# Patient Record
Sex: MALE | Race: WHITE | Employment: UNEMPLOYED | URBAN - METROPOLITAN AREA
[De-identification: names, ages, dates, MRNs, and addresses within clinical notes are randomized per-mention and may not be internally consistent; named-entity substitution may affect disease eponyms.]

---

## 2017-12-18 ENCOUNTER — HOSPITAL ENCOUNTER (INPATIENT)
Age: 42
LOS: 7 days | Discharge: HOME | DRG: 751 | End: 2017-12-25
Attending: PSYCHIATRY & NEUROLOGY | Admitting: PSYCHIATRY & NEUROLOGY
Payer: COMMERCIAL

## 2017-12-18 PROCEDURE — 85025 COMPLETE CBC W/AUTO DIFF WBC: CPT

## 2017-12-18 PROCEDURE — 1240000000 HC EMOTIONAL WELLNESS R&B

## 2017-12-18 PROCEDURE — 6370000000 HC RX 637 (ALT 250 FOR IP): Performed by: PSYCHIATRY & NEUROLOGY

## 2017-12-18 PROCEDURE — 80053 COMPREHEN METABOLIC PANEL: CPT

## 2017-12-18 PROCEDURE — 6360000002 HC RX W HCPCS: Performed by: PSYCHIATRY & NEUROLOGY

## 2017-12-18 RX ORDER — GABAPENTIN 600 MG/1
600 TABLET ORAL 3 TIMES DAILY
Status: ON HOLD | COMMUNITY
End: 2017-12-22 | Stop reason: HOSPADM

## 2017-12-18 RX ORDER — BUPRENORPHINE AND NALOXONE 8; 2 MG/1; MG/1
1 FILM, SOLUBLE BUCCAL; SUBLINGUAL DAILY
Status: ON HOLD | COMMUNITY
End: 2018-03-12 | Stop reason: HOSPADM

## 2017-12-18 RX ORDER — IBUPROFEN 800 MG/1
800 TABLET ORAL 3 TIMES DAILY PRN
Status: DISCONTINUED | OUTPATIENT
Start: 2017-12-18 | End: 2017-12-25 | Stop reason: HOSPADM

## 2017-12-18 RX ORDER — BUPRENORPHINE AND NALOXONE 8; 2 MG/1; MG/1
1 FILM, SOLUBLE BUCCAL; SUBLINGUAL 2 TIMES DAILY
Status: DISCONTINUED | OUTPATIENT
Start: 2017-12-18 | End: 2017-12-25 | Stop reason: HOSPADM

## 2017-12-18 RX ORDER — BENZTROPINE MESYLATE 1 MG/ML
2 INJECTION INTRAMUSCULAR; INTRAVENOUS DAILY PRN
Status: DISCONTINUED | OUTPATIENT
Start: 2017-12-18 | End: 2017-12-25 | Stop reason: HOSPADM

## 2017-12-18 RX ORDER — HYDROXYZINE HYDROCHLORIDE 25 MG/1
25 TABLET, FILM COATED ORAL 3 TIMES DAILY PRN
Status: DISCONTINUED | OUTPATIENT
Start: 2017-12-18 | End: 2017-12-25 | Stop reason: HOSPADM

## 2017-12-18 RX ORDER — GABAPENTIN 600 MG/1
600 TABLET ORAL 3 TIMES DAILY
Status: DISCONTINUED | OUTPATIENT
Start: 2017-12-18 | End: 2017-12-21

## 2017-12-18 RX ORDER — TRAZODONE HYDROCHLORIDE 50 MG/1
50 TABLET ORAL NIGHTLY PRN
Status: DISCONTINUED | OUTPATIENT
Start: 2017-12-18 | End: 2017-12-25 | Stop reason: HOSPADM

## 2017-12-18 RX ORDER — MAGNESIUM HYDROXIDE/ALUMINUM HYDROXICE/SIMETHICONE 120; 1200; 1200 MG/30ML; MG/30ML; MG/30ML
30 SUSPENSION ORAL EVERY 6 HOURS PRN
Status: DISCONTINUED | OUTPATIENT
Start: 2017-12-18 | End: 2017-12-25 | Stop reason: HOSPADM

## 2017-12-18 RX ORDER — AMITRIPTYLINE HYDROCHLORIDE 100 MG/1
100 TABLET, FILM COATED ORAL NIGHTLY
Status: ON HOLD | COMMUNITY
End: 2017-12-22

## 2017-12-18 RX ORDER — ACETAMINOPHEN 325 MG/1
650 TABLET ORAL EVERY 4 HOURS PRN
Status: DISCONTINUED | OUTPATIENT
Start: 2017-12-18 | End: 2017-12-25 | Stop reason: HOSPADM

## 2017-12-18 RX ORDER — AMITRIPTYLINE HYDROCHLORIDE 25 MG/1
100 TABLET, FILM COATED ORAL NIGHTLY
Status: DISCONTINUED | OUTPATIENT
Start: 2017-12-18 | End: 2017-12-25 | Stop reason: HOSPADM

## 2017-12-18 RX ADMIN — AMITRIPTYLINE HYDROCHLORIDE 100 MG: 25 TABLET, FILM COATED ORAL at 21:12

## 2017-12-18 RX ADMIN — HYDROXYZINE HYDROCHLORIDE 25 MG: 25 TABLET, FILM COATED ORAL at 21:12

## 2017-12-18 RX ADMIN — NICOTINE POLACRILEX 2 MG: 2 GUM, CHEWING BUCCAL at 21:12

## 2017-12-18 RX ADMIN — BUPRENORPHINE HYDROCHLORIDE, NALOXONE HYDROCHLORIDE 1 FILM: 8; 2 FILM, SOLUBLE BUCCAL; SUBLINGUAL at 21:12

## 2017-12-18 RX ADMIN — GABAPENTIN 600 MG: 600 TABLET, FILM COATED ORAL at 21:12

## 2017-12-18 ASSESSMENT — PATIENT HEALTH QUESTIONNAIRE - PHQ9: SUM OF ALL RESPONSES TO PHQ QUESTIONS 1-9: 17

## 2017-12-18 ASSESSMENT — SLEEP AND FATIGUE QUESTIONNAIRES
DO YOU USE A SLEEP AID: YES
DIFFICULTY STAYING ASLEEP: YES
RESTFUL SLEEP: YES
AVERAGE NUMBER OF SLEEP HOURS: 6
DIFFICULTY FALLING ASLEEP: YES
SLEEP PATTERN: DIFFICULTY FALLING ASLEEP;RESTLESSNESS;DISTURBED/INTERRUPTED SLEEP
DIFFICULTY ARISING: NO
DO YOU HAVE DIFFICULTY SLEEPING: NO

## 2017-12-18 ASSESSMENT — LIFESTYLE VARIABLES: HISTORY_ALCOHOL_USE: NO

## 2017-12-18 ASSESSMENT — PAIN DESCRIPTION - LOCATION: LOCATION: BACK

## 2017-12-18 ASSESSMENT — PAIN SCALES - GENERAL
PAINLEVEL_OUTOF10: 0
PAINLEVEL_OUTOF10: 8

## 2017-12-19 PROBLEM — F33.9 RECURRENT DEPRESSION (HCC): Status: ACTIVE | Noted: 2017-12-19

## 2017-12-19 LAB
ABSOLUTE EOS #: 0.05 K/UL (ref 0–0.4)
ABSOLUTE IMMATURE GRANULOCYTE: ABNORMAL K/UL (ref 0–0.3)
ABSOLUTE LYMPH #: 1.7 K/UL (ref 1–4.8)
ABSOLUTE MONO #: 0.18 K/UL (ref 0.1–1.3)
ALBUMIN SERPL-MCNC: 3.8 G/DL (ref 3.5–5.2)
ALBUMIN/GLOBULIN RATIO: ABNORMAL (ref 1–2.5)
ALP BLD-CCNC: 68 U/L (ref 40–129)
ALT SERPL-CCNC: 22 U/L (ref 5–41)
ANION GAP SERPL CALCULATED.3IONS-SCNC: 9 MMOL/L (ref 9–17)
AST SERPL-CCNC: 21 U/L
BASOPHILS # BLD: 0 % (ref 0–2)
BASOPHILS ABSOLUTE: 0 K/UL (ref 0–0.2)
BILIRUB SERPL-MCNC: 0.3 MG/DL (ref 0.3–1.2)
BUN BLDV-MCNC: 16 MG/DL (ref 6–20)
BUN/CREAT BLD: ABNORMAL (ref 9–20)
CALCIUM SERPL-MCNC: 9.1 MG/DL (ref 8.6–10.4)
CHLORIDE BLD-SCNC: 103 MMOL/L (ref 98–107)
CO2: 29 MMOL/L (ref 20–31)
CREAT SERPL-MCNC: 0.76 MG/DL (ref 0.7–1.2)
DIFFERENTIAL TYPE: ABNORMAL
EOSINOPHILS RELATIVE PERCENT: 1 % (ref 0–4)
GFR AFRICAN AMERICAN: >60 ML/MIN
GFR NON-AFRICAN AMERICAN: >60 ML/MIN
GFR SERPL CREATININE-BSD FRML MDRD: ABNORMAL ML/MIN/{1.73_M2}
GFR SERPL CREATININE-BSD FRML MDRD: ABNORMAL ML/MIN/{1.73_M2}
GLUCOSE BLD-MCNC: 109 MG/DL (ref 70–99)
HCT VFR BLD CALC: 41 % (ref 41–53)
HEMOGLOBIN: 13.4 G/DL (ref 13.5–17.5)
IMMATURE GRANULOCYTES: ABNORMAL %
LYMPHOCYTES # BLD: 37 % (ref 24–44)
MCH RBC QN AUTO: 29.2 PG (ref 26–34)
MCHC RBC AUTO-ENTMCNC: 32.6 G/DL (ref 31–37)
MCV RBC AUTO: 89.5 FL (ref 80–100)
MONOCYTES # BLD: 4 % (ref 1–7)
MORPHOLOGY: NORMAL
PDW BLD-RTO: 14.6 % (ref 11.5–14.9)
PLATELET # BLD: 228 K/UL (ref 150–450)
PLATELET ESTIMATE: ABNORMAL
PMV BLD AUTO: 9 FL (ref 6–12)
POTASSIUM SERPL-SCNC: 4.4 MMOL/L (ref 3.7–5.3)
RBC # BLD: 4.59 M/UL (ref 4.5–5.9)
RBC # BLD: ABNORMAL 10*6/UL
SEG NEUTROPHILS: 58 % (ref 36–66)
SEGMENTED NEUTROPHILS ABSOLUTE COUNT: 2.67 K/UL (ref 1.3–9.1)
SODIUM BLD-SCNC: 141 MMOL/L (ref 135–144)
TOTAL PROTEIN: 6.9 G/DL (ref 6.4–8.3)
WBC # BLD: 4.6 K/UL (ref 3.5–11)
WBC # BLD: ABNORMAL 10*3/UL

## 2017-12-19 PROCEDURE — 36415 COLL VENOUS BLD VENIPUNCTURE: CPT

## 2017-12-19 PROCEDURE — 80053 COMPREHEN METABOLIC PANEL: CPT

## 2017-12-19 PROCEDURE — 6360000002 HC RX W HCPCS: Performed by: PSYCHIATRY & NEUROLOGY

## 2017-12-19 PROCEDURE — 85025 COMPLETE CBC W/AUTO DIFF WBC: CPT

## 2017-12-19 PROCEDURE — 6370000000 HC RX 637 (ALT 250 FOR IP): Performed by: PSYCHIATRY & NEUROLOGY

## 2017-12-19 PROCEDURE — 1240000000 HC EMOTIONAL WELLNESS R&B

## 2017-12-19 RX ORDER — BUPROPION HYDROCHLORIDE 100 MG/1
100 TABLET ORAL ONCE
Status: COMPLETED | OUTPATIENT
Start: 2017-12-19 | End: 2017-12-19

## 2017-12-19 RX ORDER — BUPROPION HYDROCHLORIDE 150 MG/1
150 TABLET ORAL DAILY
Status: DISCONTINUED | OUTPATIENT
Start: 2017-12-20 | End: 2017-12-25 | Stop reason: HOSPADM

## 2017-12-19 RX ADMIN — GABAPENTIN 600 MG: 600 TABLET, FILM COATED ORAL at 20:28

## 2017-12-19 RX ADMIN — NICOTINE POLACRILEX 2 MG: 2 GUM, CHEWING BUCCAL at 20:28

## 2017-12-19 RX ADMIN — NICOTINE POLACRILEX 2 MG: 2 GUM, CHEWING BUCCAL at 10:45

## 2017-12-19 RX ADMIN — AMITRIPTYLINE HYDROCHLORIDE 100 MG: 25 TABLET, FILM COATED ORAL at 20:27

## 2017-12-19 RX ADMIN — NICOTINE POLACRILEX 2 MG: 2 GUM, CHEWING BUCCAL at 13:31

## 2017-12-19 RX ADMIN — NICOTINE POLACRILEX 2 MG: 2 GUM, CHEWING BUCCAL at 08:41

## 2017-12-19 RX ADMIN — HYDROXYZINE HYDROCHLORIDE 25 MG: 25 TABLET, FILM COATED ORAL at 08:41

## 2017-12-19 RX ADMIN — GABAPENTIN 600 MG: 600 TABLET, FILM COATED ORAL at 13:31

## 2017-12-19 RX ADMIN — BUPRENORPHINE HYDROCHLORIDE, NALOXONE HYDROCHLORIDE 1 FILM: 8; 2 FILM, SOLUBLE BUCCAL; SUBLINGUAL at 20:28

## 2017-12-19 RX ADMIN — NICOTINE POLACRILEX 2 MG: 2 GUM, CHEWING BUCCAL at 15:54

## 2017-12-19 RX ADMIN — NICOTINE POLACRILEX 2 MG: 2 GUM, CHEWING BUCCAL at 22:32

## 2017-12-19 RX ADMIN — NICOTINE POLACRILEX 2 MG: 2 GUM, CHEWING BUCCAL at 17:39

## 2017-12-19 RX ADMIN — GABAPENTIN 600 MG: 600 TABLET, FILM COATED ORAL at 08:41

## 2017-12-19 RX ADMIN — BUPROPION HYDROCHLORIDE 100 MG: 100 TABLET, FILM COATED ORAL at 15:54

## 2017-12-19 RX ADMIN — BUPRENORPHINE HYDROCHLORIDE, NALOXONE HYDROCHLORIDE 1 FILM: 8; 2 FILM, SOLUBLE BUCCAL; SUBLINGUAL at 08:41

## 2017-12-19 ASSESSMENT — PAIN DESCRIPTION - LOCATION
LOCATION: BACK

## 2017-12-19 ASSESSMENT — LIFESTYLE VARIABLES: HISTORY_ALCOHOL_USE: YES

## 2017-12-19 ASSESSMENT — PAIN DESCRIPTION - ORIENTATION: ORIENTATION: LOWER

## 2017-12-19 ASSESSMENT — PAIN SCALES - GENERAL
PAINLEVEL_OUTOF10: 3
PAINLEVEL_OUTOF10: 7
PAINLEVEL_OUTOF10: 3
PAINLEVEL_OUTOF10: 7
PAINLEVEL_OUTOF10: 8

## 2017-12-19 ASSESSMENT — PAIN DESCRIPTION - PAIN TYPE
TYPE: CHRONIC PAIN

## 2017-12-19 NOTE — PROGRESS NOTES
PSYCHOEDUCATION GROUP NOTE      Date:   12/19/2017 Start Time: 1330  End Time: 1410    Number Participants in Group:  8    Goal:  Patient will demonstrate increased interpersonal interaction   Topic:coping/ communication skills group    Discipline Responsible:   OT  AT  [facility]. X RT [facility] Other       Participation Level:     None  Minimal   X Active Listener X Interactive    Monopolizing         Participation Quality:  X Appropriate  Inappropriate   X       Attentive        Intrusive   X       Sharing        Resistant   X       Supportive        Lethargic       Affective:    Congruent  Incongruent  Blunted  Flat    Constricted  Anxious  Elated  Angry    Labile  Depressed  Other x bright       Cognitive:  X Alert X Oriented PPTP     Concentration X G  F  P   Attention Span X G  F  P   Short-Term Memory  G  F  P   Long-Term Memory  G  F  P   ProblemSolving/  Decision Making X G  F  P   Ability to Process  Information X G  F  P      Contributing Factors             Delusional             Hallucinating             Flight of Ideas             Other:       Modes of Intervention:  x Education x Support x Exploration    Clarifying x Problem Solving  Confrontation   x Socialization  Limit Setting  Reality Testing   x Activity  Movement  Media    Other:            Response to Learning:  X Able to verbalize current knowledge/experience   X Able to verbalize/acknowledge new learning   X Able to retain information   X Capable of insight    Able to change behavior   X Progressing to goal    Other:        Comments:

## 2017-12-19 NOTE — PROGRESS NOTES
PSYCHOEDUCATION GROUP NOTE      Date:   12/19/2017 Start Time: 1330  End Time: 1410    Number Participants in Group:  8    Goal:  Patient will demonstrate increased interpersonal interaction   Topic:coping/ communication skills group    Discipline Responsible:   OT  AT  [facility]. X RT [facility] Other       Participation Level:     None  Minimal   X Active Listener X Interactive    Monopolizing         Participation Quality:  X Appropriate  Inappropriate   X       Attentive        Intrusive   X       Sharing        Resistant   X       Supportive        Lethargic       Affective:    Congruent  Incongruent  Blunted  Flat    Constricted  Anxious  Elated  Angry    Labile  Depressed  Other x bright       Cognitive:  X Alert X Oriented PPTP     Concentration X G  F  P   Attention Span X G  F  P   Short-Term Memory  G  F  P   Long-Term Memory  G  F  P   ProblemSolving/  Decision Making X G  F  P   Ability to Process  Information X G  F  P      Contributing Factors             Delusional             Hallucinating             Flight of Ideas             Other:       Modes of Intervention:  x Education x Support x Exploration    Clarifying x Problem Solving  Confrontation   x Socialization  Limit Setting  Reality Testing   x Activity  Movement  Media    Other:            Response to Learning:  X Able to verbalize current knowledge/experience   X Able to verbalize/acknowledge new learning   X Able to retain information   X Capable of insight    Able to change behavior   X Progressing to goal    Other:        Comments:

## 2017-12-20 PROCEDURE — 6370000000 HC RX 637 (ALT 250 FOR IP): Performed by: PSYCHIATRY & NEUROLOGY

## 2017-12-20 PROCEDURE — 1240000000 HC EMOTIONAL WELLNESS R&B

## 2017-12-20 PROCEDURE — 6360000002 HC RX W HCPCS: Performed by: PSYCHIATRY & NEUROLOGY

## 2017-12-20 RX ADMIN — NICOTINE POLACRILEX 2 MG: 2 GUM, CHEWING BUCCAL at 13:31

## 2017-12-20 RX ADMIN — BUPROPION HYDROCHLORIDE 150 MG: 150 TABLET, FILM COATED, EXTENDED RELEASE ORAL at 08:31

## 2017-12-20 RX ADMIN — NICOTINE POLACRILEX 2 MG: 2 GUM, CHEWING BUCCAL at 19:23

## 2017-12-20 RX ADMIN — HYDROXYZINE HYDROCHLORIDE 25 MG: 25 TABLET, FILM COATED ORAL at 22:25

## 2017-12-20 RX ADMIN — BUPRENORPHINE HYDROCHLORIDE, NALOXONE HYDROCHLORIDE 1 FILM: 8; 2 FILM, SOLUBLE BUCCAL; SUBLINGUAL at 08:32

## 2017-12-20 RX ADMIN — GABAPENTIN 600 MG: 600 TABLET, FILM COATED ORAL at 13:30

## 2017-12-20 RX ADMIN — NICOTINE POLACRILEX 2 MG: 2 GUM, CHEWING BUCCAL at 09:36

## 2017-12-20 RX ADMIN — GABAPENTIN 600 MG: 600 TABLET, FILM COATED ORAL at 08:31

## 2017-12-20 RX ADMIN — GABAPENTIN 600 MG: 600 TABLET, FILM COATED ORAL at 21:07

## 2017-12-20 RX ADMIN — BUPRENORPHINE HYDROCHLORIDE, NALOXONE HYDROCHLORIDE 1 FILM: 8; 2 FILM, SOLUBLE BUCCAL; SUBLINGUAL at 21:07

## 2017-12-20 RX ADMIN — AMITRIPTYLINE HYDROCHLORIDE 100 MG: 25 TABLET, FILM COATED ORAL at 22:25

## 2017-12-20 RX ADMIN — NICOTINE POLACRILEX 2 MG: 2 GUM, CHEWING BUCCAL at 10:59

## 2017-12-20 RX ADMIN — NICOTINE POLACRILEX 2 MG: 2 GUM, CHEWING BUCCAL at 15:56

## 2017-12-20 ASSESSMENT — PAIN SCALES - GENERAL
PAINLEVEL_OUTOF10: 0
PAINLEVEL_OUTOF10: 2

## 2017-12-21 PROCEDURE — 6360000002 HC RX W HCPCS: Performed by: PSYCHIATRY & NEUROLOGY

## 2017-12-21 PROCEDURE — 6370000000 HC RX 637 (ALT 250 FOR IP): Performed by: PSYCHIATRY & NEUROLOGY

## 2017-12-21 PROCEDURE — 1240000000 HC EMOTIONAL WELLNESS R&B

## 2017-12-21 RX ORDER — GABAPENTIN 400 MG/1
800 CAPSULE ORAL 3 TIMES DAILY
Status: DISCONTINUED | OUTPATIENT
Start: 2017-12-22 | End: 2017-12-25 | Stop reason: HOSPADM

## 2017-12-21 RX ADMIN — NICOTINE POLACRILEX 2 MG: 2 GUM, CHEWING BUCCAL at 10:46

## 2017-12-21 RX ADMIN — AMITRIPTYLINE HYDROCHLORIDE 100 MG: 25 TABLET, FILM COATED ORAL at 22:46

## 2017-12-21 RX ADMIN — BUPRENORPHINE HYDROCHLORIDE, NALOXONE HYDROCHLORIDE 1 FILM: 8; 2 FILM, SOLUBLE BUCCAL; SUBLINGUAL at 20:47

## 2017-12-21 RX ADMIN — NICOTINE POLACRILEX 4 MG: 4 GUM, CHEWING BUCCAL at 20:26

## 2017-12-21 RX ADMIN — HYDROXYZINE HYDROCHLORIDE 25 MG: 25 TABLET, FILM COATED ORAL at 08:37

## 2017-12-21 RX ADMIN — NICOTINE POLACRILEX 2 MG: 2 GUM, CHEWING BUCCAL at 12:28

## 2017-12-21 RX ADMIN — GABAPENTIN 700 MG: 400 CAPSULE ORAL at 15:14

## 2017-12-21 RX ADMIN — GABAPENTIN 700 MG: 400 CAPSULE ORAL at 20:47

## 2017-12-21 RX ADMIN — GABAPENTIN 600 MG: 600 TABLET, FILM COATED ORAL at 08:37

## 2017-12-21 RX ADMIN — NICOTINE POLACRILEX 2 MG: 2 GUM, CHEWING BUCCAL at 08:37

## 2017-12-21 RX ADMIN — BUPRENORPHINE HYDROCHLORIDE, NALOXONE HYDROCHLORIDE 1 FILM: 8; 2 FILM, SOLUBLE BUCCAL; SUBLINGUAL at 08:37

## 2017-12-21 RX ADMIN — NICOTINE POLACRILEX 4 MG: 4 GUM, CHEWING BUCCAL at 15:15

## 2017-12-21 RX ADMIN — NICOTINE POLACRILEX 4 MG: 4 GUM, CHEWING BUCCAL at 17:36

## 2017-12-21 RX ADMIN — BUPROPION HYDROCHLORIDE 150 MG: 150 TABLET, FILM COATED, EXTENDED RELEASE ORAL at 08:37

## 2017-12-21 ASSESSMENT — PAIN DESCRIPTION - LOCATION
LOCATION: BACK

## 2017-12-21 ASSESSMENT — PAIN SCALES - GENERAL
PAINLEVEL_OUTOF10: 3
PAINLEVEL_OUTOF10: 5
PAINLEVEL_OUTOF10: 4
PAINLEVEL_OUTOF10: 7
PAINLEVEL_OUTOF10: 7

## 2017-12-21 ASSESSMENT — PAIN DESCRIPTION - PAIN TYPE
TYPE: CHRONIC PAIN
TYPE: CHRONIC PAIN

## 2017-12-21 NOTE — PROGRESS NOTES
He is still quite depressed, despondent, overwhelmed, and having frequent suicidal thoughts. He states he finds himself perseverating on recent breakup, having difficulty managing emotions. Affect remains flat and poorly reactive. Patient has been withdrawn and isolative. Patient complains of high level of racing thoughts driving feelings of anxiety. Feeling hopeless and helpless. Denies any side effects to medications. Explored his  concerns and support provided. There is no identifiable safe alternative other than continued hospitalization. Charting and medications reviewed. Will continue Wellbutrin.

## 2017-12-22 PROCEDURE — 6360000002 HC RX W HCPCS: Performed by: PSYCHIATRY & NEUROLOGY

## 2017-12-22 PROCEDURE — 1240000000 HC EMOTIONAL WELLNESS R&B

## 2017-12-22 PROCEDURE — 6370000000 HC RX 637 (ALT 250 FOR IP): Performed by: PSYCHIATRY & NEUROLOGY

## 2017-12-22 RX ORDER — AMITRIPTYLINE HYDROCHLORIDE 100 MG/1
100 TABLET, FILM COATED ORAL NIGHTLY
Qty: 30 TABLET | Refills: 3 | Status: ON HOLD | OUTPATIENT
Start: 2017-12-22 | End: 2018-03-12

## 2017-12-22 RX ORDER — BUPROPION HYDROCHLORIDE 150 MG/1
150 TABLET ORAL DAILY
Qty: 30 TABLET | Refills: 0 | Status: ON HOLD | OUTPATIENT
Start: 2017-12-23 | End: 2018-03-09

## 2017-12-22 RX ORDER — GABAPENTIN 800 MG/1
800 TABLET ORAL 3 TIMES DAILY
Qty: 90 TABLET | Refills: 0 | Status: ON HOLD | OUTPATIENT
Start: 2017-12-22 | End: 2018-03-12

## 2017-12-22 RX ADMIN — NICOTINE POLACRILEX 4 MG: 4 GUM, CHEWING BUCCAL at 08:19

## 2017-12-22 RX ADMIN — GABAPENTIN 800 MG: 400 CAPSULE ORAL at 20:51

## 2017-12-22 RX ADMIN — BUPRENORPHINE HYDROCHLORIDE, NALOXONE HYDROCHLORIDE 1 FILM: 8; 2 FILM, SOLUBLE BUCCAL; SUBLINGUAL at 08:19

## 2017-12-22 RX ADMIN — AMITRIPTYLINE HYDROCHLORIDE 100 MG: 25 TABLET, FILM COATED ORAL at 20:51

## 2017-12-22 RX ADMIN — GABAPENTIN 700 MG: 400 CAPSULE ORAL at 08:18

## 2017-12-22 RX ADMIN — NICOTINE POLACRILEX 4 MG: 4 GUM, CHEWING BUCCAL at 20:51

## 2017-12-22 RX ADMIN — NICOTINE POLACRILEX 4 MG: 4 GUM, CHEWING BUCCAL at 12:17

## 2017-12-22 RX ADMIN — NICOTINE POLACRILEX 4 MG: 4 GUM, CHEWING BUCCAL at 17:05

## 2017-12-22 RX ADMIN — GABAPENTIN 800 MG: 400 CAPSULE ORAL at 14:14

## 2017-12-22 RX ADMIN — BUPRENORPHINE HYDROCHLORIDE, NALOXONE HYDROCHLORIDE 1 FILM: 8; 2 FILM, SOLUBLE BUCCAL; SUBLINGUAL at 20:51

## 2017-12-22 RX ADMIN — BUPROPION HYDROCHLORIDE 150 MG: 150 TABLET, FILM COATED, EXTENDED RELEASE ORAL at 08:18

## 2017-12-22 ASSESSMENT — PAIN DESCRIPTION - LOCATION
LOCATION: BACK
LOCATION: BACK

## 2017-12-22 ASSESSMENT — PAIN SCALES - GENERAL
PAINLEVEL_OUTOF10: 2
PAINLEVEL_OUTOF10: 5
PAINLEVEL_OUTOF10: 0

## 2017-12-22 NOTE — PROGRESS NOTES
Psychoeducation Group Note    Date: 12/22/17  Start Time: 1430  End Time: 1510    Number Participants in Group:  9/18    Goal:  Patient will demonstrate increased interpersonal interaction   Topic: trivia skills group    Discipline Responsible:   OT  AT  [facility]. x RT  Other       Participation Level:     None  Minimal   x Active Listener x Interactive    Monopolizing         Participation Quality:  x Appropriate  Inappropriate   x       Attentive        Intrusive          Sharing        Resistant          Supportive        Lethargic       Affective:   x Congruent  Incongruent  Blunted  Flat    Constricted  Anxious  Elated  Angry    Labile  Depressed  Other         Cognitive:  x Alert x Oriented PPTP     Concentration x G  F  P   Attention Span x G  F  P   Short-Term Memory  G  F  P   Long-Term Memory  G  F  P   ProblemSolving/  Decision Making x G  F  P   Ability to Process  Information x G  F  P      Contributing Factors             Delusional             Hallucinating             Flight of Ideas             Other:       Modes of Intervention:  x Education x Support x Exploration    Clarifying  Problem Solving  Confrontation   x Socialization  Limit Setting  Reality Testing   x Activity  Movement  Media    Other:            Response to Learning:  x Able to verbalize current knowledge/experience    Able to verbalize/acknowledge new learning   x Able to retain information   x Capable of insight   x Able to change behavior    Progressing to goal    Other:        Comments:

## 2017-12-23 PROCEDURE — 1240000000 HC EMOTIONAL WELLNESS R&B

## 2017-12-23 PROCEDURE — 6370000000 HC RX 637 (ALT 250 FOR IP): Performed by: PSYCHIATRY & NEUROLOGY

## 2017-12-23 PROCEDURE — 6360000002 HC RX W HCPCS: Performed by: PSYCHIATRY & NEUROLOGY

## 2017-12-23 RX ADMIN — GABAPENTIN 800 MG: 400 CAPSULE ORAL at 20:55

## 2017-12-23 RX ADMIN — AMITRIPTYLINE HYDROCHLORIDE 100 MG: 25 TABLET, FILM COATED ORAL at 22:30

## 2017-12-23 RX ADMIN — HYDROXYZINE HYDROCHLORIDE 25 MG: 25 TABLET, FILM COATED ORAL at 22:30

## 2017-12-23 RX ADMIN — NICOTINE POLACRILEX 4 MG: 4 GUM, CHEWING BUCCAL at 18:31

## 2017-12-23 RX ADMIN — NICOTINE POLACRILEX 4 MG: 4 GUM, CHEWING BUCCAL at 22:31

## 2017-12-23 RX ADMIN — NICOTINE POLACRILEX 4 MG: 4 GUM, CHEWING BUCCAL at 11:35

## 2017-12-23 RX ADMIN — BUPRENORPHINE HYDROCHLORIDE, NALOXONE HYDROCHLORIDE 1 FILM: 8; 2 FILM, SOLUBLE BUCCAL; SUBLINGUAL at 09:15

## 2017-12-23 RX ADMIN — NICOTINE POLACRILEX 4 MG: 4 GUM, CHEWING BUCCAL at 15:56

## 2017-12-23 RX ADMIN — BUPRENORPHINE HYDROCHLORIDE, NALOXONE HYDROCHLORIDE 1 FILM: 8; 2 FILM, SOLUBLE BUCCAL; SUBLINGUAL at 20:55

## 2017-12-23 RX ADMIN — BUPROPION HYDROCHLORIDE 150 MG: 150 TABLET, FILM COATED, EXTENDED RELEASE ORAL at 09:15

## 2017-12-23 RX ADMIN — NICOTINE POLACRILEX 4 MG: 4 GUM, CHEWING BUCCAL at 20:56

## 2017-12-23 RX ADMIN — GABAPENTIN 800 MG: 400 CAPSULE ORAL at 09:15

## 2017-12-23 RX ADMIN — GABAPENTIN 800 MG: 400 CAPSULE ORAL at 14:18

## 2017-12-23 ASSESSMENT — PAIN SCALES - GENERAL
PAINLEVEL_OUTOF10: 0
PAINLEVEL_OUTOF10: 0
PAINLEVEL_OUTOF10: 4

## 2017-12-23 ASSESSMENT — PAIN DESCRIPTION - PAIN TYPE: TYPE: CHRONIC PAIN

## 2017-12-23 ASSESSMENT — PAIN DESCRIPTION - LOCATION: LOCATION: BACK

## 2017-12-23 NOTE — H&P
HISTORY and [id number]       NAME:  Tommie Simmons  MRN: [id number]   YOB: 1975   Date: 12/23/2017   Age: 43 y.o. Gender: male     COMPLAINT AND PRESENT HISTORY:      Tommie Simmons is 43 y.o.,  male, admitted because of depression. Pt has suicidal ideation, Pt has plans to cut self. Pt denies any homicidal ideation. Pt has a history of previous suicide attempts by Cutting wrists. Pt feels hopeless, helpless, worthless, lack of interest, loss of energy. Poor insight. Pt has fair sleep, appetite, concentration and memory. No current auditory, visual or tactile hallucinations. Patient uses marijuana. Patient lived with his wife, patient will be moving in with his uncle, patient is on disability. Pt has been compliant with the psychiatric medications. DIAGNOSTIC RESULTS   Labs:      PAST MEDICAL HISTORY     Past Medical History:   Diagnosis Date    Depression     Hep C w/ coma, chronic (HCC)     Substance abuse        Pt denies any history of Diabetes mellitus type 2, hypertension, stroke, heart disease, COPD, Asthma, GERD, HLD, Cancer, Seizures,Thyroid disease, Kidney Disease, TB.    SURGICAL HISTORY     History reviewed. No pertinent surgical history. FAMILY HISTORY       Family History   Problem Relation Age of Onset    Diabetes Mother     Hypertension Mother     Depression Mother      & Uncles    Coronary Art Dis Father     Cancer Other      G. Parent  ?        SOCIAL HISTORY       Social History     Social History    Marital status: Legally      Spouse name: Pro Bolden Number of children: 2    Years of education: N/A     Social History Main Topics    Smoking status: Current Every Day Smoker     Packs/day: 2.00     Years: 26.00     Types: Cigarettes    Smokeless tobacco: None    Alcohol use No      Comment: Quit drinking at 22 yrs old    Drug use: Yes     Frequency: 7.0 times per week     Types: Marijuana, IV      Comment: uses 1 gram of Heroin a day and any opiates he can get    Sexual activity: Yes     Partners: Female     Other Topics Concern    None     Social History Narrative    None           REVIEW OF SYSTEMS      No Known Allergies    No current facility-administered medications on file prior to encounter. Current Outpatient Prescriptions on File Prior to Encounter   Medication Sig Dispense Refill    ibuprofen (ADVIL;MOTRIN) 800 MG tablet Take 1 tablet by mouth 3 times daily as needed for Pain 90 tablet 0                      General health:  Fairly good. No fever or chills. Skin:  No itching, redness or rash. Head, eyes, ears, nose, throat:  No headache, epistaxis, rhinorrhea hearing loss or sore throat. Neck:  No pain, stiffness or masses. Cardiovascular/Respiratory system:  No chest pain, palpitation, shortness of breath, coughing or expectoration. Gastrointestinal tract: No abdominal pain, nausea, vomiting, diarrhea or constipation. Genitourinary:  No burning on micturition. No hesitancy, urgency, frequency or discoloration of urine. Locomotor:  No bone or joint pains. No swelling or deformities. Neuropsychiatric:  See HPI. GENERAL PHYSICAL EXAM:     Vitals: BP (!) 139/91   Pulse 96   Temp 97.7 °F (36.5 °C)   Resp 14   Ht 6\"  Wt 190 lb (86.2 kg)   SpO2 98%      Pt was examined with a nurse present in the room. GENERAL APPEARANCE:  Kim Elena is 43 y.o.,  male, not obese, nourished, conscious, alert. Does not appear to be distress or pain at this time. SKIN:  Warm, dry, no cyanosis or jaundice. HEAD:  Normocephalic, atraumatic, no swelling or tenderness. EYES:  Pupils equal, reactive to light, Conjunctiva is clear, EOMs intact danika. eyelids WNL. EARS:  No discharge, no marked hearing loss. NOSE:  No rhinorrhea, epistaxis or septal deformity. THROAT:  Not congested. No ulceration bleeding or discharge.      NECK: No stiffness, trachea central.  No palpable masses or L.N.      CHEST:  Symmetrical and equal on expansion. HEART:  Regular rate and rhythm. S1 > S2, No audible murmurs or gallops. LUNGS:  Equal on expansion, normal breath sounds. No adventitious sounds. ABDOMEN:  Soft on palpation. No localized tenderness. No guarding or rigidity. No palpable organomegaly. LYMPHATICS:  No palpable Lymphadenopathy. LOCOMOTOR, BACK AND SPINE:  No tenderness or deformities. EXTREMITIES:  Symmetrical, no pedal edema. Multiple scars in the forearms from previous cutting. Rosalvas sign negative. No discoloration or ulcerations. NEUROLOGIC:  The patient is conscious, alert, oriented, Gait and balance WNL. No apparent focal sensory deficits. No motor deficits, muscle strength equal Desmond. No facial droop, tongue protrudes centrally, no slurring of the speech.             PROVISIONAL DIAGNOSES:      Active Problems:    Recurrent [facility] ([facility]      KOKI MELO PA-C on 12/23/2017 at 6:19 AM

## 2017-12-24 PROCEDURE — 1240000000 HC EMOTIONAL WELLNESS R&B

## 2017-12-24 PROCEDURE — 6370000000 HC RX 637 (ALT 250 FOR IP): Performed by: PSYCHIATRY & NEUROLOGY

## 2017-12-24 PROCEDURE — 6360000002 HC RX W HCPCS: Performed by: PSYCHIATRY & NEUROLOGY

## 2017-12-24 RX ADMIN — NICOTINE POLACRILEX 4 MG: 4 GUM, CHEWING BUCCAL at 21:07

## 2017-12-24 RX ADMIN — GABAPENTIN 800 MG: 400 CAPSULE ORAL at 14:35

## 2017-12-24 RX ADMIN — NICOTINE POLACRILEX 4 MG: 4 GUM, CHEWING BUCCAL at 14:59

## 2017-12-24 RX ADMIN — GABAPENTIN 800 MG: 400 CAPSULE ORAL at 08:13

## 2017-12-24 RX ADMIN — NICOTINE POLACRILEX 4 MG: 4 GUM, CHEWING BUCCAL at 08:13

## 2017-12-24 RX ADMIN — NICOTINE POLACRILEX 4 MG: 4 GUM, CHEWING BUCCAL at 12:17

## 2017-12-24 RX ADMIN — NICOTINE POLACRILEX 4 MG: 4 GUM, CHEWING BUCCAL at 17:33

## 2017-12-24 RX ADMIN — HYDROXYZINE HYDROCHLORIDE 25 MG: 25 TABLET, FILM COATED ORAL at 22:11

## 2017-12-24 RX ADMIN — AMITRIPTYLINE HYDROCHLORIDE 100 MG: 25 TABLET, FILM COATED ORAL at 22:11

## 2017-12-24 RX ADMIN — GABAPENTIN 800 MG: 400 CAPSULE ORAL at 21:07

## 2017-12-24 RX ADMIN — BUPRENORPHINE HYDROCHLORIDE, NALOXONE HYDROCHLORIDE 1 FILM: 8; 2 FILM, SOLUBLE BUCCAL; SUBLINGUAL at 08:13

## 2017-12-24 RX ADMIN — IBUPROFEN 800 MG: 800 TABLET ORAL at 21:07

## 2017-12-24 RX ADMIN — BUPROPION HYDROCHLORIDE 150 MG: 150 TABLET, FILM COATED, EXTENDED RELEASE ORAL at 08:13

## 2017-12-24 RX ADMIN — BUPRENORPHINE HYDROCHLORIDE, NALOXONE HYDROCHLORIDE 1 FILM: 8; 2 FILM, SOLUBLE BUCCAL; SUBLINGUAL at 21:07

## 2017-12-24 ASSESSMENT — PAIN SCALES - GENERAL
PAINLEVEL_OUTOF10: 5
PAINLEVEL_OUTOF10: 0
PAINLEVEL_OUTOF10: 0
PAINLEVEL_OUTOF10: 4

## 2017-12-24 ASSESSMENT — PAIN DESCRIPTION - LOCATION: LOCATION: BACK

## 2017-12-25 VITALS
OXYGEN SATURATION: 98 % | SYSTOLIC BLOOD PRESSURE: 121 MMHG | RESPIRATION RATE: 14 BRPM | DIASTOLIC BLOOD PRESSURE: 73 MMHG | WEIGHT: 190 LBS | BODY MASS INDEX: 37.3 KG/M2 | HEIGHT: 60 IN | TEMPERATURE: 97.3 F | HEART RATE: 86 BPM

## 2017-12-25 PROCEDURE — 6370000000 HC RX 637 (ALT 250 FOR IP): Performed by: PSYCHIATRY & NEUROLOGY

## 2017-12-25 PROCEDURE — 6360000002 HC RX W HCPCS: Performed by: PSYCHIATRY & NEUROLOGY

## 2017-12-25 RX ADMIN — NICOTINE POLACRILEX 4 MG: 4 GUM, CHEWING BUCCAL at 08:19

## 2017-12-25 RX ADMIN — GABAPENTIN 800 MG: 400 CAPSULE ORAL at 08:18

## 2017-12-25 RX ADMIN — BUPROPION HYDROCHLORIDE 150 MG: 150 TABLET, FILM COATED, EXTENDED RELEASE ORAL at 08:18

## 2017-12-25 RX ADMIN — BUPRENORPHINE HYDROCHLORIDE, NALOXONE HYDROCHLORIDE 1 FILM: 8; 2 FILM, SOLUBLE BUCCAL; SUBLINGUAL at 08:18

## 2017-12-25 ASSESSMENT — PAIN SCALES - GENERAL: PAINLEVEL_OUTOF10: 0

## 2017-12-25 NOTE — PLAN OF CARE
Problem: Altered Mood, Depressive Behavior  Goal: LTG-Able to verbalize and/or display a decrease in depressive symptoms  Outcome: Ongoing  Met with pt 1:1 , pt able to verbalize issues and express concerns to staff as needed. Pt encouraged to attend groups and identify ways to cope with depression and anxiety. Pt remains in behavioral control and takes all meds,did go PM wrap up group and states no SI at this time and anxiety and dep 4/10. Free from self harm.
Problem: Altered Mood, Depressive Behavior  Goal: LTG-Able to verbalize and/or display a decrease in depressive symptoms  Outcome: Ongoing  PSYCHOTHERAPY GROUP NOTE       Date:            12/20/17      Start Time:       11:00                  End Time: 12:00      Number Participants in Group: 5/9      Goals: To participate in psychotherapy and remain in the here and now. RT X SW  Nsg  LPN   BHTII  Other       Participation Level:     None  Minimal   X Active Listener X Interactive    Monopolizing         Participation Quality:  X Appropriate  Inappropriate   X  Attentive   Intrusive   X  Sharing   Resistant   X  Supportive    Lethargic       Affective:    Congruent  Incongruent  Blunted  Flat    Constricted  Anxious  Elated  Angry    Labile  Depressed  Other         Cognitive:  X Alert X Oriented PPTS     Concentration G X F  P    Attention Span G X F  P    Short-Term Memory G  F X P    Long-Term Memory G  F X P    ProblemSolving/  Decision Making G  F X P    Ability to Process  Information G X F  P       Contributing Factors             Delusional             Hallucinating             Flight of Ideas             Other: poor concentration       Modes of Intervention:   Education X Support  Exploration   X Clarifying X Problem Solving  Confrontation   X Socialization X Limit Setting  Reality Testing    Activity  Movement  Media    Other:          Response to Learning:  X Able to verbalize current knowledge/experience   X Able to verbalize/acknowledge new learning    Able to retain information    Capable of insight    Able to change behavior   X Progressing to goal    Other:        Comments: PT participates in group.
Problem: Altered Mood, Depressive Behavior  Goal: LTG-Able to verbalize and/or display a decrease in depressive symptoms  Outcome: Ongoing  Psycho Education Group Note    Date: 12/23/17  Start Time: 10:00 am  End Time: 10:45 am    Number Participants in Group:  7/17    Goal:  Patient will demonstrate increased interpersonal interaction   Topic: [place] [facility]o Education Group: Holiday stress and depression management     Discipline Responsible:   OT  AT X SW  Nsg.  RT  Other       Participation Level:     None  Minimal    Active Listener x Interactive    Monopolizing         Participation Quality:  x Appropriate  Inappropriate          Attentive        Intrusive          Sharing        Resistant          Supportive        Lethargic       Affective:   x Congruent  Incongruent  Blunted  Flat    Constricted  Anxious  Elated  Angry    Labile  Depressed  Other         Cognitive:  x Alert x Oriented PPTP     Concentration  G  F x P   Attention Span  G  F x P   Short-Term Memory  G  F x P   Long-Term Memory  G  F x P   ProblemSolving/  Decision Making  G  F x P   Ability to Process  Information  G  F x P      Contributing Factors             Delusional             Hallucinating   x          Flight of Ideas             Other:       Modes of Intervention:  x Education x Support x Exploration    Clarifying x Problem Solving  Confrontation    Socialization  Limit Setting  Reality Testing    Activity  Movement  Media    Other:            Response to Learning:   Able to verbalize current knowledge/experience    Able to verbalize/acknowledge new learning    Able to retain information    Capable of insight    Able to change behavior   x Progressing to goal    Other:        Comments:   Pt. Was able to identify stressors and give examples of positive supports.
Problem: Altered Mood, Depressive Behavior  Goal: LTG-Able to verbalize and/or display a decrease in depressive symptoms  Outcome: Ongoing  Psychoeducation Group Note    Date: 12/22/2017  Start Time: 1000  End Time: 1045    Number Participants in Group:  3    Goal:  Patient will demonstrate increased interpersonal interaction   Topic: Leisure/ social skills    Discipline Responsible:   OT  AT  [facility]. x RT  Other       Participation Level:     None  Minimal   x Active Listener x Interactive    Monopolizing         Participation Quality:  x Appropriate  Inappropriate   x       Attentive        Intrusive   x       Sharing        Resistant          Supportive        Lethargic       Affective:   x Congruent  Incongruent  Blunted  Flat    Constricted  Anxious  Elated  Angry    Labile  Depressed  Other         Cognitive:  x Alert x Oriented PPTP     Concentration x G  F  P   Attention Span x G  F  P   Short-Term Memory x G  F  P   Long-Term Memory x G  F  P   ProblemSolving/  Decision Making x G  F  P   Ability to Process  Information x G  F  P      Contributing Factors             Delusional             Hallucinating             Flight of Ideas             Other:       Modes of Intervention:  x Education x Support x Exploration   x Clarifying x Problem Solving  Confrontation   x Socialization  Limit Setting x Reality Testing   x Activity  Movement  Media    Other:            Response to Learning:  x Able to verbalize current knowledge/experience   x Able to verbalize/acknowledge new learning   x Able to retain information    Capable of insight    Able to change behavior   x Progressing to goal    Other:        Comments:
Problem: Altered Mood, Depressive Behavior  Goal: LTG-Able to verbalize and/or display a decrease in depressive symptoms  Outcome: Ongoing  Pt denies SI, anxious regarding discharge, states he is ready to go home. Pt educated on importance of follow up appointments and medication compliance. Will monitor for safety and offer support as needed.
Problem: Altered Mood, Depressive Behavior  Goal: LTG-Able to verbalize and/or display a decrease in depressive symptoms  Outcome: Ongoing  Pt did not participate in cognitive skills/ mental health education group at 1330 despite staff encouragement to attend.
Problem: Altered Mood, Depressive Behavior  Goal: STG-Absence of Self Harm  Outcome: Ongoing  During one on one conversation this patient continues to admit that he still has fleeting thoughts of wanting to harm self, but has no plan at this time and contracts to safety while on the unit. This patient denies wanting to harm others. This patient is attending select groups through out the day and is social with select peers. There are no plans for discharge at this time due to this patient states he is not ready to go. Until then this patient will remain on 15 min checks per unit policy to maintain safety.
Problem: Altered Mood, Depressive Behavior  Goal: STG-Absence of Self Harm  Outcome: Ongoing  Met with pt 1:1 , pt able to verbalize issues and express concerns to staff as needed. Pt encouraged to attend groups and identify ways to cope with depression and anxiety. Pt remains in behavioral control and takes all meds,did go PM wrap up group and states no SI at this time and anxiety and dep 4/10. Free from self harm.
Problem: Altered Mood, Depressive Behavior  Goal: STG-Absence of Self Harm  Outcome: Ongoing  Pt admits to having thoughts of self harm. Agreeable to seeking out staff should feelings increase. Able to identify positive coping skills and plan for safety. Will cont to monitor q15 minutes for safety and provide support and reassurance as needed.
Problem: Altered Mood, Depressive Behavior  Goal: STG-Absence of Self Harm  Outcome: Ongoing  Pt denies thoughts of self harm and is agreeable to seeking out should thoughts of self harm arise. Safe environment maintained. Q15 minute checks for safety cont per unit policy. Will cont to monitor for safety and provides support and reassurance as needed.
Problem: Altered Mood, Depressive Behavior  Goal: STG-Absence of Self Harm  Outcome: Ongoing  Pt denies thoughts of self harm and is agreeable to seeking out should thoughts of self harm arise. Safe environment maintained. Q15 minute checks for safety cont per unit policy. Will cont to monitor for safety and provides support and reassurance as needed.
Problem: Altered Mood, Depressive Behavior  Goal: STG-Absence of Self Harm  Outcome: Ongoing  Pt seclusive to self and room this morning. Continues to admit to fleeting suicidal ideations upon request this morning. Denies any plans but continues to report feeling depressed and overwhelmed with  his wife and losing a grandchild. Agrees to seek out staff as needed and before harming self if negative self harm thoughts arise. Q15 minute checks for safety cont.
Problem: Substance Abuse  Goal: LTG-Absence of acute withdrawl symptoms  Outcome: Ongoing  Pt appeared absent of acute withdrawal symptoms. Pt denies thoughts to hurt self or others. Pt denies all hallucinations. Pt appeared cooperative and social. Pt was observed attending unit programming. Pt was encouraged to communicate with staff if symptoms worsen or needs arise. Pt independence was promoted.
Problem: Substance Abuse  Goal: LTG-Absence of acute withdrawl symptoms  Outcome: Ongoing  Pt. Absent of acute withdraw symptoms at this time. Continue to monitor and encourage use of PRN medications if needed.
Problem: Substance Abuse  Goal: LTG-Absence of acute withdrawl symptoms  Outcome: Ongoing  Pt. Absent of withdraw symptoms at this time. Continue to monitor patient q 15 minute checks.
Problem: Substance Abuse  Goal: STG-Knowledge of positive coping patterns  Outcome: Ongoing  1:1 with pt x ten minutes. Pt encouraged to attend unit programming and interact with peers and staff. Pt also encouraged to tend to hygiene and ADLs. Pt encouraged to discuss feelings with staff and feedback and reassurance provided.
Problem: Substance Abuse  Goal: STG-Knowledge of positive coping patterns  Outcome: Ongoing  Psychoeducation Group Note    Date: 12/24/2017  Start Time: 0845  End Time: 090    Number Participants in Group:  3    Goal:  Patient will demonstrate increased interpersonal interaction   Topic: Community meeting/ goals group    Discipline Responsible:   OT  AT  [facility]. x RT  Other       Participation Level:     None  Minimal   x Active Listener x Interactive    Monopolizing         Participation Quality:  x Appropriate  Inappropriate   x       Attentive        Intrusive   x       Sharing        Resistant          Supportive        Lethargic       Affective:   x Congruent  Incongruent  Blunted  Flat    Constricted  Anxious  Elated  Angry    Labile  Depressed  Other         Cognitive:  x Alert x Oriented PPTP     Concentration x G  F  P   Attention Span x G  F  P   Short-Term Memory x G  F  P   Long-Term Memory x G  F  P   ProblemSolving/  Decision Making x G  F  P   Ability to Process  Information x G  F  P      Contributing Factors             Delusional             Hallucinating             Flight of Ideas             Other:       Modes of Intervention:  x Education x Support x Exploration   x Clarifying x Problem Solving  Confrontation   x Socialization  Limit Setting x Reality Testing   x Activity  Movement  Media    Other:            Response to Learning:  x Able to verbalize current knowledge/experience   x Able to verbalize/acknowledge new learning   x Able to retain information    Capable of insight    Able to change behavior   x Progressing to goal    Other:        Comments:
Problem: Substance Abuse  Goal: STG-Knowledge of positive coping patterns  Outcome: Ongoing  Pt. Not knowledgeable of positive coping mechanisms at this time. Continue to monitor patient and encourage patient to seek out staff to discuss life coping skills.
Problem: Substance Abuse  Goal: STG-Knowledge of positive coping patterns  Outcome: Ongoing  Writer encouraged pt to attend groups, continue taking prescribed medications and go to all follow up appointments.
Goals: reviewed group plans and strategies for care    Method:group therapy, medication compliance, individualized assessments and care planning    Outcome: needs reinforcement    PATIENT GOALS: to be discussed with patient within 72 hours    PLAN/TREATMENT RECOMMENDATIONS:     continue group therapy , medications compliance, goal setting, individualized assessments and care, continue to monitor pt on unit      SHORT-TERM GOALS:   Time frame for Short-Term Goals: 5-7 days    LONG-TERM GOALS:  Time frame for Long-Term Goals: 6 months  Members Present in Team Meeting: See Signature Sheet    VERONICA Palmer
[employer], Unmotivated, Unrealistic  Present Suicidal Ideation: Yes (fleeting, denies plan)  Present Homicidal Ideation: No    Daily Assessment Last Entry:   Daily Sleep (WDL): Within Defined Limits         Patient Currently in Pain: Yes  Daily Nutrition (WDL): Within Defined Limits    Patient Monitoring:  Frequency of Checks: 4 times per hour, close    Psychiatric Symptoms:   Depression Symptoms  Depression Symptoms: Feelings of helplessness, Feelings of hopelessess, Isolative  Anxiety Symptoms  Anxiety Symptoms: Generalized  Lianna Symptoms  Lianna Symptoms: Poor judgment     Psychosis Symptoms  Delusion Type: No problems reported or observed. Suicide Risk CSSR-S:  1) Within the past month, have you wished you were dead or wished you could go to sleep and not wake up? : YES  2) Within the past month, have you actually had any thoughts of killing yourself? : YES  3) Within the past month, have you been thinking about how you might kill yourself? : YES  5) Within the past month, have you started to work out or worked out the details of how to kill yourself?  Do you intend to carry out this plan? : YES  6)  Have you ever done anything, started to do anything, or prepared to do anything to end your life?: YES  Change in Result No Change in Plan of care No      EDUCATION:   EDUCATION:   Learner Progress Toward Treatment Goals: Reviewed results and recommendations of this team, Reviewed group plan and strategies, Reviewed signs, symptoms and risk of self harm and violent behavior, Reviewed goals and plan of care    Method:small group, individual verbal education    Outcome:verbalized by patient, but needs reinforcement to obtain goals    PATIENT GOALS:  Short term: Talk to doctor about getting on the right meds/ go to groups  Long term: Be compliant with medications and follow up appointments    PLAN/TREATMENT RECOMMENDATIONS UPDATE: continue with group therapies, increased socialization, continue planning for after

## 2017-12-25 NOTE — BH NOTE
All patients home meds were verified,pt receives suboxone 8-2 mg from Dr Roberta Zepeda. [facility] is Virtualtwo last filled 12/11/17.
[facility] PSYCHOEDUCATION GROUP NOTE     Date: 12/20/17      Start Time: 1600     End Time: 5135        Number Participants in Group: 12     Name of group: MAT     Discipline Responsible:    OT   AT   [facility].   RT MHPx Other         Participation Level:                   None   Minimal   x Active Listener x Interactive     Monopolizing             Participation Quality:  x Appropriate   Inappropriate   x       Attentive         Intrusive   x       Sharing         Resistant   x       Supportive         Lethargic         Affective:          x Congruent   Incongruent   Blunted   Flat     Constricted   Anxious   Elated   Angry     Labile   Depressed   Other             Cognitive:  x Alert x Oriented PPTP      Concentration x G   F   P   Attention Span x G   F   P   Short-Term Memory x G   F   P   Long-Term Memory x G   F   P   ProblemSolving/  Decision Making x G   F   P   Ability to Process  Information x G   F   P        Contributing Factors              Delusional              Hallucinating              Flight of Ideas              Other: poor concentration         Modes of Intervention:  x Education x Support x Exploration     Clarifying   Problem Solving   Confrontation   x Socialization   Limit Setting   Reality Testing   x Activity   Movement   Media     Other:                  Response to Learning:  x Able to verbalize current knowledge/experience   x Able to verbalize/acknowledge new learning   x Able to retain information   x Capable of insight   x Able to change behavior   x Progressing to goal     Other:          Comments:    
PSYCHOEDUCATION GROUP NOTE    Date: 12/22/17       Start Time: 1600      End Time: 6692    Number Participants in Group: 7     Name of group: Wellness Group     Discipline Responsible:   OT  AT  [facility].  x MHP Other       Participation Level:     None  Minimal   x Active Listener x Interactive    Monopolizing         Participation Quality:  x Appropriate  Inappropriate   x       Attentive        Intrusive   x       Sharing        Resistant   x       Supportive        Lethargic       Affective:   x Congruent  Incongruent  Blunted  Flat    Constricted  Anxious  Elated  Angry    Labile  Depressed  Other         Cognitive:  x Alert x Oriented PPTP     Concentration x G  F  P   Attention Span x G  F  P   Short-Term Memory x G  F  P   Long-Term Memory x G  F  P   ProblemSolving/  Decision Making x G  F  P   Ability to Process  Information x G  F  P      Contributing Factors             Delusional             Hallucinating             Flight of Ideas             Other:        Modes of Intervention:  x Education x Support  Exploration   x Clarifying  Problem Solving  Confrontation   x Socialization x Limit Setting  Reality Testing   x Activity  Movement x Media    Other:            Response to Learning:  x Able to verbalize current knowledge/experience   x Able to verbalize/acknowledge new learning   x Able to retain information   x Capable of insight   x Able to change behavior   x Progressing to goal    Other:        Comments:
Patient given tobacco quitline number 48904005145 at this time, refusing to call at this time, states \" I just dont want to quit now\"- patient given information as to the dangers of long term tobacco use. Continue to reinforce the importance of tobacco cessation.
Patient is complaining of anxiety at this time,  feeling restless and having trouble sleeping and calming down in order to rest this evening. PRN medication was given as ordered for increased anxiety.
Psychoeducation Group Note    Date: 12/19/17  Start Time: 845AM  End Time: 930AM    Number Participants in Group: 10    Goal:  Patient will demonstrate increased interpersonal interaction   Topic: COMMUNITY MEETING/GOALS GROUP    Discipline Responsible:   OT  AT  [facility]. X RT  Other       Participation Level:     None  Minimal   X Active Listener X Interactive    Monopolizing         Participation Quality:   Appropriate  Inappropriate   X       Attentive        Intrusive   X       Sharing        Resistant          Supportive        Lethargic       Affective:    Congruent  Incongruent X Blunted  Flat    Constricted  Anxious  Elated  Angry    Labile  Depressed  Other  BRIGHTENS       Cognitive:  X Alert X Oriented PPTP     Concentration X G  F  P   Attention Span X G  F  P   Short-Term Memory  G  F  P   Long-Term Memory  G  F  P   ProblemSolving/  Decision Making  G X F  P   Ability to Process  Information X G  F  P      Contributing Factors             Delusional             Hallucinating             Flight of Ideas             Other:       Modes of Intervention:  X Education X Support X Exploration   X Clarifying X Problem Solving  Confrontation    Socialization  Limit Setting  Reality Testing   X Activity  Movement  Media    Other:            Response to Learning:  X Able to verbalize current knowledge/experience   X Able to verbalize/acknowledge new learning   X Able to retain information    Capable of insight    Able to change behavior   X Progressing to goal    Other:        Comments:
Psychoeducation Group Note    Date: 12/20/17  Start Time: 1000AM  End Time: 1045AM    Number Participants in Group:  4    Goal:  Patient will demonstrate increased interpersonal interaction   Topic: SKILLS GROUP: DEDUCTIVE REASONING TASK    Discipline Responsible:   OT  AT  [facility]. X RT  Other       Participation Level:     None  Minimal   X Active Listener X Interactive    Monopolizing         Participation Quality:   Appropriate  Inappropriate   X       Attentive        Intrusive   X       Sharing        Resistant   X       Supportive        Lethargic       Affective:   X Congruent  Incongruent  Blunted  Flat    Constricted  Anxious  Elated  Angry    Labile  Depressed  Other X BRIGHTENS       Cognitive:  X Alert X Oriented PPTP     Concentration X G  F  P   Attention Span X G  F  P   Short-Term Memory  G  F  P   Long-Term Memory  G  F  P   ProblemSolving/  Decision Making  G X F  P   Ability to Process  Information X G  F  P      Contributing Factors             Delusional             Hallucinating             Flight of Ideas             Other:       Modes of Intervention:  X Education X Support X Exploration   X Clarifying X Problem Solving  Confrontation   X Socialization  Limit Setting  Reality Testing   X Activity  Movement  Media    Other:            Response to Learning:  X Able to verbalize current knowledge/experience   X Able to verbalize/acknowledge new learning   X Able to retain information    Capable of insight    Able to change behavior   X Progressing to goal    Other:        Comments:
Psychoeducation Group Note    Date: 12/21/17  Start Time: 1000AM  End Time: 1045AM    Number Participants in Group:  6/10    Goal:  Patient will demonstrate increased interpersonal interaction   Topic: SKILLS GROUP: PROBLEM SOLVING TASK    Discipline Responsible:   OT  AT  [facility]. X RT  Other       Participation Level:     None  Minimal   X Active Listener X Interactive    Monopolizing         Participation Quality:   Appropriate  Inappropriate   X       Attentive        Intrusive   X       Sharing        Resistant   X       Supportive        Lethargic       Affective:   X Congruent  Incongruent  Blunted  Flat    Constricted  Anxious  Elated  Angry    Labile  Depressed  Other X BRIGHTENS       Cognitive:  X Alert X Oriented PPTP     Concentration  G X F  P   Attention Span  G X F  P   Short-Term Memory  G X F  P   Long-Term Memory  G X F  P   ProblemSolving/  Decision Making  G X F  P   Ability to Process  Information  G X F  P      Contributing Factors             Delusional             Hallucinating             Flight of Ideas             Other: SOME THOUGHT BLOCKING       Modes of Intervention:  X Education X Support X Exploration   X Clarifying X Problem Solving  Confrontation   X Socialization  Limit Setting  Reality Testing   X Activity  Movement  Media    Other:            Response to Learning:  X Able to verbalize current knowledge/experience   X Able to verbalize/acknowledge new learning   X Able to retain information    Capable of insight    Able to change behavior   X Progressing to goal    Other: PT ABLE TO SHARE HUMOR WITH PEERS ET RT       Comments:
Psychoeducation Group Note    Date: 12/21/17  Start Time: 1430  End Time: 1515    Number Participants in Group:  12/17    Goal:  Patient will demonstrate increased interpersonal interaction   Topic: COGNITIVE SKILLS GROUP: COMMUNICATION AND PROBLEM SOLVING TASK    Discipline Responsible:   OT  AT  [facility]. X RT  Other       Participation Level:     None  Minimal   X Active Listener X Interactive    Monopolizing         Participation Quality:   Appropriate  Inappropriate   X       Attentive        Intrusive   X       Sharing        Resistant   X       Supportive        Lethargic       Affective:   X Congruent  Incongruent  Blunted  Flat    Constricted  Anxious  Elated  Angry    Labile  Depressed  Other X BRIGHTENS       Cognitive:  X Alert X Oriented PPTP     Concentration X G  F  P   Attention Span X G  F  P   Short-Term Memory  G  F  P   Long-Term Memory  G  F  P   ProblemSolving/  Decision Making  G X F  P   Ability to Process  Information X G  F  P      Contributing Factors             Delusional             Hallucinating             Flight of Ideas             Other:       Modes of Intervention:  X Education X Support X Exploration   X Clarifying X Problem Solving  Confrontation   X Socialization  Limit Setting  Reality Testing   X Activity  Movement  Media    Other:            Response to Learning:  X Able to verbalize current knowledge/experience   X Able to verbalize/acknowledge new learning   X Able to retain information    Capable of insight    Able to change behavior   X Progressing to goal    Other:        Comments:
Psychoeducation Group Note    Date: 12/21/17  Start Time: 845AM  End Time: 915AM    Number Participants in Group:  8    Goal:  Patient will demonstrate increased interpersonal interaction   Topic: COMMUNITY MEETING/GOALS GROUP    Discipline Responsible:   OT  AT  [facility]. X RT  Other       Participation Level:     None  Minimal   X Active Listener X Interactive    Monopolizing         Participation Quality:   Appropriate  Inappropriate   X       Attentive        Intrusive   X       Sharing        Resistant   X       Supportive        Lethargic       Affective:    Congruent  Incongruent X Blunted  Flat    Constricted  Anxious  Elated  Angry    Labile  Depressed  Other X BRIGHTENS       Cognitive:  X Alert X Oriented PPTP     Concentration  G X F  P   Attention Span  G X F  P   Short-Term Memory  G  F  P   Long-Term Memory  G  F  P   ProblemSolving/  Decision Making  G X F  P   Ability to Process  Information  G X F  P      Contributing Factors             Delusional             Hallucinating             Flight of Ideas             Other:       Modes of Intervention:  X Education X Support X Exploration   X Clarifying X Problem Solving  Confrontation    Socialization  Limit Setting  Reality Testing   X Activity  Movement  Media    Other:            Response to Learning:  X Able to verbalize current knowledge/experience   X Able to verbalize/acknowledge new learning   X Able to retain information    Capable of insight    Able to change behavior   X Progressing to goal    Other:        Comments:
Psychoeducation Group Note    Date: 12/21/2017  Start Time: 2030  End Time: 2100    Number Participants in Group:  8    Goal:  Patient will demonstrate increased interpersonal interaction   Topic: Wrap up/ Relaxation and Positive Affirmations    Discipline Responsible:   OT  AT  [facility].  RT BHT2 Other       Participation Level:     None  Minimal   x Active Listener x Interactive    Monopolizing         Participation Quality:  x Appropriate  Inappropriate          Attentive        Intrusive   x       Sharing        Resistant          Supportive        Lethargic       Affective:   x Congruent  Incongruent  Blunted  Flat    Constricted  Anxious  Elated  Angry    Labile  Depressed  Other         Cognitive:  x Alert x Oriented PPTP     Concentration x G  F  P   Attention Span x G  F  P   Short-Term Memory x G  F  P   Long-Term Memory x G  F  P   ProblemSolving/  Decision Making x G  F  P   Ability to Process  Information x G  F  P      Contributing Factors             Delusional             Hallucinating             Flight of Ideas             Other:       Modes of Intervention:   Education x Support x Exploration    Clarifying  Problem Solving  Confrontation   x Socialization  Limit Setting  Reality Testing   x Activity  Movement  Media    Other:            Response to Learning:  x Able to verbalize current knowledge/experience   x Able to verbalize/acknowledge new learning   x Able to retain information   x Capable of insight   x Able to change behavior   x Progressing to goal    Other:        Comments: Pt was an active participant in HS groups this evening, sharing appropriately.
Pt had contacted [employer] for a discharge cab back home, [facility] had [facility]. Nurse called vickey to confirm and they stated they were unable to find a cab company open with an available . Nurse on hold with vickey for about 30 minutes, they were given number for [employer] company and [address] [place] [address] stated [employer] did not have any drivers. Nurse cancelled insurance cab and called [employer], told cab would be there in 5-30 minutes.
RN has reviewed and agrees with all technician documentation.   Jesse Camejo RN
RT ASSESSMENT TREATMENT GOALS    [x]Pt Goal: Pt will identify 1-2 positive coping skills by time of discharge. [x]Pt Goal: Pt will identify 1-2 positive aspects of self by time of discharge. []Pt Goal: Pt will remain on task/topic for 15-30 minutes during group by time of discharge. []Pt Goal: Pt will identify 1-2 aspects of relapse prevention plan by time of discharge. []Pt Goal: Pt will join in conversation with peers 1-2 times per group by time of discharge. []Pt Goal: Pt will identify 1-2 new leisure interests by time of discharge. []Pt Goal: Pt will not voice any delusional content by time of discharge.
about quitting (benefits of quitting, techniques in how to quit, available resources  ( ) Referral for counseling faxed to [facility]                              ( ) Patient refused counseling  ( ) Patient has not smoked in the last 30 days      Pt admitted with followings belongings:  Dentures: None  Vision - Corrective Lenses: Glasses  Hearing Aid: None  Jewelry: Earrings, Watch (pair of earrings)  Body Piercings Removed: N/A  Clothing: Pants, Shirt, Jacket / coat, Socks, Undergarments (Comment)  Were All Patient Medications Collected?: Not Applicable  Other Valuables: Cell phone, Lindsey Miranda placed in safe in security envelope, number: [id number]. Patient oriented to surroundings and program expectations and copy of patient rights given. Received admission packet: Yes. Consents reviewed, signed, except the Voluntary Admission form. Patient verbalize understanding of need for current admission. Patient education on precautions of [facility].                    Rubina Vega RN

## 2017-12-26 NOTE — CARE COORDINATION
- Writer calls [facility]. on [place] at [phone] and spoke with Rodney Martinez. She states PT has not had any prescriptions filled at their location in a year. She also states per [facility] PT must get all controlled prescriptions are locked and have to be filled at this [facility]. since 8/29/2016. - If PT has had prescriptions filled after 8/29/2016, per Rodney Martinez, they would have to have been picked up at [place].
Name: Marcio Mazariegos    : 1975    Discharge Date: 17    Primary Auth/Cert #: [id number]    Discharged to home     Discharge Medications:      Medication List      START taking these medications    buPROPion 150 MG extended release tablet  Commonly known as:  WELLBUTRIN XL  Take 1 tablet by mouth daily  Notes to patient:  For depression         CHANGE how you take these medications    gabapentin 800 MG tablet  Commonly known as:  NEURONTIN  Take 1 tablet by mouth 3 times daily  What changed:  · medication strength  · how much to take  Notes to patient:  For nerve pain         CONTINUE taking these medications    amitriptyline 100 MG tablet  Commonly known as:  ELAVIL  Take 1 tablet by mouth nightly  Notes to patient:  For depression      buprenorphine-naloxone 8-2 MG Film SL film  Commonly known as:  SUBOXONE  Notes to patient:  For opiate addiction      ibuprofen 800 MG tablet  Commonly known as:  ADVIL;MOTRIN  Take 1 tablet by mouth 3 times daily as needed for Pain  Notes to patient:  For pain            Where to Get Your Medications      You can get these medications from any pharmacy    Bring a paper prescription for each of these medications  · amitriptyline 100 MG tablet  · buPROPion 150 MG extended release tablet  · gabapentin 800 MG tablet         Follow Up Appointment: [facility] [address]  [facility]  Phone: [phone]  Fax: [phone]    Schedule an appointment as soon as possible for a visit on 2017  We will contact [facility] for a hospital discharge appointment and contact you on Tuesday with your appointment    Discharge to home address:  [place]  Go on 2017  Please send Yumiko Spencer home by [place] [employer]
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 PT states after cutting his wrists he was referred to [facility] for 10 days, and states he is a client of [facility] in [place]. PT reports he is in recovery and has not used heroin in 16 months, is on suboxone. PT reports supportive family and he will be staying with his uncle upon discharge.

## 2018-02-01 NOTE — DISCHARGE SUMMARY
Presenting Evaluation:  Tammi Hall is a 43 y.o. male who was admitted from   [address] [place] [address] on a pink slip due to patient expressing suicidal ideations with a plan to hang himself. Patient reports he has been overwhelmed with the fact that his wife recently left him, his daughter had a miscarriage, and states he has no idea how to start grieving. He reports very low mood, low energy, decreased hedonic capacity. He reports desire to isolate himself as a means of avoidance. We discussed his recent medication history. He has been taking Elavil only as antidepressant with minimal benefit. He previously took Wellbutrin and is unsure what medication was discontinued. He is current with a medication assisted treatment program at [facility] and has been on Suboxone for about 16 months. Diagnostic Impression     Recurrent depressive disorder without psychosis. Opiate dependence. After discussion with patient about potential risks, benefits, side effects, decided to start Wellbutrin. He was doing fairly well at the time of discharge and was not in any distress. Thought process was organized and showed insight into compliance with treatment. Patient had been making rational and realistic plans so he was discharged. Patient had been bright, reactive, and interacting appropriately with staff and peers. There had been multiple consecutive days without any thoughts of suicide or other safety concerns. Patient was tolerating medication changes without any adverse side effects. He will follow up at [facility].        Medication List      START taking these medications    buPROPion 150 MG extended release tablet  Commonly known as:  WELLBUTRIN XL  Take 1 tablet by mouth daily  Notes to patient:  For depression         CHANGE how you take these medications    gabapentin 800 MG tablet  Commonly known as:  NEURONTIN  Take 1 tablet by mouth 3 times daily  What changed:  · medication strength  · how much to take  Notes to patient:  For nerve pain         CONTINUE taking these medications    amitriptyline 100 MG tablet  Commonly known as:  ELAVIL  Take 1 tablet by mouth nightly  Notes to patient:  For depression      buprenorphine-naloxone 8-2 MG Film SL film  Commonly known as:  SUBOXONE  Notes to patient:  For opiate addiction      ibuprofen 800 MG tablet  Commonly known as:  ADVIL;MOTRIN  Take 1 tablet by mouth 3 times daily as needed for Pain  Notes to patient:  For pain            Where to Get Your Medications      You can get these medications from any pharmacy    Bring a paper prescription for each of these medications  · amitriptyline 100 MG tablet  · buPROPion 150 MG extended release tablet  · gabapentin 800 MG tablet

## 2018-03-09 ENCOUNTER — HOSPITAL ENCOUNTER (INPATIENT)
Age: 43
LOS: 3 days | Discharge: HOME | DRG: 753 | End: 2018-03-12
Attending: PSYCHIATRY & NEUROLOGY | Admitting: PSYCHIATRY & NEUROLOGY
Payer: COMMERCIAL

## 2018-03-09 PROBLEM — F31.9 BIPOLAR 1 DISORDER (HCC): Status: ACTIVE | Noted: 2018-03-09

## 2018-03-09 LAB
CHOLESTEROL/HDL RATIO: 3.4
CHOLESTEROL: 119 MG/DL
HDLC SERPL-MCNC: 35 MG/DL
LDL CHOLESTEROL: 68 MG/DL (ref 0–130)
TRIGL SERPL-MCNC: 78 MG/DL
VLDLC SERPL CALC-MCNC: ABNORMAL MG/DL (ref 1–30)

## 2018-03-09 PROCEDURE — 6370000000 HC RX 637 (ALT 250 FOR IP): Performed by: PSYCHIATRY & NEUROLOGY

## 2018-03-09 PROCEDURE — 6360000002 HC RX W HCPCS: Performed by: PSYCHIATRY & NEUROLOGY

## 2018-03-09 PROCEDURE — 36415 COLL VENOUS BLD VENIPUNCTURE: CPT

## 2018-03-09 PROCEDURE — 99254 IP/OBS CNSLTJ NEW/EST MOD 60: CPT | Performed by: INTERNAL MEDICINE

## 2018-03-09 PROCEDURE — 1240000000 HC EMOTIONAL WELLNESS R&B

## 2018-03-09 PROCEDURE — 80061 LIPID PANEL: CPT

## 2018-03-09 RX ORDER — CYCLOBENZAPRINE HCL 10 MG
10 TABLET ORAL EVERY 8 HOURS PRN
Status: ON HOLD | COMMUNITY
End: 2018-03-12 | Stop reason: HOSPADM

## 2018-03-09 RX ORDER — DICYCLOMINE HYDROCHLORIDE 10 MG/1
20 CAPSULE ORAL 4 TIMES DAILY PRN
Status: DISCONTINUED | OUTPATIENT
Start: 2018-03-09 | End: 2018-03-12 | Stop reason: HOSPADM

## 2018-03-09 RX ORDER — HYDROXYZINE HYDROCHLORIDE 25 MG/1
25 TABLET, FILM COATED ORAL 3 TIMES DAILY PRN
Status: DISCONTINUED | OUTPATIENT
Start: 2018-03-09 | End: 2018-03-12 | Stop reason: HOSPADM

## 2018-03-09 RX ORDER — MAGNESIUM HYDROXIDE/ALUMINUM HYDROXICE/SIMETHICONE 120; 1200; 1200 MG/30ML; MG/30ML; MG/30ML
30 SUSPENSION ORAL PRN
Status: DISCONTINUED | OUTPATIENT
Start: 2018-03-09 | End: 2018-03-11

## 2018-03-09 RX ORDER — PROMETHAZINE HYDROCHLORIDE 12.5 MG/1
25 TABLET ORAL EVERY 4 HOURS PRN
Status: DISCONTINUED | OUTPATIENT
Start: 2018-03-09 | End: 2018-03-12 | Stop reason: HOSPADM

## 2018-03-09 RX ORDER — POLYMYXIN B SULFATE AND TRIMETHOPRIM 1; 10000 MG/ML; [USP'U]/ML
2 SOLUTION OPHTHALMIC 4 TIMES DAILY
Status: DISCONTINUED | OUTPATIENT
Start: 2018-03-09 | End: 2018-03-12 | Stop reason: HOSPADM

## 2018-03-09 RX ORDER — KETOROLAC TROMETHAMINE 5 MG/ML
1 SOLUTION OPHTHALMIC 4 TIMES DAILY
Status: DISCONTINUED | OUTPATIENT
Start: 2018-03-09 | End: 2018-03-12 | Stop reason: HOSPADM

## 2018-03-09 RX ORDER — TRAZODONE HYDROCHLORIDE 50 MG/1
50 TABLET ORAL NIGHTLY PRN
Status: DISCONTINUED | OUTPATIENT
Start: 2018-03-09 | End: 2018-03-12 | Stop reason: HOSPADM

## 2018-03-09 RX ORDER — OMEPRAZOLE 20 MG/1
40 CAPSULE, DELAYED RELEASE ORAL DAILY
Status: ON HOLD | COMMUNITY
End: 2018-03-12 | Stop reason: HOSPADM

## 2018-03-09 RX ORDER — CLONIDINE HYDROCHLORIDE 0.1 MG/1
0.1 TABLET ORAL 3 TIMES DAILY
Status: DISCONTINUED | OUTPATIENT
Start: 2018-03-09 | End: 2018-03-12 | Stop reason: HOSPADM

## 2018-03-09 RX ORDER — BENZTROPINE MESYLATE 1 MG/ML
2 INJECTION INTRAMUSCULAR; INTRAVENOUS 2 TIMES DAILY PRN
Status: DISCONTINUED | OUTPATIENT
Start: 2018-03-09 | End: 2018-03-12 | Stop reason: HOSPADM

## 2018-03-09 RX ORDER — BUPROPION HYDROCHLORIDE 150 MG/1
150 TABLET ORAL DAILY
Status: DISCONTINUED | OUTPATIENT
Start: 2018-03-09 | End: 2018-03-12 | Stop reason: HOSPADM

## 2018-03-09 RX ORDER — PROMETHAZINE HYDROCHLORIDE 25 MG/ML
12.5 INJECTION, SOLUTION INTRAMUSCULAR; INTRAVENOUS EVERY 4 HOURS PRN
Status: DISCONTINUED | OUTPATIENT
Start: 2018-03-09 | End: 2018-03-12 | Stop reason: HOSPADM

## 2018-03-09 RX ORDER — GABAPENTIN 400 MG/1
800 CAPSULE ORAL 3 TIMES DAILY
Status: DISCONTINUED | OUTPATIENT
Start: 2018-03-09 | End: 2018-03-12 | Stop reason: HOSPADM

## 2018-03-09 RX ORDER — IBUPROFEN 800 MG/1
800 TABLET ORAL 3 TIMES DAILY PRN
Status: DISCONTINUED | OUTPATIENT
Start: 2018-03-09 | End: 2018-03-12 | Stop reason: HOSPADM

## 2018-03-09 RX ORDER — AMITRIPTYLINE HYDROCHLORIDE 25 MG/1
100 TABLET, FILM COATED ORAL NIGHTLY
Status: DISCONTINUED | OUTPATIENT
Start: 2018-03-09 | End: 2018-03-12 | Stop reason: HOSPADM

## 2018-03-09 RX ORDER — CYCLOBENZAPRINE HCL 10 MG
10 TABLET ORAL 3 TIMES DAILY PRN
Status: DISCONTINUED | OUTPATIENT
Start: 2018-03-09 | End: 2018-03-12 | Stop reason: HOSPADM

## 2018-03-09 RX ORDER — ACETAMINOPHEN 325 MG/1
650 TABLET ORAL EVERY 4 HOURS PRN
Status: DISCONTINUED | OUTPATIENT
Start: 2018-03-09 | End: 2018-03-12 | Stop reason: HOSPADM

## 2018-03-09 RX ORDER — DIPHENHYDRAMINE HCL 25 MG
25 TABLET ORAL NIGHTLY PRN
Status: DISCONTINUED | OUTPATIENT
Start: 2018-03-09 | End: 2018-03-12 | Stop reason: HOSPADM

## 2018-03-09 RX ORDER — LOPERAMIDE HYDROCHLORIDE 2 MG/1
2 CAPSULE ORAL 4 TIMES DAILY PRN
Status: DISCONTINUED | OUTPATIENT
Start: 2018-03-09 | End: 2018-03-12 | Stop reason: HOSPADM

## 2018-03-09 RX ORDER — ALBUTEROL SULFATE 90 UG/1
2 AEROSOL, METERED RESPIRATORY (INHALATION) EVERY 6 HOURS PRN
Status: ON HOLD | COMMUNITY
End: 2018-03-12 | Stop reason: HOSPADM

## 2018-03-09 RX ORDER — FLUTICASONE PROPIONATE 50 MCG
1 SPRAY, SUSPENSION (ML) NASAL 2 TIMES DAILY
Status: ON HOLD | COMMUNITY
End: 2018-03-12 | Stop reason: HOSPADM

## 2018-03-09 RX ADMIN — LOPERAMIDE HYDROCHLORIDE 2 MG: 2 CAPSULE ORAL at 21:16

## 2018-03-09 RX ADMIN — IBUPROFEN 800 MG: 800 TABLET ORAL at 21:17

## 2018-03-09 RX ADMIN — NICOTINE POLACRILEX 2 MG: 2 GUM, CHEWING BUCCAL at 20:00

## 2018-03-09 RX ADMIN — PROMETHAZINE HYDROCHLORIDE 25 MG: 12.5 TABLET ORAL at 21:16

## 2018-03-09 RX ADMIN — AMITRIPTYLINE HYDROCHLORIDE 100 MG: 25 TABLET, FILM COATED ORAL at 21:16

## 2018-03-09 RX ADMIN — KETOROLAC TROMETHAMINE 1 DROP: 5 SOLUTION OPHTHALMIC at 21:17

## 2018-03-09 RX ADMIN — DICYCLOMINE HYDROCHLORIDE 20 MG: 10 CAPSULE ORAL at 16:25

## 2018-03-09 RX ADMIN — POLYMYXIN B SULFATE, TRIMETHOPRIM SULFATE 2 DROP: 10000; 1 SOLUTION/ DROPS OPHTHALMIC at 21:17

## 2018-03-09 RX ADMIN — KETOROLAC TROMETHAMINE 1 DROP: 5 SOLUTION OPHTHALMIC at 12:46

## 2018-03-09 RX ADMIN — POLYMYXIN B SULFATE, TRIMETHOPRIM SULFATE 2 DROP: 10000; 1 SOLUTION/ DROPS OPHTHALMIC at 12:45

## 2018-03-09 RX ADMIN — GABAPENTIN 800 MG: 400 CAPSULE ORAL at 12:00

## 2018-03-09 RX ADMIN — PROMETHAZINE HYDROCHLORIDE 25 MG: 12.5 TABLET ORAL at 16:26

## 2018-03-09 RX ADMIN — LOPERAMIDE HYDROCHLORIDE 2 MG: 2 CAPSULE ORAL at 16:26

## 2018-03-09 RX ADMIN — GABAPENTIN 800 MG: 400 CAPSULE ORAL at 21:17

## 2018-03-09 RX ADMIN — CYCLOBENZAPRINE HYDROCHLORIDE 10 MG: 10 TABLET, FILM COATED ORAL at 16:25

## 2018-03-09 RX ADMIN — BUPROPION HYDROCHLORIDE 150 MG: 150 TABLET, FILM COATED, EXTENDED RELEASE ORAL at 12:00

## 2018-03-09 RX ADMIN — HYDROXYZINE HYDROCHLORIDE 25 MG: 25 TABLET, FILM COATED ORAL at 16:25

## 2018-03-09 RX ADMIN — POLYMYXIN B SULFATE, TRIMETHOPRIM SULFATE 2 DROP: 10000; 1 SOLUTION/ DROPS OPHTHALMIC at 17:17

## 2018-03-09 RX ADMIN — KETOROLAC TROMETHAMINE 1 DROP: 5 SOLUTION OPHTHALMIC at 17:17

## 2018-03-09 RX ADMIN — CYCLOBENZAPRINE HYDROCHLORIDE 10 MG: 10 TABLET, FILM COATED ORAL at 21:17

## 2018-03-09 RX ADMIN — DICYCLOMINE HYDROCHLORIDE 20 MG: 10 CAPSULE ORAL at 21:19

## 2018-03-09 ASSESSMENT — SLEEP AND FATIGUE QUESTIONNAIRES
AVERAGE NUMBER OF SLEEP HOURS: 7
DO YOU USE A SLEEP AID: NO
DO YOU HAVE DIFFICULTY SLEEPING: NO

## 2018-03-09 ASSESSMENT — PAIN SCALES - GENERAL
PAINLEVEL_OUTOF10: 3
PAINLEVEL_OUTOF10: 6
PAINLEVEL_OUTOF10: 7
PAINLEVEL_OUTOF10: 7
PAINLEVEL_OUTOF10: 5

## 2018-03-09 ASSESSMENT — PATIENT HEALTH QUESTIONNAIRE - PHQ9: SUM OF ALL RESPONSES TO PHQ QUESTIONS 1-9: 22

## 2018-03-09 ASSESSMENT — LIFESTYLE VARIABLES
HISTORY_ALCOHOL_USE: YES
HISTORY_ALCOHOL_USE: NO

## 2018-03-09 ASSESSMENT — PAIN DESCRIPTION - LOCATION: LOCATION: EYE

## 2018-03-09 ASSESSMENT — PAIN DESCRIPTION - PAIN TYPE
TYPE: ACUTE PAIN

## 2018-03-09 ASSESSMENT — PAIN DESCRIPTION - DESCRIPTORS: DESCRIPTORS: ACHING;BURNING;TENDER

## 2018-03-09 NOTE — PLAN OF CARE
Problem: Altered Mood, Depressive Behavior:  Goal: Able to verbalize acceptance of life and situations over which he or she has no control  Able to verbalize acceptance of life and situations over which he or she has no control   Outcome: Ongoing    Goal: Able to verbalize and/or display a decrease in depressive symptoms  LTG Able to verbalize and/or display a decrease in depressive symptoms   Outcome: Ongoing    Goal: Ability to disclose and discuss suicidal ideas will improve  STG Ability to disclose and discuss suicidal ideas will improve   Outcome: Ongoing  [facility]  Initial Interdisciplinary Treatment Plan NO      Original treatment plan Date & Time: March 9, 2018 0817    Admission Type:  Admission Type: Involuntary (Simultaneous filing. User may not have seen previous data.)    Reason for admission:   Reason for Admission: Admitted involuntary from [facility]. SI with plan to walk into a car. +Voices telling him to kill (Simultaneous filing.  User may not have seen previous data.)    Estimated Length of Stay:  5-7days  Estimated Discharge Date: to be determined by physician    PATIENT STRENGTHS:  Patient Strengths:Strengths: Communication  Patient Strengths and Limitations:Limitations: Inappropriate/potentially harmful leisure interests, Difficulty problem solving/relies on others to help solve problems  Addictive Behavior: Addictive Behavior  In the past 3 months, have you felt or has someone told you that you have a problem with:  : None  Do you have a history of Chemical Use?: No  Do you have a history of Alcohol Use?: No  Do you have a history of Street Drug Abuse?: Yes  Histroy of Prescripton Drug Abuse?: Yes  Medical Problems:  Past Medical History:   Diagnosis Date    Depression     Hep C w/ coma, chronic ([facility] [address]     Substance abuse      Status EXAM:Status and Exam  Normal: No  Facial Expression: Flat, Sad  Affect: Blunt  Level of Consciousness: Alert  Mood:Normal: No  Mood: Depressed, Sad  Motor Activity:Normal: Yes  Interview Behavior: Cooperative  Attention:Normal: Yes  Thought Content:Normal: Yes  Hallucinations: None  Delusions: No  Memory:Normal: Yes  Insight and Judgment: No  Insight and Judgment: Poor Judgment, Poor Insight  Present Suicidal Ideation: Yes  Present Homicidal Ideation: No    EDUCATION:   Learner Progress Toward Treatment Goals: reviewed group plans and strategies for care    Method:group therapy, medication compliance, individualized assessments and care planning    Outcome: needs reinforcement    PATIENT GOALS: to be discussed with patient within 72 hours    PLAN/TREATMENT RECOMMENDATIONS:     continue group therapy , medications compliance, goal setting, individualized assessments and care, continue to monitor pt on unit      SHORT-TERM GOALS:   Time frame for Short-Term Goals: 5-7 days    LONG-TERM GOALS:  Time frame for Long-Term Goals: 6 months  Members Present in Team Meeting: See Signature Sheet    DANA Edwards    Problem: Pain:  Goal: Pain level will decrease  Pain level will decrease   Outcome: Ongoing    Goal: Control of acute pain  Control of acute pain   Outcome: Ongoing    Goal: Control of chronic pain  Control of chronic pain   Outcome: Ongoing      Problem: Coping - Ineffective, Individual:  Goal: Ability to identify and develop effective coping behavior will improve  Ability to identify and develop effective coping behavior will improve   Outcome: Ongoing

## 2018-03-09 NOTE — CARE COORDINATION
Writer attempted to meet with pt, pt stated \"I have a migraine headache, can you come back later? \" Reina Farr will continue to make attempts to meet with pt to complete psychosocial assessment.

## 2018-03-09 NOTE — PLAN OF CARE
Problem: Altered Mood, Depressive Behavior:  Goal: Able to verbalize and/or display a decrease in depressive symptoms  LTG Able to verbalize and/or display a decrease in depressive symptoms   Outcome: Ongoing  Patient is calm, controlled, and medication compliant. Patient denies suicidal ideations, appears flat and reports some anxiety. Patient is seclusive to room and reports some opiate withdrawal symptoms; fluids and meals are encouraged as needed. Patient reports sleeping intermittently and is eating what patient can tolerate. Patient has safety checks Q15min and at irregular intervals.   Goal: Ability to disclose and discuss suicidal ideas will improve  STG Ability to disclose and discuss suicidal ideas will improve   Outcome: Ongoing

## 2018-03-09 NOTE — CONSULTS
[address] [facility]    Consult Note. Date:   3/9/2018  Patient name:  Ana Burch  Date of admission:  3/9/2018  4:09 AM  MRN:   [id number]  YOB: 1975    Pt seen at the request of Vannessa Alvarez MD    CHIEF COMPLAINT:     History Obtained From:  Patient and chart review. HISTORY OF PRESENT ILLNESS:      The patient is a 37 y.o.  male who is admitted to the hospital for  Rt eye pain  H/u hep c    Past Medical History:   has a past medical history of Depression; Hep C w/ coma, chronic ([facility] [address] and Substance abuse. Past Surgical History:   has no past surgical history on file. Home Medications:    Prior to Admission medications    Medication Sig Start Date End Date Taking? Authorizing Provider   cyclobenzaprine (FLEXERIL) 10 MG tablet Take 10 mg by mouth every 8 hours as needed for Muscle spasms   Yes Historical Provider, MD   omeprazole (PRILOSEC) 20 MG delayed release capsule Take 40 mg by mouth Daily   Yes Historical Provider, MD   fluticasone (FLONASE) 50 MCG/ACT nasal spray 1 spray by Nasal route 2 times daily   Yes Historical Provider, MD   albuterol sulfate  (90 Base) MCG/ACT inhaler Inhale 2 puffs into the lungs every 6 hours as needed for Wheezing   Yes Historical Provider, MD   nicotine polacrilex (NICORETTE) 4 MG gum Take 4 mg by mouth as needed for Smoking cessation   Yes Historical Provider, MD   gabapentin (NEURONTIN) 800 MG tablet Take 1 tablet by mouth 3 times daily 12/22/17  Yes Vannessa Alvarez MD   amitriptyline (ELAVIL) 100 MG tablet Take 1 tablet by mouth nightly 12/22/17  Yes Vannessa Alvarez MD   buprenorphine-naloxone (SUBOXONE) 8-2 MG FILM SL film Place 1 Film under the tongue daily Filled on 2/19/18, ordered as 1 1/2 films daily. Yes Historical Provider, MD       Allergies:  Patient has no known allergies. Social History:   reports that he has been smoking Cigarettes.   He has a 52.00 pack-year smoking history. He has never used smokeless tobacco. He reports that he uses drugs, including Marijuana and IV, about 7 times per week. He reports that he does not drink alcohol. Family History: family history includes Cancer in an other family member; Coronary Art Dis in his father; Depression in his mother; Diabetes in his mother; Hypertension in his mother. REVIEW OF SYSTEMS:    · Constitutional: Negative for weight loss  · Eyes: ENT: Negative for Headaches, hearing loss, vertigo,pos rt eye pain tearingCardiovascular: Negative for lightheadedness/orthostatic symptoms ,chest pain, dyspnea on exertion, palpitations or loss of consciousness. · Respiratory: Negative for cough or wheezing, sputum production, hemoptysis, pleuritic pain. · Gastrointestinal: Negative for nausea/vomiting, change in bowel habits, abdominal pain, dysphagia/appetite loss, hematemesis, blood in stools. · Genitourinary:Negative for change in bladder habits, dysuria, trouble voiding, hematuria. · Musculoskeletal: Negative for gait disturbance, weakness, joint complaints. · Integumentary: Negative for rash, pruritis. · Neurological: Negative for headache, dizziness, change in muscle strength, numbness/tingling, change in gait, balance, coordination,   · Endocrine: negative for temperature intolerance, excessive thirst, fluid intake, or urination, tremor. · Hematologic/Lymphatic: negative for abnormal bruising or bleeding, blood clots, swollen lymph nodes. · Allergic/Immunologic: negative for nasal congestion, pruritis, hives. PHYSICAL EXAM:    /78   Pulse 94   Temp 98.6 °F (37 °C)   Resp 14   Ht 6' (1.829 m)   Wt 185 lb (83.9 kg)   BMI 25.09 kg/m²      · General appearance: well nourished  · HEENT: Head: Normocephalic, no lesions, without obvious abnormality. rt eye tenser tearing no erythema  · Lungs: clear to auscultation bilaterally  · Heart: regular rate and rhythm, S1, S2 normal, no murmur,

## 2018-03-09 NOTE — PLAN OF CARE
Problem: Altered Mood, Depressive Behavior:  Goal: Ability to disclose and discuss suicidal ideas will improve  STG Ability to disclose and discuss suicidal ideas will improve   Outcome: Ongoing  Pt did not attend Community Meeting at 0900 d/t resting in room despite staff invitation to attend.

## 2018-03-10 PROCEDURE — 6360000002 HC RX W HCPCS: Performed by: PSYCHIATRY & NEUROLOGY

## 2018-03-10 PROCEDURE — 6370000000 HC RX 637 (ALT 250 FOR IP): Performed by: PSYCHIATRY & NEUROLOGY

## 2018-03-10 PROCEDURE — 1240000000 HC EMOTIONAL WELLNESS R&B

## 2018-03-10 RX ADMIN — ACETAMINOPHEN 650 MG: 325 TABLET, FILM COATED ORAL at 12:20

## 2018-03-10 RX ADMIN — LOPERAMIDE HYDROCHLORIDE 2 MG: 2 CAPSULE ORAL at 12:20

## 2018-03-10 RX ADMIN — NICOTINE POLACRILEX 2 MG: 2 GUM, CHEWING BUCCAL at 12:20

## 2018-03-10 RX ADMIN — LOPERAMIDE HYDROCHLORIDE 2 MG: 2 CAPSULE ORAL at 08:36

## 2018-03-10 RX ADMIN — KETOROLAC TROMETHAMINE 1 DROP: 5 SOLUTION OPHTHALMIC at 17:15

## 2018-03-10 RX ADMIN — GABAPENTIN 800 MG: 400 CAPSULE ORAL at 21:49

## 2018-03-10 RX ADMIN — CLONIDINE HYDROCHLORIDE 0.1 MG: 0.1 TABLET ORAL at 21:49

## 2018-03-10 RX ADMIN — POLYMYXIN B SULFATE, TRIMETHOPRIM SULFATE 2 DROP: 10000; 1 SOLUTION/ DROPS OPHTHALMIC at 21:51

## 2018-03-10 RX ADMIN — POLYMYXIN B SULFATE, TRIMETHOPRIM SULFATE 2 DROP: 10000; 1 SOLUTION/ DROPS OPHTHALMIC at 14:26

## 2018-03-10 RX ADMIN — CLONIDINE HYDROCHLORIDE 0.1 MG: 0.1 TABLET ORAL at 14:25

## 2018-03-10 RX ADMIN — NICOTINE POLACRILEX 2 MG: 2 GUM, CHEWING BUCCAL at 17:22

## 2018-03-10 RX ADMIN — AMITRIPTYLINE HYDROCHLORIDE 100 MG: 25 TABLET, FILM COATED ORAL at 21:48

## 2018-03-10 RX ADMIN — IBUPROFEN 800 MG: 800 TABLET ORAL at 08:36

## 2018-03-10 RX ADMIN — KETOROLAC TROMETHAMINE 1 DROP: 5 SOLUTION OPHTHALMIC at 14:25

## 2018-03-10 RX ADMIN — NICOTINE POLACRILEX 2 MG: 2 GUM, CHEWING BUCCAL at 23:03

## 2018-03-10 RX ADMIN — NICOTINE POLACRILEX 2 MG: 2 GUM, CHEWING BUCCAL at 08:45

## 2018-03-10 RX ADMIN — GABAPENTIN 800 MG: 400 CAPSULE ORAL at 08:37

## 2018-03-10 RX ADMIN — NICOTINE POLACRILEX 2 MG: 2 GUM, CHEWING BUCCAL at 19:58

## 2018-03-10 RX ADMIN — GABAPENTIN 800 MG: 400 CAPSULE ORAL at 14:25

## 2018-03-10 RX ADMIN — DICYCLOMINE HYDROCHLORIDE 20 MG: 10 CAPSULE ORAL at 08:36

## 2018-03-10 RX ADMIN — DICYCLOMINE HYDROCHLORIDE 20 MG: 10 CAPSULE ORAL at 17:22

## 2018-03-10 RX ADMIN — CYCLOBENZAPRINE HYDROCHLORIDE 10 MG: 10 TABLET, FILM COATED ORAL at 08:37

## 2018-03-10 RX ADMIN — KETOROLAC TROMETHAMINE 1 DROP: 5 SOLUTION OPHTHALMIC at 08:37

## 2018-03-10 RX ADMIN — POLYMYXIN B SULFATE, TRIMETHOPRIM SULFATE 2 DROP: 10000; 1 SOLUTION/ DROPS OPHTHALMIC at 08:37

## 2018-03-10 RX ADMIN — KETOROLAC TROMETHAMINE 1 DROP: 5 SOLUTION OPHTHALMIC at 21:51

## 2018-03-10 RX ADMIN — PROMETHAZINE HYDROCHLORIDE 25 MG: 12.5 TABLET ORAL at 17:22

## 2018-03-10 RX ADMIN — NICOTINE POLACRILEX 2 MG: 2 GUM, CHEWING BUCCAL at 14:37

## 2018-03-10 RX ADMIN — IBUPROFEN 800 MG: 800 TABLET ORAL at 17:22

## 2018-03-10 RX ADMIN — BUPROPION HYDROCHLORIDE 150 MG: 150 TABLET, FILM COATED, EXTENDED RELEASE ORAL at 08:36

## 2018-03-10 RX ADMIN — CYCLOBENZAPRINE HYDROCHLORIDE 10 MG: 10 TABLET, FILM COATED ORAL at 17:22

## 2018-03-10 RX ADMIN — POLYMYXIN B SULFATE, TRIMETHOPRIM SULFATE 2 DROP: 10000; 1 SOLUTION/ DROPS OPHTHALMIC at 17:15

## 2018-03-10 RX ADMIN — HYDROXYZINE HYDROCHLORIDE 25 MG: 25 TABLET, FILM COATED ORAL at 12:20

## 2018-03-10 RX ADMIN — CLONIDINE HYDROCHLORIDE 0.1 MG: 0.1 TABLET ORAL at 08:36

## 2018-03-10 RX ADMIN — NICOTINE POLACRILEX 2 MG: 2 GUM, CHEWING BUCCAL at 13:34

## 2018-03-10 RX ADMIN — PROMETHAZINE HYDROCHLORIDE 25 MG: 12.5 TABLET ORAL at 08:37

## 2018-03-10 ASSESSMENT — PAIN SCALES - GENERAL
PAINLEVEL_OUTOF10: 7
PAINLEVEL_OUTOF10: 5
PAINLEVEL_OUTOF10: 5
PAINLEVEL_OUTOF10: 7
PAINLEVEL_OUTOF10: 7
PAINLEVEL_OUTOF10: 4

## 2018-03-10 NOTE — PLAN OF CARE
to Time, [place] to Place, [place] to Situation  Attention:Normal: No  Attention: Distractible  Thought Processes: Circumstantial  Thought Content:Normal: No  Thought Content: Preoccupations  Hallucinations: None  Delusions: No  Memory:Normal: Yes  Memory: Poor Recent  Insight and Judgment: No  Insight and Judgment: Poor Judgment, Poor Insight  Present Suicidal Ideation: No  Present Homicidal Ideation: No    Daily Assessment Last Entry:   Daily Sleep (WDL): Within Defined Limits         Patient Currently in Pain: No  Daily Nutrition (WDL): Exceptions to WDL  Appetite Change: Decreased (w/d symptoms)  Level of Assistance: Independent/Self    Patient Monitoring:  Frequency of Checks: 4 times per hour, close    Psychiatric Symptoms:   Depression Symptoms  Depression Symptoms: Appetite change, Sleep disturbance  Anxiety Symptoms  Anxiety Symptoms: Generalized  Lianna Symptoms  Lianna Symptoms: No problems reported or observed. Psychosis Symptoms  Delusion Type: No problems reported or observed. Suicide Risk CSSR-S:  1) Within the past month, have you wished you were dead or wished you could go to sleep and not wake up? : YES  2) Within the past month, have you actually had any thoughts of killing yourself? : YES  3) Within the past month, have you been thinking about how you might kill yourself? : YES  5) Within the past month, have you started to work out or worked out the details of how to kill yourself?  Do you intend to carry out this plan? : YES  6)  Have you ever done anything, started to do anything, or prepared to do anything to end your life?: YES  Change in ResultNO Change in Plan of careNO      EDUCATION:   EDUCATION:   Learner Progress Toward Treatment Goals: Reviewed results and recommendations of this team, Reviewed group plan and strategies, Reviewed signs, symptoms and risk of self harm and violent behavior, Reviewed goals and plan of care    Method:small group, individual verbal

## 2018-03-10 NOTE — PLAN OF CARE
Problem: Coping - Ineffective, Individual:  Goal: Ability to identify and develop effective coping behavior will improve  Ability to identify and develop effective coping behavior will improve   Outcome: Ongoing  Pt did not attend 0900 community meeting/ goals group despite staff invitation to attend.

## 2018-03-11 PROCEDURE — 1240000000 HC EMOTIONAL WELLNESS R&B

## 2018-03-11 PROCEDURE — 6370000000 HC RX 637 (ALT 250 FOR IP): Performed by: PSYCHIATRY & NEUROLOGY

## 2018-03-11 RX ORDER — MAGNESIUM HYDROXIDE/ALUMINUM HYDROXICE/SIMETHICONE 120; 1200; 1200 MG/30ML; MG/30ML; MG/30ML
20 SUSPENSION ORAL 3 TIMES DAILY PRN
Status: DISCONTINUED | OUTPATIENT
Start: 2018-03-11 | End: 2018-03-12 | Stop reason: HOSPADM

## 2018-03-11 RX ADMIN — CYCLOBENZAPRINE HYDROCHLORIDE 10 MG: 10 TABLET, FILM COATED ORAL at 08:39

## 2018-03-11 RX ADMIN — POLYMYXIN B SULFATE, TRIMETHOPRIM SULFATE 2 DROP: 10000; 1 SOLUTION/ DROPS OPHTHALMIC at 21:15

## 2018-03-11 RX ADMIN — CYCLOBENZAPRINE HYDROCHLORIDE 10 MG: 10 TABLET, FILM COATED ORAL at 21:15

## 2018-03-11 RX ADMIN — IBUPROFEN 800 MG: 800 TABLET ORAL at 13:42

## 2018-03-11 RX ADMIN — CLONIDINE HYDROCHLORIDE 0.1 MG: 0.1 TABLET ORAL at 21:15

## 2018-03-11 RX ADMIN — HYDROXYZINE HYDROCHLORIDE 25 MG: 25 TABLET, FILM COATED ORAL at 08:39

## 2018-03-11 RX ADMIN — KETOROLAC TROMETHAMINE 1 DROP: 5 SOLUTION OPHTHALMIC at 21:15

## 2018-03-11 RX ADMIN — CYCLOBENZAPRINE HYDROCHLORIDE 10 MG: 10 TABLET, FILM COATED ORAL at 13:43

## 2018-03-11 RX ADMIN — POLYMYXIN B SULFATE, TRIMETHOPRIM SULFATE 2 DROP: 10000; 1 SOLUTION/ DROPS OPHTHALMIC at 17:59

## 2018-03-11 RX ADMIN — IBUPROFEN 800 MG: 800 TABLET ORAL at 21:15

## 2018-03-11 RX ADMIN — BUPROPION HYDROCHLORIDE 150 MG: 150 TABLET, FILM COATED, EXTENDED RELEASE ORAL at 08:39

## 2018-03-11 RX ADMIN — GABAPENTIN 800 MG: 400 CAPSULE ORAL at 13:43

## 2018-03-11 RX ADMIN — NICOTINE POLACRILEX 2 MG: 2 GUM, CHEWING BUCCAL at 13:43

## 2018-03-11 RX ADMIN — CLONIDINE HYDROCHLORIDE 0.1 MG: 0.1 TABLET ORAL at 13:42

## 2018-03-11 RX ADMIN — HYDROXYZINE HYDROCHLORIDE 25 MG: 25 TABLET, FILM COATED ORAL at 13:42

## 2018-03-11 RX ADMIN — ALUMINUM HYDROXIDE, MAGNESIUM HYDROXIDE, AND SIMETHICONE 30 ML: 200; 200; 20 SUSPENSION ORAL at 17:01

## 2018-03-11 RX ADMIN — ALUMINUM HYDROXIDE, MAGNESIUM HYDROXIDE, AND SIMETHICONE 20 ML: 200; 200; 20 SUSPENSION ORAL at 23:19

## 2018-03-11 RX ADMIN — NICOTINE POLACRILEX 2 MG: 2 GUM, CHEWING BUCCAL at 17:01

## 2018-03-11 RX ADMIN — KETOROLAC TROMETHAMINE 1 DROP: 5 SOLUTION OPHTHALMIC at 13:43

## 2018-03-11 RX ADMIN — AMITRIPTYLINE HYDROCHLORIDE 100 MG: 25 TABLET, FILM COATED ORAL at 22:18

## 2018-03-11 RX ADMIN — NICOTINE POLACRILEX 2 MG: 2 GUM, CHEWING BUCCAL at 19:25

## 2018-03-11 RX ADMIN — NICOTINE POLACRILEX 2 MG: 2 GUM, CHEWING BUCCAL at 08:39

## 2018-03-11 RX ADMIN — KETOROLAC TROMETHAMINE 1 DROP: 5 SOLUTION OPHTHALMIC at 08:40

## 2018-03-11 RX ADMIN — POLYMYXIN B SULFATE, TRIMETHOPRIM SULFATE 2 DROP: 10000; 1 SOLUTION/ DROPS OPHTHALMIC at 13:43

## 2018-03-11 RX ADMIN — POLYMYXIN B SULFATE, TRIMETHOPRIM SULFATE 2 DROP: 10000; 1 SOLUTION/ DROPS OPHTHALMIC at 08:40

## 2018-03-11 RX ADMIN — CLONIDINE HYDROCHLORIDE 0.1 MG: 0.1 TABLET ORAL at 08:39

## 2018-03-11 RX ADMIN — GABAPENTIN 800 MG: 400 CAPSULE ORAL at 21:15

## 2018-03-11 RX ADMIN — NICOTINE POLACRILEX 2 MG: 2 GUM, CHEWING BUCCAL at 21:14

## 2018-03-11 RX ADMIN — KETOROLAC TROMETHAMINE 1 DROP: 5 SOLUTION OPHTHALMIC at 17:59

## 2018-03-11 RX ADMIN — GABAPENTIN 800 MG: 400 CAPSULE ORAL at 08:39

## 2018-03-11 RX ADMIN — ACETAMINOPHEN 650 MG: 325 TABLET, FILM COATED ORAL at 08:40

## 2018-03-11 ASSESSMENT — PAIN SCALES - GENERAL
PAINLEVEL_OUTOF10: 6
PAINLEVEL_OUTOF10: 4
PAINLEVEL_OUTOF10: 5
PAINLEVEL_OUTOF10: 5
PAINLEVEL_OUTOF10: 7
PAINLEVEL_OUTOF10: 6
PAINLEVEL_OUTOF10: 6

## 2018-03-11 ASSESSMENT — PAIN DESCRIPTION - LOCATION
LOCATION: BACK

## 2018-03-11 NOTE — BH NOTE
Psychoeducation Group Note    Date: 3/11/18 Start Time:4 p.m. End Time: 4:30 p.m. Number Participants in Group:  21    Goal:  Patient will demonstrate increased interpersonal interaction   Topic: Unit safety education. Comments: Pt participated in search for contraband on unit and items found not permissible confiscated by staff. Expectations of unit safety and allowable procession reviewed. No problems or complaints noted.

## 2018-03-11 NOTE — H&P
disease, GERD/PUD, Kidney Disease, Cancer, Seizures, Tuberculosis    SURGICAL HISTORY     History reviewed. No pertinent surgical history. Patient denies previous surgery. FAMILY HISTORY       Family History   Problem Relation Age of Onset    Diabetes Mother     Hypertension Mother     Depression Mother      & Uncles    Coronary Art Dis Father     Cancer Other      G. Parent  ? Pt specifically denies any history of:  Anxiety, Bipolar Disorder, Schizophrenia, or Alcohol/Substance Abuse    SOCIAL HISTORY       Social History     Social History    Marital status: Legally      Spouse name: Dorothea Bateman Number of children: 2    Years of education: N/A     Social History Main Topics    Smoking status: Current Every Day Smoker     Packs/day: 2.00     Years: 26.00     Types: Cigarettes    Smokeless tobacco: Never Used    Alcohol use No      Comment: Quit drinking at 22 yrs old    Drug use: Yes     Frequency: 7.0 times per week     Types: Marijuana, IV      Comment: uses 1 gram of Heroin a day and any opiates he can get    Sexual activity: Yes     Partners: Female     Other Topics Concern    None     Social History Narrative    None     REVIEW OF SYSTEMS      No Known Allergies    No current facility-administered medications on file prior to encounter. Current Outpatient Prescriptions on File Prior to Encounter   Medication Sig Dispense Refill    gabapentin (NEURONTIN) 800 MG tablet Take 1 tablet by mouth 3 times daily 90 tablet 0    amitriptyline (ELAVIL) 100 MG tablet Take 1 tablet by mouth nightly 30 tablet 3    buprenorphine-naloxone (SUBOXONE) 8-2 MG FILM SL film Place 1 Film under the tongue daily Filled on 2/19/18, ordered as 1 1/2 films daily. General health:  Fairly good. No fever or chills. Skin:  No itching, redness or rash. Head, eyes, ears, nose, throat:  No headache, epistaxis, rhinorrhea hearing loss or sore throat.        Neck:  No pain,

## 2018-03-11 NOTE — PLAN OF CARE
Problem: Altered Mood, Depressive Behavior:  Goal: Able to verbalize and/or display a decrease in depressive symptoms  LTG Able to verbalize and/or display a decrease in depressive symptoms   Outcome: Ongoing  Pt displays improved mood, decreased acuity of w/d symptoms, improved sleep and appetite.

## 2018-03-11 NOTE — PLAN OF CARE
Problem: Altered Mood, Depressive Behavior:  Goal: Able to verbalize acceptance of life and situations over which he or she has no control  Able to verbalize acceptance of life and situations over which he or she has no control   Outcome: Ongoing  Patient has been calm, controlled, med complaint. Accepting of 1:1 talk time with staff. Goal: Able to verbalize and/or display a decrease in depressive symptoms  LTG Able to verbalize and/or display a decrease in depressive symptoms   Outcome: Ongoing  Pt denies depression at this time. Goal: Ability to disclose and discuss suicidal ideas will improve  STG Ability to disclose and discuss suicidal ideas will improve   Outcome: Ongoing  Pt denies suicidal ideations at this time. Problem: Coping - Ineffective, Individual:  Goal: Ability to identify and develop effective coping behavior will improve  Ability to identify and develop effective coping behavior will improve   Outcome: Ongoing  Pt encouraged to explore coping skills for reducing anxiety. None verbalized at this time.

## 2018-03-12 VITALS
SYSTOLIC BLOOD PRESSURE: 109 MMHG | TEMPERATURE: 97.3 F | HEIGHT: 72 IN | DIASTOLIC BLOOD PRESSURE: 64 MMHG | BODY MASS INDEX: 25.06 KG/M2 | WEIGHT: 185 LBS | HEART RATE: 71 BPM | RESPIRATION RATE: 14 BRPM

## 2018-03-12 PROBLEM — F33.9 RECURRENT DEPRESSION (HCC): Status: ACTIVE | Noted: 2018-03-09

## 2018-03-12 PROCEDURE — 6370000000 HC RX 637 (ALT 250 FOR IP): Performed by: PSYCHIATRY & NEUROLOGY

## 2018-03-12 PROCEDURE — 5130000000 HC BRIDGE APPOINTMENT

## 2018-03-12 RX ORDER — HYDROXYZINE HYDROCHLORIDE 25 MG/1
25 TABLET, FILM COATED ORAL 2 TIMES DAILY PRN
Qty: 60 TABLET | Refills: 0 | Status: ON HOLD | OUTPATIENT
Start: 2018-03-12 | End: 2018-11-15 | Stop reason: HOSPADM

## 2018-03-12 RX ORDER — GABAPENTIN 800 MG/1
800 TABLET ORAL 3 TIMES DAILY
Qty: 90 TABLET | Refills: 0 | Status: SHIPPED | OUTPATIENT
Start: 2018-03-12 | End: 2018-05-29

## 2018-03-12 RX ORDER — AMITRIPTYLINE HYDROCHLORIDE 100 MG/1
100 TABLET, FILM COATED ORAL NIGHTLY
Qty: 30 TABLET | Refills: 0 | Status: ON HOLD | OUTPATIENT
Start: 2018-03-12 | End: 2018-04-18 | Stop reason: HOSPADM

## 2018-03-12 RX ORDER — BUPROPION HYDROCHLORIDE 150 MG/1
150 TABLET ORAL DAILY
Qty: 30 TABLET | Refills: 0 | Status: ON HOLD | OUTPATIENT
Start: 2018-03-13 | End: 2018-04-18 | Stop reason: HOSPADM

## 2018-03-12 RX ADMIN — KETOROLAC TROMETHAMINE 1 DROP: 5 SOLUTION OPHTHALMIC at 08:16

## 2018-03-12 RX ADMIN — POLYMYXIN B SULFATE, TRIMETHOPRIM SULFATE 2 DROP: 10000; 1 SOLUTION/ DROPS OPHTHALMIC at 17:33

## 2018-03-12 RX ADMIN — NICOTINE POLACRILEX 2 MG: 2 GUM, CHEWING BUCCAL at 14:34

## 2018-03-12 RX ADMIN — KETOROLAC TROMETHAMINE 1 DROP: 5 SOLUTION OPHTHALMIC at 17:33

## 2018-03-12 RX ADMIN — NICOTINE POLACRILEX 2 MG: 2 GUM, CHEWING BUCCAL at 12:46

## 2018-03-12 RX ADMIN — CLONIDINE HYDROCHLORIDE 0.1 MG: 0.1 TABLET ORAL at 08:15

## 2018-03-12 RX ADMIN — CLONIDINE HYDROCHLORIDE 0.1 MG: 0.1 TABLET ORAL at 14:30

## 2018-03-12 RX ADMIN — POLYMYXIN B SULFATE, TRIMETHOPRIM SULFATE 2 DROP: 10000; 1 SOLUTION/ DROPS OPHTHALMIC at 14:30

## 2018-03-12 RX ADMIN — NICOTINE POLACRILEX 2 MG: 2 GUM, CHEWING BUCCAL at 08:36

## 2018-03-12 RX ADMIN — GABAPENTIN 800 MG: 400 CAPSULE ORAL at 14:29

## 2018-03-12 RX ADMIN — NICOTINE POLACRILEX 2 MG: 2 GUM, CHEWING BUCCAL at 10:52

## 2018-03-12 RX ADMIN — KETOROLAC TROMETHAMINE 1 DROP: 5 SOLUTION OPHTHALMIC at 14:30

## 2018-03-12 RX ADMIN — IBUPROFEN 800 MG: 800 TABLET ORAL at 08:38

## 2018-03-12 RX ADMIN — POLYMYXIN B SULFATE, TRIMETHOPRIM SULFATE 2 DROP: 10000; 1 SOLUTION/ DROPS OPHTHALMIC at 08:16

## 2018-03-12 RX ADMIN — BUPROPION HYDROCHLORIDE 150 MG: 150 TABLET, FILM COATED, EXTENDED RELEASE ORAL at 08:15

## 2018-03-12 RX ADMIN — GABAPENTIN 800 MG: 400 CAPSULE ORAL at 08:15

## 2018-03-12 ASSESSMENT — PAIN SCALES - GENERAL
PAINLEVEL_OUTOF10: 4
PAINLEVEL_OUTOF10: 6
PAINLEVEL_OUTOF10: 3

## 2018-03-12 ASSESSMENT — PAIN DESCRIPTION - LOCATION: LOCATION: BACK

## 2018-03-12 ASSESSMENT — PAIN DESCRIPTION - PAIN TYPE: TYPE: ACUTE PAIN

## 2018-03-12 NOTE — CARE COORDINATION
Pt attended and participated in 10 am group psycho therapy- explored sober supports, anxious of returning to home address due to substance use in environment

## 2018-03-12 NOTE — BH NOTE
Patient given tobacco quitline number 28062267471 at this time, refusing to call at this time, states \" I just dont want to quit now\"- patient given information as to the dangers of long term tobacco use. Continue to reinforce the importance of tobacco cessation.

## 2018-03-12 NOTE — PROGRESS NOTES
Patient given tobacco quitline number 89550761644 at this time, refusing to call at this time, states \" I just dont want to quit now\"- patient given information as to the dangers of long term tobacco use. Continue to reinforce the importance of tobacco cessation.
Psychoeducation Group Note    Date: 3/12/18  Start Time: 1330  End Time: 1410    Number Participants in Group:  9    Goal:  Patient will demonstrate increased interpersonal interaction.    Topic:  Therapeutic Cognitive Skills Group    Discipline Responsible:   OT  AT  [facility]. X RT  Other       Participation Level:     None  Minimal   X Active Listener X Interactive    Monopolizing         Participation Quality:  X Appropriate  Inappropriate   X       Attentive        Intrusive   X       Sharing        Resistant   X       Supportive        Lethargic       Affective:   X Congruent  Incongruent  Blunted  Flat    Constricted  Anxious  Elated  Angry    Labile  Depressed  Other  Bright       Cognitive:  X Alert X Oriented PPTP     Concentration X G  F  P   Attention Span X G  F  P   Short-Term Memory X G  F  P   Long-Term Memory X G  F  P   ProblemSolving/  Decision Making X G  F  P   Ability to Process  Information X G  F  P      Contributing Factors             Delusional             Hallucinating             Flight of Ideas             Other:       Modes of Intervention:   Education X Support X Exploration    Clarifying X Problem Solving  Confrontation   X Socialization X Limit Setting  Reality Testing   X Activity  Movement  Media    Other:            Response to Learning:  X Able to verbalize current knowledge/experience    Able to verbalize/acknowledge new learning    Able to retain information   X Capable of insight    Able to change behavior   X Progressing to goal    Other:        Comments:
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 Opiate dependence. Cocaine abuse      Medications   cloNIDine  0.1 mg Oral TID    gabapentin  800 mg Oral TID    ketorolac  1 drop Right Eye 4x Daily    trimethoprim-polymyxin b  2 drop Right Eye 4x Daily    buPROPion  150 mg Oral Daily    amitriptyline  100 mg Oral Nightly     acetaminophen, traZODone, benztropine mesylate, magnesium hydroxide, aluminum & magnesium hydroxide-simethicone, hydrOXYzine, ibuprofen, dicyclomine, cyclobenzaprine, loperamide, diphenhydrAMINE, promethazine **OR** promethazine, nicotine polacrilex    Treatment Plan:    1. Admit to inpatient psychiatric treatment  2. Supportive therapy with medication management. Reviewed risks and benefits as well as potential side effects with patient. Will order Wellbutrin XL, Elavil, symptomatic treatment of withdrawal symptoms. 3. Therapeutic activities and groups  4.  Follow up at [facility] after symptoms stabilized    Estimated length of stay: 5-7 days    Ramila Kuhn MD  Psychiatrist.
information about quitting (benefits of quitting, techniques in how to quit, available resources  ( ) Referral for counseling faxed to [facility]                                           ( ) Patient refused counseling  ( ) Patient has not smoked in the last 30 days      Pt admitted with followings belongings:  Dentures: None  Vision - Corrective Lenses: None  Hearing Aid: None  Jewelry: Ring  Body Piercings Removed: N/A  Clothing: Footwear, Jacket / coat, Pants, Shirt, Socks, Undergarments (Comment)  Were All Patient Medications Collected?: Yes  Other Valuables: Cell phone, Faye Severe, Other (Comment) (social security card, [place] ID (one his, one not his), paperwork, blistex)     . Valuables placed in safe in security envelope, number:  [id number]. Patient oriented to surroundings and program expectations and copy of patient rights given. Received admission packet:  yes. Consents reviewed, signed No. Refused to sign paperwork until he speaks with his md.                     Patient verbalize understanding:  yes.     Patient education on precautions: yes                   Malinda Rodriguez RN

## 2018-04-10 ENCOUNTER — HOSPITAL ENCOUNTER (EMERGENCY)
Age: 43
Discharge: PSYCHIATRIC OR SUBSTANCE USE FACILITY | End: 2018-04-10
Attending: EMERGENCY MEDICINE
Payer: COMMERCIAL

## 2018-04-10 ENCOUNTER — HOSPITAL ENCOUNTER (INPATIENT)
Age: 43
LOS: 8 days | Discharge: HOME | DRG: 751 | End: 2018-04-18
Attending: PSYCHIATRY & NEUROLOGY | Admitting: PSYCHIATRY & NEUROLOGY
Payer: COMMERCIAL

## 2018-04-10 VITALS
HEART RATE: 97 BPM | HEIGHT: 72 IN | BODY MASS INDEX: 25.06 KG/M2 | WEIGHT: 185 LBS | OXYGEN SATURATION: 97 % | DIASTOLIC BLOOD PRESSURE: 91 MMHG | SYSTOLIC BLOOD PRESSURE: 144 MMHG | TEMPERATURE: 97.2 F | RESPIRATION RATE: 16 BRPM

## 2018-04-10 DIAGNOSIS — R45.851 SUICIDAL IDEATION: Primary | ICD-10-CM

## 2018-04-10 PROBLEM — F33.9 MDD (MAJOR DEPRESSIVE DISORDER), RECURRENT EPISODE (HCC): Status: ACTIVE | Noted: 2018-04-10

## 2018-04-10 LAB
ABSOLUTE EOS #: 0.16 K/UL (ref 0–0.44)
ABSOLUTE IMMATURE GRANULOCYTE: <0.03 K/UL (ref 0–0.3)
ABSOLUTE LYMPH #: 2.45 K/UL (ref 1.1–3.7)
ABSOLUTE MONO #: 0.46 K/UL (ref 0.1–1.2)
AMPHETAMINE SCREEN URINE: NEGATIVE
ANION GAP SERPL CALCULATED.3IONS-SCNC: 12 MMOL/L (ref 9–17)
BARBITURATE SCREEN URINE: NEGATIVE
BASOPHILS # BLD: 1 % (ref 0–2)
BASOPHILS ABSOLUTE: 0.05 K/UL (ref 0–0.2)
BENZODIAZEPINE SCREEN, URINE: NEGATIVE
BUN BLDV-MCNC: 17 MG/DL (ref 6–20)
BUN/CREAT BLD: ABNORMAL (ref 9–20)
BUPRENORPHINE URINE: ABNORMAL
CALCIUM SERPL-MCNC: 9.6 MG/DL (ref 8.6–10.4)
CANNABINOID SCREEN URINE: POSITIVE
CHLORIDE BLD-SCNC: 97 MMOL/L (ref 98–107)
CHP ED QC CHECK: NORMAL
CO2: 27 MMOL/L (ref 20–31)
COCAINE METABOLITE, URINE: NEGATIVE
CREAT SERPL-MCNC: 0.88 MG/DL (ref 0.7–1.2)
DIFFERENTIAL TYPE: ABNORMAL
EOSINOPHILS RELATIVE PERCENT: 2 % (ref 1–4)
ETHANOL PERCENT: <0.01 %
ETHANOL: <10 MG/DL
GFR AFRICAN AMERICAN: >60 ML/MIN
GFR NON-AFRICAN AMERICAN: >60 ML/MIN
GFR SERPL CREATININE-BSD FRML MDRD: ABNORMAL ML/MIN/{1.73_M2}
GFR SERPL CREATININE-BSD FRML MDRD: ABNORMAL ML/MIN/{1.73_M2}
GLUCOSE BLD-MCNC: 102 MG/DL
GLUCOSE BLD-MCNC: 102 MG/DL (ref 75–110)
GLUCOSE BLD-MCNC: 58 MG/DL (ref 70–99)
HCT VFR BLD CALC: 40.1 % (ref 40.7–50.3)
HEMOGLOBIN: 12.4 G/DL (ref 13–17)
IMMATURE GRANULOCYTES: 0 %
LYMPHOCYTES # BLD: 33 % (ref 24–43)
MCH RBC QN AUTO: 28 PG (ref 25.2–33.5)
MCHC RBC AUTO-ENTMCNC: 30.9 G/DL (ref 28.4–34.8)
MCV RBC AUTO: 90.5 FL (ref 82.6–102.9)
MDMA URINE: ABNORMAL
METHADONE SCREEN, URINE: NEGATIVE
METHAMPHETAMINE, URINE: ABNORMAL
MONOCYTES # BLD: 6 % (ref 3–12)
NRBC AUTOMATED: 0 PER 100 WBC
OPIATES, URINE: NEGATIVE
OXYCODONE SCREEN URINE: NEGATIVE
PDW BLD-RTO: 13.8 % (ref 11.8–14.4)
PHENCYCLIDINE, URINE: NEGATIVE
PLATELET # BLD: 257 K/UL (ref 138–453)
PLATELET ESTIMATE: ABNORMAL
PMV BLD AUTO: 10.4 FL (ref 8.1–13.5)
POTASSIUM SERPL-SCNC: 3.9 MMOL/L (ref 3.7–5.3)
PROPOXYPHENE, URINE: ABNORMAL
RBC # BLD: 4.43 M/UL (ref 4.21–5.77)
RBC # BLD: ABNORMAL 10*6/UL
SEG NEUTROPHILS: 58 % (ref 36–65)
SEGMENTED NEUTROPHILS ABSOLUTE COUNT: 4.4 K/UL (ref 1.5–8.1)
SODIUM BLD-SCNC: 136 MMOL/L (ref 135–144)
TEST INFORMATION: ABNORMAL
TRICYCLIC ANTIDEPRESSANTS, UR: ABNORMAL
WBC # BLD: 7.5 K/UL (ref 3.5–11.3)
WBC # BLD: ABNORMAL 10*3/UL

## 2018-04-10 PROCEDURE — 82947 ASSAY GLUCOSE BLOOD QUANT: CPT

## 2018-04-10 PROCEDURE — 1240000000 HC EMOTIONAL WELLNESS R&B

## 2018-04-10 PROCEDURE — 99285 EMERGENCY DEPT VISIT HI MDM: CPT

## 2018-04-10 PROCEDURE — 6370000000 HC RX 637 (ALT 250 FOR IP): Performed by: PSYCHIATRY & NEUROLOGY

## 2018-04-10 PROCEDURE — 80307 DRUG TEST PRSMV CHEM ANLYZR: CPT

## 2018-04-10 PROCEDURE — G0480 DRUG TEST DEF 1-7 CLASSES: HCPCS

## 2018-04-10 PROCEDURE — 94664 DEMO&/EVAL PT USE INHALER: CPT

## 2018-04-10 PROCEDURE — 85025 COMPLETE CBC W/AUTO DIFF WBC: CPT

## 2018-04-10 PROCEDURE — 80048 BASIC METABOLIC PNL TOTAL CA: CPT

## 2018-04-10 RX ORDER — ACETAMINOPHEN 325 MG/1
650 TABLET ORAL EVERY 4 HOURS PRN
Status: DISCONTINUED | OUTPATIENT
Start: 2018-04-10 | End: 2018-04-18 | Stop reason: HOSPADM

## 2018-04-10 RX ORDER — AMOXICILLIN AND CLAVULANATE POTASSIUM 875; 125 MG/1; MG/1
1 TABLET, FILM COATED ORAL 2 TIMES DAILY
Status: ON HOLD | COMMUNITY
End: 2018-11-06

## 2018-04-10 RX ORDER — NICOTINE 21 MG/24HR
1 PATCH, TRANSDERMAL 24 HOURS TRANSDERMAL DAILY
Status: DISCONTINUED | OUTPATIENT
Start: 2018-04-11 | End: 2018-04-17

## 2018-04-10 RX ORDER — ALBUTEROL SULFATE 90 UG/1
2 AEROSOL, METERED RESPIRATORY (INHALATION) EVERY 6 HOURS PRN
COMMUNITY
End: 2019-03-09

## 2018-04-10 RX ORDER — BUPROPION HYDROCHLORIDE 150 MG/1
150 TABLET ORAL DAILY
Status: DISCONTINUED | OUTPATIENT
Start: 2018-04-11 | End: 2018-04-11

## 2018-04-10 RX ORDER — TRAZODONE HYDROCHLORIDE 50 MG/1
50 TABLET ORAL NIGHTLY PRN
Status: DISCONTINUED | OUTPATIENT
Start: 2018-04-11 | End: 2018-04-18 | Stop reason: HOSPADM

## 2018-04-10 RX ORDER — ALBUTEROL SULFATE 90 UG/1
2 AEROSOL, METERED RESPIRATORY (INHALATION) EVERY 6 HOURS PRN
Status: DISCONTINUED | OUTPATIENT
Start: 2018-04-10 | End: 2018-04-18 | Stop reason: HOSPADM

## 2018-04-10 RX ORDER — AMOXICILLIN AND CLAVULANATE POTASSIUM 875; 125 MG/1; MG/1
1 TABLET, FILM COATED ORAL 2 TIMES DAILY
Status: DISCONTINUED | OUTPATIENT
Start: 2018-04-10 | End: 2018-04-18 | Stop reason: HOSPADM

## 2018-04-10 RX ORDER — AMITRIPTYLINE HYDROCHLORIDE 25 MG/1
100 TABLET, FILM COATED ORAL NIGHTLY
Status: DISCONTINUED | OUTPATIENT
Start: 2018-04-10 | End: 2018-04-18 | Stop reason: HOSPADM

## 2018-04-10 RX ORDER — HYDROXYZINE HYDROCHLORIDE 25 MG/1
25 TABLET, FILM COATED ORAL 2 TIMES DAILY PRN
Status: DISCONTINUED | OUTPATIENT
Start: 2018-04-10 | End: 2018-04-18 | Stop reason: HOSPADM

## 2018-04-10 RX ORDER — LORAZEPAM 2 MG/ML
1 INJECTION INTRAMUSCULAR EVERY 4 HOURS PRN
Status: DISCONTINUED | OUTPATIENT
Start: 2018-04-10 | End: 2018-04-18 | Stop reason: HOSPADM

## 2018-04-10 RX ORDER — GABAPENTIN 400 MG/1
800 CAPSULE ORAL 3 TIMES DAILY
Status: DISCONTINUED | OUTPATIENT
Start: 2018-04-10 | End: 2018-04-18 | Stop reason: HOSPADM

## 2018-04-10 RX ORDER — BUPRENORPHINE AND NALOXONE 8; 2 MG/1; MG/1
1 FILM, SOLUBLE BUCCAL; SUBLINGUAL 2 TIMES DAILY
Status: ON HOLD | COMMUNITY
End: 2018-11-15 | Stop reason: HOSPADM

## 2018-04-10 RX ORDER — BUPRENORPHINE AND NALOXONE 8; 2 MG/1; MG/1
1 FILM, SOLUBLE BUCCAL; SUBLINGUAL 2 TIMES DAILY
Status: DISCONTINUED | OUTPATIENT
Start: 2018-04-10 | End: 2018-04-18 | Stop reason: HOSPADM

## 2018-04-10 RX ADMIN — GABAPENTIN 800 MG: 400 CAPSULE ORAL at 23:50

## 2018-04-10 RX ADMIN — AMITRIPTYLINE HYDROCHLORIDE 100 MG: 25 TABLET, FILM COATED ORAL at 23:50

## 2018-04-10 RX ADMIN — AMOXICILLIN AND CLAVULANATE POTASSIUM 1 TABLET: 875; 125 TABLET, FILM COATED ORAL at 23:50

## 2018-04-10 ASSESSMENT — ENCOUNTER SYMPTOMS
SHORTNESS OF BREATH: 0
WHEEZING: 0
BACK PAIN: 0
ABDOMINAL PAIN: 0

## 2018-04-10 ASSESSMENT — SLEEP AND FATIGUE QUESTIONNAIRES
DO YOU USE A SLEEP AID: YES
SLEEP PATTERN: DIFFICULTY FALLING ASLEEP
RESTFUL SLEEP: NO
DO YOU HAVE DIFFICULTY SLEEPING: YES
AVERAGE NUMBER OF SLEEP HOURS: 7
DIFFICULTY FALLING ASLEEP: YES
DIFFICULTY STAYING ASLEEP: YES
DIFFICULTY ARISING: NO

## 2018-04-10 ASSESSMENT — PATIENT HEALTH QUESTIONNAIRE - PHQ9: SUM OF ALL RESPONSES TO PHQ QUESTIONS 1-9: 22

## 2018-04-10 ASSESSMENT — LIFESTYLE VARIABLES: HISTORY_ALCOHOL_USE: YES

## 2018-04-10 ASSESSMENT — PAIN SCALES - GENERAL: PAINLEVEL_OUTOF10: 0

## 2018-04-11 PROCEDURE — 6360000002 HC RX W HCPCS: Performed by: PSYCHIATRY & NEUROLOGY

## 2018-04-11 PROCEDURE — 90792 PSYCH DIAG EVAL W/MED SRVCS: CPT | Performed by: REGISTERED NURSE

## 2018-04-11 PROCEDURE — 1240000000 HC EMOTIONAL WELLNESS R&B

## 2018-04-11 PROCEDURE — 6370000000 HC RX 637 (ALT 250 FOR IP): Performed by: REGISTERED NURSE

## 2018-04-11 PROCEDURE — 6370000000 HC RX 637 (ALT 250 FOR IP): Performed by: PSYCHIATRY & NEUROLOGY

## 2018-04-11 RX ORDER — BUPROPION HYDROCHLORIDE 300 MG/1
300 TABLET ORAL DAILY
Status: DISCONTINUED | OUTPATIENT
Start: 2018-04-12 | End: 2018-04-18 | Stop reason: HOSPADM

## 2018-04-11 RX ADMIN — AMOXICILLIN AND CLAVULANATE POTASSIUM 1 TABLET: 875; 125 TABLET, FILM COATED ORAL at 08:50

## 2018-04-11 RX ADMIN — NICOTINE POLACRILEX 2 MG: 2 GUM, CHEWING BUCCAL at 15:09

## 2018-04-11 RX ADMIN — BUPRENORPHINE HYDROCHLORIDE, NALOXONE HYDROCHLORIDE 1 FILM: 8; 2 FILM, SOLUBLE BUCCAL; SUBLINGUAL at 20:36

## 2018-04-11 RX ADMIN — GABAPENTIN 800 MG: 400 CAPSULE ORAL at 20:36

## 2018-04-11 RX ADMIN — AMITRIPTYLINE HYDROCHLORIDE 100 MG: 25 TABLET, FILM COATED ORAL at 22:00

## 2018-04-11 RX ADMIN — BUPROPION HYDROCHLORIDE 150 MG: 150 TABLET, FILM COATED, EXTENDED RELEASE ORAL at 08:50

## 2018-04-11 RX ADMIN — GABAPENTIN 800 MG: 400 CAPSULE ORAL at 08:50

## 2018-04-11 RX ADMIN — AMOXICILLIN AND CLAVULANATE POTASSIUM 1 TABLET: 875; 125 TABLET, FILM COATED ORAL at 20:36

## 2018-04-11 RX ADMIN — GABAPENTIN 800 MG: 400 CAPSULE ORAL at 13:29

## 2018-04-11 RX ADMIN — NICOTINE POLACRILEX 2 MG: 2 GUM, CHEWING BUCCAL at 22:00

## 2018-04-11 RX ADMIN — BUPRENORPHINE HYDROCHLORIDE, NALOXONE HYDROCHLORIDE 1 FILM: 8; 2 FILM, SOLUBLE BUCCAL; SUBLINGUAL at 08:50

## 2018-04-11 ASSESSMENT — SLEEP AND FATIGUE QUESTIONNAIRES
DO YOU USE A SLEEP AID: NO
SLEEP PATTERN: NORMAL
DIFFICULTY ARISING: NO
AVERAGE NUMBER OF SLEEP HOURS: 8
DIFFICULTY STAYING ASLEEP: NO
DIFFICULTY FALLING ASLEEP: NO
DO YOU HAVE DIFFICULTY SLEEPING: NO
RESTFUL SLEEP: YES

## 2018-04-11 ASSESSMENT — LIFESTYLE VARIABLES: HISTORY_ALCOHOL_USE: NO

## 2018-04-11 ASSESSMENT — PAIN SCALES - GENERAL
PAINLEVEL_OUTOF10: 0
PAINLEVEL_OUTOF10: 0

## 2018-04-12 PROCEDURE — 6360000002 HC RX W HCPCS: Performed by: PSYCHIATRY & NEUROLOGY

## 2018-04-12 PROCEDURE — 6370000000 HC RX 637 (ALT 250 FOR IP): Performed by: REGISTERED NURSE

## 2018-04-12 PROCEDURE — 99232 SBSQ HOSP IP/OBS MODERATE 35: CPT | Performed by: REGISTERED NURSE

## 2018-04-12 PROCEDURE — 6370000000 HC RX 637 (ALT 250 FOR IP): Performed by: PSYCHIATRY & NEUROLOGY

## 2018-04-12 PROCEDURE — 1240000000 HC EMOTIONAL WELLNESS R&B

## 2018-04-12 RX ADMIN — AMOXICILLIN AND CLAVULANATE POTASSIUM 1 TABLET: 875; 125 TABLET, FILM COATED ORAL at 21:08

## 2018-04-12 RX ADMIN — BUPROPION HYDROCHLORIDE 300 MG: 300 TABLET, FILM COATED, EXTENDED RELEASE ORAL at 08:17

## 2018-04-12 RX ADMIN — GABAPENTIN 800 MG: 400 CAPSULE ORAL at 21:08

## 2018-04-12 RX ADMIN — BUPRENORPHINE HYDROCHLORIDE, NALOXONE HYDROCHLORIDE 1 FILM: 8; 2 FILM, SOLUBLE BUCCAL; SUBLINGUAL at 20:35

## 2018-04-12 RX ADMIN — GABAPENTIN 800 MG: 400 CAPSULE ORAL at 14:32

## 2018-04-12 RX ADMIN — AMOXICILLIN AND CLAVULANATE POTASSIUM 1 TABLET: 875; 125 TABLET, FILM COATED ORAL at 08:18

## 2018-04-12 RX ADMIN — BUPRENORPHINE HYDROCHLORIDE, NALOXONE HYDROCHLORIDE 1 FILM: 8; 2 FILM, SOLUBLE BUCCAL; SUBLINGUAL at 08:18

## 2018-04-12 RX ADMIN — NICOTINE POLACRILEX 2 MG: 2 GUM, CHEWING BUCCAL at 21:08

## 2018-04-12 RX ADMIN — NICOTINE POLACRILEX 2 MG: 2 GUM, CHEWING BUCCAL at 15:32

## 2018-04-12 RX ADMIN — AMITRIPTYLINE HYDROCHLORIDE 100 MG: 25 TABLET, FILM COATED ORAL at 22:11

## 2018-04-12 RX ADMIN — NICOTINE POLACRILEX 2 MG: 2 GUM, CHEWING BUCCAL at 12:47

## 2018-04-12 RX ADMIN — GABAPENTIN 800 MG: 400 CAPSULE ORAL at 08:17

## 2018-04-12 ASSESSMENT — PAIN SCALES - GENERAL
PAINLEVEL_OUTOF10: 0
PAINLEVEL_OUTOF10: 0

## 2018-04-13 PROCEDURE — 6370000000 HC RX 637 (ALT 250 FOR IP): Performed by: REGISTERED NURSE

## 2018-04-13 PROCEDURE — 1240000000 HC EMOTIONAL WELLNESS R&B

## 2018-04-13 PROCEDURE — 6370000000 HC RX 637 (ALT 250 FOR IP): Performed by: NURSE PRACTITIONER

## 2018-04-13 PROCEDURE — 6370000000 HC RX 637 (ALT 250 FOR IP): Performed by: PSYCHIATRY & NEUROLOGY

## 2018-04-13 PROCEDURE — 99232 SBSQ HOSP IP/OBS MODERATE 35: CPT | Performed by: NURSE PRACTITIONER

## 2018-04-13 PROCEDURE — 6360000002 HC RX W HCPCS: Performed by: PSYCHIATRY & NEUROLOGY

## 2018-04-13 RX ORDER — SENNA PLUS 8.6 MG/1
1 TABLET ORAL NIGHTLY PRN
Status: DISCONTINUED | OUTPATIENT
Start: 2018-04-13 | End: 2018-04-18 | Stop reason: HOSPADM

## 2018-04-13 RX ORDER — DOCUSATE SODIUM 100 MG/1
100 CAPSULE, LIQUID FILLED ORAL 2 TIMES DAILY
Status: DISCONTINUED | OUTPATIENT
Start: 2018-04-13 | End: 2018-04-18 | Stop reason: HOSPADM

## 2018-04-13 RX ADMIN — AMITRIPTYLINE HYDROCHLORIDE 100 MG: 25 TABLET, FILM COATED ORAL at 22:37

## 2018-04-13 RX ADMIN — HYDROXYZINE HYDROCHLORIDE 25 MG: 25 TABLET, FILM COATED ORAL at 20:37

## 2018-04-13 RX ADMIN — DOCUSATE SODIUM 100 MG: 100 CAPSULE, LIQUID FILLED ORAL at 20:37

## 2018-04-13 RX ADMIN — NICOTINE POLACRILEX 2 MG: 2 GUM, CHEWING BUCCAL at 21:58

## 2018-04-13 RX ADMIN — AMOXICILLIN AND CLAVULANATE POTASSIUM 1 TABLET: 875; 125 TABLET, FILM COATED ORAL at 08:38

## 2018-04-13 RX ADMIN — BUPROPION HYDROCHLORIDE 300 MG: 300 TABLET, FILM COATED, EXTENDED RELEASE ORAL at 08:38

## 2018-04-13 RX ADMIN — GABAPENTIN 800 MG: 400 CAPSULE ORAL at 14:45

## 2018-04-13 RX ADMIN — AMOXICILLIN AND CLAVULANATE POTASSIUM 1 TABLET: 875; 125 TABLET, FILM COATED ORAL at 20:37

## 2018-04-13 RX ADMIN — GABAPENTIN 800 MG: 400 CAPSULE ORAL at 20:37

## 2018-04-13 RX ADMIN — GABAPENTIN 800 MG: 400 CAPSULE ORAL at 08:38

## 2018-04-13 RX ADMIN — NICOTINE POLACRILEX 2 MG: 2 GUM, CHEWING BUCCAL at 12:29

## 2018-04-13 RX ADMIN — BUPRENORPHINE HYDROCHLORIDE, NALOXONE HYDROCHLORIDE 1 FILM: 8; 2 FILM, SOLUBLE BUCCAL; SUBLINGUAL at 08:38

## 2018-04-13 RX ADMIN — BUPRENORPHINE HYDROCHLORIDE, NALOXONE HYDROCHLORIDE 1 FILM: 8; 2 FILM, SOLUBLE BUCCAL; SUBLINGUAL at 20:37

## 2018-04-13 ASSESSMENT — PAIN SCALES - GENERAL
PAINLEVEL_OUTOF10: 0
PAINLEVEL_OUTOF10: 2

## 2018-04-14 PROCEDURE — 6370000000 HC RX 637 (ALT 250 FOR IP): Performed by: NURSE PRACTITIONER

## 2018-04-14 PROCEDURE — 99232 SBSQ HOSP IP/OBS MODERATE 35: CPT | Performed by: REGISTERED NURSE

## 2018-04-14 PROCEDURE — 6360000002 HC RX W HCPCS: Performed by: PSYCHIATRY & NEUROLOGY

## 2018-04-14 PROCEDURE — 6370000000 HC RX 637 (ALT 250 FOR IP): Performed by: PSYCHIATRY & NEUROLOGY

## 2018-04-14 PROCEDURE — 6370000000 HC RX 637 (ALT 250 FOR IP): Performed by: REGISTERED NURSE

## 2018-04-14 PROCEDURE — 1240000000 HC EMOTIONAL WELLNESS R&B

## 2018-04-14 RX ORDER — BACLOFEN 10 MG/1
10 TABLET ORAL 3 TIMES DAILY PRN
Status: DISCONTINUED | OUTPATIENT
Start: 2018-04-14 | End: 2018-04-18 | Stop reason: HOSPADM

## 2018-04-14 RX ADMIN — NICOTINE POLACRILEX 2 MG: 2 GUM, CHEWING BUCCAL at 08:49

## 2018-04-14 RX ADMIN — GABAPENTIN 800 MG: 400 CAPSULE ORAL at 20:40

## 2018-04-14 RX ADMIN — DOCUSATE SODIUM 100 MG: 100 CAPSULE, LIQUID FILLED ORAL at 08:27

## 2018-04-14 RX ADMIN — NICOTINE POLACRILEX 2 MG: 2 GUM, CHEWING BUCCAL at 12:11

## 2018-04-14 RX ADMIN — NICOTINE POLACRILEX 2 MG: 2 GUM, CHEWING BUCCAL at 18:57

## 2018-04-14 RX ADMIN — AMOXICILLIN AND CLAVULANATE POTASSIUM 1 TABLET: 875; 125 TABLET, FILM COATED ORAL at 20:40

## 2018-04-14 RX ADMIN — NICOTINE POLACRILEX 2 MG: 2 GUM, CHEWING BUCCAL at 17:02

## 2018-04-14 RX ADMIN — BUPRENORPHINE HYDROCHLORIDE, NALOXONE HYDROCHLORIDE 1 FILM: 8; 2 FILM, SOLUBLE BUCCAL; SUBLINGUAL at 08:27

## 2018-04-14 RX ADMIN — GABAPENTIN 800 MG: 400 CAPSULE ORAL at 14:14

## 2018-04-14 RX ADMIN — BUPROPION HYDROCHLORIDE 300 MG: 300 TABLET, FILM COATED, EXTENDED RELEASE ORAL at 08:26

## 2018-04-14 RX ADMIN — DOCUSATE SODIUM 100 MG: 100 CAPSULE, LIQUID FILLED ORAL at 20:48

## 2018-04-14 RX ADMIN — NICOTINE POLACRILEX 2 MG: 2 GUM, CHEWING BUCCAL at 20:40

## 2018-04-14 RX ADMIN — AMOXICILLIN AND CLAVULANATE POTASSIUM 1 TABLET: 875; 125 TABLET, FILM COATED ORAL at 08:26

## 2018-04-14 RX ADMIN — BUPRENORPHINE HYDROCHLORIDE, NALOXONE HYDROCHLORIDE 1 FILM: 8; 2 FILM, SOLUBLE BUCCAL; SUBLINGUAL at 20:40

## 2018-04-14 RX ADMIN — GABAPENTIN 800 MG: 400 CAPSULE ORAL at 08:26

## 2018-04-14 RX ADMIN — AMITRIPTYLINE HYDROCHLORIDE 100 MG: 25 TABLET, FILM COATED ORAL at 22:19

## 2018-04-14 ASSESSMENT — PAIN DESCRIPTION - LOCATION: LOCATION: HEAD

## 2018-04-14 ASSESSMENT — PAIN SCALES - GENERAL
PAINLEVEL_OUTOF10: 2
PAINLEVEL_OUTOF10: 1

## 2018-04-15 PROCEDURE — 6360000002 HC RX W HCPCS: Performed by: PSYCHIATRY & NEUROLOGY

## 2018-04-15 PROCEDURE — 6370000000 HC RX 637 (ALT 250 FOR IP): Performed by: PSYCHIATRY & NEUROLOGY

## 2018-04-15 PROCEDURE — 6370000000 HC RX 637 (ALT 250 FOR IP): Performed by: NURSE PRACTITIONER

## 2018-04-15 PROCEDURE — 6370000000 HC RX 637 (ALT 250 FOR IP): Performed by: REGISTERED NURSE

## 2018-04-15 PROCEDURE — 1240000000 HC EMOTIONAL WELLNESS R&B

## 2018-04-15 RX ADMIN — HYDROXYZINE HYDROCHLORIDE 25 MG: 25 TABLET, FILM COATED ORAL at 20:30

## 2018-04-15 RX ADMIN — BUPRENORPHINE HYDROCHLORIDE, NALOXONE HYDROCHLORIDE 1 FILM: 8; 2 FILM, SOLUBLE BUCCAL; SUBLINGUAL at 20:30

## 2018-04-15 RX ADMIN — NICOTINE POLACRILEX 2 MG: 2 GUM, CHEWING BUCCAL at 12:33

## 2018-04-15 RX ADMIN — DOCUSATE SODIUM 100 MG: 100 CAPSULE, LIQUID FILLED ORAL at 20:31

## 2018-04-15 RX ADMIN — GABAPENTIN 800 MG: 400 CAPSULE ORAL at 20:31

## 2018-04-15 RX ADMIN — BACLOFEN 10 MG: 10 TABLET ORAL at 18:30

## 2018-04-15 RX ADMIN — NICOTINE POLACRILEX 2 MG: 2 GUM, CHEWING BUCCAL at 08:43

## 2018-04-15 RX ADMIN — ACETAMINOPHEN 650 MG: 325 TABLET, FILM COATED ORAL at 18:30

## 2018-04-15 RX ADMIN — GABAPENTIN 800 MG: 400 CAPSULE ORAL at 14:36

## 2018-04-15 RX ADMIN — NICOTINE POLACRILEX 2 MG: 2 GUM, CHEWING BUCCAL at 20:31

## 2018-04-15 RX ADMIN — AMOXICILLIN AND CLAVULANATE POTASSIUM 1 TABLET: 875; 125 TABLET, FILM COATED ORAL at 20:31

## 2018-04-15 RX ADMIN — AMOXICILLIN AND CLAVULANATE POTASSIUM 1 TABLET: 875; 125 TABLET, FILM COATED ORAL at 08:43

## 2018-04-15 RX ADMIN — NICOTINE POLACRILEX 2 MG: 2 GUM, CHEWING BUCCAL at 17:27

## 2018-04-15 RX ADMIN — BUPRENORPHINE HYDROCHLORIDE, NALOXONE HYDROCHLORIDE 1 FILM: 8; 2 FILM, SOLUBLE BUCCAL; SUBLINGUAL at 08:43

## 2018-04-15 RX ADMIN — DOCUSATE SODIUM 100 MG: 100 CAPSULE, LIQUID FILLED ORAL at 08:43

## 2018-04-15 RX ADMIN — GABAPENTIN 800 MG: 400 CAPSULE ORAL at 08:43

## 2018-04-15 RX ADMIN — NICOTINE POLACRILEX 2 MG: 2 GUM, CHEWING BUCCAL at 15:10

## 2018-04-15 RX ADMIN — BUPROPION HYDROCHLORIDE 300 MG: 300 TABLET, FILM COATED, EXTENDED RELEASE ORAL at 08:43

## 2018-04-15 RX ADMIN — AMITRIPTYLINE HYDROCHLORIDE 100 MG: 25 TABLET, FILM COATED ORAL at 22:19

## 2018-04-15 ASSESSMENT — PAIN - FUNCTIONAL ASSESSMENT: PAIN_FUNCTIONAL_ASSESSMENT: 0-10

## 2018-04-15 ASSESSMENT — PAIN DESCRIPTION - DESCRIPTORS: DESCRIPTORS: OTHER (COMMENT)

## 2018-04-15 ASSESSMENT — PAIN SCALES - GENERAL
PAINLEVEL_OUTOF10: 1
PAINLEVEL_OUTOF10: 2
PAINLEVEL_OUTOF10: 3

## 2018-04-16 PROCEDURE — 6360000002 HC RX W HCPCS: Performed by: PSYCHIATRY & NEUROLOGY

## 2018-04-16 PROCEDURE — 1240000000 HC EMOTIONAL WELLNESS R&B

## 2018-04-16 PROCEDURE — 6370000000 HC RX 637 (ALT 250 FOR IP): Performed by: PSYCHIATRY & NEUROLOGY

## 2018-04-16 PROCEDURE — 6370000000 HC RX 637 (ALT 250 FOR IP): Performed by: REGISTERED NURSE

## 2018-04-16 PROCEDURE — 6370000000 HC RX 637 (ALT 250 FOR IP): Performed by: NURSE PRACTITIONER

## 2018-04-16 PROCEDURE — 99232 SBSQ HOSP IP/OBS MODERATE 35: CPT | Performed by: REGISTERED NURSE

## 2018-04-16 RX ADMIN — GABAPENTIN 800 MG: 400 CAPSULE ORAL at 22:06

## 2018-04-16 RX ADMIN — NICOTINE POLACRILEX 2 MG: 2 GUM, CHEWING BUCCAL at 16:00

## 2018-04-16 RX ADMIN — NICOTINE POLACRILEX 2 MG: 2 GUM, CHEWING BUCCAL at 20:55

## 2018-04-16 RX ADMIN — GABAPENTIN 800 MG: 400 CAPSULE ORAL at 14:45

## 2018-04-16 RX ADMIN — BUPROPION HYDROCHLORIDE 300 MG: 300 TABLET, FILM COATED, EXTENDED RELEASE ORAL at 09:07

## 2018-04-16 RX ADMIN — GABAPENTIN 800 MG: 400 CAPSULE ORAL at 09:07

## 2018-04-16 RX ADMIN — BUPRENORPHINE HYDROCHLORIDE, NALOXONE HYDROCHLORIDE 1 FILM: 8; 2 FILM, SOLUBLE BUCCAL; SUBLINGUAL at 20:55

## 2018-04-16 RX ADMIN — AMOXICILLIN AND CLAVULANATE POTASSIUM 1 TABLET: 875; 125 TABLET, FILM COATED ORAL at 09:07

## 2018-04-16 RX ADMIN — AMITRIPTYLINE HYDROCHLORIDE 100 MG: 25 TABLET, FILM COATED ORAL at 22:05

## 2018-04-16 RX ADMIN — AMOXICILLIN AND CLAVULANATE POTASSIUM 1 TABLET: 875; 125 TABLET, FILM COATED ORAL at 20:55

## 2018-04-16 RX ADMIN — MAGNESIUM HYDROXIDE 30 ML: 400 SUSPENSION ORAL at 09:40

## 2018-04-16 RX ADMIN — BUPRENORPHINE HYDROCHLORIDE, NALOXONE HYDROCHLORIDE 1 FILM: 8; 2 FILM, SOLUBLE BUCCAL; SUBLINGUAL at 09:07

## 2018-04-16 RX ADMIN — HYDROXYZINE HYDROCHLORIDE 25 MG: 25 TABLET, FILM COATED ORAL at 20:55

## 2018-04-16 RX ADMIN — BACLOFEN 10 MG: 10 TABLET ORAL at 20:55

## 2018-04-16 RX ADMIN — NICOTINE POLACRILEX 2 MG: 2 GUM, CHEWING BUCCAL at 09:07

## 2018-04-16 RX ADMIN — DOCUSATE SODIUM 100 MG: 100 CAPSULE, LIQUID FILLED ORAL at 09:07

## 2018-04-16 RX ADMIN — ACETAMINOPHEN 650 MG: 325 TABLET, FILM COATED ORAL at 16:00

## 2018-04-16 RX ADMIN — NICOTINE POLACRILEX 2 MG: 2 GUM, CHEWING BUCCAL at 22:06

## 2018-04-16 RX ADMIN — NICOTINE POLACRILEX 2 MG: 2 GUM, CHEWING BUCCAL at 17:43

## 2018-04-16 RX ADMIN — NICOTINE POLACRILEX 2 MG: 2 GUM, CHEWING BUCCAL at 12:43

## 2018-04-16 RX ADMIN — SENNOSIDES 8.6 MG: 8.6 TABLET, FILM COATED ORAL at 20:55

## 2018-04-16 ASSESSMENT — PAIN DESCRIPTION - PAIN TYPE: TYPE: ACUTE PAIN

## 2018-04-16 ASSESSMENT — PAIN SCALES - GENERAL
PAINLEVEL_OUTOF10: 0
PAINLEVEL_OUTOF10: 0
PAINLEVEL_OUTOF10: 3
PAINLEVEL_OUTOF10: 3

## 2018-04-17 PROCEDURE — 99232 SBSQ HOSP IP/OBS MODERATE 35: CPT | Performed by: REGISTERED NURSE

## 2018-04-17 PROCEDURE — 6370000000 HC RX 637 (ALT 250 FOR IP): Performed by: REGISTERED NURSE

## 2018-04-17 PROCEDURE — 6370000000 HC RX 637 (ALT 250 FOR IP): Performed by: PSYCHIATRY & NEUROLOGY

## 2018-04-17 PROCEDURE — 6360000002 HC RX W HCPCS: Performed by: PSYCHIATRY & NEUROLOGY

## 2018-04-17 PROCEDURE — 1240000000 HC EMOTIONAL WELLNESS R&B

## 2018-04-17 PROCEDURE — 6370000000 HC RX 637 (ALT 250 FOR IP): Performed by: NURSE PRACTITIONER

## 2018-04-17 RX ADMIN — NICOTINE POLACRILEX 4 MG: 4 GUM, CHEWING BUCCAL at 22:41

## 2018-04-17 RX ADMIN — AMOXICILLIN AND CLAVULANATE POTASSIUM 1 TABLET: 875; 125 TABLET, FILM COATED ORAL at 08:14

## 2018-04-17 RX ADMIN — NICOTINE POLACRILEX 4 MG: 4 GUM, CHEWING BUCCAL at 14:22

## 2018-04-17 RX ADMIN — GABAPENTIN 800 MG: 400 CAPSULE ORAL at 08:14

## 2018-04-17 RX ADMIN — AMOXICILLIN AND CLAVULANATE POTASSIUM 1 TABLET: 875; 125 TABLET, FILM COATED ORAL at 21:09

## 2018-04-17 RX ADMIN — DOCUSATE SODIUM 100 MG: 100 CAPSULE, LIQUID FILLED ORAL at 08:14

## 2018-04-17 RX ADMIN — GABAPENTIN 800 MG: 400 CAPSULE ORAL at 21:09

## 2018-04-17 RX ADMIN — BUPRENORPHINE HYDROCHLORIDE, NALOXONE HYDROCHLORIDE 1 FILM: 8; 2 FILM, SOLUBLE BUCCAL; SUBLINGUAL at 21:09

## 2018-04-17 RX ADMIN — DOCUSATE SODIUM 100 MG: 100 CAPSULE, LIQUID FILLED ORAL at 21:09

## 2018-04-17 RX ADMIN — AMITRIPTYLINE HYDROCHLORIDE 100 MG: 25 TABLET, FILM COATED ORAL at 22:41

## 2018-04-17 RX ADMIN — NICOTINE POLACRILEX 4 MG: 4 GUM, CHEWING BUCCAL at 10:10

## 2018-04-17 RX ADMIN — GABAPENTIN 800 MG: 400 CAPSULE ORAL at 14:12

## 2018-04-17 RX ADMIN — BUPRENORPHINE HYDROCHLORIDE, NALOXONE HYDROCHLORIDE 1 FILM: 8; 2 FILM, SOLUBLE BUCCAL; SUBLINGUAL at 08:14

## 2018-04-17 RX ADMIN — ACETAMINOPHEN 650 MG: 325 TABLET, FILM COATED ORAL at 21:09

## 2018-04-17 RX ADMIN — BUPROPION HYDROCHLORIDE 300 MG: 300 TABLET, FILM COATED, EXTENDED RELEASE ORAL at 08:14

## 2018-04-17 RX ADMIN — NICOTINE POLACRILEX 2 MG: 2 GUM, CHEWING BUCCAL at 08:14

## 2018-04-17 ASSESSMENT — PAIN DESCRIPTION - LOCATION: LOCATION: BACK

## 2018-04-17 ASSESSMENT — PAIN SCALES - GENERAL
PAINLEVEL_OUTOF10: 0
PAINLEVEL_OUTOF10: 5
PAINLEVEL_OUTOF10: 2

## 2018-04-17 ASSESSMENT — PAIN DESCRIPTION - PAIN TYPE: TYPE: ACUTE PAIN

## 2018-04-17 ASSESSMENT — PAIN DESCRIPTION - FREQUENCY: FREQUENCY: INTERMITTENT

## 2018-04-17 ASSESSMENT — PAIN DESCRIPTION - DESCRIPTORS: DESCRIPTORS: ACHING

## 2018-04-18 VITALS
WEIGHT: 185 LBS | OXYGEN SATURATION: 98 % | RESPIRATION RATE: 14 BRPM | TEMPERATURE: 97.7 F | HEART RATE: 84 BPM | SYSTOLIC BLOOD PRESSURE: 111 MMHG | BODY MASS INDEX: 25.06 KG/M2 | DIASTOLIC BLOOD PRESSURE: 60 MMHG | HEIGHT: 72 IN

## 2018-04-18 PROCEDURE — 6370000000 HC RX 637 (ALT 250 FOR IP): Performed by: REGISTERED NURSE

## 2018-04-18 PROCEDURE — 6360000002 HC RX W HCPCS: Performed by: PSYCHIATRY & NEUROLOGY

## 2018-04-18 PROCEDURE — 5130000000 HC BRIDGE APPOINTMENT

## 2018-04-18 PROCEDURE — 99239 HOSP IP/OBS DSCHRG MGMT >30: CPT | Performed by: REGISTERED NURSE

## 2018-04-18 PROCEDURE — 6370000000 HC RX 637 (ALT 250 FOR IP): Performed by: NURSE PRACTITIONER

## 2018-04-18 PROCEDURE — 6370000000 HC RX 637 (ALT 250 FOR IP): Performed by: PSYCHIATRY & NEUROLOGY

## 2018-04-18 RX ORDER — BUPROPION HYDROCHLORIDE 300 MG/1
300 TABLET ORAL DAILY
Qty: 14 TABLET | Refills: 0 | Status: ON HOLD | OUTPATIENT
Start: 2018-04-19 | End: 2018-11-15 | Stop reason: HOSPADM

## 2018-04-18 RX ORDER — AMITRIPTYLINE HYDROCHLORIDE 100 MG/1
100 TABLET, FILM COATED ORAL NIGHTLY
Qty: 14 TABLET | Refills: 0 | Status: ON HOLD | OUTPATIENT
Start: 2018-04-18 | End: 2019-03-14 | Stop reason: HOSPADM

## 2018-04-18 RX ADMIN — GABAPENTIN 800 MG: 400 CAPSULE ORAL at 08:09

## 2018-04-18 RX ADMIN — AMOXICILLIN AND CLAVULANATE POTASSIUM 1 TABLET: 875; 125 TABLET, FILM COATED ORAL at 08:09

## 2018-04-18 RX ADMIN — BUPRENORPHINE HYDROCHLORIDE, NALOXONE HYDROCHLORIDE 1 FILM: 8; 2 FILM, SOLUBLE BUCCAL; SUBLINGUAL at 08:09

## 2018-04-18 RX ADMIN — BUPROPION HYDROCHLORIDE 300 MG: 300 TABLET, FILM COATED, EXTENDED RELEASE ORAL at 08:09

## 2018-04-18 RX ADMIN — NICOTINE POLACRILEX 4 MG: 4 GUM, CHEWING BUCCAL at 09:30

## 2018-04-18 RX ADMIN — DOCUSATE SODIUM 100 MG: 100 CAPSULE, LIQUID FILLED ORAL at 08:09

## 2018-04-18 ASSESSMENT — PAIN SCALES - GENERAL: PAINLEVEL_OUTOF10: 0

## 2018-05-29 ENCOUNTER — HOSPITAL ENCOUNTER (EMERGENCY)
Age: 43
Discharge: HOME | End: 2018-05-29
Attending: EMERGENCY MEDICINE
Payer: COMMERCIAL

## 2018-05-29 VITALS
WEIGHT: 190 LBS | HEART RATE: 111 BPM | RESPIRATION RATE: 16 BRPM | BODY MASS INDEX: 25.73 KG/M2 | SYSTOLIC BLOOD PRESSURE: 135 MMHG | OXYGEN SATURATION: 98 % | HEIGHT: 72 IN | DIASTOLIC BLOOD PRESSURE: 79 MMHG | TEMPERATURE: 98.4 F

## 2018-05-29 DIAGNOSIS — Z76.0 ENCOUNTER FOR MEDICATION REFILL: Primary | ICD-10-CM

## 2018-05-29 PROCEDURE — 99281 EMR DPT VST MAYX REQ PHY/QHP: CPT

## 2018-05-29 RX ORDER — GABAPENTIN 800 MG/1
800 TABLET ORAL 3 TIMES DAILY
Qty: 30 TABLET | Refills: 0 | Status: ON HOLD | OUTPATIENT
Start: 2018-05-29 | End: 2018-11-06

## 2018-05-29 RX ORDER — OXYCODONE HYDROCHLORIDE AND ACETAMINOPHEN 5; 325 MG/1; MG/1
1-2 TABLET ORAL EVERY 4 HOURS PRN
Qty: 20 TABLET | Refills: 0 | Status: SHIPPED | OUTPATIENT
Start: 2018-05-29 | End: 2018-05-29

## 2018-05-29 RX ORDER — GABAPENTIN 800 MG/1
800 TABLET ORAL 3 TIMES DAILY
Qty: 30 TABLET | Refills: 0 | Status: SHIPPED | OUTPATIENT
Start: 2018-05-29 | End: 2018-05-29

## 2018-05-29 ASSESSMENT — PAIN SCALES - GENERAL: PAINLEVEL_OUTOF10: 7

## 2018-05-29 ASSESSMENT — PAIN DESCRIPTION - FREQUENCY: FREQUENCY: CONTINUOUS

## 2018-05-29 ASSESSMENT — PAIN DESCRIPTION - ORIENTATION: ORIENTATION: LEFT;RIGHT

## 2018-05-29 ASSESSMENT — PAIN DESCRIPTION - PROGRESSION: CLINICAL_PROGRESSION: NOT CHANGED

## 2018-05-29 ASSESSMENT — PAIN DESCRIPTION - DESCRIPTORS: DESCRIPTORS: CONSTANT

## 2018-05-29 ASSESSMENT — PAIN DESCRIPTION - LOCATION: LOCATION: FOOT

## 2018-05-29 ASSESSMENT — ENCOUNTER SYMPTOMS
GASTROINTESTINAL NEGATIVE: 1
RESPIRATORY NEGATIVE: 1

## 2018-05-29 ASSESSMENT — PAIN DESCRIPTION - PAIN TYPE: TYPE: CHRONIC PAIN

## 2018-11-06 ENCOUNTER — HOSPITAL ENCOUNTER (INPATIENT)
Age: 43
LOS: 9 days | Discharge: HOME | DRG: 753 | End: 2018-11-15
Attending: PSYCHIATRY & NEUROLOGY | Admitting: PSYCHIATRY & NEUROLOGY
Payer: COMMERCIAL

## 2018-11-06 PROCEDURE — 84436 ASSAY OF TOTAL THYROXINE: CPT

## 2018-11-06 PROCEDURE — 84481 FREE ASSAY (FT-3): CPT

## 2018-11-06 PROCEDURE — 1240000000 HC EMOTIONAL WELLNESS R&B

## 2018-11-06 PROCEDURE — 84443 ASSAY THYROID STIM HORMONE: CPT

## 2018-11-06 PROCEDURE — 6370000000 HC RX 637 (ALT 250 FOR IP): Performed by: PSYCHIATRY & NEUROLOGY

## 2018-11-06 PROCEDURE — 80061 LIPID PANEL: CPT

## 2018-11-06 RX ORDER — PROMETHAZINE HYDROCHLORIDE 25 MG/1
25 TABLET ORAL EVERY 4 HOURS PRN
Status: DISCONTINUED | OUTPATIENT
Start: 2018-11-06 | End: 2018-11-15 | Stop reason: HOSPADM

## 2018-11-06 RX ORDER — HYDROXYZINE HYDROCHLORIDE 25 MG/1
25 TABLET, FILM COATED ORAL 3 TIMES DAILY PRN
Status: DISCONTINUED | OUTPATIENT
Start: 2018-11-06 | End: 2018-11-15 | Stop reason: HOSPADM

## 2018-11-06 RX ORDER — CLONIDINE HYDROCHLORIDE 0.1 MG/1
0.1 TABLET ORAL 3 TIMES DAILY
Status: DISPENSED | OUTPATIENT
Start: 2018-11-06 | End: 2018-11-09

## 2018-11-06 RX ORDER — PROMETHAZINE HYDROCHLORIDE 25 MG/ML
12.5 INJECTION, SOLUTION INTRAMUSCULAR; INTRAVENOUS EVERY 4 HOURS PRN
Status: DISCONTINUED | OUTPATIENT
Start: 2018-11-06 | End: 2018-11-15 | Stop reason: HOSPADM

## 2018-11-06 RX ORDER — LOPERAMIDE HYDROCHLORIDE 2 MG/1
2 CAPSULE ORAL 2 TIMES DAILY PRN
Status: DISCONTINUED | OUTPATIENT
Start: 2018-11-06 | End: 2018-11-15 | Stop reason: HOSPADM

## 2018-11-06 RX ORDER — TRAZODONE HYDROCHLORIDE 50 MG/1
50 TABLET ORAL NIGHTLY PRN
Status: DISCONTINUED | OUTPATIENT
Start: 2018-11-06 | End: 2018-11-15 | Stop reason: HOSPADM

## 2018-11-06 RX ORDER — AMITRIPTYLINE HYDROCHLORIDE 25 MG/1
100 TABLET, FILM COATED ORAL NIGHTLY
Status: DISCONTINUED | OUTPATIENT
Start: 2018-11-06 | End: 2018-11-15 | Stop reason: HOSPADM

## 2018-11-06 RX ORDER — MAGNESIUM HYDROXIDE/ALUMINUM HYDROXICE/SIMETHICONE 120; 1200; 1200 MG/30ML; MG/30ML; MG/30ML
30 SUSPENSION ORAL 3 TIMES DAILY PRN
Status: DISCONTINUED | OUTPATIENT
Start: 2018-11-06 | End: 2018-11-15 | Stop reason: HOSPADM

## 2018-11-06 RX ORDER — CARISOPRODOL 350 MG/1
350 TABLET ORAL 3 TIMES DAILY PRN
Status: ON HOLD | COMMUNITY
End: 2020-09-15 | Stop reason: HOSPADM

## 2018-11-06 RX ORDER — DICYCLOMINE HCL 20 MG
10 TABLET ORAL 4 TIMES DAILY PRN
Status: DISCONTINUED | OUTPATIENT
Start: 2018-11-06 | End: 2018-11-15 | Stop reason: HOSPADM

## 2018-11-06 RX ORDER — TRAZODONE HYDROCHLORIDE 50 MG/1
50 TABLET ORAL NIGHTLY PRN
Status: DISCONTINUED | OUTPATIENT
Start: 2018-11-07 | End: 2018-11-06

## 2018-11-06 RX ORDER — BUPROPION HYDROCHLORIDE 100 MG/1
100 TABLET, EXTENDED RELEASE ORAL DAILY
Status: DISCONTINUED | OUTPATIENT
Start: 2018-11-07 | End: 2018-11-07 | Stop reason: ALTCHOICE

## 2018-11-06 RX ORDER — CYCLOBENZAPRINE HCL 10 MG
10 TABLET ORAL 3 TIMES DAILY PRN
Status: DISCONTINUED | OUTPATIENT
Start: 2018-11-06 | End: 2018-11-13

## 2018-11-06 RX ORDER — GABAPENTIN 600 MG/1
600 TABLET ORAL 2 TIMES DAILY
Status: ON HOLD | COMMUNITY
End: 2019-03-14 | Stop reason: HOSPADM

## 2018-11-06 RX ORDER — ACETAMINOPHEN 500 MG
500 TABLET ORAL EVERY 4 HOURS PRN
Status: DISCONTINUED | OUTPATIENT
Start: 2018-11-06 | End: 2018-11-15 | Stop reason: HOSPADM

## 2018-11-06 RX ORDER — ONDANSETRON 4 MG/1
4 TABLET, ORALLY DISINTEGRATING ORAL 2 TIMES DAILY PRN
Status: DISCONTINUED | OUTPATIENT
Start: 2018-11-06 | End: 2018-11-15 | Stop reason: HOSPADM

## 2018-11-06 RX ORDER — IBUPROFEN 400 MG/1
400 TABLET ORAL 3 TIMES DAILY PRN
Status: DISCONTINUED | OUTPATIENT
Start: 2018-11-06 | End: 2018-11-15 | Stop reason: HOSPADM

## 2018-11-06 RX ADMIN — IBUPROFEN 400 MG: 400 TABLET ORAL at 23:13

## 2018-11-06 RX ADMIN — HYDROXYZINE HYDROCHLORIDE 25 MG: 25 TABLET, FILM COATED ORAL at 23:13

## 2018-11-06 RX ADMIN — NICOTINE POLACRILEX 2 MG: 2 GUM, CHEWING BUCCAL at 23:13

## 2018-11-06 RX ADMIN — AMITRIPTYLINE HYDROCHLORIDE 100 MG: 25 TABLET, FILM COATED ORAL at 23:13

## 2018-11-06 ASSESSMENT — SLEEP AND FATIGUE QUESTIONNAIRES
DIFFICULTY FALLING ASLEEP: YES
DIFFICULTY STAYING ASLEEP: YES
SLEEP PATTERN: RESTLESSNESS
AVERAGE NUMBER OF SLEEP HOURS: 5
DIFFICULTY ARISING: NO
DO YOU HAVE DIFFICULTY SLEEPING: YES
DO YOU USE A SLEEP AID: YES
RESTFUL SLEEP: NO

## 2018-11-06 ASSESSMENT — PATIENT HEALTH QUESTIONNAIRE - PHQ9: SUM OF ALL RESPONSES TO PHQ QUESTIONS 1-9: 15

## 2018-11-06 ASSESSMENT — LIFESTYLE VARIABLES: HISTORY_ALCOHOL_USE: NO

## 2018-11-06 ASSESSMENT — PAIN SCALES - GENERAL
PAINLEVEL_OUTOF10: 0
PAINLEVEL_OUTOF10: 4

## 2018-11-07 PROBLEM — F31.30 BIPOLAR I DISORDER, MOST RECENT EPISODE DEPRESSED (HCC): Status: ACTIVE | Noted: 2018-11-07

## 2018-11-07 PROBLEM — F11.20 OPIOID TYPE DEPENDENCE, CONTINUOUS (HCC): Status: ACTIVE | Noted: 2018-11-07

## 2018-11-07 PROBLEM — F33.2 MDD (MAJOR DEPRESSIVE DISORDER), RECURRENT SEVERE, WITHOUT PSYCHOSIS (HCC): Status: ACTIVE | Noted: 2018-11-07

## 2018-11-07 PROBLEM — F31.30 BIPOLAR I DISORDER, MOST RECENT EPISODE DEPRESSED (HCC): Chronic | Status: ACTIVE | Noted: 2018-11-07

## 2018-11-07 LAB
CHOLESTEROL/HDL RATIO: 2.4
CHOLESTEROL: 109 MG/DL
HDLC SERPL-MCNC: 46 MG/DL
LDL CHOLESTEROL: 53 MG/DL (ref 0–130)
T3 FREE: 3.17 PG/ML (ref 2.02–4.43)
T4 TOTAL: 6.6 UG/DL (ref 4.5–12)
TRIGL SERPL-MCNC: 48 MG/DL
TSH SERPL DL<=0.05 MIU/L-ACNC: 0.33 MIU/L (ref 0.3–5)
VLDLC SERPL CALC-MCNC: NORMAL MG/DL (ref 1–30)

## 2018-11-07 PROCEDURE — 1240000000 HC EMOTIONAL WELLNESS R&B

## 2018-11-07 PROCEDURE — 6370000000 HC RX 637 (ALT 250 FOR IP): Performed by: PSYCHIATRY & NEUROLOGY

## 2018-11-07 PROCEDURE — 36415 COLL VENOUS BLD VENIPUNCTURE: CPT

## 2018-11-07 PROCEDURE — 6360000002 HC RX W HCPCS: Performed by: PSYCHIATRY & NEUROLOGY

## 2018-11-07 PROCEDURE — 84436 ASSAY OF TOTAL THYROXINE: CPT

## 2018-11-07 PROCEDURE — 84443 ASSAY THYROID STIM HORMONE: CPT

## 2018-11-07 PROCEDURE — 90792 PSYCH DIAG EVAL W/MED SRVCS: CPT | Performed by: PSYCHIATRY & NEUROLOGY

## 2018-11-07 PROCEDURE — 84481 FREE ASSAY (FT-3): CPT

## 2018-11-07 PROCEDURE — 80061 LIPID PANEL: CPT

## 2018-11-07 RX ORDER — BUPRENORPHINE AND NALOXONE 4; 1 MG/1; MG/1
1 FILM, SOLUBLE BUCCAL; SUBLINGUAL 2 TIMES DAILY
Status: DISCONTINUED | OUTPATIENT
Start: 2018-11-09 | End: 2018-11-08

## 2018-11-07 RX ORDER — GABAPENTIN 400 MG/1
800 CAPSULE ORAL 3 TIMES DAILY
Status: DISCONTINUED | OUTPATIENT
Start: 2018-11-07 | End: 2018-11-15 | Stop reason: HOSPADM

## 2018-11-07 RX ORDER — BUPRENORPHINE AND NALOXONE 4; 1 MG/1; MG/1
1 FILM, SOLUBLE BUCCAL; SUBLINGUAL DAILY
Status: COMPLETED | OUTPATIENT
Start: 2018-11-11 | End: 2018-11-12

## 2018-11-07 RX ORDER — BUPRENORPHINE AND NALOXONE 4; 1 MG/1; MG/1
1 FILM, SOLUBLE BUCCAL; SUBLINGUAL 3 TIMES DAILY
Status: COMPLETED | OUTPATIENT
Start: 2018-11-07 | End: 2018-11-08

## 2018-11-07 RX ADMIN — NICOTINE POLACRILEX 2 MG: 2 GUM, CHEWING BUCCAL at 10:08

## 2018-11-07 RX ADMIN — NICOTINE POLACRILEX 2 MG: 2 GUM, CHEWING BUCCAL at 19:52

## 2018-11-07 RX ADMIN — Medication 400 MG: at 08:40

## 2018-11-07 RX ADMIN — GABAPENTIN 800 MG: 400 CAPSULE ORAL at 09:08

## 2018-11-07 RX ADMIN — BUPRENORPHINE HYDROCHLORIDE, NALOXONE HYDROCHLORIDE 1 FILM: 4; 1 FILM, SOLUBLE BUCCAL; SUBLINGUAL at 20:32

## 2018-11-07 RX ADMIN — NICOTINE POLACRILEX 2 MG: 2 GUM, CHEWING BUCCAL at 13:51

## 2018-11-07 RX ADMIN — NICOTINE POLACRILEX 2 MG: 2 GUM, CHEWING BUCCAL at 08:40

## 2018-11-07 RX ADMIN — IBUPROFEN 400 MG: 400 TABLET ORAL at 21:59

## 2018-11-07 RX ADMIN — GABAPENTIN 800 MG: 400 CAPSULE ORAL at 13:51

## 2018-11-07 RX ADMIN — GABAPENTIN 800 MG: 400 CAPSULE ORAL at 20:32

## 2018-11-07 RX ADMIN — NICOTINE POLACRILEX 2 MG: 2 GUM, CHEWING BUCCAL at 16:34

## 2018-11-07 RX ADMIN — BUPRENORPHINE HYDROCHLORIDE, NALOXONE HYDROCHLORIDE 1 FILM: 4; 1 FILM, SOLUBLE BUCCAL; SUBLINGUAL at 13:49

## 2018-11-07 RX ADMIN — ACETAMINOPHEN 500 MG: 500 TABLET, FILM COATED ORAL at 21:59

## 2018-11-07 RX ADMIN — ONDANSETRON 4 MG: 4 TABLET, ORALLY DISINTEGRATING ORAL at 09:08

## 2018-11-07 RX ADMIN — AMITRIPTYLINE HYDROCHLORIDE 100 MG: 25 TABLET, FILM COATED ORAL at 20:32

## 2018-11-07 RX ADMIN — BUPRENORPHINE HYDROCHLORIDE, NALOXONE HYDROCHLORIDE 1 FILM: 4; 1 FILM, SOLUBLE BUCCAL; SUBLINGUAL at 09:09

## 2018-11-07 ASSESSMENT — SLEEP AND FATIGUE QUESTIONNAIRES
RESTFUL SLEEP: NO
DIFFICULTY ARISING: NO
SLEEP PATTERN: RESTLESSNESS;DISTURBED/INTERRUPTED SLEEP
DIFFICULTY FALLING ASLEEP: YES
DO YOU HAVE DIFFICULTY SLEEPING: YES
DIFFICULTY STAYING ASLEEP: YES
AVERAGE NUMBER OF SLEEP HOURS: 5
DO YOU USE A SLEEP AID: YES

## 2018-11-07 ASSESSMENT — PAIN SCALES - GENERAL
PAINLEVEL_OUTOF10: 0
PAINLEVEL_OUTOF10: 6
PAINLEVEL_OUTOF10: 2
PAINLEVEL_OUTOF10: 0
PAINLEVEL_OUTOF10: 0

## 2018-11-07 ASSESSMENT — PAIN DESCRIPTION - PAIN TYPE
TYPE: ACUTE PAIN
TYPE: ACUTE PAIN

## 2018-11-07 ASSESSMENT — PAIN DESCRIPTION - LOCATION
LOCATION: HEAD
LOCATION: HEAD

## 2018-11-07 ASSESSMENT — PATIENT HEALTH QUESTIONNAIRE - PHQ9: SUM OF ALL RESPONSES TO PHQ QUESTIONS 1-9: 9

## 2018-11-07 ASSESSMENT — LIFESTYLE VARIABLES: HISTORY_ALCOHOL_USE: NO

## 2018-11-07 NOTE — PLAN OF CARE
Problem: Altered Mood, Depressive Behavior:  Goal: Able to verbalize and/or display a decrease in depressive symptoms  Able to verbalize and/or display a decrease in depressive symptoms   Outcome: Ongoing  Pt did not attend leisure skills group at [phone] despite staff invitation to attend.

## 2018-11-07 NOTE — BH NOTE

## 2018-11-07 NOTE — CARE COORDINATION
[facility] Biopsychosocial Assessment    Current Level of Psychosocial Functioning     Independent  X  Dependent    Minimal Assist      Psychosocial High Risk Factors (check all that apply)    Unable to obtain meds   Chronic illness/pain    Substance abuse  X - current daily use of cannabis and occasional of cocaine, not compliant with Suboxone program reports [facility] 8 days ago and last took suboxone couple days ago, hx of heroin abuse  Lack of Family Support  X - estranged from all but uncle but this is strained at times due to his current drug use per patient  Financial stress  X - SSI only  Isolation X  Inadequate Community Resources  Suicide attempt(s) X  Not taking medications  X - reports not compliant with [facility] and was \"kicked out\"  Victim of crime   Developmental Delay  Unable to manage personal needs   X  Age 72 or older   Homeless  No transportation  X  Readmission within 30 days  Unemployment X  Traumatic Event X- wife  him    Psychiatric Advanced Directives: none and not interested    Family to [employer] in Treatment: none identified    Sexual Orientation:  heterosexual    Patient Strengths:  Income, medicaid, housing with uncle    Patient Barriers: AOD abuse, lack of insight, lack of problem solving/coping skills, hx of felonies and legal issues, no support system, not compliant with treatments    Opiate/AOD Education Provided:  AOD resource folder provided, patient reports he is seeking inpatient AOD treatment    [facility]/mental health history: hx of prior inpatient admits, hx of noncompliance with [facility]   medication management, group/individual therapies, family meetings, psycho -education, treatment team meetings to assist with stabilization    Initial Discharge Plan:  Link to [facility] or [facility] for counseling / IOP and taxi to uncles if possible, if unable to return to uncles and not accepted into any AOD program,  [facility]

## 2018-11-07 NOTE — BH NOTE
information about quitting (benefits of quitting, techniques in how to quit, available resources  ( ) Referral for counseling faxed to [facility]                                           ( x) Patient refused counseling  ( ) Patient has not smoked in the last 30 days    Metabolic Screening:    No results found for: LABA1C    No results for input(s): CHOL, TRIG, HDL, LDLCALC, LABVLDL in the last 72 hours. Body mass index is 25.09 kg/m². BP Readings from Last 2 Encounters:   11/06/18 134/69   05/29/18 135/79           Pt admitted with followings belongings:  Dentures: None  Vision - Corrective Lenses: None  Hearing Aid: None  Jewelry: None  Body Piercings Removed: N/A  Clothing: Footwear, Pants, Shirt, Undergarments (Comment)  Were All Patient Medications Collected?: Not Applicable  Other Valuables: Money (Comment) ($105)     Valuables placed in safe in security envelope, number:  \[id number]\. Patient's home medications were verified  Patient oriented to surroundings and program expectations and copy of patient rights given. Received admission packet:  yes. Consents reviewed, signed yes, but did not sign voluntary. Patient verbalize understanding:  yes. Patient education on precautions: yes  Patient was offered food and fluids, patient was changed into hospital attire and oriented to unit and room. Patient is controlled and cooperative.                    Darlin Simms RN

## 2018-11-07 NOTE — BH NOTE
Psychiatric Admission Note    Olena Su is a 37 y.o. male who was admitted from rescue crisis. He was feeling depressed and sad. He complains of agitation and anxiety. He was trying to jump off the roof from his home and he was taken down from there with the help of his neighbors and the police and he went to [address]. There he was evaluated and placed on an emergency application and was sent to rescue crisis. Patient complains of agitation and anxiety and depression. He has suicidal thoughts and was planning to jump off the roof on sleep on the train tracks to kill himself. He complains of mood swings. He complains of agitation. He has been using cocaine and smoking marijuana. He was also on [facility] from [facility]. Patient said that he is going to divorce. He has 2 children. They live in [facility]. Patient had a GED. He has long-term history of drug use. He was in alf 2 times. He gets Social Security disability income. He denies any physical sexual or emotional abuse in him. He denies any family history mental illness suicide of drug and I'll call abuse. He has past history of suicide attempts at least 4 times. Past Psychiatric History   Patient reports current outpatient psychiatric linkage. . Reported history of psychiatric inpatient hospitalizations. Reported history of suicide attempts. History of Substance Abuse     Reports use of cocaine    Family History of psychiatric disorders    Family history: denied       Medical History   Allergies:  Duloxetine and Levonorgestrel-ethinyl estrad   Past Medical History:   Diagnosis Date    Depression     [facility]/ [facility] [address]     Substance abuse ([facility] [address]       History reviewed. No pertinent surgical history.         SOCIAL HISTORY  Social History     Social History    Marital status: Legally      Spouse name: Luisito Ferreira Number of children: 2    Years of Supportive therapy with medication management. Reviewed risks and benefits as well as potential side effects with patient. 3. Therapeutic activities and groups  4. Follow up at [facility] after symptoms stabilized    Estimated length of stay: 5-7 days    Starlett Hodgkin, MD  Psychiatrist.  Dragon voice recognition software used in portions of this document.  Occasionally words are mis-transcribed

## 2018-11-08 PROCEDURE — 99232 SBSQ HOSP IP/OBS MODERATE 35: CPT | Performed by: NURSE PRACTITIONER

## 2018-11-08 PROCEDURE — 6370000000 HC RX 637 (ALT 250 FOR IP): Performed by: PSYCHIATRY & NEUROLOGY

## 2018-11-08 PROCEDURE — 1240000000 HC EMOTIONAL WELLNESS R&B

## 2018-11-08 RX ORDER — BUPRENORPHINE AND NALOXONE 4; 1 MG/1; MG/1
1 FILM, SOLUBLE BUCCAL; SUBLINGUAL 2 TIMES DAILY
Status: COMPLETED | OUTPATIENT
Start: 2018-11-09 | End: 2018-11-10

## 2018-11-08 RX ADMIN — GABAPENTIN 800 MG: 400 CAPSULE ORAL at 21:39

## 2018-11-08 RX ADMIN — BUPRENORPHINE HYDROCHLORIDE, NALOXONE HYDROCHLORIDE 1 FILM: 4; 1 FILM, SOLUBLE BUCCAL; SUBLINGUAL at 14:27

## 2018-11-08 RX ADMIN — AMITRIPTYLINE HYDROCHLORIDE 100 MG: 25 TABLET, FILM COATED ORAL at 21:39

## 2018-11-08 RX ADMIN — BUPRENORPHINE HYDROCHLORIDE, NALOXONE HYDROCHLORIDE 1 FILM: 4; 1 FILM, SOLUBLE BUCCAL; SUBLINGUAL at 08:49

## 2018-11-08 RX ADMIN — NICOTINE POLACRILEX 2 MG: 2 GUM, CHEWING BUCCAL at 21:51

## 2018-11-08 RX ADMIN — NICOTINE POLACRILEX 2 MG: 2 GUM, CHEWING BUCCAL at 14:29

## 2018-11-08 RX ADMIN — NICOTINE POLACRILEX 2 MG: 2 GUM, CHEWING BUCCAL at 10:26

## 2018-11-08 RX ADMIN — IBUPROFEN 400 MG: 400 TABLET ORAL at 21:39

## 2018-11-08 RX ADMIN — CLONIDINE HYDROCHLORIDE 0.1 MG: 0.1 TABLET ORAL at 21:39

## 2018-11-08 RX ADMIN — GABAPENTIN 800 MG: 400 CAPSULE ORAL at 08:49

## 2018-11-08 RX ADMIN — BUPRENORPHINE HYDROCHLORIDE, NALOXONE HYDROCHLORIDE 1 FILM: 4; 1 FILM, SOLUBLE BUCCAL; SUBLINGUAL at 21:51

## 2018-11-08 RX ADMIN — NICOTINE POLACRILEX 2 MG: 2 GUM, CHEWING BUCCAL at 08:49

## 2018-11-08 RX ADMIN — NICOTINE POLACRILEX 2 MG: 2 GUM, CHEWING BUCCAL at 16:35

## 2018-11-08 RX ADMIN — NICOTINE POLACRILEX 2 MG: 2 GUM, CHEWING BUCCAL at 18:07

## 2018-11-08 RX ADMIN — GABAPENTIN 800 MG: 400 CAPSULE ORAL at 14:27

## 2018-11-08 ASSESSMENT — ENCOUNTER SYMPTOMS
DIARRHEA: 0
APNEA: 0
EYE DISCHARGE: 0
ABDOMINAL DISTENTION: 0
FACIAL SWELLING: 0
ABDOMINAL PAIN: 0
SINUS PRESSURE: 0
SHORTNESS OF BREATH: 0
ALLERGIC/IMMUNOLOGIC NEGATIVE: 1

## 2018-11-08 ASSESSMENT — PAIN SCALES - GENERAL
PAINLEVEL_OUTOF10: 0
PAINLEVEL_OUTOF10: 0
PAINLEVEL_OUTOF10: 3
PAINLEVEL_OUTOF10: 0

## 2018-11-08 NOTE — H&P
Negative for difficulty urinating, flank pain and genital sores. Musculoskeletal: Negative for arthralgias and joint swelling. Skin:        Mulitple heavy tattooing on arms torso,    Allergic/Immunologic: Negative. Neurological: Negative for dizziness, light-headedness and numbness. Hematological: Negative. Psychiatric/Behavioral: Positive for behavioral problems, decreased concentration, dysphoric mood, sleep disturbance and suicidal ideas. The patient is nervous/anxious. See HPI. Depression related to divorce ,, Former Heroin use     GENERAL PHYSICAL EXAM:         Vitals: /81   Pulse 76   Temp 97.6 °F (36.4 °C) (Oral)   Resp 12   Ht 6' (1.829 m)   Wt 185 lb (83.9 kg)   SpO2 97%   BMI 25.09 kg/m²  Body mass index is 25.09 kg/m². GENERAL APPEARANCE:  Koby Vasquez is 37 y.o.,  male, mildly obese, nourished, conscious, alert. Denies any pain at this time. Physical Exam   Constitutional: He is oriented to person, place, and time. He appears well-developed and well-nourished. HENT:   Head: Normocephalic. Right Ear: External ear normal.   Left Ear: External ear normal.   Nose: Nose normal.   Mouth/Throat: Oropharynx is clear and moist.   Eyes: Pupils are equal, round, and reactive to light. Conjunctivae and EOM are normal. Left eye exhibits no discharge. Neck: Normal range of motion. No JVD present. No thyromegaly present. Cardiovascular: Normal rate, regular rhythm, normal heart sounds and intact distal pulses. No murmur heard. Pulmonary/Chest: Effort normal and breath sounds normal. He has no wheezes. Abdominal: Soft. Bowel sounds are normal. He exhibits no mass. There is no guarding. Genitourinary:   Genitourinary Comments: Deferred. Musculoskeletal: Normal range of motion. He exhibits no edema or deformity. Neurological: He is alert and oriented to person, place, and time. No cranial nerve deficit. Skin: Skin is warm and dry.  Capillary

## 2018-11-08 NOTE — PLAN OF CARE
Problem: Altered Mood, Depressive Behavior:  Goal: Able to verbalize and/or display a decrease in depressive symptoms  Able to verbalize and/or display a decrease in depressive symptoms   Outcome: Ongoing  Pt admits to feelings of depression and rates the severity 10/10. Writer offered support and pt accepted. Will continue to monitor and provide support as needed. Q15min checks for safety. Goal: Ability to disclose and discuss suicidal ideas will improve  Ability to disclose and discuss suicidal ideas will improve   Outcome: Ongoing  Pt admits to SI without a plan and contracts for safety. Writer provided support. Safe environment maintained & pt remains free from self-harm. Agreeable to seeking staff should she have an intent to harm self. Q15min checks for safety. Goal: Absence of self-harm  Absence of self-harm   Outcome: Ongoing  Safe environment maintained & pt remains free from self-harm. Agreeable to seeking staff should thoughts to harm self or others arise. Q15min checks for safety.

## 2018-11-09 PROCEDURE — 99232 SBSQ HOSP IP/OBS MODERATE 35: CPT | Performed by: NURSE PRACTITIONER

## 2018-11-09 PROCEDURE — 6370000000 HC RX 637 (ALT 250 FOR IP): Performed by: PSYCHIATRY & NEUROLOGY

## 2018-11-09 PROCEDURE — 1240000000 HC EMOTIONAL WELLNESS R&B

## 2018-11-09 PROCEDURE — 6370000000 HC RX 637 (ALT 250 FOR IP): Performed by: NURSE PRACTITIONER

## 2018-11-09 RX ORDER — DIPHENHYDRAMINE HCL 25 MG
50 TABLET ORAL NIGHTLY PRN
Status: DISCONTINUED | OUTPATIENT
Start: 2018-11-09 | End: 2018-11-15 | Stop reason: HOSPADM

## 2018-11-09 RX ADMIN — AMITRIPTYLINE HYDROCHLORIDE 100 MG: 25 TABLET, FILM COATED ORAL at 20:58

## 2018-11-09 RX ADMIN — CYCLOBENZAPRINE HYDROCHLORIDE 10 MG: 10 TABLET, FILM COATED ORAL at 20:58

## 2018-11-09 RX ADMIN — GABAPENTIN 800 MG: 400 CAPSULE ORAL at 14:26

## 2018-11-09 RX ADMIN — LURASIDONE HYDROCHLORIDE 20 MG: 40 TABLET, FILM COATED ORAL at 18:05

## 2018-11-09 RX ADMIN — DIPHENHYDRAMINE HCL 50 MG: 25 TABLET ORAL at 20:58

## 2018-11-09 RX ADMIN — NICOTINE POLACRILEX 2 MG: 2 GUM, CHEWING BUCCAL at 22:22

## 2018-11-09 RX ADMIN — NICOTINE POLACRILEX 2 MG: 2 GUM, CHEWING BUCCAL at 15:48

## 2018-11-09 RX ADMIN — NICOTINE POLACRILEX 2 MG: 2 GUM, CHEWING BUCCAL at 12:40

## 2018-11-09 RX ADMIN — GABAPENTIN 800 MG: 400 CAPSULE ORAL at 20:58

## 2018-11-09 RX ADMIN — NICOTINE POLACRILEX 2 MG: 2 GUM, CHEWING BUCCAL at 20:58

## 2018-11-09 RX ADMIN — NICOTINE POLACRILEX 2 MG: 2 GUM, CHEWING BUCCAL at 18:18

## 2018-11-09 RX ADMIN — IBUPROFEN 400 MG: 400 TABLET ORAL at 20:58

## 2018-11-09 RX ADMIN — GABAPENTIN 800 MG: 400 CAPSULE ORAL at 09:01

## 2018-11-09 RX ADMIN — BUPRENORPHINE HYDROCHLORIDE, NALOXONE HYDROCHLORIDE 1 FILM: 4; 1 FILM, SOLUBLE BUCCAL; SUBLINGUAL at 09:02

## 2018-11-09 RX ADMIN — BUPRENORPHINE HYDROCHLORIDE, NALOXONE HYDROCHLORIDE 1 FILM: 4; 1 FILM, SOLUBLE BUCCAL; SUBLINGUAL at 18:08

## 2018-11-09 RX ADMIN — NICOTINE POLACRILEX 2 MG: 2 GUM, CHEWING BUCCAL at 09:10

## 2018-11-09 ASSESSMENT — PAIN SCALES - GENERAL
PAINLEVEL_OUTOF10: 7
PAINLEVEL_OUTOF10: 0

## 2018-11-09 NOTE — PLAN OF CARE
Problem: Altered Mood, Depressive Behavior:  Intervention: Group therapy to identify positive coping skills  PSYCHOTHERAPY GROUP NOTE    11/9/2018               Start Time:    1000                 End Time: 4147      Number Participants in Group: 11/18      Goals: relationships and recovery         RT x SW  Nsg  LPN   BHTII   LPC       Participation Level:     None  Minimal   x Active Listener x Interactive    Monopolizing         Participation Quality:  x Appropriate  Inappropriate   x  Attentive   Intrusive   x  Sharing   Resistant   x  Supportive    Lethargic       Affective:   x Congruent  Incongruent  Blunted  Flat    Constricted  Anxious  Elated  Angry    Labile  Depressed  Other         Cognitive:  x Alert x Oriented PPTS     Concentration G x F  P    Attention Span G x F  P    Short-Term Memory G x F  P    Long-Term Memory G x F  P    Problem Solving/  Decision Making G x F  P    Ability to Process  Information G x F  P       Contributing Factors             Delusional             Hallucinating             Flight of Ideas             Other: poor concentration       Modes of Intervention:   Education x Support x Exploration   x Clarifying x Problem Solving  Confrontation    Socialization  Limit Setting  Reality Testing    Activity  Movement  Media    Other:          Response to Learning:  x Able to verbalize current knowledge/experience   x Able to verbalize/acknowledge new learning   x Able to retain information   x Capable of insight   x Able to change behavior   x Progressing to goal    Other: intrusive and monopolizing       Comments:  participated in group was active

## 2018-11-09 NOTE — PLAN OF CARE
Problem: Altered Mood, Depressive Behavior:  Goal: Ability to disclose and discuss suicidal ideas will improve  Ability to disclose and discuss suicidal ideas will improve   Outcome: Ongoing   Pt denies suicidal ideations at this time. Pt agreed to seek staff at anytime she felt like any urges to harm self would arise. Safety checks maintained [id number]. Goal: Absence of self-harm  Absence of self-harm   Outcome: Ongoing  Pt remains free from self harm this shift. Pt denies wanting to cause harm to self or others at this time. Pt encouraged to seek nursing staff at anytime if she felt at danger to themselves or others. Pt states understanding.  Safety checks maintained [id number]

## 2018-11-10 PROCEDURE — 6370000000 HC RX 637 (ALT 250 FOR IP): Performed by: PSYCHIATRY & NEUROLOGY

## 2018-11-10 PROCEDURE — 1240000000 HC EMOTIONAL WELLNESS R&B

## 2018-11-10 PROCEDURE — 99232 SBSQ HOSP IP/OBS MODERATE 35: CPT | Performed by: NURSE PRACTITIONER

## 2018-11-10 PROCEDURE — 6370000000 HC RX 637 (ALT 250 FOR IP): Performed by: NURSE PRACTITIONER

## 2018-11-10 RX ADMIN — NICOTINE POLACRILEX 2 MG: 2 GUM, CHEWING BUCCAL at 08:36

## 2018-11-10 RX ADMIN — BUPRENORPHINE HYDROCHLORIDE, NALOXONE HYDROCHLORIDE 1 FILM: 4; 1 FILM, SOLUBLE BUCCAL; SUBLINGUAL at 08:36

## 2018-11-10 RX ADMIN — GABAPENTIN 800 MG: 400 CAPSULE ORAL at 08:36

## 2018-11-10 RX ADMIN — BUPRENORPHINE HYDROCHLORIDE, NALOXONE HYDROCHLORIDE 1 FILM: 4; 1 FILM, SOLUBLE BUCCAL; SUBLINGUAL at 17:11

## 2018-11-10 RX ADMIN — NICOTINE POLACRILEX 2 MG: 2 GUM, CHEWING BUCCAL at 19:55

## 2018-11-10 RX ADMIN — GABAPENTIN 800 MG: 400 CAPSULE ORAL at 21:01

## 2018-11-10 RX ADMIN — CYCLOBENZAPRINE HYDROCHLORIDE 10 MG: 10 TABLET, FILM COATED ORAL at 21:01

## 2018-11-10 RX ADMIN — DIPHENHYDRAMINE HCL 50 MG: 25 TABLET ORAL at 21:02

## 2018-11-10 RX ADMIN — NICOTINE POLACRILEX 2 MG: 2 GUM, CHEWING BUCCAL at 13:12

## 2018-11-10 RX ADMIN — AMITRIPTYLINE HYDROCHLORIDE 100 MG: 25 TABLET, FILM COATED ORAL at 21:02

## 2018-11-10 RX ADMIN — GABAPENTIN 800 MG: 400 CAPSULE ORAL at 14:58

## 2018-11-10 RX ADMIN — IBUPROFEN 400 MG: 400 TABLET ORAL at 08:35

## 2018-11-10 RX ADMIN — NICOTINE POLACRILEX 2 MG: 2 GUM, CHEWING BUCCAL at 17:12

## 2018-11-10 RX ADMIN — IBUPROFEN 400 MG: 400 TABLET ORAL at 21:01

## 2018-11-10 RX ADMIN — HYDROXYZINE HYDROCHLORIDE 25 MG: 25 TABLET, FILM COATED ORAL at 21:01

## 2018-11-10 RX ADMIN — NICOTINE POLACRILEX 2 MG: 2 GUM, CHEWING BUCCAL at 18:12

## 2018-11-10 RX ADMIN — LURASIDONE HYDROCHLORIDE 20 MG: 40 TABLET, FILM COATED ORAL at 18:12

## 2018-11-10 RX ADMIN — CYCLOBENZAPRINE HYDROCHLORIDE 10 MG: 10 TABLET, FILM COATED ORAL at 08:35

## 2018-11-10 RX ADMIN — NICOTINE POLACRILEX 2 MG: 2 GUM, CHEWING BUCCAL at 21:01

## 2018-11-10 RX ADMIN — HYDROXYZINE HYDROCHLORIDE 25 MG: 25 TABLET, FILM COATED ORAL at 08:34

## 2018-11-10 ASSESSMENT — PAIN SCALES - GENERAL
PAINLEVEL_OUTOF10: 5
PAINLEVEL_OUTOF10: 0
PAINLEVEL_OUTOF10: 5

## 2018-11-10 NOTE — PLAN OF CARE
Problem: Altered Mood, Depressive Behavior:  Goal: Able to verbalize and/or display a decrease in depressive symptoms  Able to verbalize and/or display a decrease in depressive symptoms   Outcome: Ongoing  PSYCHOEDUCATION GROUP NOTE    Date: 11/10/18  Start Time: 1330  End Time: 1405    Number Participants in Group:  8    Goal:  Patient will demonstrate increased interpersonal interaction   Topic: Benefits of exercise and walking    Discipline Responsible:   OT  AT    Ns. x RT MHP Other       Participation Level:     None  Minimal    Active Listener x Interactive    Monopolizing         Participation Quality:  x Appropriate  Inappropriate   x       Attentive        Intrusive   x       Sharing        Resistant          Supportive        Lethargic       Affective:   x Congruent  Incongruent  Blunted  Flat    Constricted  Anxious  Elated  Angry    Labile  Depressed  Other         Cognitive:  x Alert x Oriented PPTP     Concentration x G  F  P   Attention Span x G  F  P   Short-Term Memory x G  F  P   Long-Term Memory x G  F  P   ProblemSolving/  Decision Making x G  F  P   Ability to Process  Information x G  F  P      Contributing Factors             Delusional             Hallucinating             Flight of Ideas             Other:       Modes of Intervention:  x Education x Support  Exploration    Clarifying  Problem Solving  Confrontation   x Socialization  Limit Setting  Reality Testing   x Activity x Movement  Media    Other:            Response to Learning:  x Able to verbalize current knowledge/experience   x Able to verbalize/acknowledge new learning   x Able to retain information   x Capable of insight   x Able to change behavior   x Progressing to goal    Other:        Comments: Pt attended group and participated.

## 2018-11-10 NOTE — PROGRESS NOTES
Psychiatric Progress Note Nurse Practitioner  Pertinent History & Psychiatric Examination    HPI: Today he is interviewed in the day room, he is dressed in street clothes. He is admitting to SI, no plan, contracts for safety, anxiety and depression. He is denying HI and hallucinations. Little Sims has a history of substance abuse. Little Sims continues to remain flat with minimal reaction and is medication focused. He states that the new medication has not yet started to work; provider educated regarding expectations of symptom relief. Chart and medications reviewed. Therapeutic support provided. At this time there is no safe alternative other than inpatient care. Complaints of Pain: none  Functioning Relationships: strained with spouse or significant others      Mental Status Evaluation:  Orientation: alertness: alert   Mood:. anxious      Affect:  normal and blunted      Appearance:  disheveled   Activity:  Within Normal Limits   Gait/Posture: Normal   Speech:  normal pitch and volume   Thought Process:  circumstantial   Thought Content:  suicidal   Sensorium:  Person, place, time and situation   Cognition:  grossly intact   Memory: intact   Insight:  limited   Judgment: limited   Suicidal Ideations: without plan   Homicidal Ideations: Negative for homicidal ideation      Medication Side Effects: absent       Attention Span attention span and concentration were age appropriate     Clinical Assessment Medical Decision    Axis I: Bipolar, Depressed  Substance Use Disorder    Precautions with Justification:   Suicide    Medication Review/Mgmt: Medications reviewed with changes    Medical Issues: See Chart    Assessment of Risk for Harm to Self/Others:  Suicide Precautions    PLAN  1. Admit to inpatient psychiatric treatment  2. Supportive therapy with medication management. Reviewed risks and benefits as well as potential side effects with patient. 3. Therapeutic activities and groups  4.  Follow up at [facility]
Psychoeducation Group Note    Date: 11/7/18  Start Time: 1000  End Time: 1045    Number Participants in Group:  5/11    Goal:  Patient will demonstrate increased interpersonal interaction   Topic: self assessment, increasing coping skills, decreasing stressors    Discipline Responsible:   OT  AT  [facility]. x RT  Other       Participation Level:     None  Minimal   x Active Listener x Interactive    Monopolizing         Participation Quality:  x Appropriate  Inappropriate   x       Attentive        Intrusive          Sharing        Resistant          Supportive        Lethargic       Affective:   x Congruent  Incongruent  Blunted  Flat    Constricted  Anxious  Elated  Angry    Labile  Depressed  Other         Cognitive:  x Alert x Oriented PPTP     Concentration x G  F  P   Attention Span x G  F  P   Short-Term Memory  G  F  P   Long-Term Memory  G  F  P   ProblemSolving/  Decision Making x G  F  P   Ability to Process  Information x G  F  P      Contributing Factors             Delusional             Hallucinating             Flight of Ideas             Other:       Modes of Intervention:  x Education x Support  Exploration    Clarifying x Problem Solving  Confrontation    Socialization  Limit Setting  Reality Testing    Activity  Movement  Media    Other:            Response to Learning:  x Able to verbalize current knowledge/experience   x Able to verbalize/acknowledge new learning    Able to retain information    Capable of insight   x Able to change behavior    Progressing to goal    Other:
Date: TBD    Patient's Response to Treatment: positive    Linda Lagunas CNP  11/9/2018  12:17 PM

## 2018-11-11 PROCEDURE — 6370000000 HC RX 637 (ALT 250 FOR IP): Performed by: NURSE PRACTITIONER

## 2018-11-11 PROCEDURE — 6370000000 HC RX 637 (ALT 250 FOR IP): Performed by: PSYCHIATRY & NEUROLOGY

## 2018-11-11 PROCEDURE — 1240000000 HC EMOTIONAL WELLNESS R&B

## 2018-11-11 RX ADMIN — NICOTINE POLACRILEX 2 MG: 2 GUM, CHEWING BUCCAL at 20:43

## 2018-11-11 RX ADMIN — LURASIDONE HYDROCHLORIDE 20 MG: 40 TABLET, FILM COATED ORAL at 18:10

## 2018-11-11 RX ADMIN — BUPRENORPHINE HYDROCHLORIDE, NALOXONE HYDROCHLORIDE 1 FILM: 4; 1 FILM, SOLUBLE BUCCAL; SUBLINGUAL at 08:32

## 2018-11-11 RX ADMIN — NICOTINE POLACRILEX 2 MG: 2 GUM, CHEWING BUCCAL at 21:46

## 2018-11-11 RX ADMIN — IBUPROFEN 400 MG: 400 TABLET ORAL at 20:43

## 2018-11-11 RX ADMIN — NICOTINE POLACRILEX 2 MG: 2 GUM, CHEWING BUCCAL at 14:18

## 2018-11-11 RX ADMIN — GABAPENTIN 800 MG: 400 CAPSULE ORAL at 08:32

## 2018-11-11 RX ADMIN — NICOTINE POLACRILEX 2 MG: 2 GUM, CHEWING BUCCAL at 16:02

## 2018-11-11 RX ADMIN — IBUPROFEN 400 MG: 400 TABLET ORAL at 08:32

## 2018-11-11 RX ADMIN — GABAPENTIN 800 MG: 400 CAPSULE ORAL at 14:18

## 2018-11-11 RX ADMIN — NICOTINE POLACRILEX 2 MG: 2 GUM, CHEWING BUCCAL at 18:10

## 2018-11-11 RX ADMIN — NICOTINE POLACRILEX 2 MG: 2 GUM, CHEWING BUCCAL at 08:32

## 2018-11-11 RX ADMIN — HYDROXYZINE HYDROCHLORIDE 25 MG: 25 TABLET, FILM COATED ORAL at 20:43

## 2018-11-11 RX ADMIN — GABAPENTIN 800 MG: 400 CAPSULE ORAL at 20:43

## 2018-11-11 RX ADMIN — NICOTINE POLACRILEX 2 MG: 2 GUM, CHEWING BUCCAL at 12:23

## 2018-11-11 RX ADMIN — AMITRIPTYLINE HYDROCHLORIDE 100 MG: 25 TABLET, FILM COATED ORAL at 20:43

## 2018-11-11 ASSESSMENT — PAIN DESCRIPTION - PAIN TYPE
TYPE: ACUTE PAIN

## 2018-11-11 ASSESSMENT — PAIN SCALES - GENERAL
PAINLEVEL_OUTOF10: 5
PAINLEVEL_OUTOF10: 4
PAINLEVEL_OUTOF10: 0
PAINLEVEL_OUTOF10: 0

## 2018-11-11 ASSESSMENT — PAIN DESCRIPTION - LOCATION
LOCATION: BACK

## 2018-11-11 NOTE — PLAN OF CARE
Problem: Altered Mood, Depressive Behavior:  Intervention: Group therapy to identify positive coping skills    Patient declined to attend education group at 10 am despite encouragement.   Patient offered 1:1 and refused

## 2018-11-11 NOTE — PLAN OF CARE
Problem: Altered Mood, Depressive Behavior:  Goal: Able to verbalize and/or display a decrease in depressive symptoms  Able to verbalize and/or display a decrease in depressive symptoms   Outcome: Ongoing  Patient was not able to verbalize a decrease in depressive symptoms.

## 2018-11-11 NOTE — PLAN OF CARE
Problem: Altered Mood, Depressive Behavior:  Goal: Able to verbalize and/or display a decrease in depressive symptoms  Able to verbalize and/or display a decrease in depressive symptoms   Outcome: Ongoing  PSYCHOEDUCATION GROUP NOTE    Date: 11/11/18  Start Time: 0900  End Time: 0925    Number Participants in Group:  7    Goal:  Patient will demonstrate increased interpersonal interaction   Topic: Community meeting and goal setting    Discipline Responsible:   OT  AT  [facility]. x RT MHP Other       Participation Level:     None  Minimal    Active Listener x Interactive    Monopolizing         Participation Quality:  x Appropriate  Inappropriate   x       Attentive        Intrusive   x       Sharing        Resistant          Supportive        Lethargic       Affective:   x Congruent  Incongruent  Blunted  Flat    Constricted  Anxious  Elated  Angry    Labile  Depressed  Other         Cognitive:  x Alert x Oriented PPTP     Concentration x G  F  P   Attention Span x G  F  P   Short-Term Memory x G  F  P   Long-Term Memory x G  F  P   ProblemSolving/  Decision Making x G  F  P   Ability to Process  Information x G  F  P      Contributing Factors             Delusional             Hallucinating             Flight of Ideas             Other:       Modes of Intervention:  x Education x Support x Exploration    Clarifying  Problem Solving  Confrontation   x Socialization x Limit Setting  Reality Testing   x Activity  Movement  Media    Other:            Response to Learning:  x Able to verbalize current knowledge/experience   x Able to verbalize/acknowledge new learning   x Able to retain information   x Capable of insight   x Able to change behavior   x Progressing to goal    Other:        Comments: Pt attended group and participated.

## 2018-11-11 NOTE — BH NOTE
PSYCHOEDUCATION GROUP NOTE    Date: 11/11/18  Start Time: 11:00am  End Time: 11:30    Number Participants in Group:  15    Goal:  Patient will demonstrate increased interpersonal interaction   Topic: Safety Group    Discipline Responsible:   OT  AT    X Nsg.  RT MHP Other       Participation Level:     None  Minimal   X Active Listener  Interactive    Monopolizing         Participation Quality:  X Appropriate  Inappropriate          Attentive        Intrusive          Sharing        Resistant          Supportive        Lethargic       Affective:    Congruent  Incongruent  Blunted  Flat    Constricted  Anxious  Elated  Angry    Labile  Depressed  Other         Cognitive:  X Alert  Oriented PPTP     Concentration  G X F  P   Attention Span  G X F  P   Short-Term Memory  G  F  P   Long-Term Memory  G  F  P   ProblemSolving/  Decision Making  G  F  P   Ability to Process  Information  G  F  P      Contributing Factors             Delusional             Hallucinating             Flight of Ideas             Other:       Modes of Intervention:  X Education  Support  Exploration    Clarifying  Problem Solving  Confrontation    Socialization  Limit Setting  Reality Testing    Activity  Movement  Media    Other:            Response to Learning:   Able to verbalize current knowledge/experience    Able to verbalize/acknowledge new learning    Able to retain information    Capable of insight    Able to change behavior    Progressing to goal    Other:        Comments:

## 2018-11-12 PROCEDURE — 99232 SBSQ HOSP IP/OBS MODERATE 35: CPT | Performed by: PSYCHIATRY & NEUROLOGY

## 2018-11-12 PROCEDURE — 6370000000 HC RX 637 (ALT 250 FOR IP): Performed by: PSYCHIATRY & NEUROLOGY

## 2018-11-12 PROCEDURE — 1240000000 HC EMOTIONAL WELLNESS R&B

## 2018-11-12 RX ORDER — LITHIUM CARBONATE 300 MG/1
300 TABLET, FILM COATED, EXTENDED RELEASE ORAL EVERY 12 HOURS SCHEDULED
Status: DISCONTINUED | OUTPATIENT
Start: 2018-11-12 | End: 2018-11-15 | Stop reason: HOSPADM

## 2018-11-12 RX ADMIN — GABAPENTIN 800 MG: 400 CAPSULE ORAL at 08:27

## 2018-11-12 RX ADMIN — NICOTINE POLACRILEX 2 MG: 2 GUM, CHEWING BUCCAL at 21:28

## 2018-11-12 RX ADMIN — ACETAMINOPHEN 500 MG: 500 TABLET, FILM COATED ORAL at 21:34

## 2018-11-12 RX ADMIN — GABAPENTIN 800 MG: 400 CAPSULE ORAL at 21:27

## 2018-11-12 RX ADMIN — NICOTINE POLACRILEX 2 MG: 2 GUM, CHEWING BUCCAL at 09:43

## 2018-11-12 RX ADMIN — NICOTINE POLACRILEX 2 MG: 2 GUM, CHEWING BUCCAL at 15:50

## 2018-11-12 RX ADMIN — NICOTINE POLACRILEX 2 MG: 2 GUM, CHEWING BUCCAL at 13:26

## 2018-11-12 RX ADMIN — LITHIUM CARBONATE 300 MG: 300 TABLET, FILM COATED, EXTENDED RELEASE ORAL at 21:27

## 2018-11-12 RX ADMIN — IBUPROFEN 400 MG: 400 TABLET ORAL at 15:50

## 2018-11-12 RX ADMIN — AMITRIPTYLINE HYDROCHLORIDE 100 MG: 25 TABLET, FILM COATED ORAL at 21:27

## 2018-11-12 RX ADMIN — BUPRENORPHINE HYDROCHLORIDE, NALOXONE HYDROCHLORIDE 1 FILM: 4; 1 FILM, SOLUBLE BUCCAL; SUBLINGUAL at 09:08

## 2018-11-12 RX ADMIN — CYCLOBENZAPRINE HYDROCHLORIDE 10 MG: 10 TABLET, FILM COATED ORAL at 21:27

## 2018-11-12 RX ADMIN — GABAPENTIN 800 MG: 400 CAPSULE ORAL at 13:26

## 2018-11-12 ASSESSMENT — PAIN DESCRIPTION - PAIN TYPE
TYPE: CHRONIC PAIN
TYPE: CHRONIC PAIN

## 2018-11-12 ASSESSMENT — PAIN SCALES - GENERAL
PAINLEVEL_OUTOF10: 0
PAINLEVEL_OUTOF10: 1
PAINLEVEL_OUTOF10: 2
PAINLEVEL_OUTOF10: 3
PAINLEVEL_OUTOF10: 6

## 2018-11-12 ASSESSMENT — PAIN DESCRIPTION - LOCATION
LOCATION: HEAD
LOCATION: BACK
LOCATION: BACK

## 2018-11-13 LAB
EKG ATRIAL RATE: 95 BPM
EKG P AXIS: 73 DEGREES
EKG P-R INTERVAL: 138 MS
EKG Q-T INTERVAL: 360 MS
EKG QRS DURATION: 98 MS
EKG QTC CALCULATION (BAZETT): 452 MS
EKG R AXIS: 89 DEGREES
EKG T AXIS: 60 DEGREES
EKG VENTRICULAR RATE: 95 BPM

## 2018-11-13 PROCEDURE — 6370000000 HC RX 637 (ALT 250 FOR IP): Performed by: PSYCHIATRY & NEUROLOGY

## 2018-11-13 PROCEDURE — 99232 SBSQ HOSP IP/OBS MODERATE 35: CPT | Performed by: PSYCHIATRY & NEUROLOGY

## 2018-11-13 PROCEDURE — 6370000000 HC RX 637 (ALT 250 FOR IP): Performed by: NURSE PRACTITIONER

## 2018-11-13 PROCEDURE — 1240000000 HC EMOTIONAL WELLNESS R&B

## 2018-11-13 PROCEDURE — 93005 ELECTROCARDIOGRAM TRACING: CPT

## 2018-11-13 RX ORDER — TIZANIDINE 4 MG/1
2 TABLET ORAL 3 TIMES DAILY
Status: DISCONTINUED | OUTPATIENT
Start: 2018-11-13 | End: 2018-11-15 | Stop reason: HOSPADM

## 2018-11-13 RX ORDER — CARISOPRODOL 350 MG/1
350 TABLET ORAL 3 TIMES DAILY
Status: DISCONTINUED | OUTPATIENT
Start: 2018-11-13 | End: 2018-11-13 | Stop reason: CLARIF

## 2018-11-13 RX ADMIN — NICOTINE POLACRILEX 2 MG: 2 GUM, CHEWING BUCCAL at 17:16

## 2018-11-13 RX ADMIN — IBUPROFEN 400 MG: 400 TABLET ORAL at 21:12

## 2018-11-13 RX ADMIN — GABAPENTIN 800 MG: 400 CAPSULE ORAL at 14:19

## 2018-11-13 RX ADMIN — LITHIUM CARBONATE 300 MG: 300 TABLET, FILM COATED, EXTENDED RELEASE ORAL at 08:31

## 2018-11-13 RX ADMIN — NICOTINE POLACRILEX 2 MG: 2 GUM, CHEWING BUCCAL at 10:42

## 2018-11-13 RX ADMIN — TIZANIDINE 2 MG: 4 TABLET ORAL at 17:14

## 2018-11-13 RX ADMIN — AMITRIPTYLINE HYDROCHLORIDE 100 MG: 25 TABLET, FILM COATED ORAL at 21:12

## 2018-11-13 RX ADMIN — GABAPENTIN 800 MG: 400 CAPSULE ORAL at 08:31

## 2018-11-13 RX ADMIN — TIZANIDINE 2 MG: 4 TABLET ORAL at 21:12

## 2018-11-13 RX ADMIN — GABAPENTIN 800 MG: 400 CAPSULE ORAL at 21:12

## 2018-11-13 RX ADMIN — NICOTINE POLACRILEX 2 MG: 2 GUM, CHEWING BUCCAL at 21:13

## 2018-11-13 RX ADMIN — DIPHENHYDRAMINE HCL 50 MG: 25 TABLET ORAL at 21:54

## 2018-11-13 RX ADMIN — LITHIUM CARBONATE 300 MG: 300 TABLET, FILM COATED, EXTENDED RELEASE ORAL at 21:12

## 2018-11-13 RX ADMIN — NICOTINE POLACRILEX 2 MG: 2 GUM, CHEWING BUCCAL at 12:51

## 2018-11-13 RX ADMIN — NICOTINE POLACRILEX 2 MG: 2 GUM, CHEWING BUCCAL at 14:19

## 2018-11-13 ASSESSMENT — PAIN SCALES - GENERAL
PAINLEVEL_OUTOF10: 5
PAINLEVEL_OUTOF10: 0

## 2018-11-13 ASSESSMENT — PAIN DESCRIPTION - LOCATION: LOCATION: BACK

## 2018-11-13 ASSESSMENT — PAIN DESCRIPTION - PAIN TYPE: TYPE: ACUTE PAIN;CHRONIC PAIN

## 2018-11-13 NOTE — PLAN OF CARE
Problem: Altered Mood, Depressive Behavior:  Goal: Absence of self-harm  Absence of self-harm   No self-harm thoughts or actions this shift

## 2018-11-13 NOTE — CARE COORDINATION
Patient encouraged to fill out and call inpatient AOD programs for treatment. Patient has not turned in any applications to clinician as of this date.

## 2018-11-14 PROCEDURE — 99232 SBSQ HOSP IP/OBS MODERATE 35: CPT | Performed by: PSYCHIATRY & NEUROLOGY

## 2018-11-14 PROCEDURE — 6370000000 HC RX 637 (ALT 250 FOR IP): Performed by: PSYCHIATRY & NEUROLOGY

## 2018-11-14 PROCEDURE — 1240000000 HC EMOTIONAL WELLNESS R&B

## 2018-11-14 PROCEDURE — 6370000000 HC RX 637 (ALT 250 FOR IP): Performed by: NURSE PRACTITIONER

## 2018-11-14 RX ADMIN — NICOTINE POLACRILEX 2 MG: 2 GUM, CHEWING BUCCAL at 10:33

## 2018-11-14 RX ADMIN — GABAPENTIN 800 MG: 400 CAPSULE ORAL at 08:11

## 2018-11-14 RX ADMIN — DIPHENHYDRAMINE HCL 50 MG: 25 TABLET ORAL at 21:36

## 2018-11-14 RX ADMIN — NICOTINE POLACRILEX 2 MG: 2 GUM, CHEWING BUCCAL at 14:16

## 2018-11-14 RX ADMIN — LITHIUM CARBONATE 300 MG: 300 TABLET, FILM COATED, EXTENDED RELEASE ORAL at 08:11

## 2018-11-14 RX ADMIN — NICOTINE POLACRILEX 2 MG: 2 GUM, CHEWING BUCCAL at 17:33

## 2018-11-14 RX ADMIN — NICOTINE POLACRILEX 2 MG: 2 GUM, CHEWING BUCCAL at 12:40

## 2018-11-14 RX ADMIN — LITHIUM CARBONATE 300 MG: 300 TABLET, FILM COATED, EXTENDED RELEASE ORAL at 21:37

## 2018-11-14 RX ADMIN — TIZANIDINE 2 MG: 4 TABLET ORAL at 21:36

## 2018-11-14 RX ADMIN — NICOTINE POLACRILEX 2 MG: 2 GUM, CHEWING BUCCAL at 20:11

## 2018-11-14 RX ADMIN — GABAPENTIN 800 MG: 400 CAPSULE ORAL at 21:37

## 2018-11-14 RX ADMIN — AMITRIPTYLINE HYDROCHLORIDE 100 MG: 25 TABLET, FILM COATED ORAL at 21:36

## 2018-11-14 RX ADMIN — ACETAMINOPHEN 500 MG: 500 TABLET, FILM COATED ORAL at 21:37

## 2018-11-14 RX ADMIN — GABAPENTIN 800 MG: 400 CAPSULE ORAL at 14:21

## 2018-11-14 RX ADMIN — HYDROXYZINE HYDROCHLORIDE 25 MG: 25 TABLET, FILM COATED ORAL at 21:37

## 2018-11-14 RX ADMIN — TIZANIDINE 2 MG: 4 TABLET ORAL at 14:21

## 2018-11-14 RX ADMIN — TIZANIDINE 2 MG: 4 TABLET ORAL at 08:11

## 2018-11-14 RX ADMIN — IBUPROFEN 400 MG: 400 TABLET ORAL at 17:33

## 2018-11-14 ASSESSMENT — PAIN SCALES - GENERAL
PAINLEVEL_OUTOF10: 0
PAINLEVEL_OUTOF10: 5
PAINLEVEL_OUTOF10: 4
PAINLEVEL_OUTOF10: 5

## 2018-11-14 ASSESSMENT — PAIN DESCRIPTION - PAIN TYPE: TYPE: CHRONIC PAIN

## 2018-11-14 ASSESSMENT — PAIN DESCRIPTION - LOCATION: LOCATION: BACK

## 2018-11-14 NOTE — PLAN OF CARE
Problem: Altered Mood, Depressive Behavior:  Goal: Able to verbalize and/or display a decrease in depressive symptoms  Able to verbalize and/or display a decrease in depressive symptoms   Outcome: Ongoing    Goal: Ability to disclose and discuss suicidal ideas will improve  Ability to disclose and discuss suicidal ideas will improve   Outcome: Ongoing  [facility]  Week 1 Interdisciplinary Treatment Plan Note     Review Date & Time: 11/14/2018 1321    Admission Type:   Admission Type: Involuntary    Reason for admission:  Reason for Admission: Patient pinked from [facility] with suicidal ideation to jump in front of a train. Estimated Length of Stay :  5-7 days  Estimated Discharge Date Update: to be determined by physician    PATIENT STRENGTHS:  Patient Strengths:Strengths: Communication, Connection to output provider  Patient Strengths and Limitations:Limitations: Difficult relationships / poor social skills, General negative or hopeless attitude about future/recovery, Apathetic / unmotivated, Tendency to isolate self, External locus of control, Inappropriate/potentially harmful leisure interests  Addictive Behavior:Addictive Behavior  In the past 3 months, have you felt or has someone told you that you have a problem with:  : None  Do you have a history of Chemical Use?: No  Do you have a history of Alcohol Use?: No  Do you have a history of Street Drug Abuse?: No  Histroy of Prescripton Drug Abuse?: No  Medical Problems:   Past Medical History:   Diagnosis Date    Depression     [facility] C w/ coma, chronic ([facility] [address]     Substance abuse ([facility]        Risk:  Fall RiskTotal: 61  Jeff Scale Jeff Scale Score: 22  BVC Total: 0    Change in scores no.  Changes to plan of Care no    Status EXAM:   Status and Exam  Normal: No  Facial Expression: Flat  Affect: Appropriate  Level of Consciousness: Alert  Mood:Normal: No  Mood: Depressed, Anxious  Motor Activity:Normal: Yes  Motor Activity:

## 2018-11-14 NOTE — PLAN OF CARE
Problem: Altered Mood, Depressive Behavior:  Intervention: Group therapy to identify positive coping skills  PSYCHOTHERAPY GROUP NOTE    11/14/2018               Start Time:    1100                 End Time: 7213      Number Participants in Group: 7/11      Goals: relationships and recovery         RT x SW  Nsg  LPN   BHTII   LPC       Participation Level:     None  Minimal   x Active Listener x Interactive    Monopolizing         Participation Quality:  x Appropriate  Inappropriate   x  Attentive   Intrusive   x  Sharing   Resistant   x  Supportive    Lethargic       Affective:   x Congruent  Incongruent  Blunted  Flat    Constricted  Anxious  Elated  Angry    Labile  Depressed  Other         Cognitive:  x Alert x Oriented PPTS     Concentration G x F  P    Attention Span G x F  P    Short-Term Memory G x F  P    Long-Term Memory G x F  P    Problem Solving/  Decision Making G x F  P    Ability to Process  Information G x F  P       Contributing Factors             Delusional             Hallucinating             Flight of Ideas             Other: poor concentration       Modes of Intervention:   Education x Support x Exploration   x Clarifying x Problem Solving  Confrontation    Socialization  Limit Setting  Reality Testing    Activity  Movement  Media    Other:          Response to Learning:  x Able to verbalize current knowledge/experience   x Able to verbalize/acknowledge new learning   x Able to retain information   x Capable of insight   x Able to change behavior   x Progressing to goal    Other: intrusive and monopolizing       Comments:  participated in group was active

## 2018-11-15 VITALS
OXYGEN SATURATION: 98 % | TEMPERATURE: 97.2 F | WEIGHT: 185 LBS | RESPIRATION RATE: 14 BRPM | SYSTOLIC BLOOD PRESSURE: 106 MMHG | HEART RATE: 89 BPM | DIASTOLIC BLOOD PRESSURE: 64 MMHG | BODY MASS INDEX: 25.06 KG/M2 | HEIGHT: 72 IN

## 2018-11-15 PROCEDURE — 99239 HOSP IP/OBS DSCHRG MGMT >30: CPT | Performed by: PSYCHIATRY & NEUROLOGY

## 2018-11-15 PROCEDURE — 6370000000 HC RX 637 (ALT 250 FOR IP): Performed by: PSYCHIATRY & NEUROLOGY

## 2018-11-15 PROCEDURE — 5130000000 HC BRIDGE APPOINTMENT: Performed by: COUNSELOR

## 2018-11-15 RX ORDER — GABAPENTIN 400 MG/1
800 CAPSULE ORAL 3 TIMES DAILY
Qty: 21 CAPSULE | Refills: 0 | Status: ON HOLD | OUTPATIENT
Start: 2018-11-15 | End: 2019-03-14 | Stop reason: HOSPADM

## 2018-11-15 RX ORDER — TRAZODONE HYDROCHLORIDE 50 MG/1
50 TABLET ORAL NIGHTLY PRN
Qty: 30 TABLET | Refills: 0 | Status: SHIPPED | OUTPATIENT
Start: 2018-11-15 | End: 2019-03-09

## 2018-11-15 RX ORDER — IBUPROFEN 400 MG/1
400 TABLET ORAL 3 TIMES DAILY PRN
Qty: 30 TABLET | Refills: 0 | Status: ON HOLD | OUTPATIENT
Start: 2018-11-15 | End: 2020-10-21

## 2018-11-15 RX ORDER — LITHIUM CARBONATE 300 MG/1
300 TABLET, FILM COATED, EXTENDED RELEASE ORAL EVERY 12 HOURS SCHEDULED
Qty: 60 TABLET | Refills: 0 | Status: SHIPPED | OUTPATIENT
Start: 2018-11-15 | End: 2019-03-09

## 2018-11-15 RX ORDER — HYDROXYZINE HYDROCHLORIDE 25 MG/1
25 TABLET, FILM COATED ORAL 3 TIMES DAILY PRN
Qty: 60 TABLET | Refills: 0 | Status: SHIPPED | OUTPATIENT
Start: 2018-11-15 | End: 2018-11-25

## 2018-11-15 RX ADMIN — TIZANIDINE 2 MG: 4 TABLET ORAL at 08:26

## 2018-11-15 RX ADMIN — NICOTINE POLACRILEX 2 MG: 2 GUM, CHEWING BUCCAL at 08:26

## 2018-11-15 RX ADMIN — LITHIUM CARBONATE 300 MG: 300 TABLET, FILM COATED, EXTENDED RELEASE ORAL at 08:25

## 2018-11-15 RX ADMIN — GABAPENTIN 800 MG: 400 CAPSULE ORAL at 08:26

## 2019-01-05 ENCOUNTER — HOSPITAL ENCOUNTER (EMERGENCY)
Age: 44
Discharge: HOME | End: 2019-01-06
Attending: EMERGENCY MEDICINE
Payer: COMMERCIAL

## 2019-01-05 VITALS
RESPIRATION RATE: 18 BRPM | SYSTOLIC BLOOD PRESSURE: 149 MMHG | TEMPERATURE: 98.2 F | OXYGEN SATURATION: 96 % | DIASTOLIC BLOOD PRESSURE: 96 MMHG | HEART RATE: 121 BPM

## 2019-01-05 DIAGNOSIS — S01.511A LIP LACERATION, INITIAL ENCOUNTER: Primary | ICD-10-CM

## 2019-01-05 PROCEDURE — 99282 EMERGENCY DEPT VISIT SF MDM: CPT

## 2019-01-05 PROCEDURE — 12011 RPR F/E/E/N/L/M 2.5 CM/<: CPT

## 2019-01-05 RX ORDER — LIDOCAINE HYDROCHLORIDE 10 MG/ML
20 INJECTION, SOLUTION INFILTRATION; PERINEURAL ONCE
Status: DISCONTINUED | OUTPATIENT
Start: 2019-01-05 | End: 2019-01-06 | Stop reason: HOSPADM

## 2019-01-06 ASSESSMENT — ENCOUNTER SYMPTOMS
TROUBLE SWALLOWING: 0
ABDOMINAL PAIN: 0
NAUSEA: 0
BACK PAIN: 0
SHORTNESS OF BREATH: 0
VOMITING: 0

## 2019-03-09 ENCOUNTER — HOSPITAL ENCOUNTER (INPATIENT)
Age: 44
LOS: 4 days | Discharge: HOME | DRG: 753 | End: 2019-03-14
Attending: EMERGENCY MEDICINE | Admitting: PSYCHIATRY & NEUROLOGY
Payer: COMMERCIAL

## 2019-03-09 ENCOUNTER — APPOINTMENT (OUTPATIENT)
Dept: CT IMAGING | Age: 44
End: 2019-03-09
Payer: COMMERCIAL

## 2019-03-09 ENCOUNTER — HOSPITAL ENCOUNTER (EMERGENCY)
Age: 44
Discharge: HOME | End: 2019-03-09
Attending: EMERGENCY MEDICINE
Payer: COMMERCIAL

## 2019-03-09 VITALS
HEART RATE: 98 BPM | SYSTOLIC BLOOD PRESSURE: 141 MMHG | DIASTOLIC BLOOD PRESSURE: 95 MMHG | OXYGEN SATURATION: 99 % | TEMPERATURE: 97.9 F | RESPIRATION RATE: 16 BRPM

## 2019-03-09 DIAGNOSIS — F32.A DEPRESSION, UNSPECIFIED DEPRESSION TYPE: Primary | ICD-10-CM

## 2019-03-09 DIAGNOSIS — R10.9 FLANK PAIN: Primary | ICD-10-CM

## 2019-03-09 DIAGNOSIS — R31.9 HEMATURIA, UNSPECIFIED TYPE: ICD-10-CM

## 2019-03-09 LAB
-: NORMAL
ABSOLUTE EOS #: 0.07 K/UL (ref 0–0.44)
ABSOLUTE IMMATURE GRANULOCYTE: <0.03 K/UL (ref 0–0.3)
ABSOLUTE LYMPH #: 2.73 K/UL (ref 1.1–3.7)
ABSOLUTE MONO #: 0.8 K/UL (ref 0.1–1.2)
ALBUMIN SERPL-MCNC: 4.2 G/DL (ref 3.5–5.2)
ALBUMIN/GLOBULIN RATIO: 1.4 (ref 1–2.5)
ALP BLD-CCNC: 79 U/L (ref 40–129)
ALT SERPL-CCNC: 25 U/L (ref 5–41)
ANION GAP SERPL CALCULATED.3IONS-SCNC: 11 MMOL/L (ref 9–17)
AST SERPL-CCNC: 24 U/L
BASOPHILS # BLD: 1 % (ref 0–2)
BASOPHILS ABSOLUTE: 0.04 K/UL (ref 0–0.2)
BILIRUB SERPL-MCNC: 0.37 MG/DL (ref 0.3–1.2)
BILIRUBIN DIRECT: 0.1 MG/DL
BILIRUBIN, INDIRECT: 0.27 MG/DL (ref 0–1)
BUN BLDV-MCNC: 10 MG/DL (ref 6–20)
BUN/CREAT BLD: NORMAL (ref 9–20)
CALCIUM SERPL-MCNC: 9.8 MG/DL (ref 8.6–10.4)
CHLORIDE BLD-SCNC: 101 MMOL/L (ref 98–107)
CO2: 26 MMOL/L (ref 20–31)
CREAT SERPL-MCNC: 0.96 MG/DL (ref 0.7–1.2)
CULTURE: NORMAL
DIFFERENTIAL TYPE: ABNORMAL
EOSINOPHILS RELATIVE PERCENT: 1 % (ref 1–4)
GFR AFRICAN AMERICAN: >60 ML/MIN
GFR NON-AFRICAN AMERICAN: >60 ML/MIN
GFR SERPL CREATININE-BSD FRML MDRD: NORMAL ML/MIN/{1.73_M2}
GFR SERPL CREATININE-BSD FRML MDRD: NORMAL ML/MIN/{1.73_M2}
GLOBULIN: NORMAL G/DL (ref 1.5–3.8)
GLUCOSE BLD-MCNC: 88 MG/DL (ref 70–99)
HCT VFR BLD CALC: 37.8 % (ref 40.7–50.3)
HEMOGLOBIN: 11.6 G/DL (ref 13–17)
IMMATURE GRANULOCYTES: 0 %
LACTIC ACID, WHOLE BLOOD: 0.7 MMOL/L (ref 0.7–2.1)
LIPASE: 11 U/L (ref 13–60)
LYMPHOCYTES # BLD: 33 % (ref 24–43)
Lab: NORMAL
MCH RBC QN AUTO: 26.6 PG (ref 25.2–33.5)
MCHC RBC AUTO-ENTMCNC: 30.7 G/DL (ref 28.4–34.8)
MCV RBC AUTO: 86.7 FL (ref 82.6–102.9)
MONOCYTES # BLD: 10 % (ref 3–12)
NRBC AUTOMATED: 0 PER 100 WBC
PDW BLD-RTO: 15.5 % (ref 11.8–14.4)
PLATELET # BLD: 276 K/UL (ref 138–453)
PLATELET ESTIMATE: ABNORMAL
PMV BLD AUTO: 10.3 FL (ref 8.1–13.5)
POTASSIUM SERPL-SCNC: 3.9 MMOL/L (ref 3.7–5.3)
RBC # BLD: 4.36 M/UL (ref 4.21–5.77)
RBC # BLD: ABNORMAL 10*6/UL
REASON FOR REJECTION: NORMAL
SEG NEUTROPHILS: 55 % (ref 36–65)
SEGMENTED NEUTROPHILS ABSOLUTE COUNT: 4.68 K/UL (ref 1.5–8.1)
SODIUM BLD-SCNC: 138 MMOL/L (ref 135–144)
SPECIMEN DESCRIPTION: NORMAL
TOTAL PROTEIN: 7.2 G/DL (ref 6.4–8.3)
WBC # BLD: 8.3 K/UL (ref 3.5–11.3)
WBC # BLD: ABNORMAL 10*3/UL
ZZ NTE CLEAN UP: ORDERED TEST: NORMAL
ZZ NTE WITH NAME CLEAN UP: SPECIMEN SOURCE: NORMAL

## 2019-03-09 PROCEDURE — 80076 HEPATIC FUNCTION PANEL: CPT

## 2019-03-09 PROCEDURE — 85025 COMPLETE CBC W/AUTO DIFF WBC: CPT

## 2019-03-09 PROCEDURE — 99285 EMERGENCY DEPT VISIT HI MDM: CPT

## 2019-03-09 PROCEDURE — 83605 ASSAY OF LACTIC ACID: CPT

## 2019-03-09 PROCEDURE — 83690 ASSAY OF LIPASE: CPT

## 2019-03-09 PROCEDURE — 80307 DRUG TEST PRSMV CHEM ANLYZR: CPT

## 2019-03-09 PROCEDURE — 99284 EMERGENCY DEPT VISIT MOD MDM: CPT

## 2019-03-09 PROCEDURE — 6360000002 HC RX W HCPCS: Performed by: EMERGENCY MEDICINE

## 2019-03-09 PROCEDURE — 80048 BASIC METABOLIC PNL TOTAL CA: CPT

## 2019-03-09 PROCEDURE — 96374 THER/PROPH/DIAG INJ IV PUSH: CPT

## 2019-03-09 PROCEDURE — 2580000003 HC RX 258: Performed by: EMERGENCY MEDICINE

## 2019-03-09 PROCEDURE — 74176 CT ABD & PELVIS W/O CONTRAST: CPT

## 2019-03-09 PROCEDURE — 96375 TX/PRO/DX INJ NEW DRUG ADDON: CPT

## 2019-03-09 RX ORDER — ONDANSETRON 2 MG/ML
4 INJECTION INTRAMUSCULAR; INTRAVENOUS ONCE
Status: COMPLETED | OUTPATIENT
Start: 2019-03-09 | End: 2019-03-09

## 2019-03-09 RX ORDER — KETOROLAC TROMETHAMINE 15 MG/ML
15 INJECTION, SOLUTION INTRAMUSCULAR; INTRAVENOUS ONCE
Status: COMPLETED | OUTPATIENT
Start: 2019-03-09 | End: 2019-03-09

## 2019-03-09 RX ORDER — 0.9 % SODIUM CHLORIDE 0.9 %
1000 INTRAVENOUS SOLUTION INTRAVENOUS ONCE
Status: COMPLETED | OUTPATIENT
Start: 2019-03-09 | End: 2019-03-09

## 2019-03-09 RX ADMIN — ONDANSETRON 4 MG: 2 INJECTION INTRAMUSCULAR; INTRAVENOUS at 02:54

## 2019-03-09 RX ADMIN — KETOROLAC TROMETHAMINE 15 MG: 15 INJECTION, SOLUTION INTRAMUSCULAR; INTRAVENOUS at 02:54

## 2019-03-09 RX ADMIN — SODIUM CHLORIDE 1000 ML: 9 INJECTION, SOLUTION INTRAVENOUS at 02:54

## 2019-03-09 ASSESSMENT — PAIN SCALES - GENERAL
PAINLEVEL_OUTOF10: 10
PAINLEVEL_OUTOF10: 7
PAINLEVEL_OUTOF10: 8

## 2019-03-09 ASSESSMENT — PAIN DESCRIPTION - ORIENTATION: ORIENTATION: RIGHT

## 2019-03-09 ASSESSMENT — PAIN DESCRIPTION - LOCATION
LOCATION: BACK
LOCATION: FLANK

## 2019-03-09 ASSESSMENT — PAIN DESCRIPTION - DESCRIPTORS: DESCRIPTORS: SPASM

## 2019-03-10 PROBLEM — F31.9 BIPOLAR 1 DISORDER (HCC): Status: ACTIVE | Noted: 2019-03-10

## 2019-03-10 PROCEDURE — 6370000000 HC RX 637 (ALT 250 FOR IP): Performed by: PSYCHIATRY & NEUROLOGY

## 2019-03-10 PROCEDURE — 6370000000 HC RX 637 (ALT 250 FOR IP): Performed by: NURSE PRACTITIONER

## 2019-03-10 PROCEDURE — 1240000000 HC EMOTIONAL WELLNESS R&B

## 2019-03-10 RX ORDER — BENZTROPINE MESYLATE 1 MG/ML
2 INJECTION INTRAMUSCULAR; INTRAVENOUS 2 TIMES DAILY PRN
Status: DISCONTINUED | OUTPATIENT
Start: 2019-03-10 | End: 2019-03-14 | Stop reason: HOSPADM

## 2019-03-10 RX ORDER — HYDROXYZINE 50 MG/1
50 TABLET, FILM COATED ORAL 3 TIMES DAILY PRN
Status: DISCONTINUED | OUTPATIENT
Start: 2019-03-10 | End: 2019-03-14 | Stop reason: HOSPADM

## 2019-03-10 RX ORDER — TRAZODONE HYDROCHLORIDE 50 MG/1
50 TABLET ORAL NIGHTLY PRN
Status: DISCONTINUED | OUTPATIENT
Start: 2019-03-10 | End: 2019-03-14 | Stop reason: HOSPADM

## 2019-03-10 RX ORDER — ACETAMINOPHEN 325 MG/1
650 TABLET ORAL EVERY 4 HOURS PRN
Status: DISCONTINUED | OUTPATIENT
Start: 2019-03-10 | End: 2019-03-14 | Stop reason: HOSPADM

## 2019-03-10 RX ORDER — MAGNESIUM HYDROXIDE/ALUMINUM HYDROXICE/SIMETHICONE 120; 1200; 1200 MG/30ML; MG/30ML; MG/30ML
30 SUSPENSION ORAL EVERY 6 HOURS PRN
Status: DISCONTINUED | OUTPATIENT
Start: 2019-03-10 | End: 2019-03-14 | Stop reason: HOSPADM

## 2019-03-10 RX ORDER — TRAZODONE HYDROCHLORIDE 50 MG/1
50 TABLET ORAL NIGHTLY PRN
Status: DISCONTINUED | OUTPATIENT
Start: 2019-03-11 | End: 2019-03-10

## 2019-03-10 RX ORDER — BUPROPION HYDROCHLORIDE 300 MG/1
300 TABLET ORAL EVERY MORNING
Status: ON HOLD | COMMUNITY
End: 2019-12-12 | Stop reason: HOSPADM

## 2019-03-10 RX ORDER — NICOTINE 21 MG/24HR
1 PATCH, TRANSDERMAL 24 HOURS TRANSDERMAL DAILY
Status: DISCONTINUED | OUTPATIENT
Start: 2019-03-11 | End: 2019-03-11

## 2019-03-10 RX ADMIN — HYDROXYZINE HYDROCHLORIDE 50 MG: 50 TABLET, FILM COATED ORAL at 22:16

## 2019-03-10 RX ADMIN — NICOTINE POLACRILEX 2 MG: 2 GUM, CHEWING BUCCAL at 13:02

## 2019-03-10 RX ADMIN — NICOTINE POLACRILEX 2 MG: 2 GUM, CHEWING BUCCAL at 19:52

## 2019-03-10 RX ADMIN — TRAZODONE HYDROCHLORIDE 50 MG: 50 TABLET ORAL at 22:16

## 2019-03-10 RX ADMIN — NICOTINE POLACRILEX 2 MG: 2 GUM, CHEWING BUCCAL at 21:52

## 2019-03-10 RX ADMIN — NICOTINE POLACRILEX 2 MG: 2 GUM, CHEWING BUCCAL at 17:38

## 2019-03-10 RX ADMIN — ACETAMINOPHEN 650 MG: 325 TABLET, FILM COATED ORAL at 22:16

## 2019-03-10 ASSESSMENT — PAIN DESCRIPTION - PAIN TYPE
TYPE: ACUTE PAIN
TYPE: ACUTE PAIN

## 2019-03-10 ASSESSMENT — PAIN SCALES - GENERAL
PAINLEVEL_OUTOF10: 3
PAINLEVEL_OUTOF10: 2

## 2019-03-10 ASSESSMENT — LIFESTYLE VARIABLES
HISTORY_ALCOHOL_USE: NO
HISTORY_ALCOHOL_USE: NO

## 2019-03-10 ASSESSMENT — SLEEP AND FATIGUE QUESTIONNAIRES
DO YOU USE A SLEEP AID: NO
AVERAGE NUMBER OF SLEEP HOURS: 6
DO YOU HAVE DIFFICULTY SLEEPING: NO
AVERAGE NUMBER OF SLEEP HOURS: 6
DO YOU USE A SLEEP AID: NO
DO YOU HAVE DIFFICULTY SLEEPING: NO

## 2019-03-10 ASSESSMENT — PATIENT HEALTH QUESTIONNAIRE - PHQ9
SUM OF ALL RESPONSES TO PHQ QUESTIONS 1-9: 7
SUM OF ALL RESPONSES TO PHQ QUESTIONS 1-9: 9

## 2019-03-10 ASSESSMENT — PAIN DESCRIPTION - LOCATION
LOCATION: HEAD
LOCATION: HEAD

## 2019-03-11 LAB
ABSOLUTE EOS #: 0.2 K/UL (ref 0–0.4)
ABSOLUTE IMMATURE GRANULOCYTE: ABNORMAL K/UL (ref 0–0.3)
ABSOLUTE LYMPH #: 2.1 K/UL (ref 1–4.8)
ABSOLUTE MONO #: 0.6 K/UL (ref 0.1–1.3)
ALBUMIN SERPL-MCNC: 3.8 G/DL (ref 3.5–5.2)
ALBUMIN/GLOBULIN RATIO: NORMAL (ref 1–2.5)
ALP BLD-CCNC: 76 U/L (ref 40–129)
ALT SERPL-CCNC: 26 U/L (ref 5–41)
ANION GAP SERPL CALCULATED.3IONS-SCNC: 9 MMOL/L (ref 9–17)
AST SERPL-CCNC: 22 U/L
BASOPHILS # BLD: 1 % (ref 0–2)
BASOPHILS ABSOLUTE: 0 K/UL (ref 0–0.2)
BILIRUB SERPL-MCNC: 0.35 MG/DL (ref 0.3–1.2)
BUN BLDV-MCNC: 11 MG/DL (ref 6–20)
BUN/CREAT BLD: NORMAL (ref 9–20)
CALCIUM SERPL-MCNC: 9 MG/DL (ref 8.6–10.4)
CHLORIDE BLD-SCNC: 102 MMOL/L (ref 98–107)
CHOLESTEROL/HDL RATIO: 2.9
CHOLESTEROL: 135 MG/DL
CO2: 29 MMOL/L (ref 20–31)
CREAT SERPL-MCNC: 1.01 MG/DL (ref 0.7–1.2)
DIFFERENTIAL TYPE: ABNORMAL
EOSINOPHILS RELATIVE PERCENT: 3 % (ref 0–4)
ESTIMATED AVERAGE GLUCOSE: 120 MG/DL
GFR AFRICAN AMERICAN: >60 ML/MIN
GFR NON-AFRICAN AMERICAN: >60 ML/MIN
GFR SERPL CREATININE-BSD FRML MDRD: NORMAL ML/MIN/{1.73_M2}
GFR SERPL CREATININE-BSD FRML MDRD: NORMAL ML/MIN/{1.73_M2}
GLUCOSE BLD-MCNC: 95 MG/DL (ref 70–99)
HBA1C MFR BLD: 5.8 % (ref 4–6)
HCT VFR BLD CALC: 38.7 % (ref 41–53)
HDLC SERPL-MCNC: 46 MG/DL
HEMOGLOBIN: 12.2 G/DL (ref 13.5–17.5)
IMMATURE GRANULOCYTES: ABNORMAL %
LDL CHOLESTEROL: 75 MG/DL (ref 0–130)
LYMPHOCYTES # BLD: 36 % (ref 24–44)
MCH RBC QN AUTO: 26.6 PG (ref 26–34)
MCHC RBC AUTO-ENTMCNC: 31.5 G/DL (ref 31–37)
MCV RBC AUTO: 84.4 FL (ref 80–100)
MONOCYTES # BLD: 11 % (ref 1–7)
NRBC AUTOMATED: ABNORMAL PER 100 WBC
PDW BLD-RTO: 16.8 % (ref 11.5–14.9)
PLATELET # BLD: 258 K/UL (ref 150–450)
PLATELET ESTIMATE: ABNORMAL
PMV BLD AUTO: 8.6 FL (ref 6–12)
POTASSIUM SERPL-SCNC: 4.5 MMOL/L (ref 3.7–5.3)
RBC # BLD: 4.59 M/UL (ref 4.5–5.9)
RBC # BLD: ABNORMAL 10*6/UL
SEG NEUTROPHILS: 49 % (ref 36–66)
SEGMENTED NEUTROPHILS ABSOLUTE COUNT: 3 K/UL (ref 1.3–9.1)
SODIUM BLD-SCNC: 140 MMOL/L (ref 135–144)
THYROXINE, FREE: 1.12 NG/DL (ref 0.93–1.7)
TOTAL PROTEIN: 6.7 G/DL (ref 6.4–8.3)
TRIGL SERPL-MCNC: 69 MG/DL
TSH SERPL DL<=0.05 MIU/L-ACNC: 0.59 MIU/L (ref 0.3–5)
VLDLC SERPL CALC-MCNC: NORMAL MG/DL (ref 1–30)
WBC # BLD: 6 K/UL (ref 3.5–11)
WBC # BLD: ABNORMAL 10*3/UL

## 2019-03-11 PROCEDURE — 85025 COMPLETE CBC W/AUTO DIFF WBC: CPT

## 2019-03-11 PROCEDURE — 36415 COLL VENOUS BLD VENIPUNCTURE: CPT

## 2019-03-11 PROCEDURE — 80061 LIPID PANEL: CPT

## 2019-03-11 PROCEDURE — 6370000000 HC RX 637 (ALT 250 FOR IP): Performed by: PSYCHIATRY & NEUROLOGY

## 2019-03-11 PROCEDURE — 84439 ASSAY OF FREE THYROXINE: CPT

## 2019-03-11 PROCEDURE — 90792 PSYCH DIAG EVAL W/MED SRVCS: CPT | Performed by: PSYCHIATRY & NEUROLOGY

## 2019-03-11 PROCEDURE — 6370000000 HC RX 637 (ALT 250 FOR IP): Performed by: NURSE PRACTITIONER

## 2019-03-11 PROCEDURE — 1240000000 HC EMOTIONAL WELLNESS R&B

## 2019-03-11 PROCEDURE — 80053 COMPREHEN METABOLIC PANEL: CPT

## 2019-03-11 PROCEDURE — 83036 HEMOGLOBIN GLYCOSYLATED A1C: CPT

## 2019-03-11 PROCEDURE — 84443 ASSAY THYROID STIM HORMONE: CPT

## 2019-03-11 RX ORDER — BUPROPION HYDROCHLORIDE 300 MG/1
300 TABLET ORAL EVERY MORNING
Status: DISCONTINUED | OUTPATIENT
Start: 2019-03-11 | End: 2019-03-14 | Stop reason: HOSPADM

## 2019-03-11 RX ORDER — IBUPROFEN 400 MG/1
400 TABLET ORAL 3 TIMES DAILY PRN
Status: DISCONTINUED | OUTPATIENT
Start: 2019-03-11 | End: 2019-03-14 | Stop reason: HOSPADM

## 2019-03-11 RX ORDER — ARIPIPRAZOLE 10 MG/1
10 TABLET ORAL DAILY
Status: DISCONTINUED | OUTPATIENT
Start: 2019-03-11 | End: 2019-03-14 | Stop reason: HOSPADM

## 2019-03-11 RX ORDER — GABAPENTIN 600 MG/1
600 TABLET ORAL 2 TIMES DAILY
Status: DISCONTINUED | OUTPATIENT
Start: 2019-03-11 | End: 2019-03-12

## 2019-03-11 RX ADMIN — ACETAMINOPHEN 650 MG: 325 TABLET, FILM COATED ORAL at 17:57

## 2019-03-11 RX ADMIN — NICOTINE POLACRILEX 2 MG: 2 GUM, CHEWING BUCCAL at 13:05

## 2019-03-11 RX ADMIN — BUPROPION HYDROCHLORIDE 300 MG: 300 TABLET, FILM COATED, EXTENDED RELEASE ORAL at 13:04

## 2019-03-11 RX ADMIN — GABAPENTIN 600 MG: 600 TABLET, FILM COATED ORAL at 20:56

## 2019-03-11 RX ADMIN — IBUPROFEN 400 MG: 400 TABLET ORAL at 16:28

## 2019-03-11 RX ADMIN — GABAPENTIN 600 MG: 600 TABLET, FILM COATED ORAL at 13:04

## 2019-03-11 RX ADMIN — NICOTINE POLACRILEX 2 MG: 2 GUM, CHEWING BUCCAL at 15:05

## 2019-03-11 RX ADMIN — NICOTINE POLACRILEX 2 MG: 2 GUM, CHEWING BUCCAL at 18:59

## 2019-03-11 RX ADMIN — NICOTINE POLACRILEX 2 MG: 2 GUM, CHEWING BUCCAL at 11:38

## 2019-03-11 RX ADMIN — NICOTINE POLACRILEX 2 MG: 2 GUM, CHEWING BUCCAL at 16:29

## 2019-03-11 RX ADMIN — ARIPIPRAZOLE 10 MG: 10 TABLET ORAL at 13:04

## 2019-03-11 RX ADMIN — NICOTINE POLACRILEX 2 MG: 2 GUM, CHEWING BUCCAL at 09:32

## 2019-03-11 RX ADMIN — TRAZODONE HYDROCHLORIDE 50 MG: 50 TABLET ORAL at 20:56

## 2019-03-11 RX ADMIN — NICOTINE POLACRILEX 2 MG: 2 GUM, CHEWING BUCCAL at 20:56

## 2019-03-11 RX ADMIN — HYDROXYZINE HYDROCHLORIDE 50 MG: 50 TABLET, FILM COATED ORAL at 20:56

## 2019-03-11 ASSESSMENT — ENCOUNTER SYMPTOMS
TROUBLE SWALLOWING: 0
NAUSEA: 0
COUGH: 0
DIARRHEA: 0
VOMITING: 0
SHORTNESS OF BREATH: 0
WHEEZING: 0
CONSTIPATION: 0
CHEST TIGHTNESS: 0
ABDOMINAL PAIN: 0

## 2019-03-11 ASSESSMENT — PAIN SCALES - GENERAL
PAINLEVEL_OUTOF10: 0
PAINLEVEL_OUTOF10: 1
PAINLEVEL_OUTOF10: 3
PAINLEVEL_OUTOF10: 4

## 2019-03-12 PROCEDURE — 1240000000 HC EMOTIONAL WELLNESS R&B

## 2019-03-12 PROCEDURE — 6370000000 HC RX 637 (ALT 250 FOR IP): Performed by: PSYCHIATRY & NEUROLOGY

## 2019-03-12 PROCEDURE — 6370000000 HC RX 637 (ALT 250 FOR IP): Performed by: NURSE PRACTITIONER

## 2019-03-12 PROCEDURE — 99232 SBSQ HOSP IP/OBS MODERATE 35: CPT | Performed by: PSYCHIATRY & NEUROLOGY

## 2019-03-12 RX ORDER — GABAPENTIN 600 MG/1
600 TABLET ORAL 3 TIMES DAILY
Status: DISCONTINUED | OUTPATIENT
Start: 2019-03-12 | End: 2019-03-14 | Stop reason: HOSPADM

## 2019-03-12 RX ORDER — AMITRIPTYLINE HYDROCHLORIDE 25 MG/1
25 TABLET, FILM COATED ORAL NIGHTLY
Status: DISCONTINUED | OUTPATIENT
Start: 2019-03-12 | End: 2019-03-14 | Stop reason: HOSPADM

## 2019-03-12 RX ADMIN — NICOTINE POLACRILEX 2 MG: 2 GUM, CHEWING BUCCAL at 22:05

## 2019-03-12 RX ADMIN — NICOTINE POLACRILEX 2 MG: 2 GUM, CHEWING BUCCAL at 19:44

## 2019-03-12 RX ADMIN — BUPROPION HYDROCHLORIDE 300 MG: 300 TABLET, FILM COATED, EXTENDED RELEASE ORAL at 08:24

## 2019-03-12 RX ADMIN — NICOTINE POLACRILEX 2 MG: 2 GUM, CHEWING BUCCAL at 18:39

## 2019-03-12 RX ADMIN — IBUPROFEN 400 MG: 400 TABLET ORAL at 08:23

## 2019-03-12 RX ADMIN — NICOTINE POLACRILEX 2 MG: 2 GUM, CHEWING BUCCAL at 12:40

## 2019-03-12 RX ADMIN — NICOTINE POLACRILEX 2 MG: 2 GUM, CHEWING BUCCAL at 08:24

## 2019-03-12 RX ADMIN — HYDROXYZINE HYDROCHLORIDE 50 MG: 50 TABLET, FILM COATED ORAL at 20:08

## 2019-03-12 RX ADMIN — ARIPIPRAZOLE 10 MG: 10 TABLET ORAL at 08:23

## 2019-03-12 RX ADMIN — NICOTINE POLACRILEX 2 MG: 2 GUM, CHEWING BUCCAL at 15:18

## 2019-03-12 RX ADMIN — NICOTINE POLACRILEX 2 MG: 2 GUM, CHEWING BUCCAL at 10:52

## 2019-03-12 RX ADMIN — GABAPENTIN 600 MG: 600 TABLET, FILM COATED ORAL at 08:24

## 2019-03-12 RX ADMIN — NICOTINE POLACRILEX 2 MG: 2 GUM, CHEWING BUCCAL at 13:29

## 2019-03-12 RX ADMIN — GABAPENTIN 600 MG: 600 TABLET, FILM COATED ORAL at 20:08

## 2019-03-12 RX ADMIN — AMITRIPTYLINE HYDROCHLORIDE 25 MG: 25 TABLET, FILM COATED ORAL at 20:08

## 2019-03-12 ASSESSMENT — ENCOUNTER SYMPTOMS
RHINORRHEA: 0
RECTAL PAIN: 0
SHORTNESS OF BREATH: 0
DIARRHEA: 0
WHEEZING: 0
ABDOMINAL PAIN: 0
SORE THROAT: 0
CONSTIPATION: 0
NAUSEA: 0
BLOOD IN STOOL: 0
COUGH: 0
VOMITING: 0

## 2019-03-12 ASSESSMENT — PAIN SCALES - GENERAL
PAINLEVEL_OUTOF10: 5
PAINLEVEL_OUTOF10: 4

## 2019-03-12 ASSESSMENT — PAIN DESCRIPTION - PAIN TYPE: TYPE: ACUTE PAIN

## 2019-03-12 ASSESSMENT — PAIN DESCRIPTION - LOCATION: LOCATION: HEAD

## 2019-03-13 PROCEDURE — 6370000000 HC RX 637 (ALT 250 FOR IP): Performed by: PSYCHIATRY & NEUROLOGY

## 2019-03-13 PROCEDURE — 99232 SBSQ HOSP IP/OBS MODERATE 35: CPT | Performed by: PSYCHIATRY & NEUROLOGY

## 2019-03-13 PROCEDURE — 1240000000 HC EMOTIONAL WELLNESS R&B

## 2019-03-13 PROCEDURE — 6370000000 HC RX 637 (ALT 250 FOR IP): Performed by: NURSE PRACTITIONER

## 2019-03-13 RX ADMIN — TRAZODONE HYDROCHLORIDE 50 MG: 50 TABLET ORAL at 22:04

## 2019-03-13 RX ADMIN — GABAPENTIN 600 MG: 600 TABLET, FILM COATED ORAL at 08:02

## 2019-03-13 RX ADMIN — NICOTINE POLACRILEX 2 MG: 2 GUM, CHEWING BUCCAL at 18:16

## 2019-03-13 RX ADMIN — GABAPENTIN 600 MG: 600 TABLET, FILM COATED ORAL at 22:04

## 2019-03-13 RX ADMIN — ARIPIPRAZOLE 10 MG: 10 TABLET ORAL at 08:02

## 2019-03-13 RX ADMIN — BUPROPION HYDROCHLORIDE 300 MG: 300 TABLET, FILM COATED, EXTENDED RELEASE ORAL at 08:02

## 2019-03-13 RX ADMIN — NICOTINE POLACRILEX 2 MG: 2 GUM, CHEWING BUCCAL at 08:03

## 2019-03-13 RX ADMIN — NICOTINE POLACRILEX 2 MG: 2 GUM, CHEWING BUCCAL at 15:23

## 2019-03-13 RX ADMIN — NICOTINE POLACRILEX 2 MG: 2 GUM, CHEWING BUCCAL at 14:22

## 2019-03-13 RX ADMIN — NICOTINE POLACRILEX 2 MG: 2 GUM, CHEWING BUCCAL at 22:04

## 2019-03-13 RX ADMIN — GABAPENTIN 600 MG: 600 TABLET, FILM COATED ORAL at 13:12

## 2019-03-13 RX ADMIN — NICOTINE POLACRILEX 2 MG: 2 GUM, CHEWING BUCCAL at 13:15

## 2019-03-13 RX ADMIN — AMITRIPTYLINE HYDROCHLORIDE 25 MG: 25 TABLET, FILM COATED ORAL at 22:04

## 2019-03-13 RX ADMIN — NICOTINE POLACRILEX 2 MG: 2 GUM, CHEWING BUCCAL at 10:46

## 2019-03-13 RX ADMIN — NICOTINE POLACRILEX 2 MG: 2 GUM, CHEWING BUCCAL at 09:17

## 2019-03-14 VITALS
BODY MASS INDEX: 25.19 KG/M2 | TEMPERATURE: 97.6 F | RESPIRATION RATE: 14 BRPM | WEIGHT: 186 LBS | DIASTOLIC BLOOD PRESSURE: 68 MMHG | OXYGEN SATURATION: 100 % | HEART RATE: 97 BPM | HEIGHT: 72 IN | SYSTOLIC BLOOD PRESSURE: 145 MMHG

## 2019-03-14 PROCEDURE — 6370000000 HC RX 637 (ALT 250 FOR IP): Performed by: NURSE PRACTITIONER

## 2019-03-14 PROCEDURE — 6370000000 HC RX 637 (ALT 250 FOR IP): Performed by: PSYCHIATRY & NEUROLOGY

## 2019-03-14 PROCEDURE — 99238 HOSP IP/OBS DSCHRG MGMT 30/<: CPT | Performed by: PSYCHIATRY & NEUROLOGY

## 2019-03-14 PROCEDURE — 5130000000 HC BRIDGE APPOINTMENT: Performed by: COUNSELOR

## 2019-03-14 RX ORDER — ARIPIPRAZOLE 10 MG/1
10 TABLET ORAL DAILY
Qty: 30 TABLET | Refills: 0 | Status: ON HOLD | OUTPATIENT
Start: 2019-03-15 | End: 2019-12-12 | Stop reason: HOSPADM

## 2019-03-14 RX ORDER — GABAPENTIN 600 MG/1
600 TABLET ORAL 3 TIMES DAILY
Qty: 90 TABLET | Refills: 0 | Status: ON HOLD | OUTPATIENT
Start: 2019-03-14 | End: 2019-12-12 | Stop reason: HOSPADM

## 2019-03-14 RX ORDER — AMITRIPTYLINE HYDROCHLORIDE 25 MG/1
25 TABLET, FILM COATED ORAL NIGHTLY
Qty: 30 TABLET | Refills: 0 | Status: ON HOLD | OUTPATIENT
Start: 2019-03-14 | End: 2019-12-12 | Stop reason: HOSPADM

## 2019-03-14 RX ADMIN — ACETAMINOPHEN 650 MG: 325 TABLET, FILM COATED ORAL at 09:07

## 2019-03-14 RX ADMIN — NICOTINE POLACRILEX 2 MG: 2 GUM, CHEWING BUCCAL at 12:59

## 2019-03-14 RX ADMIN — NICOTINE POLACRILEX 2 MG: 2 GUM, CHEWING BUCCAL at 11:36

## 2019-03-14 RX ADMIN — ARIPIPRAZOLE 10 MG: 10 TABLET ORAL at 09:08

## 2019-03-14 RX ADMIN — NICOTINE POLACRILEX 2 MG: 2 GUM, CHEWING BUCCAL at 09:07

## 2019-03-14 RX ADMIN — BUPROPION HYDROCHLORIDE 300 MG: 300 TABLET, FILM COATED, EXTENDED RELEASE ORAL at 09:08

## 2019-03-14 RX ADMIN — GABAPENTIN 600 MG: 600 TABLET, FILM COATED ORAL at 09:08

## 2019-03-14 RX ADMIN — GABAPENTIN 600 MG: 600 TABLET, FILM COATED ORAL at 13:39

## 2019-03-14 ASSESSMENT — PAIN SCALES - GENERAL
PAINLEVEL_OUTOF10: 0
PAINLEVEL_OUTOF10: 3

## 2019-08-07 ENCOUNTER — HOSPITAL ENCOUNTER (OUTPATIENT)
Age: 44
Discharge: HOME | End: 2019-08-07
Payer: COMMERCIAL

## 2019-08-07 PROCEDURE — 93005 ELECTROCARDIOGRAM TRACING: CPT | Performed by: FAMILY MEDICINE

## 2019-08-08 LAB
EKG ATRIAL RATE: 100 BPM
EKG P AXIS: 70 DEGREES
EKG P-R INTERVAL: 138 MS
EKG Q-T INTERVAL: 362 MS
EKG QRS DURATION: 102 MS
EKG QTC CALCULATION (BAZETT): 466 MS
EKG R AXIS: 86 DEGREES
EKG T AXIS: 40 DEGREES
EKG VENTRICULAR RATE: 100 BPM

## 2019-08-08 PROCEDURE — 93010 ELECTROCARDIOGRAM REPORT: CPT | Performed by: INTERNAL MEDICINE

## 2019-10-22 ENCOUNTER — HOSPITAL ENCOUNTER (EMERGENCY)
Age: 44
Discharge: HOME | End: 2019-10-22
Attending: EMERGENCY MEDICINE
Payer: COMMERCIAL

## 2019-10-22 VITALS
TEMPERATURE: 97 F | SYSTOLIC BLOOD PRESSURE: 158 MMHG | OXYGEN SATURATION: 96 % | BODY MASS INDEX: 31.15 KG/M2 | HEART RATE: 98 BPM | DIASTOLIC BLOOD PRESSURE: 94 MMHG | HEIGHT: 72 IN | RESPIRATION RATE: 16 BRPM | WEIGHT: 230 LBS

## 2019-10-22 DIAGNOSIS — T30.0 BURN: Primary | ICD-10-CM

## 2019-10-22 DIAGNOSIS — L03.114 CELLULITIS OF LEFT UPPER EXTREMITY: ICD-10-CM

## 2019-10-22 PROCEDURE — 6370000000 HC RX 637 (ALT 250 FOR IP): Performed by: EMERGENCY MEDICINE

## 2019-10-22 PROCEDURE — 99283 EMERGENCY DEPT VISIT LOW MDM: CPT

## 2019-10-22 RX ORDER — CEPHALEXIN 500 MG/1
500 CAPSULE ORAL ONCE
Status: COMPLETED | OUTPATIENT
Start: 2019-10-22 | End: 2019-10-22

## 2019-10-22 RX ORDER — CEPHALEXIN 500 MG/1
500 CAPSULE ORAL 3 TIMES DAILY
Qty: 21 CAPSULE | Refills: 0 | Status: SHIPPED | OUTPATIENT
Start: 2019-10-22 | End: 2019-10-29

## 2019-10-22 RX ORDER — SULFAMETHOXAZOLE AND TRIMETHOPRIM 800; 160 MG/1; MG/1
1 TABLET ORAL 2 TIMES DAILY
Qty: 14 TABLET | Refills: 0 | Status: SHIPPED | OUTPATIENT
Start: 2019-10-22 | End: 2019-10-29

## 2019-10-22 RX ORDER — SULFAMETHOXAZOLE AND TRIMETHOPRIM 800; 160 MG/1; MG/1
1 TABLET ORAL ONCE
Status: COMPLETED | OUTPATIENT
Start: 2019-10-22 | End: 2019-10-22

## 2019-10-22 RX ORDER — BACITRACIN, NEOMYCIN, POLYMYXIN B 400; 3.5; 5 [USP'U]/G; MG/G; [USP'U]/G
OINTMENT TOPICAL
Qty: 1 TUBE | Refills: 1 | Status: SHIPPED | OUTPATIENT
Start: 2019-10-22 | End: 2019-11-01

## 2019-10-22 RX ORDER — METHADONE HYDROCHLORIDE 10 MG/5ML
130 SOLUTION ORAL DAILY
COMMUNITY
End: 2020-09-09

## 2019-10-22 RX ADMIN — CEPHALEXIN 500 MG: 500 CAPSULE ORAL at 15:35

## 2019-10-22 RX ADMIN — SULFAMETHOXAZOLE AND TRIMETHOPRIM 1 TABLET: 800; 160 TABLET ORAL at 15:35

## 2019-10-22 ASSESSMENT — ENCOUNTER SYMPTOMS
SHORTNESS OF BREATH: 0
BACK PAIN: 0
SORE THROAT: 0
ABDOMINAL PAIN: 0
VOMITING: 0

## 2019-10-22 ASSESSMENT — PAIN SCALES - GENERAL: PAINLEVEL_OUTOF10: 4

## 2019-10-22 ASSESSMENT — PAIN DESCRIPTION - LOCATION: LOCATION: ARM

## 2019-10-22 ASSESSMENT — PAIN DESCRIPTION - ORIENTATION: ORIENTATION: LEFT

## 2019-12-04 ENCOUNTER — HOSPITAL ENCOUNTER (EMERGENCY)
Age: 44
Discharge: OTHER INSTITUTION | End: 2019-12-04
Attending: EMERGENCY MEDICINE
Payer: COMMERCIAL

## 2019-12-04 ENCOUNTER — HOSPITAL ENCOUNTER (INPATIENT)
Age: 44
LOS: 8 days | Discharge: HOME | DRG: 751 | End: 2019-12-12
Attending: PSYCHIATRY & NEUROLOGY | Admitting: PSYCHIATRY & NEUROLOGY
Payer: COMMERCIAL

## 2019-12-04 VITALS
HEART RATE: 111 BPM | DIASTOLIC BLOOD PRESSURE: 105 MMHG | OXYGEN SATURATION: 98 % | RESPIRATION RATE: 18 BRPM | SYSTOLIC BLOOD PRESSURE: 165 MMHG | TEMPERATURE: 97.9 F

## 2019-12-04 DIAGNOSIS — R45.851 SUICIDAL IDEATION: Primary | ICD-10-CM

## 2019-12-04 PROBLEM — F31.4 BIPOLAR DISORDER WITH SEVERE DEPRESSION (HCC): Status: ACTIVE | Noted: 2019-12-04

## 2019-12-04 PROCEDURE — 6370000000 HC RX 637 (ALT 250 FOR IP): Performed by: NURSE PRACTITIONER

## 2019-12-04 PROCEDURE — 6370000000 HC RX 637 (ALT 250 FOR IP): Performed by: PSYCHIATRY & NEUROLOGY

## 2019-12-04 PROCEDURE — 1240000000 HC EMOTIONAL WELLNESS R&B

## 2019-12-04 PROCEDURE — 99285 EMERGENCY DEPT VISIT HI MDM: CPT

## 2019-12-04 PROCEDURE — 90792 PSYCH DIAG EVAL W/MED SRVCS: CPT | Performed by: NURSE PRACTITIONER

## 2019-12-04 RX ORDER — GABAPENTIN 600 MG/1
300 TABLET ORAL 3 TIMES DAILY
Status: DISCONTINUED | OUTPATIENT
Start: 2019-12-04 | End: 2019-12-06

## 2019-12-04 RX ORDER — ACETAMINOPHEN 325 MG/1
650 TABLET ORAL EVERY 4 HOURS PRN
Status: DISCONTINUED | OUTPATIENT
Start: 2019-12-04 | End: 2019-12-12 | Stop reason: HOSPADM

## 2019-12-04 RX ORDER — IBUPROFEN 400 MG/1
400 TABLET ORAL 3 TIMES DAILY PRN
Status: DISCONTINUED | OUTPATIENT
Start: 2019-12-04 | End: 2019-12-12 | Stop reason: HOSPADM

## 2019-12-04 RX ORDER — NICOTINE 21 MG/24HR
1 PATCH, TRANSDERMAL 24 HOURS TRANSDERMAL DAILY
Status: DISCONTINUED | OUTPATIENT
Start: 2019-12-04 | End: 2019-12-04

## 2019-12-04 RX ORDER — METHADONE HYDROCHLORIDE 10 MG/5ML
130 SOLUTION ORAL DAILY
Status: DISCONTINUED | OUTPATIENT
Start: 2019-12-05 | End: 2019-12-12 | Stop reason: HOSPADM

## 2019-12-04 RX ORDER — MAGNESIUM HYDROXIDE/ALUMINUM HYDROXICE/SIMETHICONE 120; 1200; 1200 MG/30ML; MG/30ML; MG/30ML
30 SUSPENSION ORAL EVERY 6 HOURS PRN
Status: DISCONTINUED | OUTPATIENT
Start: 2019-12-04 | End: 2019-12-12 | Stop reason: HOSPADM

## 2019-12-04 RX ORDER — AMITRIPTYLINE HYDROCHLORIDE 25 MG/1
50 TABLET, FILM COATED ORAL NIGHTLY
Status: DISCONTINUED | OUTPATIENT
Start: 2019-12-04 | End: 2019-12-05

## 2019-12-04 RX ORDER — ARIPIPRAZOLE 15 MG/1
15 TABLET ORAL DAILY
Status: DISCONTINUED | OUTPATIENT
Start: 2019-12-04 | End: 2019-12-12 | Stop reason: HOSPADM

## 2019-12-04 RX ORDER — BENZTROPINE MESYLATE 1 MG/ML
2 INJECTION INTRAMUSCULAR; INTRAVENOUS 2 TIMES DAILY PRN
Status: CANCELLED | OUTPATIENT
Start: 2019-12-04

## 2019-12-04 RX ORDER — TRAZODONE HYDROCHLORIDE 50 MG/1
50 TABLET ORAL NIGHTLY PRN
Status: CANCELLED | OUTPATIENT
Start: 2019-12-04

## 2019-12-04 RX ORDER — TIZANIDINE 4 MG/1
2 TABLET ORAL 3 TIMES DAILY
Status: DISCONTINUED | OUTPATIENT
Start: 2019-12-04 | End: 2019-12-12 | Stop reason: HOSPADM

## 2019-12-04 RX ORDER — NICOTINE 21 MG/24HR
1 PATCH, TRANSDERMAL 24 HOURS TRANSDERMAL DAILY
Status: CANCELLED | OUTPATIENT
Start: 2019-12-04

## 2019-12-04 RX ORDER — BENZTROPINE MESYLATE 1 MG/ML
2 INJECTION INTRAMUSCULAR; INTRAVENOUS 2 TIMES DAILY PRN
Status: DISCONTINUED | OUTPATIENT
Start: 2019-12-04 | End: 2019-12-12 | Stop reason: HOSPADM

## 2019-12-04 RX ORDER — ACETAMINOPHEN 325 MG/1
650 TABLET ORAL EVERY 4 HOURS PRN
Status: CANCELLED | OUTPATIENT
Start: 2019-12-04

## 2019-12-04 RX ORDER — TRAZODONE HYDROCHLORIDE 50 MG/1
50 TABLET ORAL NIGHTLY PRN
Status: DISCONTINUED | OUTPATIENT
Start: 2019-12-04 | End: 2019-12-12 | Stop reason: HOSPADM

## 2019-12-04 RX ORDER — MAGNESIUM HYDROXIDE/ALUMINUM HYDROXICE/SIMETHICONE 120; 1200; 1200 MG/30ML; MG/30ML; MG/30ML
30 SUSPENSION ORAL EVERY 6 HOURS PRN
Status: CANCELLED | OUTPATIENT
Start: 2019-12-04

## 2019-12-04 RX ORDER — METHADONE HYDROCHLORIDE 10 MG/5ML
130 SOLUTION ORAL DAILY
Status: DISCONTINUED | OUTPATIENT
Start: 2019-12-04 | End: 2019-12-04

## 2019-12-04 RX ORDER — BUPROPION HYDROCHLORIDE 150 MG/1
150 TABLET ORAL EVERY MORNING
Status: DISCONTINUED | OUTPATIENT
Start: 2019-12-05 | End: 2019-12-09

## 2019-12-04 RX ADMIN — ARIPIPRAZOLE 15 MG: 15 TABLET ORAL at 16:43

## 2019-12-04 RX ADMIN — NICOTINE POLACRILEX 4 MG: 4 GUM, CHEWING BUCCAL at 16:43

## 2019-12-04 RX ADMIN — TIZANIDINE 2 MG: 4 TABLET ORAL at 21:59

## 2019-12-04 RX ADMIN — NICOTINE POLACRILEX 4 MG: 4 GUM, CHEWING BUCCAL at 19:58

## 2019-12-04 RX ADMIN — GABAPENTIN 300 MG: 600 TABLET, FILM COATED ORAL at 21:58

## 2019-12-04 RX ADMIN — GABAPENTIN 300 MG: 600 TABLET, FILM COATED ORAL at 16:43

## 2019-12-04 RX ADMIN — AMITRIPTYLINE HYDROCHLORIDE 50 MG: 25 TABLET, FILM COATED ORAL at 21:59

## 2019-12-04 RX ADMIN — TRAZODONE HYDROCHLORIDE 50 MG: 50 TABLET ORAL at 21:58

## 2019-12-04 RX ADMIN — IBUPROFEN 400 MG: 400 TABLET, FILM COATED ORAL at 19:58

## 2019-12-04 RX ADMIN — TIZANIDINE 2 MG: 4 TABLET ORAL at 16:43

## 2019-12-04 ASSESSMENT — PAIN DESCRIPTION - PAIN TYPE: TYPE: ACUTE PAIN

## 2019-12-04 ASSESSMENT — SLEEP AND FATIGUE QUESTIONNAIRES
RESTFUL SLEEP: NO
DIFFICULTY STAYING ASLEEP: YES
SLEEP PATTERN: DIFFICULTY FALLING ASLEEP;DISTURBED/INTERRUPTED SLEEP
DIFFICULTY FALLING ASLEEP: YES
DIFFICULTY ARISING: YES
AVERAGE NUMBER OF SLEEP HOURS: 6
DO YOU HAVE DIFFICULTY SLEEPING: YES

## 2019-12-04 ASSESSMENT — PAIN - FUNCTIONAL ASSESSMENT: PAIN_FUNCTIONAL_ASSESSMENT: ACTIVITIES ARE NOT PREVENTED

## 2019-12-04 ASSESSMENT — PAIN DESCRIPTION - LOCATION: LOCATION: HEAD

## 2019-12-04 ASSESSMENT — PAIN SCALES - GENERAL
PAINLEVEL_OUTOF10: 5
PAINLEVEL_OUTOF10: 1
PAINLEVEL_OUTOF10: 0

## 2019-12-04 ASSESSMENT — PAIN DESCRIPTION - ONSET: ONSET: GRADUAL

## 2019-12-04 ASSESSMENT — LIFESTYLE VARIABLES: HISTORY_ALCOHOL_USE: NO

## 2019-12-04 ASSESSMENT — PAIN DESCRIPTION - DESCRIPTORS: DESCRIPTORS: ACHING

## 2019-12-04 ASSESSMENT — PAIN DESCRIPTION - FREQUENCY: FREQUENCY: INTERMITTENT

## 2019-12-05 PROBLEM — F11.20 OPIOID DEPENDENCE ON AGONIST THERAPY (HCC): Status: ACTIVE | Noted: 2019-12-05

## 2019-12-05 PROCEDURE — 6370000000 HC RX 637 (ALT 250 FOR IP): Performed by: NURSE PRACTITIONER

## 2019-12-05 PROCEDURE — 6370000000 HC RX 637 (ALT 250 FOR IP): Performed by: PSYCHIATRY & NEUROLOGY

## 2019-12-05 PROCEDURE — 1240000000 HC EMOTIONAL WELLNESS R&B

## 2019-12-05 PROCEDURE — 99232 SBSQ HOSP IP/OBS MODERATE 35: CPT | Performed by: NURSE PRACTITIONER

## 2019-12-05 RX ORDER — AMITRIPTYLINE HYDROCHLORIDE 25 MG/1
75 TABLET, FILM COATED ORAL NIGHTLY
Status: DISCONTINUED | OUTPATIENT
Start: 2019-12-05 | End: 2019-12-12 | Stop reason: HOSPADM

## 2019-12-05 RX ADMIN — NICOTINE POLACRILEX 4 MG: 4 GUM, CHEWING BUCCAL at 17:13

## 2019-12-05 RX ADMIN — NICOTINE POLACRILEX 4 MG: 4 GUM, CHEWING BUCCAL at 11:55

## 2019-12-05 RX ADMIN — GABAPENTIN 300 MG: 600 TABLET, FILM COATED ORAL at 08:09

## 2019-12-05 RX ADMIN — NICOTINE POLACRILEX 4 MG: 4 GUM, CHEWING BUCCAL at 14:05

## 2019-12-05 RX ADMIN — TRAZODONE HYDROCHLORIDE 50 MG: 50 TABLET ORAL at 20:41

## 2019-12-05 RX ADMIN — GABAPENTIN 300 MG: 600 TABLET, FILM COATED ORAL at 20:41

## 2019-12-05 RX ADMIN — BUPROPION HYDROCHLORIDE 150 MG: 150 TABLET, FILM COATED, EXTENDED RELEASE ORAL at 08:08

## 2019-12-05 RX ADMIN — AMITRIPTYLINE HYDROCHLORIDE 75 MG: 25 TABLET, FILM COATED ORAL at 20:41

## 2019-12-05 RX ADMIN — TIZANIDINE 2 MG: 4 TABLET ORAL at 14:02

## 2019-12-05 RX ADMIN — ARIPIPRAZOLE 15 MG: 15 TABLET ORAL at 08:08

## 2019-12-05 RX ADMIN — NICOTINE POLACRILEX 4 MG: 4 GUM, CHEWING BUCCAL at 20:40

## 2019-12-05 RX ADMIN — TIZANIDINE 2 MG: 4 TABLET ORAL at 20:41

## 2019-12-05 RX ADMIN — TIZANIDINE 2 MG: 4 TABLET ORAL at 08:08

## 2019-12-05 RX ADMIN — NICOTINE POLACRILEX 4 MG: 4 GUM, CHEWING BUCCAL at 09:02

## 2019-12-05 RX ADMIN — GABAPENTIN 300 MG: 600 TABLET, FILM COATED ORAL at 14:02

## 2019-12-05 RX ADMIN — METHADONE HYDROCHLORIDE 130 MG: 10 SOLUTION ORAL at 09:02

## 2019-12-05 ASSESSMENT — ENCOUNTER SYMPTOMS
VOICE CHANGE: 0
CONSTIPATION: 0
VOMITING: 0
EYE DISCHARGE: 0
ABDOMINAL PAIN: 0
SHORTNESS OF BREATH: 0
BACK PAIN: 0
TROUBLE SWALLOWING: 0
COLOR CHANGE: 0
COUGH: 0
SORE THROAT: 0
EYE PAIN: 0
WHEEZING: 0
EYE REDNESS: 0
DIARRHEA: 0
SINUS PAIN: 0
BLOOD IN STOOL: 0
NAUSEA: 0
CHEST TIGHTNESS: 0

## 2019-12-05 ASSESSMENT — PAIN SCALES - GENERAL: PAINLEVEL_OUTOF10: 5

## 2019-12-05 ASSESSMENT — LIFESTYLE VARIABLES: HISTORY_ALCOHOL_USE: NO

## 2019-12-06 PROCEDURE — 6370000000 HC RX 637 (ALT 250 FOR IP): Performed by: NURSE PRACTITIONER

## 2019-12-06 PROCEDURE — 99232 SBSQ HOSP IP/OBS MODERATE 35: CPT | Performed by: NURSE PRACTITIONER

## 2019-12-06 PROCEDURE — 6370000000 HC RX 637 (ALT 250 FOR IP): Performed by: PSYCHIATRY & NEUROLOGY

## 2019-12-06 PROCEDURE — 1240000000 HC EMOTIONAL WELLNESS R&B

## 2019-12-06 RX ORDER — GABAPENTIN 400 MG/1
400 CAPSULE ORAL 3 TIMES DAILY
Status: DISCONTINUED | OUTPATIENT
Start: 2019-12-06 | End: 2019-12-12 | Stop reason: HOSPADM

## 2019-12-06 RX ADMIN — GABAPENTIN 400 MG: 400 CAPSULE ORAL at 22:18

## 2019-12-06 RX ADMIN — AMITRIPTYLINE HYDROCHLORIDE 75 MG: 25 TABLET, FILM COATED ORAL at 22:17

## 2019-12-06 RX ADMIN — BUPROPION HYDROCHLORIDE 150 MG: 150 TABLET, FILM COATED, EXTENDED RELEASE ORAL at 08:30

## 2019-12-06 RX ADMIN — ARIPIPRAZOLE 15 MG: 15 TABLET ORAL at 08:30

## 2019-12-06 RX ADMIN — GABAPENTIN 300 MG: 600 TABLET, FILM COATED ORAL at 08:30

## 2019-12-06 RX ADMIN — TIZANIDINE 2 MG: 4 TABLET ORAL at 22:17

## 2019-12-06 RX ADMIN — TIZANIDINE 2 MG: 4 TABLET ORAL at 08:30

## 2019-12-06 RX ADMIN — NICOTINE POLACRILEX 4 MG: 4 GUM, CHEWING BUCCAL at 08:34

## 2019-12-06 RX ADMIN — GABAPENTIN 400 MG: 400 CAPSULE ORAL at 14:19

## 2019-12-06 RX ADMIN — TIZANIDINE 2 MG: 4 TABLET ORAL at 14:19

## 2019-12-06 RX ADMIN — NICOTINE POLACRILEX 4 MG: 4 GUM, CHEWING BUCCAL at 22:17

## 2019-12-06 RX ADMIN — NICOTINE POLACRILEX 4 MG: 4 GUM, CHEWING BUCCAL at 12:54

## 2019-12-06 RX ADMIN — METHADONE HYDROCHLORIDE 130 MG: 10 SOLUTION ORAL at 10:49

## 2019-12-06 RX ADMIN — NICOTINE POLACRILEX 4 MG: 4 GUM, CHEWING BUCCAL at 15:49

## 2019-12-06 ASSESSMENT — PAIN SCALES - GENERAL
PAINLEVEL_OUTOF10: 0
PAINLEVEL_OUTOF10: 7

## 2019-12-07 LAB
ABSOLUTE EOS #: 0.2 K/UL (ref 0–0.4)
ABSOLUTE IMMATURE GRANULOCYTE: ABNORMAL K/UL (ref 0–0.3)
ABSOLUTE LYMPH #: 2.6 K/UL (ref 1–4.8)
ABSOLUTE MONO #: 0.6 K/UL (ref 0.1–1.3)
ALBUMIN SERPL-MCNC: 3.6 G/DL (ref 3.5–5.2)
ALBUMIN/GLOBULIN RATIO: ABNORMAL (ref 1–2.5)
ALP BLD-CCNC: 68 U/L (ref 40–129)
ALT SERPL-CCNC: 31 U/L (ref 5–41)
ANION GAP SERPL CALCULATED.3IONS-SCNC: 9 MMOL/L (ref 9–17)
AST SERPL-CCNC: 17 U/L
BASOPHILS # BLD: 1 % (ref 0–2)
BASOPHILS ABSOLUTE: 0.1 K/UL (ref 0–0.2)
BILIRUB SERPL-MCNC: <0.15 MG/DL (ref 0.3–1.2)
BUN BLDV-MCNC: 16 MG/DL (ref 6–20)
BUN/CREAT BLD: ABNORMAL (ref 9–20)
CALCIUM SERPL-MCNC: 9 MG/DL (ref 8.6–10.4)
CHLORIDE BLD-SCNC: 102 MMOL/L (ref 98–107)
CHOLESTEROL/HDL RATIO: 3.2
CHOLESTEROL: 126 MG/DL
CO2: 29 MMOL/L (ref 20–31)
CREAT SERPL-MCNC: 0.87 MG/DL (ref 0.7–1.2)
DIFFERENTIAL TYPE: ABNORMAL
EOSINOPHILS RELATIVE PERCENT: 3 % (ref 0–4)
GFR AFRICAN AMERICAN: >60 ML/MIN
GFR NON-AFRICAN AMERICAN: >60 ML/MIN
GFR SERPL CREATININE-BSD FRML MDRD: ABNORMAL ML/MIN/{1.73_M2}
GFR SERPL CREATININE-BSD FRML MDRD: ABNORMAL ML/MIN/{1.73_M2}
GLUCOSE BLD-MCNC: 90 MG/DL (ref 70–99)
HCT VFR BLD CALC: 36.3 % (ref 41–53)
HDLC SERPL-MCNC: 40 MG/DL
HEMOGLOBIN: 11.6 G/DL (ref 13.5–17.5)
IMMATURE GRANULOCYTES: ABNORMAL %
LDL CHOLESTEROL: 63 MG/DL (ref 0–130)
LYMPHOCYTES # BLD: 42 % (ref 24–44)
MCH RBC QN AUTO: 27.7 PG (ref 26–34)
MCHC RBC AUTO-ENTMCNC: 32.1 G/DL (ref 31–37)
MCV RBC AUTO: 86.6 FL (ref 80–100)
MONOCYTES # BLD: 10 % (ref 1–7)
NRBC AUTOMATED: ABNORMAL PER 100 WBC
PDW BLD-RTO: 13.5 % (ref 11.5–14.9)
PLATELET # BLD: 237 K/UL (ref 150–450)
PLATELET ESTIMATE: ABNORMAL
PMV BLD AUTO: 8.8 FL (ref 6–12)
POTASSIUM SERPL-SCNC: 4.5 MMOL/L (ref 3.7–5.3)
RBC # BLD: 4.2 M/UL (ref 4.5–5.9)
RBC # BLD: ABNORMAL 10*6/UL
SEG NEUTROPHILS: 44 % (ref 36–66)
SEGMENTED NEUTROPHILS ABSOLUTE COUNT: 2.7 K/UL (ref 1.3–9.1)
SODIUM BLD-SCNC: 140 MMOL/L (ref 135–144)
THYROXINE, FREE: 1.05 NG/DL (ref 0.93–1.7)
TOTAL PROTEIN: 6.6 G/DL (ref 6.4–8.3)
TRIGL SERPL-MCNC: 113 MG/DL
TSH SERPL DL<=0.05 MIU/L-ACNC: 1.02 MIU/L (ref 0.3–5)
VLDLC SERPL CALC-MCNC: ABNORMAL MG/DL (ref 1–30)
WBC # BLD: 6.2 K/UL (ref 3.5–11)
WBC # BLD: ABNORMAL 10*3/UL

## 2019-12-07 PROCEDURE — 6370000000 HC RX 637 (ALT 250 FOR IP): Performed by: PSYCHIATRY & NEUROLOGY

## 2019-12-07 PROCEDURE — 80053 COMPREHEN METABOLIC PANEL: CPT

## 2019-12-07 PROCEDURE — 99232 SBSQ HOSP IP/OBS MODERATE 35: CPT | Performed by: NURSE PRACTITIONER

## 2019-12-07 PROCEDURE — 85025 COMPLETE CBC W/AUTO DIFF WBC: CPT

## 2019-12-07 PROCEDURE — 80061 LIPID PANEL: CPT

## 2019-12-07 PROCEDURE — 6370000000 HC RX 637 (ALT 250 FOR IP): Performed by: NURSE PRACTITIONER

## 2019-12-07 PROCEDURE — 83036 HEMOGLOBIN GLYCOSYLATED A1C: CPT

## 2019-12-07 PROCEDURE — 84443 ASSAY THYROID STIM HORMONE: CPT

## 2019-12-07 PROCEDURE — 84439 ASSAY OF FREE THYROXINE: CPT

## 2019-12-07 PROCEDURE — 36415 COLL VENOUS BLD VENIPUNCTURE: CPT

## 2019-12-07 PROCEDURE — 1240000000 HC EMOTIONAL WELLNESS R&B

## 2019-12-07 RX ADMIN — TIZANIDINE 2 MG: 4 TABLET ORAL at 08:30

## 2019-12-07 RX ADMIN — GABAPENTIN 400 MG: 400 CAPSULE ORAL at 22:11

## 2019-12-07 RX ADMIN — METHADONE HYDROCHLORIDE 130 MG: 10 SOLUTION ORAL at 09:57

## 2019-12-07 RX ADMIN — GABAPENTIN 400 MG: 400 CAPSULE ORAL at 14:00

## 2019-12-07 RX ADMIN — AMITRIPTYLINE HYDROCHLORIDE 75 MG: 25 TABLET, FILM COATED ORAL at 22:10

## 2019-12-07 RX ADMIN — NICOTINE POLACRILEX 4 MG: 4 GUM, CHEWING BUCCAL at 12:57

## 2019-12-07 RX ADMIN — TIZANIDINE 2 MG: 4 TABLET ORAL at 22:11

## 2019-12-07 RX ADMIN — TRAZODONE HYDROCHLORIDE 50 MG: 50 TABLET ORAL at 22:11

## 2019-12-07 RX ADMIN — GABAPENTIN 400 MG: 400 CAPSULE ORAL at 08:30

## 2019-12-07 RX ADMIN — NICOTINE POLACRILEX 4 MG: 4 GUM, CHEWING BUCCAL at 16:16

## 2019-12-07 RX ADMIN — BUPROPION HYDROCHLORIDE 150 MG: 150 TABLET, FILM COATED, EXTENDED RELEASE ORAL at 08:30

## 2019-12-07 RX ADMIN — NICOTINE POLACRILEX 4 MG: 4 GUM, CHEWING BUCCAL at 19:47

## 2019-12-07 RX ADMIN — TIZANIDINE 2 MG: 4 TABLET ORAL at 14:00

## 2019-12-07 RX ADMIN — ARIPIPRAZOLE 15 MG: 15 TABLET ORAL at 08:30

## 2019-12-07 ASSESSMENT — PAIN SCALES - GENERAL
PAINLEVEL_OUTOF10: 0
PAINLEVEL_OUTOF10: 5

## 2019-12-08 LAB
ESTIMATED AVERAGE GLUCOSE: 123 MG/DL
HBA1C MFR BLD: 5.9 % (ref 4–6)

## 2019-12-08 PROCEDURE — 6370000000 HC RX 637 (ALT 250 FOR IP): Performed by: NURSE PRACTITIONER

## 2019-12-08 PROCEDURE — 1240000000 HC EMOTIONAL WELLNESS R&B

## 2019-12-08 PROCEDURE — 99232 SBSQ HOSP IP/OBS MODERATE 35: CPT | Performed by: NURSE PRACTITIONER

## 2019-12-08 PROCEDURE — 6370000000 HC RX 637 (ALT 250 FOR IP): Performed by: PSYCHIATRY & NEUROLOGY

## 2019-12-08 RX ADMIN — TIZANIDINE 2 MG: 4 TABLET ORAL at 08:46

## 2019-12-08 RX ADMIN — NICOTINE POLACRILEX 4 MG: 4 GUM, CHEWING BUCCAL at 10:59

## 2019-12-08 RX ADMIN — BUPROPION HYDROCHLORIDE 150 MG: 150 TABLET, FILM COATED, EXTENDED RELEASE ORAL at 08:46

## 2019-12-08 RX ADMIN — AMITRIPTYLINE HYDROCHLORIDE 75 MG: 25 TABLET, FILM COATED ORAL at 21:45

## 2019-12-08 RX ADMIN — ARIPIPRAZOLE 15 MG: 15 TABLET ORAL at 08:46

## 2019-12-08 RX ADMIN — TIZANIDINE 2 MG: 4 TABLET ORAL at 21:45

## 2019-12-08 RX ADMIN — GABAPENTIN 400 MG: 400 CAPSULE ORAL at 21:45

## 2019-12-08 RX ADMIN — NICOTINE POLACRILEX 4 MG: 4 GUM, CHEWING BUCCAL at 08:47

## 2019-12-08 RX ADMIN — NICOTINE POLACRILEX 4 MG: 4 GUM, CHEWING BUCCAL at 17:16

## 2019-12-08 RX ADMIN — GABAPENTIN 400 MG: 400 CAPSULE ORAL at 08:46

## 2019-12-08 RX ADMIN — NICOTINE POLACRILEX 4 MG: 4 GUM, CHEWING BUCCAL at 14:23

## 2019-12-08 RX ADMIN — METHADONE HYDROCHLORIDE 130 MG: 10 SOLUTION ORAL at 10:57

## 2019-12-08 RX ADMIN — TIZANIDINE 2 MG: 4 TABLET ORAL at 14:23

## 2019-12-08 RX ADMIN — GABAPENTIN 400 MG: 400 CAPSULE ORAL at 14:23

## 2019-12-08 RX ADMIN — NICOTINE POLACRILEX 4 MG: 4 GUM, CHEWING BUCCAL at 21:42

## 2019-12-08 ASSESSMENT — PAIN SCALES - GENERAL
PAINLEVEL_OUTOF10: 1
PAINLEVEL_OUTOF10: 3

## 2019-12-09 PROCEDURE — 6370000000 HC RX 637 (ALT 250 FOR IP): Performed by: PSYCHIATRY & NEUROLOGY

## 2019-12-09 PROCEDURE — 1240000000 HC EMOTIONAL WELLNESS R&B

## 2019-12-09 PROCEDURE — 99232 SBSQ HOSP IP/OBS MODERATE 35: CPT | Performed by: NURSE PRACTITIONER

## 2019-12-09 PROCEDURE — 6370000000 HC RX 637 (ALT 250 FOR IP): Performed by: NURSE PRACTITIONER

## 2019-12-09 RX ORDER — BUPROPION HYDROCHLORIDE 300 MG/1
300 TABLET ORAL EVERY MORNING
Status: DISCONTINUED | OUTPATIENT
Start: 2019-12-10 | End: 2019-12-12 | Stop reason: HOSPADM

## 2019-12-09 RX ADMIN — ARIPIPRAZOLE 15 MG: 15 TABLET ORAL at 08:39

## 2019-12-09 RX ADMIN — NICOTINE POLACRILEX 4 MG: 4 GUM, CHEWING BUCCAL at 14:14

## 2019-12-09 RX ADMIN — BUPROPION HYDROCHLORIDE 150 MG: 150 TABLET, FILM COATED, EXTENDED RELEASE ORAL at 08:40

## 2019-12-09 RX ADMIN — NICOTINE POLACRILEX 4 MG: 4 GUM, CHEWING BUCCAL at 21:44

## 2019-12-09 RX ADMIN — METHADONE HYDROCHLORIDE 130 MG: 10 SOLUTION ORAL at 09:09

## 2019-12-09 RX ADMIN — AMITRIPTYLINE HYDROCHLORIDE 75 MG: 25 TABLET, FILM COATED ORAL at 21:27

## 2019-12-09 RX ADMIN — GABAPENTIN 400 MG: 400 CAPSULE ORAL at 14:14

## 2019-12-09 RX ADMIN — GABAPENTIN 400 MG: 400 CAPSULE ORAL at 08:40

## 2019-12-09 RX ADMIN — TIZANIDINE 2 MG: 4 TABLET ORAL at 21:26

## 2019-12-09 RX ADMIN — TIZANIDINE 2 MG: 4 TABLET ORAL at 14:15

## 2019-12-09 RX ADMIN — TIZANIDINE 2 MG: 4 TABLET ORAL at 08:40

## 2019-12-09 RX ADMIN — NICOTINE POLACRILEX 4 MG: 4 GUM, CHEWING BUCCAL at 12:16

## 2019-12-09 RX ADMIN — NICOTINE POLACRILEX 4 MG: 4 GUM, CHEWING BUCCAL at 19:27

## 2019-12-09 RX ADMIN — GABAPENTIN 400 MG: 400 CAPSULE ORAL at 21:26

## 2019-12-09 ASSESSMENT — PAIN SCALES - GENERAL
PAINLEVEL_OUTOF10: 1
PAINLEVEL_OUTOF10: 4

## 2019-12-10 PROCEDURE — 6370000000 HC RX 637 (ALT 250 FOR IP): Performed by: NURSE PRACTITIONER

## 2019-12-10 PROCEDURE — 1240000000 HC EMOTIONAL WELLNESS R&B

## 2019-12-10 PROCEDURE — 99232 SBSQ HOSP IP/OBS MODERATE 35: CPT | Performed by: NURSE PRACTITIONER

## 2019-12-10 PROCEDURE — 6370000000 HC RX 637 (ALT 250 FOR IP): Performed by: PSYCHIATRY & NEUROLOGY

## 2019-12-10 RX ADMIN — GABAPENTIN 400 MG: 400 CAPSULE ORAL at 21:12

## 2019-12-10 RX ADMIN — TIZANIDINE 2 MG: 4 TABLET ORAL at 21:12

## 2019-12-10 RX ADMIN — GABAPENTIN 400 MG: 400 CAPSULE ORAL at 14:32

## 2019-12-10 RX ADMIN — TIZANIDINE 2 MG: 4 TABLET ORAL at 14:31

## 2019-12-10 RX ADMIN — BUPROPION HYDROCHLORIDE 300 MG: 300 TABLET, FILM COATED, EXTENDED RELEASE ORAL at 09:22

## 2019-12-10 RX ADMIN — NICOTINE POLACRILEX 4 MG: 4 GUM, CHEWING BUCCAL at 21:12

## 2019-12-10 RX ADMIN — METHADONE HYDROCHLORIDE 130 MG: 10 SOLUTION ORAL at 10:34

## 2019-12-10 RX ADMIN — NICOTINE POLACRILEX 4 MG: 4 GUM, CHEWING BUCCAL at 14:32

## 2019-12-10 RX ADMIN — NICOTINE POLACRILEX 4 MG: 4 GUM, CHEWING BUCCAL at 10:34

## 2019-12-10 RX ADMIN — ARIPIPRAZOLE 15 MG: 15 TABLET ORAL at 09:22

## 2019-12-10 RX ADMIN — TIZANIDINE 2 MG: 4 TABLET ORAL at 09:22

## 2019-12-10 RX ADMIN — GABAPENTIN 400 MG: 400 CAPSULE ORAL at 09:22

## 2019-12-10 RX ADMIN — AMITRIPTYLINE HYDROCHLORIDE 75 MG: 25 TABLET, FILM COATED ORAL at 21:12

## 2019-12-10 ASSESSMENT — PAIN SCALES - GENERAL: PAINLEVEL_OUTOF10: 5

## 2019-12-11 PROCEDURE — 1240000000 HC EMOTIONAL WELLNESS R&B

## 2019-12-11 PROCEDURE — 6370000000 HC RX 637 (ALT 250 FOR IP): Performed by: PSYCHIATRY & NEUROLOGY

## 2019-12-11 PROCEDURE — 6370000000 HC RX 637 (ALT 250 FOR IP): Performed by: NURSE PRACTITIONER

## 2019-12-11 PROCEDURE — 99232 SBSQ HOSP IP/OBS MODERATE 35: CPT | Performed by: NURSE PRACTITIONER

## 2019-12-11 RX ADMIN — NICOTINE POLACRILEX 4 MG: 4 GUM, CHEWING BUCCAL at 08:26

## 2019-12-11 RX ADMIN — GABAPENTIN 400 MG: 400 CAPSULE ORAL at 13:46

## 2019-12-11 RX ADMIN — NICOTINE POLACRILEX 4 MG: 4 GUM, CHEWING BUCCAL at 13:45

## 2019-12-11 RX ADMIN — NICOTINE POLACRILEX 4 MG: 4 GUM, CHEWING BUCCAL at 17:36

## 2019-12-11 RX ADMIN — GABAPENTIN 400 MG: 400 CAPSULE ORAL at 21:38

## 2019-12-11 RX ADMIN — AMITRIPTYLINE HYDROCHLORIDE 75 MG: 25 TABLET, FILM COATED ORAL at 21:38

## 2019-12-11 RX ADMIN — BUPROPION HYDROCHLORIDE 300 MG: 300 TABLET, FILM COATED, EXTENDED RELEASE ORAL at 09:40

## 2019-12-11 RX ADMIN — ARIPIPRAZOLE 15 MG: 15 TABLET ORAL at 09:40

## 2019-12-11 RX ADMIN — METHADONE HYDROCHLORIDE 130 MG: 10 SOLUTION ORAL at 09:39

## 2019-12-11 RX ADMIN — TIZANIDINE 2 MG: 4 TABLET ORAL at 09:40

## 2019-12-11 RX ADMIN — NICOTINE POLACRILEX 4 MG: 4 GUM, CHEWING BUCCAL at 06:29

## 2019-12-11 RX ADMIN — TIZANIDINE 2 MG: 4 TABLET ORAL at 21:38

## 2019-12-11 RX ADMIN — TIZANIDINE 2 MG: 4 TABLET ORAL at 13:45

## 2019-12-11 RX ADMIN — NICOTINE POLACRILEX 4 MG: 4 GUM, CHEWING BUCCAL at 09:42

## 2019-12-11 RX ADMIN — GABAPENTIN 400 MG: 400 CAPSULE ORAL at 09:40

## 2019-12-11 RX ADMIN — TRAZODONE HYDROCHLORIDE 50 MG: 50 TABLET ORAL at 21:38

## 2019-12-11 RX ADMIN — NICOTINE POLACRILEX 4 MG: 4 GUM, CHEWING BUCCAL at 21:38

## 2019-12-11 ASSESSMENT — PAIN SCALES - GENERAL: PAINLEVEL_OUTOF10: 5

## 2019-12-12 VITALS
TEMPERATURE: 97.8 F | SYSTOLIC BLOOD PRESSURE: 114 MMHG | HEIGHT: 72 IN | OXYGEN SATURATION: 97 % | RESPIRATION RATE: 14 BRPM | WEIGHT: 200 LBS | DIASTOLIC BLOOD PRESSURE: 70 MMHG | BODY MASS INDEX: 27.09 KG/M2 | HEART RATE: 87 BPM

## 2019-12-12 PROBLEM — F33.0 MILD RECURRENT MAJOR DEPRESSION (HCC): Status: ACTIVE | Noted: 2018-11-07

## 2019-12-12 PROCEDURE — 6370000000 HC RX 637 (ALT 250 FOR IP): Performed by: PSYCHIATRY & NEUROLOGY

## 2019-12-12 PROCEDURE — 5130000000 HC BRIDGE APPOINTMENT

## 2019-12-12 PROCEDURE — 6370000000 HC RX 637 (ALT 250 FOR IP): Performed by: NURSE PRACTITIONER

## 2019-12-12 PROCEDURE — 99238 HOSP IP/OBS DSCHRG MGMT 30/<: CPT | Performed by: NURSE PRACTITIONER

## 2019-12-12 RX ORDER — ARIPIPRAZOLE 15 MG/1
15 TABLET ORAL DAILY
Qty: 14 TABLET | Refills: 0 | Status: SHIPPED | OUTPATIENT
Start: 2019-12-12 | End: 2019-12-12

## 2019-12-12 RX ORDER — BUPROPION HYDROCHLORIDE 300 MG/1
300 TABLET ORAL EVERY MORNING
Qty: 14 TABLET | Refills: 0 | Status: ON HOLD | OUTPATIENT
Start: 2019-12-12 | End: 2020-09-15 | Stop reason: HOSPADM

## 2019-12-12 RX ORDER — ARIPIPRAZOLE 15 MG/1
15 TABLET ORAL DAILY
Qty: 14 TABLET | Refills: 0 | Status: SHIPPED | OUTPATIENT
Start: 2019-12-12 | End: 2020-09-09

## 2019-12-12 RX ORDER — BUPROPION HYDROCHLORIDE 300 MG/1
300 TABLET ORAL EVERY MORNING
Qty: 14 TABLET | Refills: 0 | Status: SHIPPED | OUTPATIENT
Start: 2019-12-12 | End: 2019-12-12

## 2019-12-12 RX ORDER — AMITRIPTYLINE HYDROCHLORIDE 75 MG/1
75 TABLET, FILM COATED ORAL NIGHTLY
Qty: 14 TABLET | Refills: 0 | Status: SHIPPED | OUTPATIENT
Start: 2019-12-12 | End: 2019-12-12

## 2019-12-12 RX ORDER — AMITRIPTYLINE HYDROCHLORIDE 75 MG/1
75 TABLET, FILM COATED ORAL NIGHTLY
Qty: 14 TABLET | Refills: 0 | Status: ON HOLD | OUTPATIENT
Start: 2019-12-12 | End: 2020-09-15

## 2019-12-12 RX ORDER — TRAZODONE HYDROCHLORIDE 50 MG/1
50 TABLET ORAL NIGHTLY PRN
Qty: 14 TABLET | Refills: 0 | Status: SHIPPED | OUTPATIENT
Start: 2019-12-12 | End: 2020-09-09

## 2019-12-12 RX ORDER — GABAPENTIN 400 MG/1
400 CAPSULE ORAL 3 TIMES DAILY
Qty: 42 CAPSULE | Refills: 0 | Status: SHIPPED | OUTPATIENT
Start: 2019-12-12 | End: 2019-12-12

## 2019-12-12 RX ORDER — GABAPENTIN 400 MG/1
400 CAPSULE ORAL 3 TIMES DAILY
Qty: 42 CAPSULE | Refills: 0 | Status: ON HOLD | OUTPATIENT
Start: 2019-12-12 | End: 2020-09-10

## 2019-12-12 RX ADMIN — ARIPIPRAZOLE 15 MG: 15 TABLET ORAL at 10:40

## 2019-12-12 RX ADMIN — BUPROPION HYDROCHLORIDE 300 MG: 300 TABLET, FILM COATED, EXTENDED RELEASE ORAL at 10:40

## 2019-12-12 RX ADMIN — NICOTINE POLACRILEX 4 MG: 4 GUM, CHEWING BUCCAL at 13:52

## 2019-12-12 RX ADMIN — TIZANIDINE 2 MG: 4 TABLET ORAL at 10:45

## 2019-12-12 RX ADMIN — TIZANIDINE 2 MG: 4 TABLET ORAL at 13:52

## 2019-12-12 RX ADMIN — METHADONE HYDROCHLORIDE 130 MG: 10 SOLUTION ORAL at 10:40

## 2019-12-12 RX ADMIN — GABAPENTIN 400 MG: 400 CAPSULE ORAL at 10:40

## 2019-12-12 RX ADMIN — NICOTINE POLACRILEX 4 MG: 4 GUM, CHEWING BUCCAL at 06:06

## 2019-12-12 RX ADMIN — ALUMINUM HYDROXIDE, MAGNESIUM HYDROXIDE, AND SIMETHICONE 30 ML: 200; 200; 20 SUSPENSION ORAL at 01:36

## 2019-12-12 RX ADMIN — GABAPENTIN 400 MG: 400 CAPSULE ORAL at 13:52

## 2019-12-12 ASSESSMENT — PAIN SCALES - GENERAL: PAINLEVEL_OUTOF10: 5

## 2020-05-08 ENCOUNTER — HOSPITAL ENCOUNTER (EMERGENCY)
Age: 45
Discharge: HOME | End: 2020-05-08
Attending: EMERGENCY MEDICINE
Payer: COMMERCIAL

## 2020-05-08 ENCOUNTER — APPOINTMENT (OUTPATIENT)
Dept: GENERAL RADIOLOGY | Age: 45
End: 2020-05-08
Payer: COMMERCIAL

## 2020-05-08 ENCOUNTER — APPOINTMENT (OUTPATIENT)
Dept: CT IMAGING | Age: 45
End: 2020-05-08
Payer: COMMERCIAL

## 2020-05-08 VITALS
OXYGEN SATURATION: 100 % | DIASTOLIC BLOOD PRESSURE: 109 MMHG | RESPIRATION RATE: 16 BRPM | SYSTOLIC BLOOD PRESSURE: 147 MMHG | TEMPERATURE: 98.3 F | HEART RATE: 95 BPM

## 2020-05-08 LAB
-: NORMAL
ABSOLUTE EOS #: 0.27 K/UL (ref 0–0.44)
ABSOLUTE IMMATURE GRANULOCYTE: 0.03 K/UL (ref 0–0.3)
ABSOLUTE LYMPH #: 3.29 K/UL (ref 1.1–3.7)
ABSOLUTE MONO #: 0.93 K/UL (ref 0.1–1.2)
ANION GAP SERPL CALCULATED.3IONS-SCNC: 13 MMOL/L (ref 9–17)
BASOPHILS # BLD: 1 % (ref 0–2)
BASOPHILS ABSOLUTE: 0.07 K/UL (ref 0–0.2)
BUN BLDV-MCNC: 10 MG/DL (ref 6–20)
BUN/CREAT BLD: ABNORMAL (ref 9–20)
CALCIUM SERPL-MCNC: 9.3 MG/DL (ref 8.6–10.4)
CHLORIDE BLD-SCNC: 99 MMOL/L (ref 98–107)
CO2: 26 MMOL/L (ref 20–31)
CREAT SERPL-MCNC: 0.83 MG/DL (ref 0.7–1.2)
D-DIMER QUANTITATIVE: 1.02 MG/L FEU
DIFFERENTIAL TYPE: NORMAL
EOSINOPHILS RELATIVE PERCENT: 3 % (ref 1–4)
GFR AFRICAN AMERICAN: >60 ML/MIN
GFR NON-AFRICAN AMERICAN: >60 ML/MIN
GFR SERPL CREATININE-BSD FRML MDRD: ABNORMAL ML/MIN/{1.73_M2}
GFR SERPL CREATININE-BSD FRML MDRD: ABNORMAL ML/MIN/{1.73_M2}
GLUCOSE BLD-MCNC: 98 MG/DL (ref 70–99)
HCT VFR BLD CALC: 41.9 % (ref 40.7–50.3)
HEMOGLOBIN: 13.1 G/DL (ref 13–17)
IMMATURE GRANULOCYTES: 0 %
LYMPHOCYTES # BLD: 34 % (ref 24–43)
MCH RBC QN AUTO: 27 PG (ref 25.2–33.5)
MCHC RBC AUTO-ENTMCNC: 31.3 G/DL (ref 28.4–34.8)
MCV RBC AUTO: 86.2 FL (ref 82.6–102.9)
MONOCYTES # BLD: 10 % (ref 3–12)
NRBC AUTOMATED: 0 PER 100 WBC
PDW BLD-RTO: 14.2 % (ref 11.8–14.4)
PLATELET # BLD: 392 K/UL (ref 138–453)
PLATELET ESTIMATE: NORMAL
PMV BLD AUTO: 10.5 FL (ref 8.1–13.5)
POTASSIUM SERPL-SCNC: 3.4 MMOL/L (ref 3.7–5.3)
RBC # BLD: 4.86 M/UL (ref 4.21–5.77)
RBC # BLD: NORMAL 10*6/UL
REASON FOR REJECTION: NORMAL
SEG NEUTROPHILS: 52 % (ref 36–65)
SEGMENTED NEUTROPHILS ABSOLUTE COUNT: 4.97 K/UL (ref 1.5–8.1)
SODIUM BLD-SCNC: 138 MMOL/L (ref 135–144)
TROPONIN INTERP: NORMAL
TROPONIN INTERP: NORMAL
TROPONIN T: NORMAL NG/ML
TROPONIN T: NORMAL NG/ML
TROPONIN, HIGH SENSITIVITY: 7 NG/L (ref 0–22)
TROPONIN, HIGH SENSITIVITY: <6 NG/L (ref 0–22)
WBC # BLD: 9.6 K/UL (ref 3.5–11.3)
WBC # BLD: NORMAL 10*3/UL
ZZ NTE CLEAN UP: ORDERED TEST: NORMAL
ZZ NTE WITH NAME CLEAN UP: SPECIMEN SOURCE: NORMAL

## 2020-05-08 PROCEDURE — 85379 FIBRIN DEGRADATION QUANT: CPT

## 2020-05-08 PROCEDURE — 71260 CT THORAX DX C+: CPT

## 2020-05-08 PROCEDURE — 80048 BASIC METABOLIC PNL TOTAL CA: CPT

## 2020-05-08 PROCEDURE — 93005 ELECTROCARDIOGRAM TRACING: CPT | Performed by: STUDENT IN AN ORGANIZED HEALTH CARE EDUCATION/TRAINING PROGRAM

## 2020-05-08 PROCEDURE — 85025 COMPLETE CBC W/AUTO DIFF WBC: CPT

## 2020-05-08 PROCEDURE — 2580000003 HC RX 258: Performed by: STUDENT IN AN ORGANIZED HEALTH CARE EDUCATION/TRAINING PROGRAM

## 2020-05-08 PROCEDURE — 71045 X-RAY EXAM CHEST 1 VIEW: CPT

## 2020-05-08 PROCEDURE — 6360000004 HC RX CONTRAST MEDICATION: Performed by: EMERGENCY MEDICINE

## 2020-05-08 PROCEDURE — 6370000000 HC RX 637 (ALT 250 FOR IP): Performed by: STUDENT IN AN ORGANIZED HEALTH CARE EDUCATION/TRAINING PROGRAM

## 2020-05-08 PROCEDURE — 84484 ASSAY OF TROPONIN QUANT: CPT

## 2020-05-08 PROCEDURE — 99285 EMERGENCY DEPT VISIT HI MDM: CPT

## 2020-05-08 RX ORDER — ASPIRIN 81 MG/1
324 TABLET, CHEWABLE ORAL ONCE
Status: COMPLETED | OUTPATIENT
Start: 2020-05-08 | End: 2020-05-08

## 2020-05-08 RX ORDER — 0.9 % SODIUM CHLORIDE 0.9 %
1000 INTRAVENOUS SOLUTION INTRAVENOUS ONCE
Status: COMPLETED | OUTPATIENT
Start: 2020-05-08 | End: 2020-05-08

## 2020-05-08 RX ADMIN — IOHEXOL 75 ML: 350 INJECTION, SOLUTION INTRAVENOUS at 18:33

## 2020-05-08 RX ADMIN — SODIUM CHLORIDE 1000 ML: 9 INJECTION, SOLUTION INTRAVENOUS at 16:55

## 2020-05-08 RX ADMIN — ASPIRIN 81 MG 324 MG: 81 TABLET ORAL at 16:55

## 2020-05-08 ASSESSMENT — ENCOUNTER SYMPTOMS
SHORTNESS OF BREATH: 1
WHEEZING: 0
ABDOMINAL PAIN: 0
BACK PAIN: 0

## 2020-05-08 ASSESSMENT — PAIN SCALES - GENERAL: PAINLEVEL_OUTOF10: 6

## 2020-05-08 ASSESSMENT — PAIN DESCRIPTION - LOCATION: LOCATION: CHEST

## 2020-05-08 ASSESSMENT — PAIN DESCRIPTION - DESCRIPTORS: DESCRIPTORS: CRUSHING

## 2020-05-08 ASSESSMENT — PAIN DESCRIPTION - ONSET: ONSET: ON-GOING

## 2020-05-08 ASSESSMENT — PAIN DESCRIPTION - PROGRESSION: CLINICAL_PROGRESSION: NOT CHANGED

## 2020-05-08 ASSESSMENT — PAIN DESCRIPTION - FREQUENCY: FREQUENCY: CONTINUOUS

## 2020-05-08 ASSESSMENT — HEART SCORE: ECG: 0

## 2020-05-08 NOTE — ED PROVIDER NOTES
[facility]     Emergency Department     Faculty Attestation    I performed a history and physical examination of the patient and discussed management with the resident. I reviewed the resident´s note and agree with the documented findings and plan of care. Any areas of disagreement are noted on the chart. I was personally present for the key portions of any procedures. I have documented in the chart those procedures where I was not present during the key portions. I have reviewed the emergency nurses triage note. I agree with the chief complaint, past medical history, past surgical history, allergies, medications, social and family history as documented unless otherwise noted below. For Physician Assistant/ Nurse Practitioner cases/documentation I have personally evaluated this patient and have completed at least one if not all key elements of the E/M (history, physical exam, and MDM). Additional findings are as noted. Chest clear,  Heart exam normal , no pain or swelling on examination of the lower extremities , equal pulses both wrists , trachea midline. Abdomen is nontender without pulsatile mass or bruit. Chest pain does not radiate to the back , and the pain is not pleuritic. When I went to see the patient he states the chest pain was completely gone and his pulse was 103. Patient appears comfortable in no distress, skin is warm and dry.     Adolfo Pedroza MD [facility]  Attending Physician       EKG Interpretation    Interpreted by emergency department physician    Rhythm: normal sinus   Rate: 117  Axis: normal  Ectopy: none  Conduction: normal  ST Segments: no acute change  T Waves: no acute change  Q Waves: none    Clinical Impression: Normal EKG except for sinus tachycardia    Mayra Maldonado, AMY Maldonado MD  05/08/20 1891

## 2020-05-08 NOTE — ED PROVIDER NOTES
[facility] ED  Emergency Department Encounter  EmergencyMedicine Resident     Pt Zackery Thornton  MRN: [id number]  [place] 1975  Date of evaluation: 5/8/20  PCP:  No primary care provider on file. CHIEF COMPLAINT       Chief Complaint   Patient presents with    Chest Pain       HISTORY OF PRESENT ILLNESS  (Location/Symptom, Timing/Onset, Context/Setting, Quality, Duration, Modifying Factors, Severity.)      Sav Shearer is a 39 y.o. male who presents with complaint of left-sided chest pain just below the nipple it started 1 hour prior to arrival while at rest.  Patient notes that there is associated shortness of breath. Patient denies ever have anything like this before denies any nausea vomiting diarrhea. Patient notes shortness of breath has improved denies any fevers chills cough. PAST MEDICAL / SURGICAL / SOCIAL / FAMILY HISTORY      has a past medical history of Anxiety, Depression, Hep C w/ coma, chronic ([facility] [address] and Substance abuse ([facility] [address] has no past surgical history on file.     Social History     Socioeconomic History    Marital status: Legally      Spouse name: Caro King Number of children: 2    Years of education: Not on file    Highest education level: Not on file   Occupational History    Not on file   Social Needs    Financial resource strain: Not on file    Food insecurity     Worry: Not on file     Inability: Not on file   [employer] needs     Medical: Not on file     Non-medical: Not on file   Tobacco Use    Smoking status: Current Every Day Smoker     Packs/day: 0.50     Years: 26.00     Pack years: 13.00     Types: Cigarettes    Smokeless tobacco: Never Used   Substance and Sexual Activity    Alcohol use: Yes     Comment: occassionally    Drug use: Yes     Frequency: 7.0 times per week     Types: Marijuana, IV     Comment: uses 1 gram of Heroin a day and any opiates he can get    Sexual activity: Yes     Partners: Female   Lifestyle  Physical activity     Days per week: Not on file     Minutes per session: Not on file    Stress: Not on file   Relationships    Social connections     Talks on phone: Not on file     Gets together: Not on file     Attends Yarsani service: Not on file     Active member of club or organization: Not on file     Attends meetings of clubs or organizations: Not on file     Relationship status: Not on file    Intimate partner violence     Fear of current or ex partner: Not on file     Emotionally abused: Not on file     Physically abused: Not on file     Forced sexual activity: Not on file   Other Topics Concern    Not on file   Social History Narrative    Not on file       Patient was advised to stop smoking or to avoid tobacco use    Family History   Problem Relation Age of Onset    Diabetes Mother     Hypertension Mother    Rommel Rodriguez Depression Mother         & Uncles    Coronary Art Dis Father     Cancer Other         G. Parent  ? Allergies:  Duloxetine and Levonorgestrel-ethinyl estrad    Home Medications:  Prior to Admission medications    Medication Sig Start Date End Date Taking? Authorizing Provider   traZODone (DESYREL) 50 MG tablet Take 1 tablet by mouth nightly as needed for Sleep 12/12/19 12/26/19  Daryle Porta, APRN - CNP   gabapentin (NEURONTIN) 400 MG capsule Take 1 capsule by mouth 3 times daily for 14 days. 12/12/19 12/26/19  Daryle Porta, APRN - CNP   amitriptyline (ELAVIL) 75 MG tablet Take 1 tablet by mouth nightly for 14 days 12/12/19 12/26/19  Daryle Porta, APRN - CNP   buPROPion (WELLBUTRIN XL) 300 MG extended release tablet Take 1 tablet by mouth every morning for 14 days 12/12/19 12/26/19  Daryle Porta, APRN - CNP   ARIPiprazole (ABILIFY) 15 MG tablet Take 1 tablet by mouth daily 12/12/19   Daryle Porta, APRN - CNP   methadone 10 MG/5ML solution Take 130 mg by mouth daily.      Historical Provider, MD   ibuprofen (ADVIL;MOTRIN) 400 MG tablet Take 1 tablet by mouth 3 times daily as needed for Pain 11/15/18   Clarice Olivares MD   nicotine polacrilex (NICORETTE) 2 MG gum Take 1 each by mouth as needed for Smoking cessation 11/15/18   Clarice Olivares MD   carisoprodol (SOMA) 350 MG tablet Take 350 mg by mouth 3 times daily. Litzy Holloway Historical Provider, MD       REVIEW OF SYSTEMS    (2-9 systems for level 4, 10 or more for level 5)      Review of Systems   Constitutional: Negative for chills and fever. Respiratory: Positive for shortness of breath. Negative for wheezing. Cardiovascular: Positive for chest pain. Gastrointestinal: Negative for abdominal pain. Musculoskeletal: Negative for back pain. Skin: Negative for rash. Neurological: Negative for headaches. Psychiatric/Behavioral: Negative for suicidal ideas. PHYSICAL EXAM   (up to 7 for level 4, 8 or more for level 5)      INITIAL VITALS:  BP (!) 147/109   Pulse 95   Temp 98.3 °F (36.8 °C)   Resp 16   SpO2 100%     Physical Exam  Vitals signs reviewed. Constitutional:       General: He is not in acute distress. Appearance: He is well-developed. He is not diaphoretic. HENT:      Head: Normocephalic and atraumatic. Cardiovascular:      Rate and Rhythm: Regular rhythm. Tachycardia present. Heart sounds: Normal heart sounds. No murmur. Pulmonary:      Effort: Pulmonary effort is normal. No respiratory distress. Breath sounds: Normal breath sounds. Abdominal:      General: There is no distension. Palpations: Abdomen is soft. Tenderness: There is no abdominal tenderness. Musculoskeletal: Normal range of motion. Skin:     General: Skin is warm and dry. Neurological:      Mental Status: He is alert and oriented to person, place, and time. Cranial Nerves: No cranial nerve deficit.          DIFFERENTIAL  DIAGNOSIS     PLAN (LABS / IMAGING / EKG):  Orders Placed This Encounter   Procedures    XR CHEST PORTABLE    CT Chest Pulmonary Embolism W Contrast    Troponin    CBC Auto Differential    intravenous contrast.  Multiplanar reformatted images are provided for review. MIP images are provided for review. Dose modulation, iterative reconstruction, and/or weight based adjustment of the mA/kV was utilized to reduce the radiation dose to as low as reasonably achievable. COMPARISON: None. HISTORY: ORDERING SYSTEM PROVIDED HISTORY: Chest pain, SOB, elevated d-dimer TECHNOLOGIST PROVIDED HISTORY: Chest pain, SOB, elevated d-dimer Reason for Exam: chest pain FINDINGS: Respiratory motion artifact degrades study. This decreases sensitivity and specificity of the exam.  Small hiatal hernia seen. There is nonspecific thickening at the GE junction No embolus is seen in the central, right, or left main pulmonary artery. No embolus is seen in the proximal segmental branches. Distal segmental branches and subsegmental branches are not well evaluated secondary to motion Respiratory motion artifact limits evaluation of fine pulmonary parenchymal change. Hazy ground-glass opacities are seen throughout the right and left lung, greatest at the lung bases. No pleural effusions are noted. No pneumothorax. Adrenal glands appear normal. No splenomegaly. No perisplenic fluid Gallbladder is contracted. No peripancreatic inflammatory change. Large stool load seen in the visualized colon There is subtle areas of increased density seen in the right and left kidney, measuring 2-3 mm in size Spurring is seen in the spine. No pulmonary embolus identified. Tiny peripheral branches are not well evaluated due to motion Hazy ground-glass opacities are seen throughout the lungs. Ground-glass opacities are nonspecific can be due to atelectasis, early pneumonia, or early edema.  Subtle areas of increased density are seen in both the right and left kidney, either nonobstructing renal calculi or early excretion of IV contrast      EKG  EKG Interpretation    Interpreted by me    Rhythm: Sinus tachycardia  Rate: Tachycardia,

## 2020-05-11 LAB
EKG ATRIAL RATE: 117 BPM
EKG P AXIS: 55 DEGREES
EKG P-R INTERVAL: 154 MS
EKG Q-T INTERVAL: 340 MS
EKG QRS DURATION: 104 MS
EKG QTC CALCULATION (BAZETT): 474 MS
EKG R AXIS: 83 DEGREES
EKG T AXIS: 24 DEGREES
EKG VENTRICULAR RATE: 117 BPM

## 2020-05-11 PROCEDURE — 93010 ELECTROCARDIOGRAM REPORT: CPT | Performed by: INTERNAL MEDICINE

## 2020-09-09 ENCOUNTER — HOSPITAL ENCOUNTER (INPATIENT)
Age: 45
LOS: 6 days | Discharge: HOME | DRG: 754 | End: 2020-09-15
Attending: PSYCHIATRY & NEUROLOGY | Admitting: PSYCHIATRY & NEUROLOGY
Payer: COMMERCIAL

## 2020-09-09 ENCOUNTER — HOSPITAL ENCOUNTER (OUTPATIENT)
Age: 45
Setting detail: OBSERVATION
Discharge: PSYCHIATRIC OR SUBSTANCE USE FACILITY | DRG: 754 | End: 2020-09-09
Attending: EMERGENCY MEDICINE | Admitting: INTERNAL MEDICINE
Payer: COMMERCIAL

## 2020-09-09 ENCOUNTER — APPOINTMENT (OUTPATIENT)
Dept: GENERAL RADIOLOGY | Age: 45
DRG: 754 | End: 2020-09-09
Payer: COMMERCIAL

## 2020-09-09 ENCOUNTER — APPOINTMENT (OUTPATIENT)
Dept: NUCLEAR MEDICINE | Age: 45
DRG: 754 | End: 2020-09-09
Payer: COMMERCIAL

## 2020-09-09 VITALS
HEART RATE: 93 BPM | BODY MASS INDEX: 29.8 KG/M2 | DIASTOLIC BLOOD PRESSURE: 78 MMHG | WEIGHT: 220 LBS | RESPIRATION RATE: 16 BRPM | SYSTOLIC BLOOD PRESSURE: 120 MMHG | TEMPERATURE: 97.9 F | OXYGEN SATURATION: 97 % | HEIGHT: 72 IN

## 2020-09-09 PROBLEM — R07.9 CHEST PAIN: Status: ACTIVE | Noted: 2020-09-09

## 2020-09-09 PROBLEM — R45.851 SUICIDAL IDEATION: Status: ACTIVE | Noted: 2020-09-09

## 2020-09-09 PROBLEM — F32.9 MAJOR DEPRESSION, SINGLE EPISODE: Status: ACTIVE | Noted: 2020-09-09

## 2020-09-09 LAB
ABSOLUTE EOS #: 0 K/UL (ref 0–0.4)
ABSOLUTE IMMATURE GRANULOCYTE: ABNORMAL K/UL (ref 0–0.3)
ABSOLUTE LYMPH #: 2.3 K/UL (ref 1–4.8)
ABSOLUTE MONO #: 1.2 K/UL (ref 0.1–1.3)
ALBUMIN SERPL-MCNC: 4.5 G/DL (ref 3.5–5.2)
ALBUMIN/GLOBULIN RATIO: ABNORMAL (ref 1–2.5)
ALP BLD-CCNC: 92 U/L (ref 40–129)
ALT SERPL-CCNC: 31 U/L (ref 5–41)
ANION GAP SERPL CALCULATED.3IONS-SCNC: 14 MMOL/L (ref 9–17)
AST SERPL-CCNC: 31 U/L
BASOPHILS # BLD: 1 % (ref 0–2)
BASOPHILS ABSOLUTE: 0.2 K/UL (ref 0–0.2)
BILIRUB SERPL-MCNC: 0.32 MG/DL (ref 0.3–1.2)
BNP INTERPRETATION: NORMAL
BUN BLDV-MCNC: 10 MG/DL (ref 6–20)
BUN/CREAT BLD: ABNORMAL (ref 9–20)
CALCIUM SERPL-MCNC: 9.5 MG/DL (ref 8.6–10.4)
CHLORIDE BLD-SCNC: 103 MMOL/L (ref 98–107)
CO2: 21 MMOL/L (ref 20–31)
CREAT SERPL-MCNC: 0.99 MG/DL (ref 0.7–1.2)
D-DIMER QUANTITATIVE: <0.27 MG/L FEU (ref 0–0.59)
DIFFERENTIAL TYPE: ABNORMAL
EOSINOPHILS RELATIVE PERCENT: 0 % (ref 0–4)
GFR AFRICAN AMERICAN: >60 ML/MIN
GFR NON-AFRICAN AMERICAN: >60 ML/MIN
GFR SERPL CREATININE-BSD FRML MDRD: ABNORMAL ML/MIN/{1.73_M2}
GFR SERPL CREATININE-BSD FRML MDRD: ABNORMAL ML/MIN/{1.73_M2}
GLUCOSE BLD-MCNC: 114 MG/DL (ref 70–99)
HCT VFR BLD CALC: 34.9 % (ref 41–53)
HEMOGLOBIN: 11.3 G/DL (ref 13.5–17.5)
IMMATURE GRANULOCYTES: ABNORMAL %
LV EF: 40 %
LV EF: 47 %
LVEF MODALITY: NORMAL
LVEF MODALITY: NORMAL
LYMPHOCYTES # BLD: 16 % (ref 24–44)
MCH RBC QN AUTO: 26.1 PG (ref 26–34)
MCHC RBC AUTO-ENTMCNC: 32.4 G/DL (ref 31–37)
MCV RBC AUTO: 80.7 FL (ref 80–100)
MONOCYTES # BLD: 9 % (ref 1–7)
NRBC AUTOMATED: ABNORMAL PER 100 WBC
PDW BLD-RTO: 16 % (ref 11.5–14.9)
PLATELET # BLD: 328 K/UL (ref 150–450)
PLATELET ESTIMATE: ABNORMAL
PMV BLD AUTO: 8.4 FL (ref 6–12)
POTASSIUM SERPL-SCNC: 3.8 MMOL/L (ref 3.7–5.3)
PRO-BNP: 49 PG/ML
RBC # BLD: 4.32 M/UL (ref 4.5–5.9)
RBC # BLD: ABNORMAL 10*6/UL
SEG NEUTROPHILS: 74 % (ref 36–66)
SEGMENTED NEUTROPHILS ABSOLUTE COUNT: 10.3 K/UL (ref 1.3–9.1)
SODIUM BLD-SCNC: 138 MMOL/L (ref 135–144)
TOTAL PROTEIN: 8.1 G/DL (ref 6.4–8.3)
TROPONIN INTERP: NORMAL
TROPONIN INTERP: NORMAL
TROPONIN T: NORMAL NG/ML
TROPONIN T: NORMAL NG/ML
TROPONIN, HIGH SENSITIVITY: 6 NG/L (ref 0–22)
TROPONIN, HIGH SENSITIVITY: 7 NG/L (ref 0–22)
WBC # BLD: 13.9 K/UL (ref 3.5–11)
WBC # BLD: ABNORMAL 10*3/UL

## 2020-09-09 PROCEDURE — 71045 X-RAY EXAM CHEST 1 VIEW: CPT

## 2020-09-09 PROCEDURE — 96374 THER/PROPH/DIAG INJ IV PUSH: CPT

## 2020-09-09 PROCEDURE — U0003 INFECTIOUS AGENT DETECTION BY NUCLEIC ACID (DNA OR RNA); SEVERE ACUTE RESPIRATORY SYNDROME CORONAVIRUS 2 (SARS-COV-2) (CORONAVIRUS DISEASE [COVID-19]), AMPLIFIED PROBE TECHNIQUE, MAKING USE OF HIGH THROUGHPUT TECHNOLOGIES AS DESCRIBED BY CMS-2020-01-R: HCPCS

## 2020-09-09 PROCEDURE — 3430000000 HC RX DIAGNOSTIC RADIOPHARMACEUTICAL: Performed by: INTERNAL MEDICINE

## 2020-09-09 PROCEDURE — 2580000003 HC RX 258: Performed by: INTERNAL MEDICINE

## 2020-09-09 PROCEDURE — 93017 CV STRESS TEST TRACING ONLY: CPT

## 2020-09-09 PROCEDURE — 90792 PSYCH DIAG EVAL W/MED SRVCS: CPT | Performed by: PSYCHIATRY & NEUROLOGY

## 2020-09-09 PROCEDURE — 1240000000 HC EMOTIONAL WELLNESS R&B

## 2020-09-09 PROCEDURE — 80053 COMPREHEN METABOLIC PANEL: CPT

## 2020-09-09 PROCEDURE — 99223 1ST HOSP IP/OBS HIGH 75: CPT | Performed by: INTERNAL MEDICINE

## 2020-09-09 PROCEDURE — 6360000004 HC RX CONTRAST MEDICATION: Performed by: INTERNAL MEDICINE

## 2020-09-09 PROCEDURE — 78452 HT MUSCLE IMAGE SPECT MULT: CPT

## 2020-09-09 PROCEDURE — 6360000002 HC RX W HCPCS: Performed by: INTERNAL MEDICINE

## 2020-09-09 PROCEDURE — 83880 ASSAY OF NATRIURETIC PEPTIDE: CPT

## 2020-09-09 PROCEDURE — 84484 ASSAY OF TROPONIN QUANT: CPT

## 2020-09-09 PROCEDURE — 2060000000 HC ICU INTERMEDIATE R&B

## 2020-09-09 PROCEDURE — G0378 HOSPITAL OBSERVATION PER HR: HCPCS

## 2020-09-09 PROCEDURE — 6370000000 HC RX 637 (ALT 250 FOR IP): Performed by: NURSE PRACTITIONER

## 2020-09-09 PROCEDURE — 36415 COLL VENOUS BLD VENIPUNCTURE: CPT

## 2020-09-09 PROCEDURE — 6360000002 HC RX W HCPCS: Performed by: NURSE PRACTITIONER

## 2020-09-09 PROCEDURE — 85379 FIBRIN DEGRADATION QUANT: CPT

## 2020-09-09 PROCEDURE — 99285 EMERGENCY DEPT VISIT HI MDM: CPT

## 2020-09-09 PROCEDURE — 6360000002 HC RX W HCPCS: Performed by: EMERGENCY MEDICINE

## 2020-09-09 PROCEDURE — 2580000003 HC RX 258: Performed by: NURSE PRACTITIONER

## 2020-09-09 PROCEDURE — 85025 COMPLETE CBC W/AUTO DIFF WBC: CPT

## 2020-09-09 PROCEDURE — C8929 TTE W OR WO FOL WCON,DOPPLER: HCPCS

## 2020-09-09 PROCEDURE — A9500 TC99M SESTAMIBI: HCPCS | Performed by: INTERNAL MEDICINE

## 2020-09-09 PROCEDURE — 6370000000 HC RX 637 (ALT 250 FOR IP): Performed by: PSYCHIATRY & NEUROLOGY

## 2020-09-09 RX ORDER — PROMETHAZINE HYDROCHLORIDE 25 MG/1
12.5 TABLET ORAL EVERY 6 HOURS PRN
Status: DISCONTINUED | OUTPATIENT
Start: 2020-09-09 | End: 2020-09-09 | Stop reason: HOSPADM

## 2020-09-09 RX ORDER — SODIUM CHLORIDE 0.9 % (FLUSH) 0.9 %
10 SYRINGE (ML) INJECTION EVERY 12 HOURS SCHEDULED
Status: DISCONTINUED | OUTPATIENT
Start: 2020-09-09 | End: 2020-09-09 | Stop reason: HOSPADM

## 2020-09-09 RX ORDER — AMINOPHYLLINE DIHYDRATE 25 MG/ML
50 INJECTION, SOLUTION INTRAVENOUS PRN
Status: ACTIVE | OUTPATIENT
Start: 2020-09-09 | End: 2020-09-09

## 2020-09-09 RX ORDER — SODIUM CHLORIDE 0.9 % (FLUSH) 0.9 %
10 SYRINGE (ML) INJECTION PRN
Status: ACTIVE | OUTPATIENT
Start: 2020-09-09 | End: 2020-09-09

## 2020-09-09 RX ORDER — POTASSIUM CHLORIDE 20 MEQ/1
40 TABLET, EXTENDED RELEASE ORAL PRN
Status: DISCONTINUED | OUTPATIENT
Start: 2020-09-09 | End: 2020-09-09 | Stop reason: HOSPADM

## 2020-09-09 RX ORDER — LORAZEPAM 2 MG/ML
1 INJECTION INTRAMUSCULAR ONCE
Status: COMPLETED | OUTPATIENT
Start: 2020-09-09 | End: 2020-09-09

## 2020-09-09 RX ORDER — MAGNESIUM SULFATE 1 G/100ML
1 INJECTION INTRAVENOUS PRN
Status: CANCELLED | OUTPATIENT
Start: 2020-09-09

## 2020-09-09 RX ORDER — MAGNESIUM HYDROXIDE/ALUMINUM HYDROXICE/SIMETHICONE 120; 1200; 1200 MG/30ML; MG/30ML; MG/30ML
30 SUSPENSION ORAL EVERY 6 HOURS PRN
Status: DISCONTINUED | OUTPATIENT
Start: 2020-09-09 | End: 2020-09-15 | Stop reason: HOSPADM

## 2020-09-09 RX ORDER — NITROGLYCERIN 0.4 MG/1
0.4 TABLET SUBLINGUAL EVERY 5 MIN PRN
Status: DISCONTINUED | OUTPATIENT
Start: 2020-09-09 | End: 2020-09-09 | Stop reason: HOSPADM

## 2020-09-09 RX ORDER — AMITRIPTYLINE HYDROCHLORIDE 75 MG/1
75 TABLET, FILM COATED ORAL NIGHTLY
Status: DISCONTINUED | OUTPATIENT
Start: 2020-09-09 | End: 2020-09-09 | Stop reason: HOSPADM

## 2020-09-09 RX ORDER — AMITRIPTYLINE HYDROCHLORIDE 75 MG/1
75 TABLET, FILM COATED ORAL NIGHTLY
Status: CANCELLED | OUTPATIENT
Start: 2020-09-09

## 2020-09-09 RX ORDER — POTASSIUM CHLORIDE 7.45 MG/ML
10 INJECTION INTRAVENOUS PRN
Status: DISCONTINUED | OUTPATIENT
Start: 2020-09-09 | End: 2020-09-09 | Stop reason: HOSPADM

## 2020-09-09 RX ORDER — TRAZODONE HYDROCHLORIDE 50 MG/1
50 TABLET ORAL NIGHTLY PRN
Status: DISCONTINUED | OUTPATIENT
Start: 2020-09-09 | End: 2020-09-15 | Stop reason: HOSPADM

## 2020-09-09 RX ORDER — BUPROPION HYDROCHLORIDE 300 MG/1
300 TABLET ORAL EVERY MORNING
Status: DISCONTINUED | OUTPATIENT
Start: 2020-09-09 | End: 2020-09-09 | Stop reason: HOSPADM

## 2020-09-09 RX ORDER — ATORVASTATIN CALCIUM 40 MG/1
40 TABLET, FILM COATED ORAL NIGHTLY
Status: CANCELLED | OUTPATIENT
Start: 2020-09-09

## 2020-09-09 RX ORDER — METOPROLOL TARTRATE 5 MG/5ML
5 INJECTION INTRAVENOUS EVERY 5 MIN PRN
Status: ACTIVE | OUTPATIENT
Start: 2020-09-09 | End: 2020-09-09

## 2020-09-09 RX ORDER — NITROGLYCERIN 0.4 MG/1
0.4 TABLET SUBLINGUAL EVERY 5 MIN PRN
Status: ACTIVE | OUTPATIENT
Start: 2020-09-09 | End: 2020-09-09

## 2020-09-09 RX ORDER — IBUPROFEN 400 MG/1
400 TABLET ORAL 3 TIMES DAILY PRN
Status: DISCONTINUED | OUTPATIENT
Start: 2020-09-09 | End: 2020-09-09 | Stop reason: HOSPADM

## 2020-09-09 RX ORDER — GABAPENTIN 300 MG/1
600 CAPSULE ORAL 3 TIMES DAILY
Status: CANCELLED | OUTPATIENT
Start: 2020-09-09

## 2020-09-09 RX ORDER — ATROPINE SULFATE 0.1 MG/ML
0.5 INJECTION INTRAVENOUS EVERY 5 MIN PRN
Status: ACTIVE | OUTPATIENT
Start: 2020-09-09 | End: 2020-09-09

## 2020-09-09 RX ORDER — BUPRENORPHINE AND NALOXONE 4; 1 MG/1; MG/1
1 FILM, SOLUBLE BUCCAL; SUBLINGUAL DAILY
Status: CANCELLED | OUTPATIENT
Start: 2020-09-10

## 2020-09-09 RX ORDER — IBUPROFEN 400 MG/1
400 TABLET ORAL EVERY 6 HOURS PRN
Status: DISCONTINUED | OUTPATIENT
Start: 2020-09-09 | End: 2020-09-15 | Stop reason: HOSPADM

## 2020-09-09 RX ORDER — ONDANSETRON 2 MG/ML
4 INJECTION INTRAMUSCULAR; INTRAVENOUS EVERY 6 HOURS PRN
Status: DISCONTINUED | OUTPATIENT
Start: 2020-09-09 | End: 2020-09-09 | Stop reason: HOSPADM

## 2020-09-09 RX ORDER — SODIUM CHLORIDE 0.9 % (FLUSH) 0.9 %
10 SYRINGE (ML) INJECTION PRN
Status: DISCONTINUED | OUTPATIENT
Start: 2020-09-09 | End: 2020-09-09 | Stop reason: HOSPADM

## 2020-09-09 RX ORDER — TIZANIDINE 2 MG/1
2 TABLET ORAL EVERY 8 HOURS PRN
Status: CANCELLED | OUTPATIENT
Start: 2020-09-09

## 2020-09-09 RX ORDER — BUPRENORPHINE AND NALOXONE 4; 1 MG/1; MG/1
1 FILM, SOLUBLE BUCCAL; SUBLINGUAL DAILY
Status: ON HOLD | COMMUNITY
End: 2020-09-10

## 2020-09-09 RX ORDER — POLYETHYLENE GLYCOL 3350 17 G/17G
17 POWDER, FOR SOLUTION ORAL DAILY PRN
Status: DISCONTINUED | OUTPATIENT
Start: 2020-09-09 | End: 2020-09-15 | Stop reason: HOSPADM

## 2020-09-09 RX ORDER — MAGNESIUM SULFATE 1 G/100ML
1 INJECTION INTRAVENOUS PRN
Status: DISCONTINUED | OUTPATIENT
Start: 2020-09-09 | End: 2020-09-09 | Stop reason: HOSPADM

## 2020-09-09 RX ORDER — GABAPENTIN 300 MG/1
600 CAPSULE ORAL 3 TIMES DAILY
Status: DISCONTINUED | OUTPATIENT
Start: 2020-09-09 | End: 2020-09-09 | Stop reason: HOSPADM

## 2020-09-09 RX ORDER — ACETAMINOPHEN 650 MG/1
650 SUPPOSITORY RECTAL EVERY 6 HOURS PRN
Status: CANCELLED | OUTPATIENT
Start: 2020-09-09

## 2020-09-09 RX ORDER — NICOTINE 21 MG/24HR
1 PATCH, TRANSDERMAL 24 HOURS TRANSDERMAL DAILY
Status: DISCONTINUED | OUTPATIENT
Start: 2020-09-10 | End: 2020-09-09

## 2020-09-09 RX ORDER — ACETAMINOPHEN 325 MG/1
650 TABLET ORAL EVERY 4 HOURS PRN
Status: DISCONTINUED | OUTPATIENT
Start: 2020-09-09 | End: 2020-09-15 | Stop reason: HOSPADM

## 2020-09-09 RX ORDER — BUPRENORPHINE AND NALOXONE 4; 1 MG/1; MG/1
1 FILM, SOLUBLE BUCCAL; SUBLINGUAL DAILY
Status: DISCONTINUED | OUTPATIENT
Start: 2020-09-09 | End: 2020-09-09 | Stop reason: HOSPADM

## 2020-09-09 RX ORDER — IBUPROFEN 400 MG/1
400 TABLET ORAL 3 TIMES DAILY PRN
Status: CANCELLED | OUTPATIENT
Start: 2020-09-09

## 2020-09-09 RX ORDER — TIZANIDINE 2 MG/1
2 TABLET ORAL EVERY 8 HOURS PRN
Status: DISCONTINUED | OUTPATIENT
Start: 2020-09-09 | End: 2020-09-09 | Stop reason: HOSPADM

## 2020-09-09 RX ORDER — ACETAMINOPHEN 650 MG/1
650 SUPPOSITORY RECTAL EVERY 6 HOURS PRN
Status: DISCONTINUED | OUTPATIENT
Start: 2020-09-09 | End: 2020-09-09 | Stop reason: HOSPADM

## 2020-09-09 RX ORDER — SODIUM CHLORIDE 9 MG/ML
500 INJECTION, SOLUTION INTRAVENOUS CONTINUOUS PRN
Status: ACTIVE | OUTPATIENT
Start: 2020-09-09 | End: 2020-09-09

## 2020-09-09 RX ORDER — ATORVASTATIN CALCIUM 40 MG/1
40 TABLET, FILM COATED ORAL NIGHTLY
Status: DISCONTINUED | OUTPATIENT
Start: 2020-09-09 | End: 2020-09-09 | Stop reason: HOSPADM

## 2020-09-09 RX ORDER — ACETAMINOPHEN 325 MG/1
650 TABLET ORAL EVERY 6 HOURS PRN
Status: CANCELLED | OUTPATIENT
Start: 2020-09-09

## 2020-09-09 RX ORDER — BUPROPION HYDROCHLORIDE 300 MG/1
300 TABLET ORAL EVERY MORNING
Status: CANCELLED | OUTPATIENT
Start: 2020-09-10

## 2020-09-09 RX ORDER — HYDROXYZINE 50 MG/1
50 TABLET, FILM COATED ORAL 3 TIMES DAILY PRN
Status: DISCONTINUED | OUTPATIENT
Start: 2020-09-09 | End: 2020-09-15 | Stop reason: HOSPADM

## 2020-09-09 RX ORDER — ACETAMINOPHEN 325 MG/1
650 TABLET ORAL EVERY 6 HOURS PRN
Status: DISCONTINUED | OUTPATIENT
Start: 2020-09-09 | End: 2020-09-09 | Stop reason: HOSPADM

## 2020-09-09 RX ORDER — NITROGLYCERIN 0.4 MG/1
0.4 TABLET SUBLINGUAL EVERY 5 MIN PRN
Status: CANCELLED | OUTPATIENT
Start: 2020-09-09

## 2020-09-09 RX ADMIN — PERFLUTREN 2.2 MG: 6.52 INJECTION, SUSPENSION INTRAVENOUS at 09:01

## 2020-09-09 RX ADMIN — Medication 10 ML: at 07:30

## 2020-09-09 RX ADMIN — TETRAKIS(2-METHOXYISOBUTYLISOCYANIDE)COPPER(I) TETRAFLUOROBORATE 10.9 MILLICURIE: 1 INJECTION, POWDER, LYOPHILIZED, FOR SOLUTION INTRAVENOUS at 07:30

## 2020-09-09 RX ADMIN — Medication 10 ML: at 12:07

## 2020-09-09 RX ADMIN — BUPROPION HYDROCHLORIDE 300 MG: 300 TABLET, FILM COATED, EXTENDED RELEASE ORAL at 12:04

## 2020-09-09 RX ADMIN — GABAPENTIN 600 MG: 300 CAPSULE ORAL at 12:03

## 2020-09-09 RX ADMIN — BUPRENORPHINE AND NALOXONE 1 FILM: 4; 1 FILM, SOLUBLE BUCCAL; SUBLINGUAL at 12:03

## 2020-09-09 RX ADMIN — ACETAMINOPHEN 650 MG: 325 TABLET, FILM COATED ORAL at 13:20

## 2020-09-09 RX ADMIN — LORAZEPAM 1 MG: 2 INJECTION INTRAMUSCULAR; INTRAVENOUS at 03:29

## 2020-09-09 RX ADMIN — GABAPENTIN 600 MG: 300 CAPSULE ORAL at 20:19

## 2020-09-09 RX ADMIN — REGADENOSON 0.4 MG: 0.08 INJECTION, SOLUTION INTRAVENOUS at 09:24

## 2020-09-09 RX ADMIN — NICOTINE POLACRILEX 2 MG: 2 GUM, CHEWING BUCCAL at 16:29

## 2020-09-09 RX ADMIN — AMITRIPTYLINE HYDROCHLORIDE 75 MG: 75 TABLET, FILM COATED ORAL at 20:19

## 2020-09-09 RX ADMIN — NICOTINE POLACRILEX 2 MG: 2 GUM, CHEWING BUCCAL at 21:25

## 2020-09-09 RX ADMIN — TETRAKIS(2-METHOXYISOBUTYLISOCYANIDE)COPPER(I) TETRAFLUOROBORATE 34.5 MILLICURIE: 1 INJECTION, POWDER, LYOPHILIZED, FOR SOLUTION INTRAVENOUS at 09:25

## 2020-09-09 RX ADMIN — ATORVASTATIN CALCIUM 40 MG: 40 TABLET, FILM COATED ORAL at 20:19

## 2020-09-09 RX ADMIN — ACETAMINOPHEN 650 MG: 325 TABLET, FILM COATED ORAL at 06:35

## 2020-09-09 RX ADMIN — Medication 10 ML: at 09:25

## 2020-09-09 RX ADMIN — TIZANIDINE 2 MG: 2 TABLET ORAL at 12:03

## 2020-09-09 RX ADMIN — Medication 10 ML: at 09:02

## 2020-09-09 RX ADMIN — ASPIRIN 325 MG: 325 TABLET, COATED ORAL at 06:40

## 2020-09-09 RX ADMIN — ACETAMINOPHEN 650 MG: 325 TABLET, FILM COATED ORAL at 22:02

## 2020-09-09 ASSESSMENT — PAIN SCALES - GENERAL
PAINLEVEL_OUTOF10: 10
PAINLEVEL_OUTOF10: 3
PAINLEVEL_OUTOF10: 2
PAINLEVEL_OUTOF10: 0
PAINLEVEL_OUTOF10: 3
PAINLEVEL_OUTOF10: 3

## 2020-09-09 ASSESSMENT — ENCOUNTER SYMPTOMS
CONSTIPATION: 0
COUGH: 0
ABDOMINAL PAIN: 0
WHEEZING: 0
BACK PAIN: 0
NAUSEA: 1
SORE THROAT: 0
DIARRHEA: 0
SHORTNESS OF BREATH: 1
VOMITING: 0

## 2020-09-09 ASSESSMENT — PAIN DESCRIPTION - LOCATION: LOCATION: CHEST

## 2020-09-09 ASSESSMENT — SLEEP AND FATIGUE QUESTIONNAIRES
AVERAGE NUMBER OF SLEEP HOURS: 5
DO YOU HAVE DIFFICULTY SLEEPING: YES
DO YOU USE A SLEEP AID: YES
DIFFICULTY STAYING ASLEEP: YES
DIFFICULTY FALLING ASLEEP: YES
SLEEP PATTERN: DIFFICULTY FALLING ASLEEP;RESTLESSNESS
RESTFUL SLEEP: NO
DIFFICULTY ARISING: NO

## 2020-09-09 ASSESSMENT — PATIENT HEALTH QUESTIONNAIRE - PHQ9: SUM OF ALL RESPONSES TO PHQ QUESTIONS 1-9: 18

## 2020-09-09 ASSESSMENT — PAIN DESCRIPTION - PAIN TYPE: TYPE: ACUTE PAIN

## 2020-09-09 NOTE — H&P
If no further cardiology intervention planned, pt will be medically cleared for discharge.    Omar Ferreira

## 2020-09-09 NOTE — CARE COORDINATION
CASE MANAGEMENT NOTE:    Admission Date:  9/9/2020 Chichi Goldberg is a 39 y.o.  male    Admitted for : Chest pain [R07.9]    Met with:  Patient    PCP:  New PCP appt 9/23/20 with Joe Landa @ 10:15am                                Insurance:  [facility]      Current Residence/ Living Arrangements:  independently at home             Current Services PTA:  [facility]     Is patient agreeable to VNS: No    Freedom of choice provided:  Yes    List of [address] provided: No    VNS chosen:  No    DME:  none    Home Oxygen: No    Nebulizer: No    CPAP/BIPAP: No    Supplier: N/A    Potential Assistance Needed: Substance abuse rehab    SNF needed: No    Freedom of choice and list provided: No    Pharmacy:  [facility]       Does Patient want to use MEDS to BEDS? No    Is patient currently receiving oral anticoagulation therapy? No    Is the Patient an [facility] [place] with Readmission Risk Score greater than 14%? No  If yes, pt needs a follow up appointment made within 7 days. Family Members/Caregivers that pt would like involved in their care:    Yes    If yes, list name here:  Nereyda Miller    Transportation Provider:  Cab             Is patient in Isolation/One on One/Altered Mental Status? Yes  If yes, skip next question. If no, would they like an I-Pad to  use? No  If yes, call [phone]. Discharge Plan:  9/9 [facility] - independent from home with friend. Current with [facility]. DME: none. Chest pain / substance abuse. Stress test, psych consult. Sitter in room. New PCP appt with Joe Landa 9/23 @ 10:15am.  Declines VNS.                  Electronically signed by: Ksenia Bowman RN on 9/9/2020 at 11:51 AM
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
## 2020-09-09 NOTE — FLOWSHEET NOTE
09/09/20 1514   Provider Notification   Reason for Communication Evaluate   Provider Name Dr Lottie Leo   Provider Notification Physician   Method of Communication Call   Response Waiting for response   Provider notification of cardiac test results.

## 2020-09-09 NOTE — ED NOTES
Mode of arrival (squad #, walk in, police, etc) : LS 2        Chief complaint(s): suicidal, drug addiction, cocaine abuse         Arrival Note (brief scenario, treatment PTA, etc). : Pt arrives via EMS with suicidal ideation. Pt states he took $1200 worth of cocaine tonight to end his life. Pt has since developed chest pressure and is now anxious. Pt repeatedly states that did the cocaine to kill himself. Pt was living at [facility] but got kicked out two days ago because \"I hit a blunt\". Pt denies any triggers for his suicidal ideation. GCS 15        C= \"Have you ever felt that you should Cut down on your drinking? \"  No  A= \"Have people Annoyed you by criticizing your drinking? \"  No  G= \"Have you ever felt bad or Guilty about your drinking? \"  No  E= \"Have you ever had a drink as an Eye-opener first thing in the morning to steady your nerves or to help a hangover? \"  No      Deferred []      Reason for deferring: N/A    *If yes to two or more: probable alcohol abuse. Daisha Mcclelland RN  09/09/20 [id number]

## 2020-09-09 NOTE — PROGRESS NOTES
Patient admitted to room 2101. Patient was oriented to room and call light. 1:1 present and the need for it was explained, patient understands and agrees. Belongings collected and locked in cabinet behind nurses station. Admission complete. Will continue to monitor.

## 2020-09-09 NOTE — CONSULTS
[facility]  In PatientCardiology Consult             Date:   9/9/2020  Patient name: Mark Haywood  Date of admission:  9/9/2020  3:21 AM  MRN:   [id number]  YOB: 1975      Reason for Admission:  Chest pain; suicidal ideation    CHIEF COMPLAINT:  Chest heaviness    History Obtained From:  Patient and medical record    HISTORY OF PRESENT ILLNESS:      The patient is a 39 y.o gentleman with h/o anxiety/depression and substance abuse, who presented to the ER overnight after developing acute substernal chest pain after cocaine use. He had just snorted and injected cocaine and within 30 minutes developed chest heaviness and generalized weakness. He did not experience any palpitation, but had transient SOB and nausea. EKG in the ER showed sinus rhythm with no acute changes, and serial high sensitivity troponins are 6 and 7 ng/L. Coronary risk factors include smoking and + FH of premature CAD. Past Medical History:   has a past medical history of Anxiety, Depression, Hep C w/ coma, chronic ([facility] [address] and Substance abuse ([facility] [address] Past Surgical History:   has no past surgical history on file. Home Medications:    Prior to Admission medications    Medication Sig Start Date End Date Taking? Authorizing Provider   buprenorphine-naloxone (SUBOXONE) 4-1 MG FILM SL film Place 1 Film under the tongue daily. Take 1.5 films daily   Yes Historical Provider, MD   gabapentin (NEURONTIN) 400 MG capsule Take 1 capsule by mouth 3 times daily for 14 days. Patient taking differently: Take 600 mg by mouth 3 times daily.   12/12/19 9/9/20 Yes PRADEEP Morales CNP   amitriptyline (ELAVIL) 75 MG tablet Take 1 tablet by mouth nightly for 14 days 12/12/19 9/9/20 Yes PRADEEP Morales CNP   buPROPion (WELLBUTRIN XL) 300 MG extended release tablet Take 1 tablet by mouth every morning for 14 days 12/12/19 9/9/20 Yes PRADEEP Morales CNP   ibuprofen (ADVIL;MOTRIN) 400 MG tablet Take 1 tablet by mouth 3 times daily as needed for Pain 11/15/18  Yes Sammie Eldridge MD   nicotine polacrilex (NICORETTE) 2 MG gum Take 1 each by mouth as needed for Smoking cessation 11/15/18  Yes Sammie Eldridge MD   carisoprodol (SOMA) 350 MG tablet Take 350 mg by mouth 3 times daily as needed for Muscle spasms (back pain). Yes Historical Provider, MD       Allergies:  Duloxetine and Levonorgestrel-ethinyl estrad    Social History:   reports that he has been smoking cigarettes. He has a 13.00 pack-year smoking history. He has never used smokeless tobacco. He reports current alcohol use. He reports current drug use. Frequency: 7.00 times per week. Drugs: Marijuana, IV, and Cocaine. Family History:   Positive for early CAD    REVIEW OF SYSTEMS:    · Constitutional: there has been no unanticipated weight loss. There's been No change in energy level, No change in activity level. · Eyes: No visual changes or diplopia. No scleral icterus. · ENT: No Headaches, hearing loss or vertigo. No mouth sores or sore throat. · Cardiovascular: No problem  · Respiratory: No previous reported problems  · Gastrointestinal: No abdominal pain, appetite loss, blood in stools. No change in bowel or bladder habits. · Genitourinary: No dysuria, trouble voiding, or hematuria. · Musculoskeletal:  No gait disturbance, No weakness or joint complaints. · Integumentary: No rash or pruritis. · Neurological: No headache, diplopia, change in muscle strength, numbness or tingling. No change in gait, balance, coordination, mood, affect, memory, mentation, behavior. · Psychiatric: No anxiety, or depression. · Endocrine: No temperature intolerance. No excessive thirst, fluid intake, or urination. No tremor. · Hematologic/Lymphatic: No abnormal bruising or bleeding, blood clots or swollen lymph nodes. · Allergic/Immunologic: No nasal congestion or hives.     PHYSICAL EXAM:    Physical Examination:    BP (!) 140/79   Pulse 86   Temp 98 °F (36.7 °C) Resp 16   Ht 6' (1.829 m)   Wt 220 lb (99.8 kg)   SpO2 98%   BMI 29.84 kg/m²    Constitutional and General Appearance: alert, cooperative, no distress and appears stated age  [de-identified]: PERRL, no cervical lymphadenopathy. No masses palpable. Normal oral mucosa  Respiratory:  · Normal excursion and expansion without use of accessory muscles  · Resp Auscultation: Good respiratory effort. No for increased work of breathing. On auscultation: clear to auscultation bilaterally  Cardiovascular:  · The apical impulse is not displaced  · Heart tones are crisp and normal. regular S1 and S2. Murmurs:  None  · Jugular venous pulsation Normal  · The carotid upstroke is normal in amplitude and contour without delay or bruit  · Peripheral pulses are symmetrical and full   Abdomen:  · No masses or tenderness  · Bowel sounds present  Extremities:  ·  No Cyanosis or Clubbing  ·  Lower extremity edema: None  ·  Skin: Warm and dry  Neurological:  · Alert and oriented. · Moves all extremities well  · No abnormalities of mood, affect, memory, mentation, or behavior are noted    DATA:    Diagnostics:      EKG:   Sinus rhythm; non-specific T wave abnormality      Labs:     CBC:   Recent Labs     09/09/20 0322   WBC 13.9*   HGB 11.3*   HCT 34.9*        BMP:   Recent Labs     09/09/20 0322      K 3.8   CO2 21   BUN 10   CREATININE 0.99   LABGLOM >60   GLUCOSE 114*     BNP: No results for input(s): BNP in the last 72 hours. PT/INR: No results for input(s): PROTIME, INR in the last 72 hours. APTT:No results for input(s): APTT in the last 72 hours. CARDIAC ENZYMES:No results for input(s): CKTOTAL, CKMB, CKMBINDEX, TROPONINI in the last 72 hours. FASTING LIPID PANEL:  Lab Results   Component Value Date    HDL 40 12/07/2019    TRIG 113 12/07/2019     LIVER PROFILE:  Recent Labs     09/09/20  0322   AST 31   ALT 31   LABALBU 4.5         IMPRESSION:    1.  Atypical chest pain associated with cocaine use; currently pain-free with no evidence of acute coronary syndrome. 2. Bipolar disorder with suicidal ideation  3. Polysubstance abuse ( heroin, cocaine, cannabis)  4. Family h/o premature coronary disease  5. Tobacco abuse    Patient Active Problem List   Diagnosis    Recurrent depression ([address]    Recurrent depression ([address]    MDD (major depressive disorder), recurrent episode ([address]    Bipolar I disorder, most recent episode depressed ([address]    Opioid type dependence, continuous ([address]    Mild recurrent major depression ([address]    Bipolar 1 disorder ([address]    Bipolar disorder with severe depression ([address]    Opioid dependence on agonist therapy ([address]    Chest pain    Suicidal ideation    Substance abuse ([address]       RECOMMENDATIONS:  1. Complete 2D echo and Lexiscan stress test today  2. Continue ASA and Lipitor      Discussed with patient and nursing.     Electronically signed by Teddy Castrejon MD on 9/9/2020 at 10:18 AM.  [facility] cardiology Consultant

## 2020-09-09 NOTE — ED NOTES
[employer]guard at bedside for pt watch. Safeguard informed that they need to stay with the pt at all times, must be present in the room and if they need a break or relief to let the nurse know so they can be replaced. Safeguard verbalized understanding. Belongings and pt checked by security. Belongings locked up, pt in blue gown.         Jessica Camargo RN  09/09/20 0928

## 2020-09-09 NOTE — H&P
[address] [facility]     HISTORY AND PHYSICAL EXAMINATION            Date:   9/9/2020  Patientname:  Chapo Chaudhary  Date of admission:  9/9/2020  3:21 AM  MRN:   [id number]  Account:  [de-identified]  YOB: 1975  PCP:    No primary care provider on file. Room:   [facility]  Code Status:    Full Code    CHIEF COMPLAINT     Chief Complaint   Patient presents with    Suicidal    Addiction Problem       HISTORY OF PRESENT ILLNESS  (Character, Onset, Location, Duration,  Exacerbating/RelievingFactors, Radiation,   Associated Symptoms, Severity )      The patient is a 39 y.o.  male, with a history of bipolar disorder, major depressive disorder, opioid dependence, and chronic hepatitis C, who presents with suicidal and addiction problem. According to patient, he was recently kicked out of residential treatment at the [facility] after smoking a blunt of marijuana. Reports that since that time, he has been having suicidal ideation. States that he attempted to end his life yesterday by overdosing on Cocaine, which he smoked and injected intravenously. Reports sudden onset midsternal chest pressure approximately 1-1/2 hours prior to arrival.  Patient describes pain as feeling as if someone was sitting on his chest.  Symptoms are associated with lightheadedness, diaphoresis, shortness of breath, and nausea without vomiting. Patient reports feeling weak throughout his entire body. Denies fever, chills, cough, abdominal pain, vomiting, diarrhea, and urinary symptoms. There are no aggravating or alleviating factors, though pain greatly improved in ED after receiving ASA via EMS and Ativan in ED. Symptoms are reported as constant and severe, improved since arrival to ED. Patient reports the pain is now a dull achy sensation. PAST MEDICAL HISTORY   Patient  has a past medical history of Anxiety, Depression, Hep C w/ coma, chronic ([facility] [address] and Substance abuse ([facility] [address]     PAST SURGICAL HISTORY    Patient  has no past surgical history on file. FAMILY HISTORY    Patient family history includes Cancer in an other family member; Coronary Art Dis in his father; Depression in his mother; Diabetes in his mother; Hypertension in his mother. SOCIAL HISTORY    Patient  reports that he has been smoking cigarettes. He has a 13.00 pack-year smoking history. He has never used smokeless tobacco. He reports current alcohol use. He reports current drug use. Frequency: 7.00 times per week. Drugs: Marijuana, IV, and Cocaine. HOME MEDICATIONS        Prior to Admission medications    Medication Sig Start Date End Date Taking? Authorizing Provider   buprenorphine-naloxone (SUBOXONE) 4-1 MG FILM SL film Place 1 Film under the tongue daily. Take 1.5 films daily   Yes Historical Provider, MD   gabapentin (NEURONTIN) 400 MG capsule Take 1 capsule by mouth 3 times daily for 14 days. Patient taking differently: Take 600 mg by mouth 3 times daily. 12/12/19 9/9/20 Yes PRADEEP Corley CNP   amitriptyline (ELAVIL) 75 MG tablet Take 1 tablet by mouth nightly for 14 days 12/12/19 9/9/20 Yes PRADEEP Corley CNP   buPROPion (WELLBUTRIN XL) 300 MG extended release tablet Take 1 tablet by mouth every morning for 14 days 12/12/19 9/9/20 Yes PRADEEP Corley CNP   ibuprofen (ADVIL;MOTRIN) 400 MG tablet Take 1 tablet by mouth 3 times daily as needed for Pain 11/15/18  Yes Sandip Valentin MD   nicotine polacrilex (NICORETTE) 2 MG gum Take 1 each by mouth as needed for Smoking cessation 11/15/18  Yes Sandip Valentin MD   carisoprodol (SOMA) 350 MG tablet Take 350 mg by mouth 3 times daily as needed for Muscle spasms (back pain). Yes Historical Provider, MD       ALLERGIES      Duloxetine and Levonorgestrel-ethinyl estrad    REVIEW OF SYSTEMS     Review of Systems   Constitutional: Positive for diaphoresis. Negative for chills and fever. HENT: Negative for congestion and sore throat.     Respiratory: Positive for shortness of breath. Negative for cough and wheezing. Cardiovascular: Positive for chest pain. Negative for palpitations and leg swelling. Gastrointestinal: Positive for nausea. Negative for abdominal pain, constipation, diarrhea and vomiting. Genitourinary: Negative for dysuria, frequency and urgency. Musculoskeletal: Negative for back pain and myalgias. Skin: Negative for rash. Neurological: Positive for weakness (generalized) and light-headedness. Negative for dizziness and headaches. Psychiatric/Behavioral: The patient is not nervous/anxious. PHYSICAL EXAM      /77   Pulse 83   Temp 98.2 °F (36.8 °C) (Oral)   Resp 16   Ht 6' (1.829 m)   Wt 220 lb (99.8 kg)   SpO2 95%   BMI 29.84 kg/m²  Body mass index is 29.84 kg/m². Physical Exam  Constitutional:       General: He is not in acute distress. Appearance: He is well-developed. He is not diaphoretic. HENT:      Head: Normocephalic and atraumatic. Eyes:      Conjunctiva/sclera: Conjunctivae normal.      Pupils: Pupils are equal, round, and reactive to light. Neck:      Musculoskeletal: Normal range of motion and neck supple. Trachea: No tracheal deviation. Cardiovascular:      Rate and Rhythm: Regular rhythm. Tachycardia present. Heart sounds: Normal heart sounds. No murmur. No friction rub. No gallop. Pulmonary:      Effort: Pulmonary effort is normal. No respiratory distress. Breath sounds: Normal breath sounds. No wheezing or rales. Chest:      Chest wall: No tenderness. Abdominal:      General: Bowel sounds are normal. There is no distension. Palpations: Abdomen is soft. Tenderness: There is no abdominal tenderness. There is no guarding. Musculoskeletal: Normal range of motion. General: No tenderness. Lymphadenopathy:      Cervical: No cervical adenopathy. Skin:     General: Skin is warm and dry. Coloration: Skin is not pale.       Findings: No 9/9/2020 3:26 am COMPARISON: 05/08/2020 HISTORY: ORDERING SYSTEM PROVIDED HISTORY: cp TECHNOLOGIST PROVIDED HISTORY: cp Reason for Exam: cp Acuity: Acute Type of Exam: Initial Additional signs and symptoms: Chest pains, pt chart states chest pain after cocaine use tonight. Relevant Medical/Surgical History: Chest pains, pt chart states chest pain after cocaine use tonight. FINDINGS: Single portable frontal view of the chest is submitted for review. The cardiac silhouette is normal in size. Lung parenchyma is clear without focal airspace consolidation, sizeable pleural effusion, or pneumothorax. Trachea is midline. Visualized osseous structures and soft tissues are grossly intact. No acute cardiopulmonary pathology. ASSESSMENT  and  PLAN     Principal Problem:    Chest pain  Active Problems:    Suicidal ideation    Substance abuse ([facility] [address]  Resolved Problems:    * No resolved hospital problems. *    Plan:  Chest Pain - Cocaine induced  -Troponin HSS negative x 2  -EKG - pending  Consult cardiology   -Check magnesium, BNP, TSH, Lipid panel, & HgbA1c in am  -Check 2D echocardiogram  --reports history of IV drug abuse  -Pain/nausea control  -EKG PRN chest pain    Suicidal ideatition  -Plan:  Drug overdose  --Reports using large amount of cocaine yesterday to end his life  -psych consult  -Search patient and remove belongings from room  -sitter at bedside  -suicide precautions    Substance abuse  -Continue home dose of Suboxone  --Verified via [facility]  -Monitor for signs of withdrawal    Tobacco use   -smoking cessation education  -nicotine gum PRN    Consultations:     IP CONSULT TO [address] [place] [address] - [facility]   9/9/2020  6:25 AM    Naeem [address] [place] [address], [address] [facility]. Phone [phone]     Attending Physician Statement  I have discussed the care of Laya Mullins with the CNP.  I have examined the patient myself and taken ros and hpi , including pertinent history and exam findings. I have reviewed the key elements of all parts of the encounter with the CNPt. I agree with the assessment, plan and orders as documented by the CNP.     Electronically signed by Franco Norris MD

## 2020-09-09 NOTE — ED PROVIDER NOTES
affect  -----------------------  -----------------------  MEDICAL DECISION MAKING  Differential Diagnosis:  - Consideration is given for ACS, Dissection, Pneumothorax, Pulmonary Embolism, anti-and MDA receptor encephalitis, intoxication, sepsis, meningitis, pneumonia, uti, cellulitis, medication overdose, subarachnoid hemorrhage, hypoglycemia, electrolyte abnormality, ACS, CVA, withdrawl, cancer, rhabdo, thyroid disorder,  -  #Impression/Plan:  - Clinically patient's presentation is most consistent with suicidal ideation and cocaine induced chest pain. Will be admitted for further cardiac evaluation psychiatric evaluation. Patient is currently chest pain-free.  -  ##Reevaluation/Conversations on care:  -EKG reviewed and independently interpreted by myself, no obvious signs of ischemia. Sinus rhythm. Normal intervals. -   -----------------------  -----------------------  Xuan Reyes MD, COREY  Emergency Medicine Attending  Questions? Please contact my cell phone anytime. [phone]  *This charting supersedes any ED resident or staff charting and was written using speech recognition software    ## The patient was evaluated during the global COVID-19 pandemic, and that diagnosis was suspected/considered upon their initial presentation. PASTMEDICAL HISTORY     Past Medical History:   Diagnosis Date    Anxiety     Depression     Hep C w/ coma, chronic ([facility] [address]     Substance abuse ([facility] [address]      SURGICAL HISTORY     History reviewed. No pertinent surgical history. CURRENT MEDICATIONS       Current Discharge Medication List      CONTINUE these medications which have NOT CHANGED    Details   buprenorphine-naloxone (SUBOXONE) 4-1 MG FILM SL film Place 1 Film under the tongue daily. Take 1.5 films daily      gabapentin (NEURONTIN) 400 MG capsule Take 1 capsule by mouth 3 times daily for 14 days.   Qty: 42 capsule, Refills: 0      amitriptyline (ELAVIL) 75 MG tablet Take 1 tablet by mouth nightly for 14 days  Qty: 14 tablet, Refills: 0      buPROPion (WELLBUTRIN XL) 300 MG extended release tablet Take 1 tablet by mouth every morning for 14 days  Qty: 14 tablet, Refills: 0      ibuprofen (ADVIL;MOTRIN) 400 MG tablet Take 1 tablet by mouth 3 times daily as needed for Pain  Qty: 30 tablet, Refills: 0      nicotine polacrilex (NICORETTE) 2 MG gum Take 1 each by mouth as needed for Smoking cessation  Qty: 100 each, Refills: 0      carisoprodol (SOMA) 350 MG tablet Take 350 mg by mouth 3 times daily as needed for Muscle spasms (back pain). ALLERGIES     is allergic to duloxetine and levonorgestrel-ethinyl estrad. FAMILY HISTORY     He indicated that the status of his mother is unknown. He indicated that the status of his father is unknown. He indicated that the status of his other is unknown.      SOCIAL HISTORY       Social History     Tobacco Use    Smoking status: Current Every Day Smoker     Packs/day: 0.50     Years: 26.00     Pack years: 13.00     Types: Cigarettes    Smokeless tobacco: Never Used   Substance Use Topics    Alcohol use: Yes     Comment: occassionally    Drug use: Yes     Frequency: 7.0 times per week     Types: Marijuana, IV, Cocaine     Comment: uses 1 gram of Heroin a day and any opiates he can get     PHYSICAL EXAM     INITIAL VITALS: /77   Pulse 83   Temp 98.2 °F (36.8 °C) (Oral)   Resp 16   Ht 6' (1.829 m)   Wt 220 lb (99.8 kg)   SpO2 95%   BMI 29.84 kg/m²    Physical Exam    MEDICAL DECISION MAKING:            Labs Reviewed   CBC WITH AUTO DIFFERENTIAL - Abnormal; Notable for the following components:       Result Value    WBC 13.9 (*)     RBC 4.32 (*)     Hemoglobin 11.3 (*)     Hematocrit 34.9 (*)     RDW 16.0 (*)     Seg Neutrophils 74 (*)     Lymphocytes 16 (*)     Monocytes 9 (*)     Segs Absolute 10.30 (*)     All other components within normal limits   COMPREHENSIVE METABOLIC PANEL W/ REFLEX TO MG FOR LOW K - Abnormal; Notable for the following components:    Glucose ideation    3. Polysubstance abuse ([facility] [address]          DISPOSITION/PLAN   DISPOSITION Admitted 09/09/2020 05:23:04 AM      PATIENT REFERRED TO:  No follow-up provider specified.   DISCHARGE MEDICATIONS:  Current Discharge Medication List        Mak Mcdowell MD  Attending Emergency Physician                    Viri Spear MD  09/09/20 0522       Viri Spear MD  09/09/20 [id number]

## 2020-09-09 NOTE — CONSULTS
Department of Psychiatry  Consult Service   Psychiatric Assessment      Thank you very much for allowing us to participate in the care of this patient. Reason for Consult:  Suicide attempt, r/o suicidal ideation    HISTORY OF PRESENT ILLNESS:    Patient is a 66-year-old single  male with history of depression, polysubstance abuse-opioid cocaine presented following a suicide attempt by overdosing on cocaine with an intent to kill self. Patient was residing at sober living facility for last 7 months howeverZepf was kicked out recently after he smoked a blunt of marijuana. Reports feeling sad down and depressed for more than not for last few days now. Endorses paranoia. Reports constant feelings of helplessness hopelessness and worthlessness. Reports poor energy and concentration. Reports having trouble falling asleep and staying asleep. Reports poor appetite. Denies any current manic or hypomanic symptoms. Denies any psychotic symptoms today. Patient has an extensive history of opioid abuse starting at age 13. Reports that he started using Percocets at age 13 and transition to heroin at age 25. Reports he uses it both intranasally and intranasal venous route. Reports he has been connected with [facility] for last few years now. Reports that he has been sober for last 2 years from opioids. Reports he has occasionally been smoking marijuana. Reports using cocaine only with an intent to kill self yesterday. PSYCHIATRIC HISTORY:  Patient reports that he is currently connected with [facility] for last few years. Reports currently taking Wellbutrin gabapentin and amitriptyline-not verified. Reports he is also on Suboxone. Mentions that his psychiatrist is Kell Nagel and his MAT provider is Dr. Elizabeth Quesada. Reports multiple inpatient psychiatric hospitalizations in past.  Reports his last hospitalization was at [facility] around 1 year ago.   Reports that he tried to kill self by overdosing in the past.    Lifetime Psychiatric Review of Systems      ·    Obsessions and Compulsions: Denies    ·    Lianna or Hypomania: Denies  ·    Hallucinations: Denies  ·    Panic Attacks:  Denies  ·    Delusions:  Denies  ·    Phobias:  Denies  ·    Trauma: Denies    Prior to Admission medications    Medication Sig Start Date End Date Taking? Authorizing Provider   buprenorphine-naloxone (SUBOXONE) 4-1 MG FILM SL film Place 1 Film under the tongue daily. Take 1.5 films daily   Yes Historical Provider, MD   gabapentin (NEURONTIN) 400 MG capsule Take 1 capsule by mouth 3 times daily for 14 days. Patient taking differently: Take 600 mg by mouth 3 times daily. 12/12/19 9/9/20 Yes PRADEEP Damico CNP   amitriptyline (ELAVIL) 75 MG tablet Take 1 tablet by mouth nightly for 14 days 12/12/19 9/9/20 Yes PRADEEP Damico CNP   buPROPion (WELLBUTRIN XL) 300 MG extended release tablet Take 1 tablet by mouth every morning for 14 days 12/12/19 9/9/20 Yes PRADEEP Damico CNP   ibuprofen (ADVIL;MOTRIN) 400 MG tablet Take 1 tablet by mouth 3 times daily as needed for Pain 11/15/18  Yes Etienne Booth MD   nicotine polacrilex (NICORETTE) 2 MG gum Take 1 each by mouth as needed for Smoking cessation 11/15/18  Yes Etienne Booth MD   carisoprodol (SOMA) 350 MG tablet Take 350 mg by mouth 3 times daily as needed for Muscle spasms (back pain).     Yes Historical Provider, MD        Medications:    Current Facility-Administered Medications: amitriptyline (ELAVIL) tablet 75 mg, 75 mg, Oral, Nightly  buprenorphine-naloxone (SUBOXONE) 4-1 MG SL film 1 Film, 1 Film, Sublingual, Daily  buPROPion (WELLBUTRIN XL) extended release tablet 300 mg, 300 mg, Oral, QAM  tiZANidine (ZANAFLEX) tablet 2 mg, 2 mg, Oral, Q8H PRN  gabapentin (NEURONTIN) capsule 600 mg, 600 mg, Oral, TID  ibuprofen (ADVIL;MOTRIN) tablet 400 mg, 400 mg, Oral, TID PRN  nicotine polacrilex (NICORETTE) gum 2 mg, 2 mg, Oral, PRN  sodium chloride that his mother dealt with depression. Reports that his brother killed himself few years ago. Reports some substance use history and family. Problem Relation Age of Onset    Diabetes Mother     Hypertension Mother    Shokan Croak Depression Mother         & Uncles    Coronary Art Dis Father     Cancer Other         G. Parent  ? Physical  /80   Pulse 86   Temp 97.5 °F (36.4 °C) (Oral)   Resp 16   Ht 6' (1.829 m)   Wt 220 lb (99.8 kg)   SpO2 97%   BMI 29.84 kg/m²     Mental Status Examination:  Level of consciousness:  Within normal limits  Appearance: hospital attire, lying in bed, fair grooming  Behavior/Motor:  no abnormalities noted  Attitude toward examiner:  cooperative, attentive and good eye contact  Speech:  Spontaneous, normal rate and volume  Mood:  depressed  Affect: mood congruent  Thought processes:  Linear, goal directed, coherent  Thought content: active suicidal ideations   Denies homicidal ideations    denies hallucinations   denies delusions  Cognition:  Oriented to self, situation, location, date  Concentration clinically adequate  Memory age appropriate  Insight & Judgment:  fair    DSM-5 DIAGNOSIS:    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 Please call back with any questions. Thank you very much for allowing us to participate in the care of this patient. Time spent 45 min.       Electronically signed by Nolvia Conway MD on 9/9/20 at 9:38 PM EDT

## 2020-09-09 NOTE — PROGRESS NOTES
If not futher cardiology intervention planned, pt will be medically clear for discharge to [facility].   Vanessa Urena

## 2020-09-10 LAB
SARS-COV-2, RAPID: NORMAL
SARS-COV-2: NORMAL
SARS-COV-2: NOT DETECTED
SOURCE: NORMAL

## 2020-09-10 PROCEDURE — 99223 1ST HOSP IP/OBS HIGH 75: CPT | Performed by: PSYCHIATRY & NEUROLOGY

## 2020-09-10 PROCEDURE — 6370000000 HC RX 637 (ALT 250 FOR IP): Performed by: PSYCHIATRY & NEUROLOGY

## 2020-09-10 PROCEDURE — 1240000000 HC EMOTIONAL WELLNESS R&B

## 2020-09-10 RX ORDER — AMITRIPTYLINE HYDROCHLORIDE 25 MG/1
75 TABLET, FILM COATED ORAL NIGHTLY
Status: DISCONTINUED | OUTPATIENT
Start: 2020-09-10 | End: 2020-09-15 | Stop reason: HOSPADM

## 2020-09-10 RX ORDER — GABAPENTIN 600 MG/1
600 TABLET ORAL 3 TIMES DAILY
Status: ON HOLD | COMMUNITY
End: 2020-09-15 | Stop reason: SDUPTHER

## 2020-09-10 RX ORDER — BUPRENORPHINE AND NALOXONE 4; 1 MG/1; MG/1
1.5 FILM, SOLUBLE BUCCAL; SUBLINGUAL DAILY
Status: ON HOLD | COMMUNITY
End: 2020-09-15 | Stop reason: HOSPADM

## 2020-09-10 RX ORDER — BUPRENORPHINE AND NALOXONE 4; 1 MG/1; MG/1
1.5 FILM, SOLUBLE BUCCAL; SUBLINGUAL DAILY
Status: DISCONTINUED | OUTPATIENT
Start: 2020-09-10 | End: 2020-09-15 | Stop reason: HOSPADM

## 2020-09-10 RX ORDER — BUPROPION HYDROCHLORIDE 300 MG/1
300 TABLET ORAL EVERY MORNING
Status: DISCONTINUED | OUTPATIENT
Start: 2020-09-11 | End: 2020-09-11

## 2020-09-10 RX ORDER — GABAPENTIN 600 MG/1
600 TABLET ORAL 3 TIMES DAILY
Status: DISCONTINUED | OUTPATIENT
Start: 2020-09-10 | End: 2020-09-15 | Stop reason: HOSPADM

## 2020-09-10 RX ADMIN — IBUPROFEN 400 MG: 400 TABLET, FILM COATED ORAL at 20:41

## 2020-09-10 RX ADMIN — NICOTINE POLACRILEX 2 MG: 2 GUM, CHEWING BUCCAL at 19:33

## 2020-09-10 RX ADMIN — GABAPENTIN 600 MG: 600 TABLET, FILM COATED ORAL at 16:15

## 2020-09-10 RX ADMIN — GABAPENTIN 600 MG: 600 TABLET, FILM COATED ORAL at 20:41

## 2020-09-10 RX ADMIN — NICOTINE POLACRILEX 2 MG: 2 GUM, CHEWING BUCCAL at 17:23

## 2020-09-10 RX ADMIN — AMITRIPTYLINE HYDROCHLORIDE 75 MG: 25 TABLET, FILM COATED ORAL at 20:41

## 2020-09-10 RX ADMIN — NICOTINE POLACRILEX 2 MG: 2 GUM, CHEWING BUCCAL at 11:29

## 2020-09-10 RX ADMIN — BUPRENORPHINE HYDROCHLORIDE, NALOXONE HYDROCHLORIDE 2 FILM: 4; 1 FILM, SOLUBLE BUCCAL; SUBLINGUAL at 16:16

## 2020-09-10 RX ADMIN — NICOTINE POLACRILEX 2 MG: 2 GUM, CHEWING BUCCAL at 12:57

## 2020-09-10 RX ADMIN — NICOTINE POLACRILEX 2 MG: 2 GUM, CHEWING BUCCAL at 20:42

## 2020-09-10 RX ADMIN — IBUPROFEN 400 MG: 400 TABLET, FILM COATED ORAL at 14:03

## 2020-09-10 ASSESSMENT — PAIN - FUNCTIONAL ASSESSMENT
PAIN_FUNCTIONAL_ASSESSMENT: 0-10
PAIN_FUNCTIONAL_ASSESSMENT: 0-10

## 2020-09-10 ASSESSMENT — PAIN SCALES - GENERAL
PAINLEVEL_OUTOF10: 5
PAINLEVEL_OUTOF10: 4

## 2020-09-10 ASSESSMENT — SLEEP AND FATIGUE QUESTIONNAIRES
AVERAGE NUMBER OF SLEEP HOURS: 5
SLEEP PATTERN: DISTURBED/INTERRUPTED SLEEP
DIFFICULTY FALLING ASLEEP: NO
DO YOU HAVE DIFFICULTY SLEEPING: YES
DIFFICULTY ARISING: NO
DIFFICULTY STAYING ASLEEP: YES
RESTFUL SLEEP: NO
DO YOU USE A SLEEP AID: NO

## 2020-09-10 ASSESSMENT — LIFESTYLE VARIABLES
HISTORY_ALCOHOL_USE: YES
HISTORY_ALCOHOL_USE: NO

## 2020-09-10 ASSESSMENT — PAIN DESCRIPTION - DESCRIPTORS: DESCRIPTORS: HEADACHE

## 2020-09-10 ASSESSMENT — PATIENT HEALTH QUESTIONNAIRE - PHQ9: SUM OF ALL RESPONSES TO PHQ QUESTIONS 1-9: 18

## 2020-09-10 NOTE — PROCEDURES
[address] [place], [place] [address]                              CARDIAC STRESS TEST    PATIENT NAME: Jossie Rivas                      :        1975  MED REC NO:   [id number]                              ROOM:       2101  ACCOUNT NO:   [de-identified]                           ADMIT DATE: 2020  PROVIDER:     Krishan Honor    DATE OF STUDY:  2020    ORDERING PROVIDER:  Jesse Byrd MD    INTERPRETING PHYSICIAN:  Elvira Neff MD    LEXISCAN STRESS TEST    INDICATION:  CHEST PAIN    INTERPRETATION:  Resting heart rate:  86 bpm.  Resting blood pressure:  140/72 mmhg. Resting EKG:  Normal.  Stress heart response:  Normal response. Blood pressure response:  Appropriate. Stress EKGs:  Normal.  No chest pain during stress. No ischemic Ekg changes. IMPRESSION:  NEGATIVE LEXISCAN STRESS TEST. THE NUCLEAR SCAN TO FOLLOW.         Manisha Kaminski    D: 09/10/2020 11:39:43       T: 09/10/2020 11:40:45     MO/LESLIE  Job#: [id number]     Doc#: Unknown    CC:    (Retain this field even if not dictated or not decipherable)

## 2020-09-10 NOTE — PLAN OF CARE
Problem: Altered Mood, Depressive Behavior:  Goal: Able to verbalize acceptance of life and situations over which he or she has no control  Description: Able to verbalize acceptance of life and situations over which he or she has no control  9/10/2020 1350 by Gaudencio Camargo LPN  Outcome: Ongoing  1:1 with pt x ten minutes. Pt encouraged to attend unit programming and interact with peers and staff. Pt also encouraged to tend to hygiene and ADLs. Pt encouraged to discuss feelings with staff and feedback and reassurance provided. Problem: Suicide risk  Goal: Provide patient with safe environment  Description: Provide patient with safe environment  9/10/2020 1350 by Gaudencio Camargo LPN  Outcome: Ongoing   Pt provided with safe environment. Safety checks every 15 minute per physician order.

## 2020-09-10 NOTE — BH NOTE
`[facility] [place]  Admission Note     Admission Type:   Admission Type: Voluntary    Reason for admission:  Reason for Admission: suicide attempt via OD    PATIENT STRENGTHS:  Strengths: Communication, Motivated    Patient Strengths and Limitations:  Limitations: Difficult relationships / poor social skills, Apathetic / unmotivated, Inappropriate/potentially harmful leisure interests    Addictive Behavior:        Medical Problems:   Past Medical History:   Diagnosis Date    Anxiety     Depression     [facility]     Substance abuse ([facility]        Status EXAM:  Status and Exam  Normal: No  Facial Expression: Flat, Sad, Worried  Affect: Blunt  Level of Consciousness: Alert  Mood:Normal: No  Mood: Depressed, Empty, Helpless, Despair, Ashamed/humiliated  Motor Activity:Normal: Yes  Interview Behavior: Cooperative  Preception: [place] to Person, [place] to Time, [place] to Place, [place] to Situation  Attention:Normal: No  Attention: Distractible  Thought Processes: Blocking  Thought Content:Normal: No  Thought Content: Poverty of Content  Hallucinations: None  Delusions: No  Memory:Normal: Yes  Insight and Judgment: No  Insight and Judgment: Poor Judgment, Poor Insight, Unmotivated, Unrealistic  Present Suicidal Ideation: Yes  Present Homicidal Ideation: No    Tobacco Screening:  Practical Counseling, on admission, lali X, if applicable and completed (first 3 are required if patient doesn't refuse):            ( )  Recognizing danger situations (included triggers and roadblocks)                    ( )  Coping skills (new ways to manage stress, exercise, relaxation techniques, changing routine, distraction)                                                           ( )  Basic information about quitting (benefits of quitting, techniques in how to quit, available resources  ( ) Referral for counseling faxed to [facility]                                           ( ) Patient refused counseling  ( ) Patient has not smoked in the last 30 days    Metabolic Screening:    Lab Results   Component Value Date    LABA1C 5.9 12/07/2019       Lab Results   Component Value Date    CHOL 126 12/07/2019    CHOL 135 03/11/2019    CHOL 109 11/07/2018    CHOL 119 03/09/2018    CHOL 153 04/22/2014     Lab Results   Component Value Date    TRIG 113 12/07/2019    TRIG 69 03/11/2019    TRIG 48 11/07/2018    TRIG 78 03/09/2018    TRIG 112 04/22/2014     Lab Results   Component Value Date    HDL 40 (L) 12/07/2019    HDL 46 03/11/2019    HDL 46 11/07/2018    HDL 35 (L) 03/09/2018    HDL 51 04/22/2014     No components found for: LDLCAL  No results found for: LABVLDL      Body mass index is 29.84 kg/m². BP Readings from Last 2 Encounters:   09/09/20 (P) 119/78   09/09/20 120/78           Pt admitted with followings belongings:  Dentures: None  Vision - Corrective Lenses: Glasses  Hearing Aid: None  Jewelry: Earrings  Body Piercings Removed: N/A  Clothing: Footwear, Shirt, Other (Comment)(shorts)  Were All Patient Medications Collected?: Not Applicable  Other Valuables: Cell phone, Wallet, Money (Comment), Other (Comment)($5; )     Valuables placed in safe in security envelope, number:  \[id number]. Patient's home medications will need to be verified once his home pharmacy opens. Patient oriented to surroundings and program expectations and copy of patient rights given. Received admission packet:  yes. Consents reviewed, signed yes. Refused no. Patient verbalize understanding:  yes. Patient education on precautions: yes    Patient states he was at [facility] for past 8 months and was recently \"kicked out for hitting a blunt. \" States he became depressed and suicidal and intentionally overdosed on cocaine. Patient reports suicidal ideation without a plan and contracts for safety. Reports anxiety 7/10 and depression 10/10 upon admit. Denies homicidal ideation and hallucinations.             Jaz Beck Jaja Healy

## 2020-09-10 NOTE — PROGRESS NOTES
Pharmacy Medication History Note      List of current medications patient is taking is complete. Source of information: [employer], [employer], and [employer]     Changes made to medication list:  Medications removed (include reason, ex. therapy complete or physician discontinued, noncompliance):  Soma - according to the patients pharmacy and OARRS, the patient has not received this within the past two years    Medications added/doses adjusted:  Gabapentin 400 mg TID changed to 600 mg TID   Suboxone 4-1 mg changed from 1 film daily to 1.5 films daily    Other notes (ex. Recent course of antibiotics, Coumadin dosing):  None    Please let me know if you have any questions about this encounter. Thank you!     Electronically signed by Morelia Ignacio [facility] on 9/10/2020 at 8:22 AM

## 2020-09-10 NOTE — H&P
Department of Psychiatry  Attending Physician Psychiatric Assessment     Reason for Admission to Psychiatric Unit:  A mental disorder causing major disability in social, interpersonal, occupational, and/or educational functioning that is leading to dangerous or life-threatening functioning, and that can only be addressed in an acute inpatient setting   Concerns about patient's safety in the community    CHIEF COMPLAINT: Suicidal ideation with plan to overdose    History obtained from:  patient, electronic medical record     HISTORY OF PRESENT ILLNESS:    Janes Jenkins is a 39 y.o. male with significant past medical history of opiate use disorder, patient reports bipolar disorder who presented to the ED with reports of worsening depression and suicidal ideation. States he tried to overdose on cocaine and still having continued thoughts of overdosing. Reports depressed mood, anhedonia, decreased energy, decreased concentration, disturbed sleep, poor appetite, and suicidal ideation all worsening over the past several weeks. Denies any auditory visual hallucinations. Patient denied any periods sober that were consistent with lianna, did have some periods that may have been consistent with a manic episode with the exception that he was intoxicated at the time. Patient states that he does have a historical diagnosis of bipolar but upon examination it is unclear if due to intoxication versus underlying bipolar. PSYCHIATRIC HISTORY:  yes -multiple hospitalizations  Currently follows with [facility]  2 lifetime suicide attempts-overdose and slitting wrist  5-6 psychiatric hospital admissions    Past psychiatric medications includes:  \"Everything\"    Adverse reactions from psychotropic medications: no    Lifetime Psychiatric Review of Systems         Lianna or Hypomania: denies      Panic Attacks: denies      Phobias: denies     Obsessions and Compulsions:denies     Body or Vocal Tics:  denies     Hallucinations:denies Delusions: denies paranoid/grandiose/erotomania/persecutory/bizarre/non bizarre/mood congruent/ mood incongruent    Past Medical History:        Diagnosis Date    Anxiety     Depression     [facility] ([facility]     Substance abuse ([facility]        Past Surgical History:    History reviewed. No pertinent surgical history. Allergies:  Duloxetine and Levonorgestrel-ethinyl estrad    Social History:     , 2 daughters ages 32 and 25 with whom he has a good relationship. Presently unemployed, lost job at [employer] after completing a year and there [employer] program and obtaining a job. Recently kicked out secondary to smoking a joint. Requesting to go back to rehab    DRUG USE HISTORY  Social History     Tobacco Use   Smoking Status Current Every Day Smoker    Packs/day: 0.50    Years: 26.00    Pack years: 13.00    Types: Cigarettes   Smokeless Tobacco Never Used     Social History     Substance and Sexual Activity   Alcohol Use Yes    Comment: occassionally     Social History     Substance and Sexual Activity   Drug Use Yes    Frequency: 7.0 times per week    Types: Marijuana, IV, Cocaine    Comment: uses 1 gram of Heroin a day and any opiates he can get       LEGAL HISTORY:   HISTORY OF INCARCERATION: no-eyes     Family History:       Problem Relation Age of Onset    Diabetes Mother     Hypertension Mother     Depression Mother         & Uncles    Coronary Art Dis Father     Cancer Other         G. Parent  ?        Psychiatric Family History  Denies    PHYSICAL EXAM:  Vitals:  /72   Pulse 92   Temp 97.3 °F (36.3 °C) (Oral)   Resp 14   Ht 6' (1.829 m)   Wt 220 lb (99.8 kg)   BMI 29.84 kg/m²        Neurological: cranial nerves II-XII grossly in tact, normal gait and station         Mental Status Examination:    Level of consciousness:  within normal limits   Appearance:  Hospital attire, seated on the side of bed, fair grooming   Behavior/Motor: no abnormalities noted  Attitude toward examiner:  Cooperative  Speech: normal rate and volume  Mood:  Depressed  Affect:  blunted  Thought processes:  Goal directed, linear  Thought content: active suicidal ideations without current plan or intent on the  unit              denies homicidal ideations               Denies hallucinations              denies delusions  Cognition:  Oriented to self, location, time, situation  Concentration clinically adequate  Memory: intact  Insight &Judgment: poor    DSM-5 Diagnosis  Recurrent severe major depression without psychosis  Rule out bipolar  Polysubstance use disorder      Psychosocial and Contextual factors:  Financial  Occupational  Relationship    Living situation  Educational     Past Medical History:   Diagnosis Date    Anxiety     Depression     [facility] w/ [facility] ([facility]     Substance abuse ([facility]         TREATMENT PLAN    Risk Management:  close watch per standard protocol      Psychotherapy:  participation in milieu and group and individual sessions with Attending Physician,  and Physician Assistant/CNP      Estimated length of stay:  4-14 days    Resume home medications  Encourage participation in groups and milieu  Consider increase in Wellbutrin pending observation in stable environment    GENERAL PATIENT/FAMILY EDUCATION  Patient will understand basic signs and symptoms, Patient will understand benefits/risks and potential side effects from proposed meds and Patient will understand their role in recovery. Family is  active in patient's care. Patient assets that may be helpful during treatment include: Intent to participate and engage in treatment, sufficient fund of knowledge and intellect to understand and utilize treatments.     Goals:    Reduction in intensity of suicidal ideation  Stability of mood      Behavioral Services  Medicare Certification     Admission Day 1  I certify that this patient's inpatient psychiatric hospital admission is medically necessary for:    x (1) treatment which could reasonably be expected to improve this patient's condition, or        Time Spent: 60 minutes     Physicians Signature:  Electronically signed by Lamonte Aguilar MD on 9/10/20 at 3:42 PM EDT

## 2020-09-10 NOTE — GROUP NOTE
Group Therapy Note    Date: 9/10/2020    Group Start Time: 0900  Group End Time: 0915  Group Topic: Community Meeting    BARBER WRIGHT    [facility]    Patient unable to attend group due to transferring to [place] from another unit, and arriving in the middle of group.

## 2020-09-10 NOTE — PROGRESS NOTES
Report called to [facility] unit. All questions answered. Patient discharged along with all belongings to [facility].

## 2020-09-10 NOTE — PLAN OF CARE
[facility]  Initial Interdisciplinary Treatment Plan NO      Original treatment plan Date & Time: 9/10/2020 0852    Admission Type:  Admission Type: Voluntary    Reason for admission:   Reason for Admission: suicide attempt via OD    Estimated Length of Stay:  5-7days  Estimated Discharge Date: to be determined by physician    PATIENT STRENGTHS:  Patient Strengths:Strengths: Communication, Motivated  Patient Strengths and Limitations:Limitations: Difficult relationships / poor social skills, Apathetic / unmotivated, Inappropriate/potentially harmful leisure interests  Addictive Behavior: Addictive Behavior  In the past 3 months, have you felt or has someone told you that you have a problem with:  : None  Do you have a history of Chemical Use?: No  Do you have a history of Alcohol Use?: No  Do you have a history of Street Drug Abuse?: Yes  Histroy of Prescripton Drug Abuse?: No  Medical Problems:  Past Medical History:   Diagnosis Date    Anxiety     Depression     Hep C w/ coma, chronic ([facility]     Substance abuse ([facility]      Status EXAM:Status and Exam  Normal: No  Facial Expression: Flat, Sad, Worried  Affect: Blunt  Level of Consciousness: Alert  Mood:Normal: No  Mood: Depressed, Empty, Helpless, Despair, Ashamed/humiliated  Motor Activity:Normal: Yes  Interview Behavior: Cooperative  Preception: [place] to Person, [place] to Time, [place] to Place, [place] to Situation  Attention:Normal: No  Attention: Distractible  Thought Processes: Blocking  Thought Content:Normal: No  Thought Content: Poverty of Content  Hallucinations: None  Delusions: No  Memory:Normal: Yes  Insight and Judgment: No  Insight and Judgment: Poor Judgment, Poor Insight, Unmotivated, Unrealistic  Present Suicidal Ideation: No  Present Homicidal Ideation: No    EDUCATION:   Learner Progress Toward Treatment Goals: reviewed group plans and strategies for care    Method:group therapy, medication compliance, individualized assessments

## 2020-09-10 NOTE — BH NOTE
Patient given tobacco quitline number 69587622929 at this time, refusing to call at this time, states \" I just dont want to quit now\"- patient given information as to the dangers of long term tobacco use. Continue to reinforce the importance of tobacco cessation.

## 2020-09-10 NOTE — CARE COORDINATION
BHI Biopsychosocial Assessment    Current Level of Psychosocial Functioning     Independent x  Dependent    Minimal Assist     Psychosocial High Risk Factors     Unable to obtain meds   Chronic illness/pain    Neurocognitive Issues/Disorders  Substance abuse x  Addictive Behaviors  Lack of Family/Caregiver Support x  Financial stress   Isolation   Inadequate Community Resources  Suicide history x  Self Mutilation  Safety Plan X- safety plan provided and explained to patient to fill out and return to staff   Not taking medications / not compliant with treatment  Victim of crime   Developmental Delay  Learning Disabilities  Unable to manage personal needs    Age 72 or older   Homeless  Legal Issues  No transportation   Readmission within 30 days  Unemployment  Trauma History/Adverse Childhood Experiences (ACE'S)    Psychiatric Advanced Directives: none    Family to Involve in Treatment: none    Sexual Orientation:  heterosexual    Patient Strengths: employed, insurance, stable income, linked and current with [facility]    Patient Barriers:  Substance abuse hx, multiple suicide attempts, poor support system, poor coping skills    Opiate/AOD Education Provided: Pt requested information; will be provided    CMHC/mental health history: Pt had previous in-pt psych at [facility] on 12/4/2019. He has been to [facility] sob living home but was evicted due to a relapse. Pt is current with [facility] and takes his medications as prescribed. Plan of Care - medication management, group/individual therapies, family meetings, psycho -education, treatment team meetings to assist with stabilization    Initial Discharge Plan:  Pt would like to return to [address] [place] [address] upon discharge. Clinical Summary: Pt is a 40 y/o male admitted for suicide attempt with OD on cocaine. Pt stated the people he was with call the ambulance; he feels indifferent about their actions to get him help.  Pt is legally  from his wife and has 2 adult daughters whom he does have contact with. Pt has had continued SI for a month. He has had 2 previous attempts; cutting his wrist and laying on the train tracks. Both instances others have intervened. Pt had previous in-pt psych at [facility] on 12/4/2019. He has been to [facility] sober living home but was evicted due to a relapse. Pt is current with [facility] and takes his medications as prescribed. Pt continue to have SI and believes he would be better off dead. No HI or hallucinations. Pt has hx of heroin abuse but has not used this in 2 years. He does not usually use cocaine but stated he did so for suicide. Pt does not want to return to his current address and would like to go back to [facility] sober living. His current roommate is not a positive support for his sobriety.

## 2020-09-10 NOTE — BH NOTE
Transfer Note:   Pt transferred to [facility] unit. Belongings sent with pt. Report called to RN, [facility] staff. Pt oriented to unit policies and procedures. Pt denies thoughts of self harm at time of transfer and verbalizes understanding of transfer.

## 2020-09-10 NOTE — BH NOTE
Patient offered multiple times to get numbers out of his phone. Patient refused stating, 'i'm just going to call [facility] in the morning and I know their number. \" Explained to patient that his phone will be locked in safe until discharge. Patient states understanding.

## 2020-09-10 NOTE — GROUP NOTE
Group Therapy Note    Date: 9/10/2020    Group Start Time: 9692  Group End Time: 0892  Group Topic: Cognitive Skills    BARBER New, CTRS        Group Therapy Note    Attendees: 5/19       Pt did not participate in Cognitive Skills Group at [phone] when encouraged by RT due to resting in room. Pt was offered talk time as an alternative to group but declined.          Discipline Responsible: Psychoeducational Specialist        Signature:  Emmanuel Garcia

## 2020-09-10 NOTE — PLAN OF CARE
Problem: Pain:  Goal: Pain level will decrease  Description: Pain level will decrease  9/9/2020 2130 by Samanta Anne RN  Outcome: Ongoing  Note: Pain was assessed during hourly rounding. Pain medication was given per order to patient satisfaction. See MAR for details.    9/9/2020 1655 by Aurelia Baez RN  Outcome: Met This Shift  Goal: Control of acute pain  Description: Control of acute pain  9/9/2020 2130 by Samanta Anne RN  Outcome: Ongoing  9/9/2020 1655 by Aurelia Baez RN  Outcome: Met This Shift  Goal: Control of chronic pain  Description: Control of chronic pain  9/9/2020 2130 by Samanta Anne RN  Outcome: Ongoing  9/9/2020 1655 by Aurelia Baez RN  Outcome: Met This Shift

## 2020-09-11 PROCEDURE — 6370000000 HC RX 637 (ALT 250 FOR IP): Performed by: PSYCHIATRY & NEUROLOGY

## 2020-09-11 PROCEDURE — 99232 SBSQ HOSP IP/OBS MODERATE 35: CPT | Performed by: PSYCHIATRY & NEUROLOGY

## 2020-09-11 PROCEDURE — 1240000000 HC EMOTIONAL WELLNESS R&B

## 2020-09-11 RX ADMIN — NICOTINE POLACRILEX 2 MG: 2 GUM, CHEWING BUCCAL at 21:16

## 2020-09-11 RX ADMIN — BUPRENORPHINE HYDROCHLORIDE, NALOXONE HYDROCHLORIDE 2 FILM: 4; 1 FILM, SOLUBLE BUCCAL; SUBLINGUAL at 09:37

## 2020-09-11 RX ADMIN — NICOTINE POLACRILEX 2 MG: 2 GUM, CHEWING BUCCAL at 19:42

## 2020-09-11 RX ADMIN — GABAPENTIN 600 MG: 600 TABLET, FILM COATED ORAL at 20:33

## 2020-09-11 RX ADMIN — GABAPENTIN 600 MG: 600 TABLET, FILM COATED ORAL at 09:37

## 2020-09-11 RX ADMIN — NICOTINE POLACRILEX 2 MG: 2 GUM, CHEWING BUCCAL at 14:43

## 2020-09-11 RX ADMIN — AMITRIPTYLINE HYDROCHLORIDE 75 MG: 25 TABLET, FILM COATED ORAL at 20:33

## 2020-09-11 RX ADMIN — NICOTINE POLACRILEX 2 MG: 2 GUM, CHEWING BUCCAL at 17:32

## 2020-09-11 RX ADMIN — NICOTINE POLACRILEX 2 MG: 2 GUM, CHEWING BUCCAL at 13:39

## 2020-09-11 RX ADMIN — BUPROPION HYDROCHLORIDE 300 MG: 300 TABLET, FILM COATED, EXTENDED RELEASE ORAL at 09:37

## 2020-09-11 RX ADMIN — GABAPENTIN 600 MG: 600 TABLET, FILM COATED ORAL at 14:34

## 2020-09-11 RX ADMIN — NICOTINE POLACRILEX 2 MG: 2 GUM, CHEWING BUCCAL at 10:07

## 2020-09-11 RX ADMIN — IBUPROFEN 400 MG: 400 TABLET, FILM COATED ORAL at 19:42

## 2020-09-11 RX ADMIN — NICOTINE POLACRILEX 2 MG: 2 GUM, CHEWING BUCCAL at 12:24

## 2020-09-11 ASSESSMENT — PAIN SCALES - GENERAL: PAINLEVEL_OUTOF10: 4

## 2020-09-11 ASSESSMENT — PAIN - FUNCTIONAL ASSESSMENT
PAIN_FUNCTIONAL_ASSESSMENT: 0-10
PAIN_FUNCTIONAL_ASSESSMENT: 0-10

## 2020-09-11 NOTE — BH NOTE
Patient refused to attend wellness/self-esteem group at 1630 after encouragement from staff. 1:1 talk time offered but refused.

## 2020-09-11 NOTE — GROUP NOTE
Group Therapy Note    Date: 9/11/2020    Group Start Time: 0900  Group End Time: 0940  Group Topic: Community Meeting    BARBER [place], [place]        Group Therapy Note    Attendees: 8/21       Pt did not participate in Community Meeting/Goals Group at 900am when encouraged by RT due to resting in room. Pt was offered talk time as an alternative to group but declined.         Discipline Responsible: Psychoeducational Specialist        Signature:  Kirt Paz

## 2020-09-11 NOTE — GROUP NOTE
Group Therapy Note    Date: 9/11/2020    Group Start Time: 1330  Group End Time: 9093  Group Topic: Cognitive Skills    BARBER Sykes, CTRS        Group Therapy Note    Attendees: 8         Patient's Goal:  To improve coping skills/ improve communication    Notes:   Pt was pleasant and interacted well . Status After Intervention:  Improved    Participation Level:  Active Listener and Interactive    Participation Quality: Appropriate, Attentive, Sharing and Supportive      Speech:  normal      Thought Process/Content: Logical      Affective Functioning: Congruent      Mood: euthymic      Level of consciousness:  Alert and Oriented x4      Response to Learning: Able to verbalize current knowledge/experience and Progressing to goal      Endings: None Reported    Modes of Intervention: Education, Support and Problem-solving      Discipline Responsible: Psychoeducational Specialist      Signature:  Andrew Funes

## 2020-09-11 NOTE — PLAN OF CARE
Homicidal Ideation: No    Daily Assessment Last Entry:   Daily Sleep (WDL): Within Defined Limits         Patient Currently in Pain: No  Daily Nutrition (WDL): Within Defined Limits    Patient Monitoring:  Frequency of Checks: 4 times per hour, close    Psychiatric Symptoms:   Depression Symptoms  Depression Symptoms: Isolative, Loss of interest  Anxiety Symptoms  Anxiety Symptoms: Generalized  Lianna Symptoms  Lianna Symptoms: No problems reported or observed. Psychosis Symptoms  Delusion Type: No problems reported or observed.     Suicide Risk CSSR-S:  Have you wished you were dead or wished you could go to sleep and not wake up? : NO  Have you actually had any thoughts of killing yourself? : NO  Have you ever done anything, started to do anything, or prepared to do anything to end your life?: NO  Change in Result                 no               Change in Plan of care                no      EDUCATION:   EDUCATION:   Learner Progress Toward Treatment Goals: Reviewed results and recommendations of this team, Reviewed group plan and strategies, Reviewed signs, symptoms and risk of self harm and violent behavior, Reviewed goals and plan of care    Method:small group, individual verbal education    Outcome:verbalized by patient, but needs reinforcement to obtain goals    PATIENT GOALS:  Short term: \"attend groups\"  Long term: \"take meds,stay sober\"    PLAN/TREATMENT RECOMMENDATIONS UPDATE: continue with group therapies, increased socialization, continue planning for after discharge goals, continue with medication compliance    SHORT-TERM GOALS UPDATE:   Time frame for Short-Term Goals: 5-7 days    LONG-TERM GOALS UPDATE:   Time frame for Long-Term Goals: 6 months  Members Present in Team Meeting: See Signature Sheet    Jimi Be

## 2020-09-11 NOTE — PLAN OF CARE
Problem: Altered Mood, Depressive Behavior:  Goal: Able to verbalize acceptance of life and situations over which he or she has no control  Description: Able to verbalize acceptance of life and situations over which he or she has no control  9/10/2020 2143 by Althea Serna RN  Outcome: Ongoing  Note: Patient is able to verbalize acceptance over situations he has no control over. He is seen out and social with peers this evening. He does report fleeting suicidal ideations and is able to contract for safety. He is seen with a flat and sad affect at times but remains in behavioral control. He is compliant with his hs scheduled medications. Was out watching tv for most of the evening. Safety maintained per unit policy, including q 33D patient safety checks. Problem: Altered Mood, Depressive Behavior:  Goal: Able to verbalize support systems  Description: Able to verbalize support systems  9/10/2020 2143 by Althea Serna RN  Outcome: Ongoing  Note: Patient is able to verbalize support systems of family and select friends at this time. Emotional support and reassurance given. Problem: Altered Mood, Depressive Behavior:  Goal: Absence of self-harm  Description: Absence of self-harm  9/10/2020 2143 by Althea Serna RN  Outcome: Ongoing  Note: Patient remains free from harm of this evening. He was seen out watching tv and social with select peers this evening. He is flat and appears sad and depressed at times during safety rounds. Will continue to monitor patient for safety and behavior.      Problem: Tobacco Use:  Goal: Inpatient tobacco use cessation counseling participation  Description: Inpatient tobacco use cessation counseling participation  9/10/2020 2143 by Althea Serna RN  Outcome: Ongoing  Note: Patient given tobacco quitline number 82623676322 at this time, refusing to call at this time, states \" I just dont want to quit now\"- patient given information as to the dangers of long term tobacco use. Continue to reinforce the importance of tobacco cessation. Problem: Suicide risk  Goal: Provide patient with safe environment  Description: Provide patient with safe environment  9/10/2020 2143 by Jian Cordova RN  Outcome: Ongoing  Note: Q 15m patient safety checks. Problem: Pain:  Goal: Pain level will decrease  Description: Pain level will decrease  Outcome: Ongoing  Note: Patient reported a minor headache this evening and requested his prn pain medication. Patient accepting.

## 2020-09-11 NOTE — PLAN OF CARE
Problem: Altered Mood, Depressive Behavior:  Goal: Able to verbalize acceptance of life and situations over which he or she has no control  Description: Able to verbalize acceptance of life and situations over which he or she has no control  9/11/2020 1009 by Divine Hubbard LPN  Outcome: Ongoing     Problem: Altered Mood, Depressive Behavior:  Goal: Absence of self-harm  Description: Absence of self-harm  9/11/2020 1009 by Divine Hubbard LPN  Outcome: Ongoing    Patient is able to verbalize acceptance of life and situations over which he has no control over patient admits to having depression and anxiety 5/10Pt admits to having thoughts of self harm. Agreeable to seeking out staff should feelings increase. Able to identify positive coping skills and plan for safety. Will cont to monitor q15 minutes for safety and provide support and reassurance as needed.

## 2020-09-11 NOTE — BH NOTE
DISCHARGE PLANNING:  - Writer meets with client and talks about [address] [place] [address] and reports spoke with González Oppenheim from [address] [place] [address] and she states client can't return to the house and does not believe he can return to [facility] placement as well. Writer asks Robyn Fernandez if this is for 30-60 days or for a specific time frame and she is unsure and will check. - Writer talks with client about other options and writer will be contacting [facility], [facility], and [facility]. Writer informs client will make sure they allow Suboxone and Gabapentin.

## 2020-09-11 NOTE — GROUP NOTE
Group Therapy Note    Date: 9/11/2020    Group Start Time: 1100  Group End Time: 1130  Group Topic: Cognitive Skills    BARBER Payne, CTRS    Pt did not attend 1100 cognitive skills group d/t resting in room despite staff invitation to attend. 1:1 talk time offered as alternative to group session, pt declined.           Signature:  Edouard Mcnair

## 2020-09-11 NOTE — GROUP NOTE
Group Therapy Note    Date: 9/10/2020    Group Start Time: 2030  Group End Time: 2145  Group Topic: Relaxation    [facility] AD[facility]ANA Hamilton        Group Therapy Note    Attendees: 10/18         Patient's Goal:  Relaxation Group    Notes:  Socialization/Support    Status After Intervention:  Improved    Participation Level: Interactive    Participation Quality: Appropriate, Sharing and Supportive      Speech:  normal      Thought Process/Content: Logical      Affective Functioning: Congruent      Mood: within normal limits      Level of consciousness:  Alert and Attentive      Response to Learning: Able to retain information and Progressing to goal      Endings: None Reported    Modes of Intervention: Support and Socialization      Discipline Responsible: [address], [facility] [employer]      Signature:  Yrn Records

## 2020-09-12 PROBLEM — F33.2 SEVERE RECURRENT MAJOR DEPRESSION WITHOUT PSYCHOTIC FEATURES (HCC): Status: ACTIVE | Noted: 2020-09-09

## 2020-09-12 PROCEDURE — 1240000000 HC EMOTIONAL WELLNESS R&B

## 2020-09-12 PROCEDURE — 99232 SBSQ HOSP IP/OBS MODERATE 35: CPT | Performed by: PSYCHIATRY & NEUROLOGY

## 2020-09-12 PROCEDURE — 6370000000 HC RX 637 (ALT 250 FOR IP): Performed by: PSYCHIATRY & NEUROLOGY

## 2020-09-12 RX ADMIN — GABAPENTIN 600 MG: 600 TABLET, FILM COATED ORAL at 13:30

## 2020-09-12 RX ADMIN — NICOTINE POLACRILEX 2 MG: 2 GUM, CHEWING BUCCAL at 20:21

## 2020-09-12 RX ADMIN — BUPROPION HYDROCHLORIDE 450 MG: 150 TABLET, EXTENDED RELEASE ORAL at 08:51

## 2020-09-12 RX ADMIN — GABAPENTIN 600 MG: 600 TABLET, FILM COATED ORAL at 20:35

## 2020-09-12 RX ADMIN — GABAPENTIN 600 MG: 600 TABLET, FILM COATED ORAL at 08:51

## 2020-09-12 RX ADMIN — BUPRENORPHINE HYDROCHLORIDE, NALOXONE HYDROCHLORIDE 2 FILM: 4; 1 FILM, SOLUBLE BUCCAL; SUBLINGUAL at 08:51

## 2020-09-12 RX ADMIN — NICOTINE POLACRILEX 2 MG: 2 GUM, CHEWING BUCCAL at 17:51

## 2020-09-12 RX ADMIN — IBUPROFEN 400 MG: 400 TABLET, FILM COATED ORAL at 20:35

## 2020-09-12 RX ADMIN — NICOTINE POLACRILEX 2 MG: 2 GUM, CHEWING BUCCAL at 15:38

## 2020-09-12 RX ADMIN — NICOTINE POLACRILEX 2 MG: 2 GUM, CHEWING BUCCAL at 22:14

## 2020-09-12 RX ADMIN — AMITRIPTYLINE HYDROCHLORIDE 75 MG: 25 TABLET, FILM COATED ORAL at 20:35

## 2020-09-12 RX ADMIN — NICOTINE POLACRILEX 2 MG: 2 GUM, CHEWING BUCCAL at 13:30

## 2020-09-12 RX ADMIN — NICOTINE POLACRILEX 2 MG: 2 GUM, CHEWING BUCCAL at 12:30

## 2020-09-12 ASSESSMENT — PAIN SCALES - GENERAL: PAINLEVEL_OUTOF10: 5

## 2020-09-12 NOTE — PLAN OF CARE
Problem: Altered Mood, Depressive Behavior:  Goal: Able to verbalize acceptance of life and situations over which he or she has no control  Description: Able to verbalize acceptance of life and situations over which he or she has no control  9/11/2020 2140 by Jevon Ibanez RN  Outcome: Ongoing  Note: Patient is able to verbalize acceptance over situations he has no control over. He is seen out watching tv for most of the night. He is compliant with medications. Continues to report fleeting suicidal ideations without a plan and is able to contract for safety. Cooperative on the unit. Safety maintained per unit policy, including q 21S patient safety checks. Problem: Altered Mood, Depressive Behavior:  Goal: Able to verbalize support systems  Description: Able to verbalize support systems  9/11/2020 2140 by Jevon Ibanez RN  Outcome: Ongoing  Note: Patient is able to verbalize support of his friends and some family at this time. Patient is seen out on the unit tonight. Emotional support and reassurance given. He is compliant with all scheduled hs medications. Sleep and appetite are adequate for patient. Problem: Altered Mood, Depressive Behavior:  Goal: Absence of self-harm  Description: Absence of self-harm  9/11/2020 2140 by Jevon Ibanez RN  Outcome: Ongoing  Note: Patient remains free from harm this shift. He is seen out watching tv in the day area, social with select peers for most of the evening. He is calm and controlled. Will continue to monitor patient for safety and behavior.      Problem: Tobacco Use:  Goal: Inpatient tobacco use cessation counseling participation  Description: Inpatient tobacco use cessation counseling participation  9/11/2020 2140 by Jevon Ibanez RN  Outcome: Ongoing  Note: Patient given tobacco quitline number 02856712083 at this time, refusing to call at this time, states \" I just dont want to quit now\"- patient given information as to the dangers of long term tobacco use. Continue to reinforce the importance of tobacco cessation. Problem: Suicide risk  Goal: Provide patient with safe environment  Description: Provide patient with safe environment  9/11/2020 2140 by Praveen Cheney RN  Outcome: Ongoing  Note: Q 15m patient safety checks. Problem: Pain:  Goal: Pain level will decrease  Description: Pain level will decrease  9/11/2020 2140 by Praveen Cheney RN  Outcome: Ongoing  Note: Patient reports acute pain in his left ear this evening. Prn pain medication given and patient was accepting.

## 2020-09-12 NOTE — BH NOTE
Group Therapy Note    Date: 9/12/2020    Group Start Time: 1600  Group End Time: 1645  Group Topic: Healthy Living/Wellness    [facility]    Jeri Negrete LPN        Group Therapy Note    Attendees: 6/18         Patient's Goal:  Effective communication    Notes:  Participated     Status After Intervention:  Unchanged    Participation Level: Interactive    Participation Quality: Supportive      Speech:  normal      Thought Process/Content: Logical      Affective Functioning: Congruent      Mood: euphoric      Level of consciousness:  Alert      Response to Learning: Capable of insight      Endings: None Reported    Modes of Intervention: Education      Discipline Responsible: Licensed Practical Nurse      Signature:  Jeri Negrete LPN

## 2020-09-12 NOTE — PLAN OF CARE
Problem: Suicide risk  Goal: Provide patient with safe environment  Description: Provide patient with safe environment  9/12/2020 1030 by Rolly Jarquin LPN  Outcome: Ongoing   Pt denies suicidal ideation   Problem: Tobacco Use:  Goal: Inpatient tobacco use cessation counseling participation  Description: Inpatient tobacco use cessation counseling participation  9/12/2020 1030 by Rolly Jarquin LPN  Outcome: Ongoing   Pt denied the use of any medications to control tobacco cravings   Problem: Altered Mood, Depressive Behavior:  Goal: Able to verbalize acceptance of life and situations over which he or she has no control  Description: Able to verbalize acceptance of life and situations over which he or she has no control  9/12/2020 1030 by Sumi Sadler LPN  Outcome: Ongoing   Haylee Barr is seen in his room, positive for some depression and anxiety, due to losing housing at rehab facility, plans to try and get into another inpatient treatment. Took medications without issues. Fair sleep no issues with appetite. Not attending any groups today. Denies suicdal ideation or HI.  15 minute safety rounds continue

## 2020-09-12 NOTE — PROGRESS NOTES
Daily Progress Note  Car Junior MD  9/11/2020  CHIEF COMPLAINT: Pression with suicidal ideation    Reviewed patient's current plan of care and vital signs with nursing staff. Sleep:  6 hours last night  Attending groups: Yes    SUBJECTIVE:    Patient reports he is doing somewhat better today. Still endorses depressed mood and suicidal ideation but states he was able to come out onto the unit milieu. Denying any auditory or visual hallucinations. States medications appear to be working. Begins talking about possible discharge to rehab or sober living. More forward-looking today. Mental Status Exam  Level of consciousness:  Within normal limits  Appearance: Hospital attire, seated in chair, with good grooming and hygiene   Behavior/Motor: No abnormalities noted  Attitude toward examiner:  Cooperative, attentive, good eye contact  Speech:  spontaneous, normal rate, normal volume and well articulated  Mood: Depressed  Affect: Congruent  Thought processes:  linear, goal directed and coherent  Thought content:  denies homicidal ideation  Suicidal Ideation: Endorses suicidal ideation  Delusions:  no evidence of delusions  Perceptual Disturbance:  denies any perceptual disturbance  Cognition:  Oriented to self, location, time, and situation  Memory: age appropriate  Insight & Judgement: improving  Medication side effects:  denies       Data   height is 6' (1.829 m) and weight is 220 lb (99.8 kg). His oral temperature is 98 °F (36.7 °C). His blood pressure is 120/67 and his pulse is 92. His respiration is 14. Labs:   No visits with results within 2 Day(s) from this visit.    Latest known visit with results is:   Admission on 09/09/2020, Discharged on 09/09/2020   Component Date Value Ref Range Status    WBC 09/09/2020 13.9* 3.5 - 11.0 k/uL Final    RBC 09/09/2020 4.32* 4.5 - 5.9 m/uL Final    Hemoglobin 09/09/2020 11.3* 13.5 - 17.5 g/dL Final    Hematocrit 09/09/2020 34.9* 41 - 53 % Final    MCV 09/09/2020 80.7  80 - 100 fL Final    MCH 09/09/2020 26.1  26 - 34 pg Final    MCHC 09/09/2020 32.4  31 - 37 g/dL Final    RDW 09/09/2020 16.0* 11.5 - 14.9 % Final    Platelets 47/42/8037 328  150 - 450 k/uL Final    MPV 09/09/2020 8.4  6.0 - 12.0 fL Final    NRBC Automated 09/09/2020 NOT REPORTED  per 100 WBC Final    Differential Type 09/09/2020 NOT REPORTED   Final    Seg Neutrophils 09/09/2020 74* 36 - 66 % Final    Lymphocytes 09/09/2020 16* 24 - 44 % Final    Monocytes 09/09/2020 9* 1 - 7 % Final    Eosinophils % 09/09/2020 0  0 - 4 % Final    Basophils 09/09/2020 1  0 - 2 % Final    Immature Granulocytes 09/09/2020 NOT REPORTED  0 % Final    Segs Absolute 09/09/2020 10.30* 1.3 - 9.1 k/uL Final    Absolute Lymph # 09/09/2020 2.30  1.0 - 4.8 k/uL Final    Absolute Mono # 09/09/2020 1.20  0.1 - 1.3 k/uL Final    Absolute Eos # 09/09/2020 0.00  0.0 - 0.4 k/uL Final    Basophils Absolute 09/09/2020 0.20  0.0 - 0.2 k/uL Final    Absolute Immature Granulocyte 09/09/2020 NOT REPORTED  0.00 - 0.30 k/uL Final    WBC Morphology 09/09/2020 NOT REPORTED   Final    RBC Morphology 09/09/2020 NOT REPORTED   Final    Platelet Estimate 36/62/7649 NOT REPORTED   Final    Glucose 09/09/2020 114* 70 - 99 mg/dL Final    BUN 09/09/2020 10  6 - 20 mg/dL Final    CREATININE 09/09/2020 0.99  0.70 - 1.20 mg/dL Final    Bun/Cre Ratio 09/09/2020 NOT REPORTED  9 - 20 Final    Calcium 09/09/2020 9.5  8.6 - 10.4 mg/dL Final    Sodium 09/09/2020 138  135 - 144 mmol/L Final    Potassium 09/09/2020 3.8  3.7 - 5.3 mmol/L Final    Chloride 09/09/2020 103  98 - 107 mmol/L Final    CO2 09/09/2020 21  20 - 31 mmol/L Final    Anion Gap 09/09/2020 14  9 - 17 mmol/L Final    Alkaline Phosphatase 09/09/2020 92  40 - 129 U/L Final    ALT 09/09/2020 31  5 - 41 U/L Final    AST 09/09/2020 31  <40 U/L Final    Total Bilirubin 09/09/2020 0.32  0.3 - 1.2 mg/dL Final    Total Protein 09/09/2020 8.1  6.4 - 8.3 g/dL Final    Alb information for diagnosis.  Troponin T 09/09/2020 NOT REPORTED  <0.03 ng/mL Final    Troponin Interp 09/09/2020 NOT REPORTED   Final    D-Dimer, Quant 09/09/2020 <0.27  0.00 - 0.59 mg/L FEU Final    Comment:        When combined with a low clinical probability, a D dimer value of <0.50 mg/L FEU is   considered negative for DVT and PE (negative predictive value of 98%, sensitivity of 97%). If this test is not being used to help rule out DVT and PE, then the following reference   range should be utilized: 0.00 - 0.59 mg/L FEU. The D-Dimer assay is intended for use as an aid in the diagnosis of venous thromboembolism   (DVT and PE) and the results should be interpreted in conjunction with the patient's medical   history, clinical presentation, and other findings. Elevated levels of D-dimer activity can be seen in any state of coagulation activation and   is not recommended in patients with therapeutic dose anticoagulant therapy for >24 hours,   fibrinolytic therapy within the previous 7 days, trauma or surgery within the previous 4   weeks,   disseminated malignancies, aortic aneurysm, sepsis, severe infections, pneumonia, severe   skin infections, liver cirrhosis, advanced age, diomedes                           nary disease, diabetes, and pregnancy. A very low percentage of patients with DVT may yield D-dimer results below the cutoff of   0.5 mg/L FEU. This is known to be more prevalent in patients with distal DVT.  SARS-CoV-2 09/09/2020 Not Detected  Not Detected Final    Comment:       The specimen is NEGATIVE for SARS-CoV-2, the novel coronavirus associated with COVID-19. A negative result does not rule out COVID-19. This test has been authorized by the FDA under an Emergency Use Authorization (EUA) for use   by authorized laboratories.         [facility] SARS-CoV-2 Reagents for [employer] System are designed to detect the virus that causes   COVID-19 in patients with signs and symptoms of infection who are suspected of COVID-19. An individual without symptoms of COVID-19 and who is not shedding SARS-CoV-2 virus would   expect to have a negative (not detected) result in this assay. Fact sheet for Healthcare Providers: [employer]  Fact sheet for Patients: [employer]com        METHODOLOGY: RT-PCR      SARS-CoV-2, Rapid 09/09/2020        Final    Source 09/09/2020 . NASOPHARYNGEAL SWAB   Final    SARS-CoV-2 09/09/2020        Final            Medications  Current Facility-Administered Medications: amitriptyline (ELAVIL) tablet 75 mg, 75 mg, Oral, Nightly  buprenorphine-naloxone (SUBOXONE) 4-1 MG SL film 2 Film, 2 Film, Sublingual, Daily  buPROPion (WELLBUTRIN XL) extended release tablet 300 mg, 300 mg, Oral, QAM  gabapentin (NEURONTIN) tablet 600 mg, 600 mg, Oral, TID  nicotine polacrilex (NICORETTE) gum 2 mg, 2 mg, Oral, PRN  acetaminophen (TYLENOL) tablet 650 mg, 650 mg, Oral, Q4H PRN  aluminum & magnesium hydroxide-simethicone (MAALOX) 200-200-20 MG/5ML suspension 30 mL, 30 mL, Oral, Q6H PRN  hydrOXYzine (ATARAX) tablet 50 mg, 50 mg, Oral, TID PRN  ibuprofen (ADVIL;MOTRIN) tablet 400 mg, 400 mg, Oral, Q6H PRN  polyethylene glycol (GLYCOLAX) packet 17 g, 17 g, Oral, Daily PRN  traZODone (DESYREL) tablet 50 mg, 50 mg, Oral, Nightly PRN    ASSESSMENT  <principal problem not specified>     PLAN  Patient s symptoms   are improving  Increase Wellbutrin to 450 mg extended release starting tomorrow  Attempt to develop insight  Psycho-education conducted. Supportive Therapy conducted. Probable discharge is next week  Follow-up daily while on the inpatient unit      Electronically signed by Arlyn Muñiz MD on 9/11/20 at 8:12 PM EDT    **This report has been created using voice recognition software. It may contain minor errors which are inherent in voice recognition technology. **

## 2020-09-13 PROCEDURE — 99232 SBSQ HOSP IP/OBS MODERATE 35: CPT | Performed by: PSYCHIATRY & NEUROLOGY

## 2020-09-13 PROCEDURE — 6370000000 HC RX 637 (ALT 250 FOR IP): Performed by: PSYCHIATRY & NEUROLOGY

## 2020-09-13 PROCEDURE — 1240000000 HC EMOTIONAL WELLNESS R&B

## 2020-09-13 RX ADMIN — NICOTINE POLACRILEX 2 MG: 2 GUM, CHEWING BUCCAL at 11:17

## 2020-09-13 RX ADMIN — BUPRENORPHINE HYDROCHLORIDE, NALOXONE HYDROCHLORIDE 2 FILM: 4; 1 FILM, SOLUBLE BUCCAL; SUBLINGUAL at 08:47

## 2020-09-13 RX ADMIN — NICOTINE POLACRILEX 4 MG: 4 GUM, CHEWING BUCCAL at 15:10

## 2020-09-13 RX ADMIN — NICOTINE POLACRILEX 2 MG: 2 GUM, CHEWING BUCCAL at 14:18

## 2020-09-13 RX ADMIN — GABAPENTIN 600 MG: 600 TABLET, FILM COATED ORAL at 08:47

## 2020-09-13 RX ADMIN — GABAPENTIN 600 MG: 600 TABLET, FILM COATED ORAL at 20:54

## 2020-09-13 RX ADMIN — NICOTINE POLACRILEX 4 MG: 4 GUM, CHEWING BUCCAL at 21:58

## 2020-09-13 RX ADMIN — AMITRIPTYLINE HYDROCHLORIDE 75 MG: 25 TABLET, FILM COATED ORAL at 20:54

## 2020-09-13 RX ADMIN — BUPROPION HYDROCHLORIDE 450 MG: 150 TABLET, EXTENDED RELEASE ORAL at 08:46

## 2020-09-13 RX ADMIN — IBUPROFEN 400 MG: 400 TABLET, FILM COATED ORAL at 20:55

## 2020-09-13 RX ADMIN — NICOTINE POLACRILEX 4 MG: 4 GUM, CHEWING BUCCAL at 20:19

## 2020-09-13 RX ADMIN — NICOTINE POLACRILEX 2 MG: 2 GUM, CHEWING BUCCAL at 12:35

## 2020-09-13 RX ADMIN — GABAPENTIN 600 MG: 600 TABLET, FILM COATED ORAL at 14:18

## 2020-09-13 RX ADMIN — NICOTINE POLACRILEX 4 MG: 4 GUM, CHEWING BUCCAL at 17:31

## 2020-09-13 ASSESSMENT — PAIN SCALES - GENERAL: PAINLEVEL_OUTOF10: 5

## 2020-09-13 NOTE — PROGRESS NOTES
Daily Progress Note  Mike Joe MD  9/13/2020  CHIEF COMPLAINT: Patient with suicidal ideation    Reviewed patient's current plan of care and vital signs with nursing staff. Sleep:  6 hours last night  Attending groups: Yes    SUBJECTIVE:    Patient reports he is somewhat anxious about his discharge. He states he is not convinced that the plan will come together. He is very focused on not relapsing and is expressing a great desire to get into a sober living program.  We explored whether his increased anxiety may be due to the recently increased Wellbutrin. Patient states he does not believe this is a side effect of medication but simply concern about his future. He has been participating in groups and reports that he is getting benefit from the unit milieu. Continues to find comfort in knowing that he is safe while trying to put together a plan for his future. Denying any side effects to medication. Reports no suicidal ideation at present time. Mental Status Exam  Level of consciousness:  Within normal limits  Appearance: Hospital attire, seated in chair, with good grooming and hygiene   Behavior/Motor: No abnormalities noted  Attitude toward examiner:  Cooperative, attentive, good eye contact  Speech:  spontaneous, normal rate, normal volume and well articulated  Mood: \"Better\"  Affect: Overall improved, somewhat anxious today  Thought processes:  linear, goal directed and coherent  Thought content:  denies homicidal ideation  Suicidal Ideation: Denies suicidal ideation  Delusions:  no evidence of delusions  Perceptual Disturbance:  denies any perceptual disturbance  Cognition:  Oriented to self, location, time, and situation  Memory: age appropriate  Insight & Judgement: improving  Medication side effects:  denies       Data   height is 6' (1.829 m) and weight is 220 lb (99.8 kg). His temperature is 98.2 °F (36.8 °C). His blood pressure is 120/53 (abnormal) and his pulse is 87.  His respiration is 14.   Labs:   No visits with results within 2 Day(s) from this visit.    Latest known visit with results is:   Admission on 09/09/2020, Discharged on 09/09/2020   Component Date Value Ref Range Status    WBC 09/09/2020 13.9* 3.5 - 11.0 k/uL Final    RBC 09/09/2020 4.32* 4.5 - 5.9 m/uL Final    Hemoglobin 09/09/2020 11.3* 13.5 - 17.5 g/dL Final    Hematocrit 09/09/2020 34.9* 41 - 53 % Final    MCV 09/09/2020 80.7  80 - 100 fL Final    MCH 09/09/2020 26.1  26 - 34 pg Final    MCHC 09/09/2020 32.4  31 - 37 g/dL Final    RDW 09/09/2020 16.0* 11.5 - 14.9 % Final    Platelets 22/18/8382 328  150 - 450 k/uL Final    MPV 09/09/2020 8.4  6.0 - 12.0 fL Final    NRBC Automated 09/09/2020 NOT REPORTED  per 100 WBC Final    Differential Type 09/09/2020 NOT REPORTED   Final    Seg Neutrophils 09/09/2020 74* 36 - 66 % Final    Lymphocytes 09/09/2020 16* 24 - 44 % Final    Monocytes 09/09/2020 9* 1 - 7 % Final    Eosinophils % 09/09/2020 0  0 - 4 % Final    Basophils 09/09/2020 1  0 - 2 % Final    Immature Granulocytes 09/09/2020 NOT REPORTED  0 % Final    Segs Absolute 09/09/2020 10.30* 1.3 - 9.1 k/uL Final    Absolute Lymph # 09/09/2020 2.30  1.0 - 4.8 k/uL Final    Absolute Mono # 09/09/2020 1.20  0.1 - 1.3 k/uL Final    Absolute Eos # 09/09/2020 0.00  0.0 - 0.4 k/uL Final    Basophils Absolute 09/09/2020 0.20  0.0 - 0.2 k/uL Final    Absolute Immature Granulocyte 09/09/2020 NOT REPORTED  0.00 - 0.30 k/uL Final    WBC Morphology 09/09/2020 NOT REPORTED   Final    RBC Morphology 09/09/2020 NOT REPORTED   Final    Platelet Estimate 59/40/3179 NOT REPORTED   Final    Glucose 09/09/2020 114* 70 - 99 mg/dL Final    BUN 09/09/2020 10  6 - 20 mg/dL Final    CREATININE 09/09/2020 0.99  0.70 - 1.20 mg/dL Final    Bun/Cre Ratio 09/09/2020 NOT REPORTED  9 - 20 Final    Calcium 09/09/2020 9.5  8.6 - 10.4 mg/dL Final    Sodium 09/09/2020 138  135 - 144 mmol/L Final    Potassium 09/09/2020 3.8  3.7 - 5.3 mmol/L Final    Chloride 09/09/2020 103  98 - 107 mmol/L Final    CO2 09/09/2020 21  20 - 31 mmol/L Final    Anion Gap 09/09/2020 14  9 - 17 mmol/L Final    Alkaline Phosphatase 09/09/2020 92  40 - 129 U/L Final    ALT 09/09/2020 31  5 - 41 U/L Final    AST 09/09/2020 31  <40 U/L Final    Total Bilirubin 09/09/2020 0.32  0.3 - 1.2 mg/dL Final    Total Protein 09/09/2020 8.1  6.4 - 8.3 g/dL Final    Alb 09/09/2020 4.5  3.5 - 5.2 g/dL Final    Albumin/Globulin Ratio 09/09/2020 NOT REPORTED  1.0 - 2.5 Final    GFR Non- 09/09/2020 >60  >60 mL/min Final    GFR  09/09/2020 >60  >60 mL/min Final    GFR Comment 09/09/2020        Final    Comment: Average GFR for 38-51 years old:   80 mL/min/1.73sq m  Chronic Kidney Disease:   <60 mL/min/1.73sq m  Kidney failure:   <15 mL/min/1.73sq m              eGFR calculated using average adult body mass. Additional eGFR calculator available at:        [employer]            GFR Staging 09/09/2020 NOT REPORTED   Final    Pro-BNP 09/09/2020 49  <300 pg/mL Final     Pro-BNP results cannot be compared to BNP results.  BNP Interpretation 09/09/2020 Pro-BNP Reference Range:   Final    Comment:       Rule Out:    <300        [place]:   Age <50     300-450   Age 54-65   300-900   Age >75     300-1800  Usually represents mild to moderate HF but other cardiopulmonary causes cannot be ruled out. Rule In:   Age <50     >65   Age 54-65   >900   Age >76    >5      Troponin, High Sensitivity 09/09/2020 6  0 - 22 ng/L Final    Comment:       High Sensitivity Troponin values cannot be compared with other Troponin methodologies. Patients with high levels of Biotin oral intake (i.e >5mg/day) may have falsely decreased   Troponin levels. Samples collected within 8 hours of biotin intake may require additional   information for diagnosis.       Troponin T 09/09/2020 NOT REPORTED  <0.03 ng/mL Final    specimen is NEGATIVE for SARS-CoV-2, the novel coronavirus associated with COVID-19. A negative result does not rule out COVID-19. This test has been authorized by the FDA under an Emergency Use Authorization (EUA) for use   by authorized laboratories. [employer] SARS-CoV-2 Reagents for BD MAX System are designed to detect the virus that causes   COVID-19 in patients with signs and symptoms of infection who are suspected of COVID-19. An individual without symptoms of COVID-19 and who is not shedding SARS-CoV-2 virus would   expect to have a negative (not detected) result in this assay. Fact sheet for Healthcare Providers: [employer]  Fact sheet for Patients: [employer]        METHODOLOGY: RT-PCR      SARS-CoV-2, Rapid 09/09/2020        Final    Source 09/09/2020 . NASOPHARYNGEAL SWAB   Final    SARS-CoV-2 09/09/2020        Final            Medications  Current Facility-Administered Medications: nicotine polacrilex (NICORETTE) gum 4 mg, 4 mg, Oral, PRN  buPROPion (WELLBUTRIN XL) extended release tablet 450 mg, 450 mg, Oral, QAM  amitriptyline (ELAVIL) tablet 75 mg, 75 mg, Oral, Nightly  buprenorphine-naloxone (SUBOXONE) 4-1 MG SL film 2 Film, 2 Film, Sublingual, Daily  gabapentin (NEURONTIN) tablet 600 mg, 600 mg, Oral, TID  acetaminophen (TYLENOL) tablet 650 mg, 650 mg, Oral, Q4H PRN  aluminum & magnesium hydroxide-simethicone (MAALOX) 200-200-20 MG/5ML suspension 30 mL, 30 mL, Oral, Q6H PRN  hydrOXYzine (ATARAX) tablet 50 mg, 50 mg, Oral, TID PRN  ibuprofen (ADVIL;MOTRIN) tablet 400 mg, 400 mg, Oral, Q6H PRN  polyethylene glycol (GLYCOLAX) packet 17 g, 17 g, Oral, Daily PRN  traZODone (DESYREL) tablet 50 mg, 50 mg, Oral, Nightly PRN    ASSESSMENT  Severe recurrent major depression without psychotic features [facility])     PLAN  Patient s symptoms   are improving  Observation for stability of symptoms on current medication regiment  Change dose of Nicorette gum at patient's request    Attempt to develop insight  Psycho-education conducted. Supportive Therapy conducted. Probable discharge is tomorrow if placement confirmed and stability of symptoms  Follow-up with a while on the inpatient unit      Electronically signed by Omayra Galdamez MD on 9/13/20 at 4:27 PM EDT    **This report has been created using voice recognition software. It may contain minor errors which are inherent in voice recognition technology. **

## 2020-09-13 NOTE — PLAN OF CARE
Problem: Pain:  Goal: Pain level will decrease  Description: Pain level will decrease  9/13/2020 1325 by Sumi Crandall LPN  Outcome: Met This Shift   Pt denies any pain at this time   Problem: Tobacco Use:  Goal: Inpatient tobacco use cessation counseling participation  Description: Inpatient tobacco use cessation counseling participation  9/13/2020 1325 by Wendel Hodgkins, LPN  Outcome: Ongoing   Patient given tobacco quitline number 41525589339 at this time, refusing to call at this time, states \" I just dont want to quit now\"- patient given information as to the dangers of long term tobacco use. Continue to reinforce the importance of tobacco cessation. Problem: Suicide risk  Goal: Provide patient with safe environment  Description: Provide patient with safe environment  9/13/2020 1325 by Wendel Hodgkins, LPN  Outcome: Ongoing   Pt denies any suicidal ideation   Problem: Altered Mood, Depressive Behavior:  Goal: Able to verbalize acceptance of life and situations over which he or she has no control  Description: Able to verbalize acceptance of life and situations over which he or she has no control  Outcome: Ongoing   Wai Lopez is seen in the dayroom, brightened affect, reports improved depression and anxiety, working with  to find new inpatient treatment. Denies any suicidal ideation or HI Reports poor sleep but no issues with appetite. Taking medications, and  attending groups.  15 minute safety checks continue

## 2020-09-13 NOTE — GROUP NOTE
Group Therapy Note    Date: 9/13/2020    Group Start Time: 1000  Group End Time: 1030  Group Topic: Psychoeducation    BARBER Austin        Group Therapy Note           Patient refused to attend psychotherapy group after encouragement from staff. 1:1 talk time offered but refused. Signature:   Ania Austin

## 2020-09-13 NOTE — GROUP NOTE
Group Therapy Note    Date: 9/13/2020    Group Start Time: 1330  Group End Time: 9568  Group Topic: Cognitive Skills    [facility]    Kiesha Gr        Group Therapy Note    Attendees: 8/19         Patient's Goal:  To increase interpersonal interaction     Notes:      Status After Intervention:  Improved    Participation Level:  Active Listener and Interactive    Participation Quality: Appropriate, Attentive and Sharing      Speech:  normal      Thought Process/Content: Logical  Linear      Affective Functioning: Congruent      Mood: euthymic      Level of consciousness:  Alert, Oriented x4 and Attentive      Response to Learning: Able to verbalize current knowledge/experience, Able to verbalize/acknowledge new learning, Able to retain information and Progressing to goal      Endings: None Reported    Modes of Intervention: Education, Support, Socialization, Exploration, Clarifying, Problem-solving and Activity      Discipline Responsible: Psychoeducational Specialist      Signature:  Kiesha Gr

## 2020-09-13 NOTE — PROGRESS NOTES
hours of biotin intake may require additional   information for diagnosis.  Troponin T 09/09/2020 NOT REPORTED  <0.03 ng/mL Final    Troponin Interp 09/09/2020 NOT REPORTED   Final    D-Dimer, Quant 09/09/2020 <0.27  0.00 - 0.59 mg/L FEU Final    Comment:        When combined with a low clinical probability, a D dimer value of <0.50 mg/L FEU is   considered negative for DVT and PE (negative predictive value of 98%, sensitivity of 97%). If this test is not being used to help rule out DVT and PE, then the following reference   range should be utilized: 0.00 - 0.59 mg/L FEU. The D-Dimer assay is intended for use as an aid in the diagnosis of venous thromboembolism   (DVT and PE) and the results should be interpreted in conjunction with the patient's medical   history, clinical presentation, and other findings. Elevated levels of D-dimer activity can be seen in any state of coagulation activation and   is not recommended in patients with therapeutic dose anticoagulant therapy for >24 hours,   fibrinolytic therapy within the previous 7 days, trauma or surgery within the previous 4   weeks,   disseminated malignancies, aortic aneurysm, sepsis, severe infections, pneumonia, severe   skin infections, liver cirrhosis, advanced age, diomedes                           nary disease, diabetes, and pregnancy. A very low percentage of patients with DVT may yield D-dimer results below the cutoff of   0.5 mg/L FEU. This is known to be more prevalent in patients with distal DVT.  SARS-CoV-2 09/09/2020 Not Detected  Not Detected Final    Comment:       The specimen is NEGATIVE for SARS-CoV-2, the novel coronavirus associated with COVID-19. A negative result does not rule out COVID-19. This test has been authorized by the FDA under an Emergency Use Authorization (EUA) for use   by authorized laboratories.         [employer] SARS-CoV-2 Reagents for BD MAX System are designed to detect the virus that causes COVID-19 in patients with signs and symptoms of infection who are suspected of COVID-19. An individual without symptoms of COVID-19 and who is not shedding SARS-CoV-2 virus would   expect to have a negative (not detected) result in this assay. Fact sheet for Healthcare Providers: [employer]  Fact sheet for Patients: Stan[employer]yane        METHODOLOGY: RT-PCR      SARS-CoV-2, Rapid 09/09/2020        Final    Source 09/09/2020 . NASOPHARYNGEAL SWAB   Final    SARS-CoV-2 09/09/2020        Final            Medications  Current Facility-Administered Medications: buPROPion (WELLBUTRIN XL) extended release tablet 450 mg, 450 mg, Oral, QAM  amitriptyline (ELAVIL) tablet 75 mg, 75 mg, Oral, Nightly  buprenorphine-naloxone (SUBOXONE) 4-1 MG SL film 2 Film, 2 Film, Sublingual, Daily  gabapentin (NEURONTIN) tablet 600 mg, 600 mg, Oral, TID  nicotine polacrilex (NICORETTE) gum 2 mg, 2 mg, Oral, PRN  acetaminophen (TYLENOL) tablet 650 mg, 650 mg, Oral, Q4H PRN  aluminum & magnesium hydroxide-simethicone (MAALOX) 200-200-20 MG/5ML suspension 30 mL, 30 mL, Oral, Q6H PRN  hydrOXYzine (ATARAX) tablet 50 mg, 50 mg, Oral, TID PRN  ibuprofen (ADVIL;MOTRIN) tablet 400 mg, 400 mg, Oral, Q6H PRN  polyethylene glycol (GLYCOLAX) packet 17 g, 17 g, Oral, Daily PRN  traZODone (DESYREL) tablet 50 mg, 50 mg, Oral, Nightly PRN    ASSESSMENT  Severe recurrent major depression without psychotic features [facility])     PLAN  Patient s symptoms   are improving  Observation on recently adjusted medication   attempt to develop insight  Psycho-education conducted. Supportive Therapy conducted. Probable discharge is next 1 to 2 days  Follow-up daily while on the inpatient unit      Electronically signed by Marko Cedeño MD on 9/11/20 at 8:12 PM EDT    **This report has been created using voice recognition software.  It may contain minor errors which are inherent in voice recognition technology. **

## 2020-09-13 NOTE — PLAN OF CARE
Problem: Altered Mood, Depressive Behavior:  Goal: Absence of self-harm  Description: Absence of self-harm  Outcome: Ongoing  Note: Patient remains free from self-harm at this time. Patient currently is denying experiencing homicidal and suicidal ideations. Patient is encouraged to seek out staff if these thoughts arise. Patient says he is experiencing some mild feelings of depression and anxiety but does feel as thought his mood is improving. Problem: Tobacco Use:  Goal: Inpatient tobacco use cessation counseling participation  Description: Inpatient tobacco use cessation counseling participation  9/13/2020 0007 by Regina Rao RN  Outcome: Ongoing  Note: Patient given tobacco quitline number 50596832148 at this time, refusing to call at this time, states \" I just dont want to quit now\"- patient given information as to the dangers of long term tobacco use. Continue to reinforce the importance of tobacco cessation. Problem: Suicide risk  Goal: Provide patient with safe environment  Description: Provide patient with safe environment  9/13/2020 0007 by Regina Rao RN  Outcome: Ongoing  Note: Safe environment maintained. Safety checks remain in place every 15 minutes and as needed. Problem: Pain:  Goal: Pain level will decrease  Description: Pain level will decrease  9/13/2020 0007 by Regina Rao RN  Outcome: Ongoing  Note: Patient uses 0-10 scale to rate his pain.  Patient given PRN pain medications per request.

## 2020-09-14 PROCEDURE — 6370000000 HC RX 637 (ALT 250 FOR IP): Performed by: PSYCHIATRY & NEUROLOGY

## 2020-09-14 PROCEDURE — 99232 SBSQ HOSP IP/OBS MODERATE 35: CPT | Performed by: PSYCHIATRY & NEUROLOGY

## 2020-09-14 PROCEDURE — 1240000000 HC EMOTIONAL WELLNESS R&B

## 2020-09-14 RX ADMIN — NICOTINE POLACRILEX 4 MG: 4 GUM, CHEWING BUCCAL at 00:00

## 2020-09-14 RX ADMIN — GABAPENTIN 600 MG: 600 TABLET, FILM COATED ORAL at 21:03

## 2020-09-14 RX ADMIN — GABAPENTIN 600 MG: 600 TABLET, FILM COATED ORAL at 09:07

## 2020-09-14 RX ADMIN — BUPRENORPHINE HYDROCHLORIDE, NALOXONE HYDROCHLORIDE 2 FILM: 4; 1 FILM, SOLUBLE BUCCAL; SUBLINGUAL at 09:07

## 2020-09-14 RX ADMIN — NICOTINE POLACRILEX 4 MG: 4 GUM, CHEWING BUCCAL at 21:44

## 2020-09-14 RX ADMIN — NICOTINE POLACRILEX 4 MG: 4 GUM, CHEWING BUCCAL at 08:05

## 2020-09-14 RX ADMIN — BUPROPION HYDROCHLORIDE 450 MG: 150 TABLET, EXTENDED RELEASE ORAL at 09:07

## 2020-09-14 RX ADMIN — IBUPROFEN 400 MG: 400 TABLET, FILM COATED ORAL at 21:02

## 2020-09-14 RX ADMIN — GABAPENTIN 600 MG: 600 TABLET, FILM COATED ORAL at 14:54

## 2020-09-14 RX ADMIN — NICOTINE POLACRILEX 4 MG: 4 GUM, CHEWING BUCCAL at 16:17

## 2020-09-14 RX ADMIN — AMITRIPTYLINE HYDROCHLORIDE 75 MG: 25 TABLET, FILM COATED ORAL at 21:03

## 2020-09-14 RX ADMIN — NICOTINE POLACRILEX 4 MG: 4 GUM, CHEWING BUCCAL at 17:55

## 2020-09-14 RX ADMIN — NICOTINE POLACRILEX 4 MG: 4 GUM, CHEWING BUCCAL at 20:30

## 2020-09-14 RX ADMIN — NICOTINE POLACRILEX 4 MG: 4 GUM, CHEWING BUCCAL at 19:15

## 2020-09-14 RX ADMIN — NICOTINE POLACRILEX 4 MG: 4 GUM, CHEWING BUCCAL at 12:16

## 2020-09-14 ASSESSMENT — PAIN - FUNCTIONAL ASSESSMENT
PAIN_FUNCTIONAL_ASSESSMENT: 0-10
PAIN_FUNCTIONAL_ASSESSMENT: 0-10

## 2020-09-14 ASSESSMENT — PAIN SCALES - GENERAL: PAINLEVEL_OUTOF10: 4

## 2020-09-14 NOTE — GROUP NOTE
Group Therapy Note    Date: 9/14/2020    Group Start Time: 1100  Group End Time: 5068  Group Topic: Cognitive Skills    [facility]    DANA Jo        Group Therapy Note    Attendees: 2/8         Patient's Goal:  To improve cognitive skills     Notes:   Pt was pleasant and participated well     Status After Intervention:  Improved    Participation Level:  Active Listener and Interactive    Participation Quality: Appropriate and Attentive      Speech:  normal      Thought Process/Content: Logical      Affective Functioning: Congruent      Mood: euthymic      Level of consciousness:  Alert and Oriented x4      Response to Learning: Able to verbalize current knowledge/experience and Progressing to goal      Endings: None Reported    Modes of Intervention: Education, Support and Socialization      Discipline Responsible: Psychoeducational Specialist      Signature:  Beatriz Adair

## 2020-09-14 NOTE — PLAN OF CARE
Problem: Altered Mood, Depressive Behavior:  Goal: Absence of self-harm  Description: Absence of self-harm  Outcome: Met This Shift  Note: No attempts to do self-harm this shift. Problem: Suicide risk  Goal: Provide patient with safe environment  Description: Provide patient with safe environment  Outcome: Met This Shift  Note: Pt denies any suicidal or homicidal ideation this shift. Denies any hallucinations. Admits to slight depression & anxiety. Attended a couple groups. Slept fair, eating adequately. Problem: Pain:  Goal: Pain level will decrease  Description: Pain level will decrease  Outcome: Met This Shift  Note: No c/o pain this shift.

## 2020-09-14 NOTE — FLOWSHEET NOTE
*Patient participated in the Spirituality Group       09/14/20 1413   Encounter Summary   Services provided to: Patient   Referral/Consult From: Rounding   Continue Visiting   (9/14/20)   Complexity of Encounter Moderate   Length of Encounter 30 minutes   Spiritual/Buddhism   Type Spiritual support   Assessment Calm; Approachable   Intervention Active listening   Outcome Receptive;Engaged in conversation

## 2020-09-14 NOTE — PROGRESS NOTES
17.5 g/dL Final    Hematocrit 09/09/2020 34.9* 41 - 53 % Final    MCV 09/09/2020 80.7  80 - 100 fL Final    MCH 09/09/2020 26.1  26 - 34 pg Final    MCHC 09/09/2020 32.4  31 - 37 g/dL Final    RDW 09/09/2020 16.0* 11.5 - 14.9 % Final    Platelets 92/33/7473 328  150 - 450 k/uL Final    MPV 09/09/2020 8.4  6.0 - 12.0 fL Final    NRBC Automated 09/09/2020 NOT REPORTED  per 100 WBC Final    Differential Type 09/09/2020 NOT REPORTED   Final    Seg Neutrophils 09/09/2020 74* 36 - 66 % Final    Lymphocytes 09/09/2020 16* 24 - 44 % Final    Monocytes 09/09/2020 9* 1 - 7 % Final    Eosinophils % 09/09/2020 0  0 - 4 % Final    Basophils 09/09/2020 1  0 - 2 % Final    Immature Granulocytes 09/09/2020 NOT REPORTED  0 % Final    Segs Absolute 09/09/2020 10.30* 1.3 - 9.1 k/uL Final    Absolute Lymph # 09/09/2020 2.30  1.0 - 4.8 k/uL Final    Absolute Mono # 09/09/2020 1.20  0.1 - 1.3 k/uL Final    Absolute Eos # 09/09/2020 0.00  0.0 - 0.4 k/uL Final    Basophils Absolute 09/09/2020 0.20  0.0 - 0.2 k/uL Final    Absolute Immature Granulocyte 09/09/2020 NOT REPORTED  0.00 - 0.30 k/uL Final    WBC Morphology 09/09/2020 NOT REPORTED   Final    RBC Morphology 09/09/2020 NOT REPORTED   Final    Platelet Estimate 14/83/6395 NOT REPORTED   Final    Glucose 09/09/2020 114* 70 - 99 mg/dL Final    BUN 09/09/2020 10  6 - 20 mg/dL Final    CREATININE 09/09/2020 0.99  0.70 - 1.20 mg/dL Final    Bun/Cre Ratio 09/09/2020 NOT REPORTED  9 - 20 Final    Calcium 09/09/2020 9.5  8.6 - 10.4 mg/dL Final    Sodium 09/09/2020 138  135 - 144 mmol/L Final    Potassium 09/09/2020 3.8  3.7 - 5.3 mmol/L Final    Chloride 09/09/2020 103  98 - 107 mmol/L Final    CO2 09/09/2020 21  20 - 31 mmol/L Final    Anion Gap 09/09/2020 14  9 - 17 mmol/L Final    Alkaline Phosphatase 09/09/2020 92  40 - 129 U/L Final    ALT 09/09/2020 31  5 - 41 U/L Final    AST 09/09/2020 31  <40 U/L Final    Total Bilirubin 09/09/2020 0.32  0.3 - 1.2 mg/dL Final    Total Protein 09/09/2020 8.1  6.4 - 8.3 g/dL Final    Alb 09/09/2020 4.5  3.5 - 5.2 g/dL Final    Albumin/Globulin Ratio 09/09/2020 NOT REPORTED  1.0 - 2.5 Final    GFR Non- 09/09/2020 >60  >60 mL/min Final    GFR  09/09/2020 >60  >60 mL/min Final    GFR Comment 09/09/2020        Final    Comment: Average GFR for 38-51 years old:   80 mL/min/1.73sq m  Chronic Kidney Disease:   <60 mL/min/1.73sq m  Kidney failure:   <15 mL/min/1.73sq m              eGFR calculated using average adult body mass. Additional eGFR calculator available at:        [employer]            GFR Staging 09/09/2020 NOT REPORTED   Final    Pro-BNP 09/09/2020 49  <300 pg/mL Final     Pro-BNP results cannot be compared to BNP results.  BNP Interpretation 09/09/2020 Pro-BNP Reference Range:   Final    Comment:       Rule Out:    <300        [place] Zone:   Age <50     300-450   Age 54-65   300-900   Age >75     300-1800  Usually represents mild to moderate HF but other cardiopulmonary causes cannot be ruled out. Rule In:   Age <50     >65   Age 54-65   >900   Age >76    >5      Troponin, High Sensitivity 09/09/2020 6  0 - 22 ng/L Final    Comment:       High Sensitivity Troponin values cannot be compared with other Troponin methodologies. Patients with high levels of Biotin oral intake (i.e >5mg/day) may have falsely decreased   Troponin levels. Samples collected within 8 hours of biotin intake may require additional   information for diagnosis.  Troponin T 09/09/2020 NOT REPORTED  <0.03 ng/mL Final    Troponin Interp 09/09/2020 NOT REPORTED   Final    Troponin, High Sensitivity 09/09/2020 7  0 - 22 ng/L Final    Comment:       High Sensitivity Troponin values cannot be compared with other Troponin methodologies. Patients with high levels of Biotin oral intake (i.e >5mg/day) may have falsely decreased   Troponin levels. Samples collected within 8 hours of biotin intake may require additional   information for diagnosis.  Troponin T 09/09/2020 NOT REPORTED  <0.03 ng/mL Final    Troponin Interp 09/09/2020 NOT REPORTED   Final    D-Dimer, Quant 09/09/2020 <0.27  0.00 - 0.59 mg/L FEU Final    Comment:        When combined with a low clinical probability, a D dimer value of <0.50 mg/L FEU is   considered negative for DVT and PE (negative predictive value of 98%, sensitivity of 97%). If this test is not being used to help rule out DVT and PE, then the following reference   range should be utilized: 0.00 - 0.59 mg/L FEU. The D-Dimer assay is intended for use as an aid in the diagnosis of venous thromboembolism   (DVT and PE) and the results should be interpreted in conjunction with the patient's medical   history, clinical presentation, and other findings. Elevated levels of D-dimer activity can be seen in any state of coagulation activation and   is not recommended in patients with therapeutic dose anticoagulant therapy for >24 hours,   fibrinolytic therapy within the previous 7 days, trauma or surgery within the previous 4   weeks,   disseminated malignancies, aortic aneurysm, sepsis, severe infections, pneumonia, severe   skin infections, liver cirrhosis, advanced age, diomedes                           nary disease, diabetes, and pregnancy. A very low percentage of patients with DVT may yield D-dimer results below the cutoff of   0.5 mg/L FEU. This is known to be more prevalent in patients with distal DVT.  SARS-CoV-2 09/09/2020 Not Detected  Not Detected Final    Comment:       The specimen is NEGATIVE for SARS-CoV-2, the novel coronavirus associated with COVID-19. A negative result does not rule out COVID-19. This test has been authorized by the FDA under an Emergency Use Authorization (EUA) for use   by authorized laboratories.         Food Reporter SARS-CoV-2 Reagents for [employer] System are designed to detect the virus that causes   COVID-19 in patients with signs and symptoms of infection who are suspected of COVID-19. An individual without symptoms of COVID-19 and who is not shedding SARS-CoV-2 virus would   expect to have a negative (not detected) result in this assay. Fact sheet for Healthcare Providers: Uday  Fact sheet for Patients: Uday        METHODOLOGY: RT-PCR      SARS-CoV-2, Rapid 09/09/2020        Final    Source 09/09/2020 . NASOPHARYNGEAL SWAB   Final    SARS-CoV-2 09/09/2020        Final            Medications  Current Facility-Administered Medications: nicotine polacrilex (NICORETTE) gum 4 mg, 4 mg, Oral, PRN  buPROPion (WELLBUTRIN XL) extended release tablet 450 mg, 450 mg, Oral, QAM  amitriptyline (ELAVIL) tablet 75 mg, 75 mg, Oral, Nightly  buprenorphine-naloxone (SUBOXONE) 4-1 MG SL film 2 Film, 2 Film, Sublingual, Daily  gabapentin (NEURONTIN) tablet 600 mg, 600 mg, Oral, TID  acetaminophen (TYLENOL) tablet 650 mg, 650 mg, Oral, Q4H PRN  aluminum & magnesium hydroxide-simethicone (MAALOX) 200-200-20 MG/5ML suspension 30 mL, 30 mL, Oral, Q6H PRN  hydrOXYzine (ATARAX) tablet 50 mg, 50 mg, Oral, TID PRN  ibuprofen (ADVIL;MOTRIN) tablet 400 mg, 400 mg, Oral, Q6H PRN  polyethylene glycol (GLYCOLAX) packet 17 g, 17 g, Oral, Daily PRN  traZODone (DESYREL) tablet 50 mg, 50 mg, Oral, Nightly PRN    ASSESSMENT  Severe recurrent major depression without psychotic features [facility])     PLAN  Patient s symptoms   are improving  Observation 1 more night for stability and symptoms then likely discharge  Attempt to develop insight  Psycho-education conducted. Supportive Therapy conducted. Probable discharge is tomorrow  Follow-up daily while in the inpatient unit      Electronically signed by Zac Alexander MD on 9/14/20 at 4:48 PM EDT    **This report has been created using voice recognition software.  It may contain minor errors which are inherent in voice recognition technology. **

## 2020-09-14 NOTE — GROUP NOTE
Group Therapy Note    Date: 9/14/2020    Group Start Time: 1430  Group End Time: 7921  Group Topic: Recreational    [facility]    Rafaela Willingham, CTRS    Pt did not attend [phone] [id number] skills group d/t resting in room despite staff invitation to attend. 1:1 talk time offered as alternative to group session, pt declined.               Signature:  Samia Fofana

## 2020-09-15 VITALS
SYSTOLIC BLOOD PRESSURE: 120 MMHG | HEART RATE: 84 BPM | TEMPERATURE: 97.3 F | DIASTOLIC BLOOD PRESSURE: 65 MMHG | WEIGHT: 220 LBS | BODY MASS INDEX: 29.8 KG/M2 | HEIGHT: 72 IN | RESPIRATION RATE: 14 BRPM

## 2020-09-15 PROCEDURE — 6370000000 HC RX 637 (ALT 250 FOR IP): Performed by: PSYCHIATRY & NEUROLOGY

## 2020-09-15 PROCEDURE — 99239 HOSP IP/OBS DSCHRG MGMT >30: CPT | Performed by: PSYCHIATRY & NEUROLOGY

## 2020-09-15 RX ORDER — TRAZODONE HYDROCHLORIDE 50 MG/1
50 TABLET ORAL NIGHTLY PRN
Qty: 30 TABLET | Refills: 0 | Status: ON HOLD | OUTPATIENT
Start: 2020-09-15 | End: 2020-10-21

## 2020-09-15 RX ORDER — AMITRIPTYLINE HYDROCHLORIDE 75 MG/1
75 TABLET, FILM COATED ORAL NIGHTLY
Qty: 14 TABLET | Refills: 0 | Status: ON HOLD | OUTPATIENT
Start: 2020-09-15 | End: 2021-04-19 | Stop reason: HOSPADM

## 2020-09-15 RX ORDER — GABAPENTIN 600 MG/1
600 TABLET ORAL 3 TIMES DAILY
Qty: 30 TABLET | Refills: 0 | Status: ON HOLD | OUTPATIENT
Start: 2020-09-15 | End: 2020-10-29

## 2020-09-15 RX ORDER — BUPROPION HYDROCHLORIDE 450 MG/1
450 TABLET, FILM COATED, EXTENDED RELEASE ORAL EVERY MORNING
Qty: 30 TABLET | Refills: 3 | Status: ON HOLD | OUTPATIENT
Start: 2020-09-15 | End: 2020-10-29 | Stop reason: HOSPADM

## 2020-09-15 RX ADMIN — IBUPROFEN 400 MG: 400 TABLET, FILM COATED ORAL at 08:28

## 2020-09-15 RX ADMIN — GABAPENTIN 600 MG: 600 TABLET, FILM COATED ORAL at 08:28

## 2020-09-15 RX ADMIN — NICOTINE POLACRILEX 4 MG: 4 GUM, CHEWING BUCCAL at 08:10

## 2020-09-15 RX ADMIN — BUPRENORPHINE HYDROCHLORIDE, NALOXONE HYDROCHLORIDE 2 FILM: 4; 1 FILM, SOLUBLE BUCCAL; SUBLINGUAL at 08:27

## 2020-09-15 RX ADMIN — BUPROPION HYDROCHLORIDE 450 MG: 150 TABLET, EXTENDED RELEASE ORAL at 08:28

## 2020-09-15 RX ADMIN — NICOTINE POLACRILEX 4 MG: 4 GUM, CHEWING BUCCAL at 09:49

## 2020-09-15 ASSESSMENT — PAIN SCALES - GENERAL: PAINLEVEL_OUTOF10: 5

## 2020-09-15 NOTE — DISCHARGE INSTR - OTHER ORDERS
Make sure to take all medications prescribed by your Dr. Gaby Mustafa not double up on doses. If you miss or skip a dose make sure to take the next scheduled dose. Make sure to not do any illicit drugs or alcohol. Make sure to attend all follow up appointments.

## 2020-09-15 NOTE — GROUP NOTE
Group Therapy Note    Date: 9/14/2020    Group Start Time: 2030  Group End Time: 2104  Group Topic: Wrap-Up    BARBER Hernandez        Group Therapy Note    Attendees:10         Patient's Goal:  I'm leaving to go to [facility] for 30 days to detox    Notes:  I am ready to get sober    Status After Intervention:  Improved    Participation Level:  Active Listener    Participation Quality: Appropriate      Speech:  normal      Thought Process/Content: Logical      Affective Functioning: Congruent      Mood: WNL      Level of consciousness:  Alert      Response to Learning: Able to verbalize/acknowledge new learning      Endings: None Reported    Modes of Intervention: Problem-solving      Discipline Responsible: Aidin      Signature:  Theresa Peterson

## 2020-09-15 NOTE — DISCHARGE SUMMARY
Provider Discharge Summary     Patient ID:  Hawk Christianson  [id number]  81 y.o.  1975    Admit date: 9/9/2020    Discharge date and time: 9/15/2020  7:31 AM     Admitting Physician: No admitting provider for patient encounter. Discharge Physician: Yung Bhatti MD    Admission Diagnoses: Major depression, single episode [F32.9]    Discharge Diagnoses:      Severe recurrent major depression without psychotic features [facility])     Patient Active Problem List   Diagnosis Code    Recurrent depression ([address] F33.9    Recurrent depression ([address] F33.9    MDD (major depressive disorder), recurrent episode ([address] F33.9    Bipolar I disorder, most recent episode depressed ([address] F31.30    Opioid type dependence, continuous ([address] F11.20    Mild recurrent major depression ([address] F33.0    Bipolar 1 disorder ([address] F31.9    Bipolar disorder with severe depression ([address] F31.4    Opioid dependence on agonist therapy ([address] F11.20    Chest pain R07.9    Suicidal ideation R45.851    Substance abuse ([address] F19.10    Severe recurrent major depression without psychotic features ([address] F33.2        Admission Condition: poor    Discharged Condition: stable    Indication for Admission: threat to self    History of Present Illnes (present tense wording is of findings from admission exam and are not necessarily indicative of current findings):   Hawk Christianson is a 39 y.o. male with significant past medical history of opiate use disorder, patient reports bipolar disorder who presented to the ED with reports of worsening depression and suicidal ideation. States he tried to overdose on cocaine and still having continued thoughts of overdosing. Reports depressed mood, anhedonia, decreased energy, decreased concentration, disturbed sleep, poor appetite, and suicidal ideation all worsening over the past several weeks.   Denies any auditory visual hallucinations.     Patient denied any periods sober that were consistent with abelino, did have some periods that may have been consistent with a manic episode with the exception that he was intoxicated at the time. Patient states that he does have a historical diagnosis of bipolar but upon examination it is unclear if due to intoxication versus underlying bipolar. Hospital Course:   Upon admission, Enrique Santos was provided a safe secure environment, introduced to unit milieu. Patient participated in groups and individual therapies. Meds were adjusted as noted below. After few days of hospital care, patient began to feel improvement. Depression lifted, thoughts to harm self ceased. Sleep improved, appetite was good. On morning rounds 9/15/2020, Enrique Santos  endorses feeling ready for discharge. Patient denies suicidal or homicidal ideations, denies hallucinations or delusions. Denies SE's from meds. It was decided that maximum benefit from hospital care had been achieved and patient can be discharged. Consults: In house medical service for medical H&P-no acute issues    Significant Diagnostic Studies: Routine labs and diagnostics    Treatments: Psychotropic medications, therapy with group, milieu, and 1:1 with nurses, social workers, PA-C/CNP, and Attending physician. Discharge Medications:  Current Discharge Medication List      START taking these medications    Details   traZODone (DESYREL) 50 MG tablet Take 1 tablet by mouth nightly as needed for Sleep  Qty: 30 tablet, Refills: 0         CONTINUE these medications which have CHANGED    Details   amitriptyline (ELAVIL) 75 MG tablet Take 1 tablet by mouth nightly for 14 days  Qty: 14 tablet, Refills: 0      buPROPion 450 MG TB24 Take 450 mg by mouth every morning  Qty: 30 tablet, Refills: 3      gabapentin (NEURONTIN) 600 MG tablet Take 1 tablet by mouth 3 times daily for 10 days.   Qty: 30 tablet, Refills: 0      nicotine polacrilex (NICORETTE) 4 MG gum Take 1 each by mouth as needed for Smoking cessation  Qty: 110 each, Refills: 3 CONTINUE these medications which have NOT CHANGED    Details   ibuprofen (ADVIL;MOTRIN) 400 MG tablet Take 1 tablet by mouth 3 times daily as needed for Pain  Qty: 30 tablet, Refills: 0         STOP taking these medications       buprenorphine-naloxone (SUBOXONE) 4-1 MG FILM SL film Comments:   Reason for Stopping:         buprenorphine-naloxone (SUBOXONE) 4-1 MG FILM SL film Comments:   Reason for Stopping:         gabapentin (NEURONTIN) 400 MG capsule Comments:   Reason for Stopping:         carisoprodol (SOMA) 350 MG tablet Comments:   Reason for Stopping:                Patient was not discharged on 2 or more antipsychotics    Discharge Exam:  Level of consciousness:  Within normal limits  Appearance: Street clothes, seated, with good grooming  Behavior/Motor: No abnormalities noted  Attitude toward examiner:  Cooperative, attentive, good eye contact  Speech:  spontaneous, normal rate, normal volume and well articulated  Mood:  euthymic  Affect:  Full range  Thought processes:  linear, goal directed and coherent  Thought content:  denies homicidal ideation  Suicidal Ideation:  denies suicidal ideation  Delusions:  no evidence of delusions  Perceptual Disturbance:  denies any perceptual disturbance  Cognition:  Intact  Memory: age appropriate  Insight & Judgement: fair  Medication side effects: denies     Disposition: home    Patient Instructions: Activity: activity as tolerated  1. Patient instructed to take medications regularly and follow up with outpatient appointments. Follow-up as scheduled with Cierra hamilton valeria      Signed:    Electronically signed by Shabnam Rob MD on 9/15/20 at 7:31 AM EDT    Time Spent on discharge is more than 30 minutes in the examination, evaluation, counseling and review of medications and discharge plan.

## 2020-09-15 NOTE — BH NOTE
Group Psychotherapy Note     Date: 9/15/2020     Group Start Time: 1000  Group End Time: 8556  Group Topic: Group Psychotherapy    [facility]    Attendees: 5/8 - Rooms [id number]  Patient's Goal:  To actively participate in psychotherapy group and remain in the here-and-now and discuss ways to address issues regarding interpersonal relationships and social support systems. Notes:   Pt was pleasant and cooperative      Status After Intervention:  Improved     Participation Level: Active Listener and Interactive     Participation Quality: Appropriate and Attentive      Speech:  Normal      Thought Process/Content: Logical      Affective Functioning: Normal     Mood: Sad and Depressed       Level of consciousness:  Alert       Response to Learning: Able to verbalize current knowledge/experience and progressing to goal      Endings: None Reported     Modes of Intervention: Psychotherapy, Education, Support and Problem-solving     Discipline Responsible: Clinical     Signature: Albert Sanchez.  Bruno arguello MSW, LSW

## 2020-09-15 NOTE — BH NOTE
[facility]  Discharge Note    Pt discharged with followings belongings:   Dentures: None  Vision - Corrective Lenses: Glasses  Hearing Aid: None  Jewelry: Earrings  Body Piercings Removed: N/A  Clothing: Footwear, Shirt, Other (Comment)(shorts)  Were All Patient Medications Collected?: Not Applicable  Other Valuables: Cell phone, Wallet, Money (Comment), Other (Comment)($5; )   Valuables sent home with patient. Valuables retrieved from safe, Security envelope number:  [id number], [id number] and returned to patient. Patient education on aftercare instructions: Yes  Information faxed to [facility] by RN Patient verbalize understanding of AVS:  Yes.     Status EXAM upon discharge:  Status and Exam  Normal: Yes  Facial Expression: Brightened  Affect: Appropriate  Level of Consciousness: Alert  Mood:Normal: Yes  Mood: Anxious, Helpless  Motor Activity:Normal: Yes  Motor Activity: Increased  Interview Behavior: Cooperative  Preception: Warne to Time, Warne to Situation, Warne to Lorayne Fam to Person  Attention:Normal: Yes  Attention: Distractible  Thought Processes: Circumstantial  Thought Content:Normal: Yes  Thought Content: Preoccupations  Hallucinations: None  Delusions: No  Memory:Normal: Yes  Insight and Judgment: Yes  Insight and Judgment: Unmotivated  Present Suicidal Ideation: No  Present Homicidal Ideation: No      Metabolic Screening:    Lab Results   Component Value Date    LABA1C 5.9 12/07/2019       Lab Results   Component Value Date    CHOL 126 12/07/2019    CHOL 135 03/11/2019    CHOL 109 11/07/2018    CHOL 119 03/09/2018    CHOL 153 04/22/2014     Lab Results   Component Value Date    TRIG 113 12/07/2019    TRIG 69 03/11/2019    TRIG 48 11/07/2018    TRIG 78 03/09/2018    TRIG 112 04/22/2014     Lab Results   Component Value Date    HDL 40 (L) 12/07/2019    HDL 46 03/11/2019    HDL 46 11/07/2018    HDL 35 (L) 03/09/2018    HDL 51 04/22/2014     No components found for: LDLCAL  No results found for: Michelle Rios RN     Patient discharged to [facility] via black and white at 1100. Patient ambulatory. All belongings given to patient.

## 2020-09-15 NOTE — PLAN OF CARE
Problem: Altered Mood, Depressive Behavior:  Goal: Able to verbalize acceptance of life and situations over which he or she has no control  Description: Able to verbalize acceptance of life and situations over which he or she has no control  Outcome: Ongoing  Note: Patient is able to verbalize acceptance over situations he has no control over. He is possible being discharged on tomorrow and reports readiness. He is seen out and watching tv most of the night, brightened and socializing with peers. He remains in control and is compliant with all scheduled hs medications. Safety maintained per unit policy, including q 46J patient safety checks. Problem: Altered Mood, Depressive Behavior:  Goal: Able to verbalize support systems  Description: Able to verbalize support systems  Outcome: Ongoing  Note: Patient is able to verbalize support systems of friends and some family at this time. Emotional support and reassurance given. Problem: Altered Mood, Depressive Behavior:  Goal: Absence of self-harm  Description: Absence of self-harm  9/14/2020 2210 by Darleen Jasso RN  Outcome: Ongoing  Note: Patient remains free from self harm this evening. He is seen out and watching tv, socializing with peers for majority of the night. Will continue to monitor patient for safety and behavior. Problem: Tobacco Use:  Goal: Inpatient tobacco use cessation counseling participation  Description: Inpatient tobacco use cessation counseling participation  Outcome: Ongoing  Note: Patient given tobacco quitline number 30373853707 at this time, refusing to call at this time, states \" I just dont want to quit now\"- patient given information as to the dangers of long term tobacco use. Continue to reinforce the importance of tobacco cessation.        Problem: Suicide risk  Goal: Provide patient with safe environment  Description: Provide patient with safe environment  9/14/2020 2210 by Darleen Jasso RN  Outcome: Ongoing  Note: Q 15m patient safety checks. Problem: Pain:  Goal: Pain level will decrease  Description: Pain level will decrease  9/14/2020 2210 by Camryn Cai RN  Outcome: Ongoing  Note: Patient reports pain in his left ear this evening and requests prn pain medication. Medication given as ordered and patient is accepting.

## 2020-09-15 NOTE — GROUP NOTE
Group Therapy Note    Date: 9/15/2020    Group Start Time: 0900  Group End Time: 0915  Group Topic: Community Meeting    [facility]    [address] [facility]        Group Therapy Note    Attendees: 8/18         Patient's Goal:  Patient self identified his daily goal as \"be discharged to a detox center today and get settled in there\". Notes:  Patient was pleasant and appropriate throughout the session. Status After Intervention:  Improved    Participation Level:  Active Listener and Interactive    Participation Quality: Appropriate, Attentive and Sharing      Speech:  normal      Thought Process/Content: Logical      Affective Functioning: Congruent      Mood: euthymic      Level of consciousness:  Alert, Oriented x4 and Attentive      Response to Learning: Able to verbalize current knowledge/experience, Able to verbalize/acknowledge new learning, Capable of insight and Progressing to goal      Endings: None Reported    Modes of Intervention: Education, Support, Socialization, Exploration, Clarifying and Problem-solving      Discipline Responsible: Psychoeducational Specialist      Signature:  [address] [facility]

## 2020-09-15 NOTE — BH NOTE
Patient given tobacco quitline number 32492839374 at this time, refusing to call at this time, states \" I just dont want to quit now\"- patient given information as to the dangers of long term tobacco use. Continue to reinforce the importance of tobacco cessation.

## 2020-09-16 NOTE — CARE COORDINATION
Name: Angy Guardado    : 1975    Discharge Date: 7/15/75  Primary Auth/Cert #:[id number]    Patient discharged to [facility]  Discharge Medications:      Medication List      START taking these medications    traZODone 50 MG tablet  Commonly known as:  DESYREL  Take 1 tablet by mouth nightly as needed for Sleep  Notes to patient:  As needed for sleep         CHANGE how you take these medications    buPROPion HCl ER (XL) 450 MG Tb24  Take 450 mg by mouth every morning  What changed:    · medication strength  · how much to take  Notes to patient: To lower depression      nicotine polacrilex 4 MG gum  Commonly known as:  NICORETTE  Take 1 each by mouth as needed for Smoking cessation  What changed:    · medication strength  · how much to take  Notes to patient:  As needed for smoking         CONTINUE taking these medications    amitriptyline 75 MG tablet  Commonly known as:  ELAVIL  Take 1 tablet by mouth nightly for 14 days  Notes to patient: To treat depression and nerve pain      gabapentin 600 MG tablet  Commonly known as:  NEURONTIN  Take 1 tablet by mouth 3 times daily for 10 days. Notes to patient:   To treat seizures and nerve pain      ibuprofen 400 MG tablet  Commonly known as:  ADVIL;MOTRIN  Take 1 tablet by mouth 3 times daily as needed for Pain  Notes to patient:  As needed for pain         STOP taking these medications    carisoprodol 350 MG tablet  Commonly known as:  SOMA     Suboxone 4-1 MG Film SL film  Generic drug:  buprenorphine-naloxone           Where to Get Your Medications      These medications were sent to [facility] Kiran Pleitez, [address] [place] Pleitez  [address]    Phone:  [phone]   · amitriptyline 75 MG tablet  · buPROPion HCl ER (XL) 450 MG Tb24  · gabapentin 600 MG tablet  · nicotine polacrilex 4 MG gum  · traZODone 50 MG tablet      Pharmacy Instructions:      Medications has been sent to [address] [place] [address] to be filled. Follow Up Appointment: [place] [facility]  3.5 Treatment  [facility], [address]  Phone: [phone]   Fax: [phone]    THERE IS NO FOLLOW-UP APPOINTMENT NEEDED. ALL FOLLOW-UP IS DONE AT THE RECOVERY CENTER. Go on 9/15/2020  Please send by [facility], [address] transportation    [facility]    Call  Call Jennifer King, , [phone] ext.  [facility], [address]

## 2020-09-23 ENCOUNTER — TELEPHONE (OUTPATIENT)
Dept: INTERNAL MEDICINE CLINIC | Age: 45
End: 2020-09-23

## 2020-09-23 PROBLEM — Z00.00 HEALTHCARE MAINTENANCE: Status: ACTIVE | Noted: 2020-09-23

## 2020-10-21 ENCOUNTER — HOSPITAL ENCOUNTER (INPATIENT)
Age: 45
LOS: 9 days | Discharge: HOME | DRG: 753 | End: 2020-10-30
Attending: EMERGENCY MEDICINE | Admitting: PSYCHIATRY & NEUROLOGY
Payer: COMMERCIAL

## 2020-10-21 PROBLEM — F32.A DEPRESSION WITH SUICIDAL IDEATION: Status: ACTIVE | Noted: 2020-10-21

## 2020-10-21 PROBLEM — F32.9 MAJOR DEPRESSION, SINGLE EPISODE: Status: ACTIVE | Noted: 2020-10-21

## 2020-10-21 PROBLEM — R45.851 DEPRESSION WITH SUICIDAL IDEATION: Status: ACTIVE | Noted: 2020-10-21

## 2020-10-21 LAB
SARS-COV-2, RAPID: NOT DETECTED
SARS-COV-2: NORMAL
SARS-COV-2: NORMAL
SOURCE: NORMAL

## 2020-10-21 PROCEDURE — U0002 COVID-19 LAB TEST NON-CDC: HCPCS

## 2020-10-21 PROCEDURE — 1240000000 HC EMOTIONAL WELLNESS R&B

## 2020-10-21 PROCEDURE — 6370000000 HC RX 637 (ALT 250 FOR IP): Performed by: PSYCHIATRY & NEUROLOGY

## 2020-10-21 PROCEDURE — 99285 EMERGENCY DEPT VISIT HI MDM: CPT

## 2020-10-21 RX ORDER — NICOTINE 21 MG/24HR
1 PATCH, TRANSDERMAL 24 HOURS TRANSDERMAL DAILY
Status: DISCONTINUED | OUTPATIENT
Start: 2020-10-21 | End: 2020-10-29

## 2020-10-21 RX ORDER — IBUPROFEN 200 MG
200 TABLET ORAL EVERY 6 HOURS PRN
Status: ON HOLD | COMMUNITY
End: 2021-04-19 | Stop reason: HOSPADM

## 2020-10-21 RX ORDER — HYDROXYZINE 50 MG/1
50 TABLET, FILM COATED ORAL 3 TIMES DAILY PRN
Status: DISCONTINUED | OUTPATIENT
Start: 2020-10-21 | End: 2020-10-30 | Stop reason: HOSPADM

## 2020-10-21 RX ORDER — MAGNESIUM HYDROXIDE/ALUMINUM HYDROXICE/SIMETHICONE 120; 1200; 1200 MG/30ML; MG/30ML; MG/30ML
30 SUSPENSION ORAL EVERY 6 HOURS PRN
Status: DISCONTINUED | OUTPATIENT
Start: 2020-10-21 | End: 2020-10-30 | Stop reason: HOSPADM

## 2020-10-21 RX ORDER — TRAZODONE HYDROCHLORIDE 50 MG/1
50 TABLET ORAL NIGHTLY PRN
Status: DISCONTINUED | OUTPATIENT
Start: 2020-10-21 | End: 2020-10-30 | Stop reason: HOSPADM

## 2020-10-21 RX ORDER — GABAPENTIN 600 MG/1
600 TABLET ORAL 3 TIMES DAILY
Status: DISCONTINUED | OUTPATIENT
Start: 2020-10-21 | End: 2020-10-30 | Stop reason: HOSPADM

## 2020-10-21 RX ORDER — ACETAMINOPHEN 325 MG/1
650 TABLET ORAL EVERY 4 HOURS PRN
Status: DISCONTINUED | OUTPATIENT
Start: 2020-10-21 | End: 2020-10-30 | Stop reason: HOSPADM

## 2020-10-21 RX ORDER — BUPRENORPHINE AND NALOXONE 8; 2 MG/1; MG/1
1 FILM, SOLUBLE BUCCAL; SUBLINGUAL 2 TIMES DAILY
Status: DISCONTINUED | OUTPATIENT
Start: 2020-10-21 | End: 2020-10-30 | Stop reason: HOSPADM

## 2020-10-21 RX ORDER — AMITRIPTYLINE HYDROCHLORIDE 25 MG/1
75 TABLET, FILM COATED ORAL NIGHTLY
Status: DISCONTINUED | OUTPATIENT
Start: 2020-10-21 | End: 2020-10-30 | Stop reason: HOSPADM

## 2020-10-21 RX ORDER — BUPRENORPHINE AND NALOXONE 8; 2 MG/1; MG/1
1 FILM, SOLUBLE BUCCAL; SUBLINGUAL 2 TIMES DAILY
Status: ON HOLD | COMMUNITY
End: 2020-10-29 | Stop reason: SDUPTHER

## 2020-10-21 RX ORDER — POLYETHYLENE GLYCOL 3350 17 G/17G
17 POWDER, FOR SOLUTION ORAL DAILY PRN
Status: DISCONTINUED | OUTPATIENT
Start: 2020-10-21 | End: 2020-10-29

## 2020-10-21 RX ORDER — IBUPROFEN 400 MG/1
400 TABLET ORAL EVERY 6 HOURS PRN
Status: DISCONTINUED | OUTPATIENT
Start: 2020-10-21 | End: 2020-10-30 | Stop reason: HOSPADM

## 2020-10-21 RX ADMIN — HYDROXYZINE HYDROCHLORIDE 50 MG: 50 TABLET, FILM COATED ORAL at 16:43

## 2020-10-21 RX ADMIN — BUPRENORPHINE AND NALOXONE 1 FILM: 8; 2 FILM BUCCAL; SUBLINGUAL at 19:49

## 2020-10-21 RX ADMIN — AMITRIPTYLINE HYDROCHLORIDE 75 MG: 25 TABLET, FILM COATED ORAL at 21:01

## 2020-10-21 RX ADMIN — GABAPENTIN 600 MG: 600 TABLET, FILM COATED ORAL at 21:01

## 2020-10-21 RX ADMIN — ACETAMINOPHEN 650 MG: 325 TABLET, FILM COATED ORAL at 16:43

## 2020-10-21 SDOH — HEALTH STABILITY: MENTAL HEALTH: HOW OFTEN DO YOU HAVE A DRINK CONTAINING ALCOHOL?: NOT ASKED

## 2020-10-21 ASSESSMENT — PAIN DESCRIPTION - LOCATION: LOCATION: HEAD;BACK

## 2020-10-21 ASSESSMENT — PATIENT HEALTH QUESTIONNAIRE - PHQ9: SUM OF ALL RESPONSES TO PHQ QUESTIONS 1-9: 9

## 2020-10-21 ASSESSMENT — ENCOUNTER SYMPTOMS
ABDOMINAL PAIN: 0
COLOR CHANGE: 0
BACK PAIN: 0
EYE PAIN: 0
SHORTNESS OF BREATH: 0

## 2020-10-21 ASSESSMENT — PAIN SCALES - GENERAL
PAINLEVEL_OUTOF10: 8
PAINLEVEL_OUTOF10: 5

## 2020-10-21 ASSESSMENT — SLEEP AND FATIGUE QUESTIONNAIRES
SLEEP PATTERN: DIFFICULTY FALLING ASLEEP;RESTLESSNESS;DISTURBED/INTERRUPTED SLEEP
DIFFICULTY ARISING: NO
DO YOU HAVE DIFFICULTY SLEEPING: YES
DIFFICULTY STAYING ASLEEP: YES
AVERAGE NUMBER OF SLEEP HOURS: 4
DIFFICULTY FALLING ASLEEP: YES
RESTFUL SLEEP: NO
DO YOU USE A SLEEP AID: YES

## 2020-10-21 ASSESSMENT — LIFESTYLE VARIABLES: HISTORY_ALCOHOL_USE: NO

## 2020-10-21 ASSESSMENT — PAIN DESCRIPTION - PAIN TYPE: TYPE: ACUTE PAIN

## 2020-10-21 NOTE — BH NOTE
`[facility] [place]  Admission Note     Admission Type:   Admission Type: Voluntary    Reason for admission:  Reason for Admission: SI to jump in front of a train    PATIENT STRENGTHS:  Strengths: Communication, Connection to output provider    Patient Strengths and Limitations:  Limitations: Inappropriate/potentially harmful leisure interests    Addictive Behavior:   Addictive Behavior  In the past 3 months, have you felt or has someone told you that you have a problem with:  : None  Do you have a history of Chemical Use?: No  Do you have a history of Alcohol Use?: No  Do you have a history of Street Drug Abuse?: Yes  Histroy of Prescripton Drug Abuse?: No    Medical Problems:   Past Medical History:   Diagnosis Date    Anxiety     Depression     [facility] C w/ coma, chronic [facility] [address]     Substance abuse ([facility] [address]        Status EXAM:  Status and Exam  Normal: No  Facial Expression: Avoids Gaze, Expressionless, Flat  Affect: Blunt  Level of Consciousness: Alert  Mood:Normal: No  Mood: Depressed, Anxious, Empty, Helpless, Ashamed/humiliated  Motor Activity:Normal: Yes  Interview Behavior: Cooperative  Preception: Erwin to Person, Erwin to Time, Erwin to Place, Erwin to Situation  Thought Content:Normal: No  Thought Content: Preoccupations  Hallucinations: None  Delusions: No  Memory:Normal: Yes  Insight and Judgment: No  Insight and Judgment: Poor Judgment, Poor Insight, Unmotivated  Present Suicidal Ideation: Other(See comment)(fleeting SI)  Present Homicidal Ideation: No    Tobacco Screening:  Practical Counseling, on admission, lali X, if applicable and completed (first 3 are required if patient doesn't refuse):            ( )  Recognizing danger situations (included triggers and roadblocks)                    ( )  Coping skills (new ways to manage stress, exercise, relaxation techniques, changing routine, distraction)                                                           ( )  Basic information about quitting (benefits of quitting, techniques in how to quit, available resources  ( ) Referral for counseling faxed to [facility]                                           ( x) Patient refused counseling  ( ) Patient has not smoked in the last 30 days    Metabolic Screening:    Lab Results   Component Value Date    LABA1C 5.9 12/07/2019       Lab Results   Component Value Date    CHOL 126 12/07/2019    CHOL 135 03/11/2019    CHOL 109 11/07/2018    CHOL 119 03/09/2018    CHOL 153 04/22/2014     Lab Results   Component Value Date    TRIG 113 12/07/2019    TRIG 69 03/11/2019    TRIG 48 11/07/2018    TRIG 78 03/09/2018    TRIG 112 04/22/2014     Lab Results   Component Value Date    HDL 40 (L) 12/07/2019    HDL 46 03/11/2019    HDL 46 11/07/2018    HDL 35 (L) 03/09/2018    HDL 51 04/22/2014     No components found for: LDLCAL  No results found for: LABVLDL      Body mass index is 29.43 kg/m². BP Readings from Last 2 Encounters:   10/21/20 (!) 143/91   09/15/20 120/65           Pt admitted with followings belongings:  Dentures: None  Vision - Corrective Lenses: Glasses  Hearing Aid: None  Jewelry: Ring, Earrings  Body Piercings Removed: Yes  Clothing: Footwear, Jacket / coat, Shirt, Socks, Other (Comment)(black cap)  Were All Patient Medications Collected?: Not Applicable  Other Valuables: Cell phone, [employer] placed in safe in security envelope, number:  [id number]. Patient's home medications were verified. Patient oriented to surroundings and program expectations and copy of patient rights given. Received admission packet:  yes. Consents reviewed, signed yes. Refused n/a. Patient verbalize understanding:  yes. Patient education on precautions: yes    Patient arrived from [facility] with SI to jump in front of a train. Patient has felt increased depression for a few weeks and suicidal for 1 week. Patient is compliant with meds. His last use of cocaine/crack IV was yesterday.  He denies alcohol use. Patient was living at [facility] and wants to return to a detox after discharge. Patient has not been sleeping well.                 Ramin Finney RN

## 2020-10-21 NOTE — BH NOTE
Patient arrived from [facility] with SI to jump in front of a train. Patient dressed in proper attire. All consents signed at this time.

## 2020-10-21 NOTE — BH NOTE
Patient given tobacco quitline number 22537306466 at this time, refusing to call at this time, states \" I just dont want to quit now\"- patient given information as to the dangers of long term tobacco use. Continue to reinforce the importance of tobacco cessation.

## 2020-10-21 NOTE — ED NOTES
Provisional Diagnosis:     Patient was brought to ED by [facility] after experiencing suicidal ideation. Psychosocial and Contextual Factors:    Patient has history of depression with SI.    C-SSRS Summary:    Patient reports current suicidal ideation in the form of \"jumping in front of a train\". Patient: X  Family:   Agency:     Substance Abuse:  Patient reports using \"weed\". Present Suicidal Behavior:    Patient reports current suicidal ideation in the form of \"jumping in front of a train\". Verbal: X    Attempt:    Past Suicidal Behavior:   Patient reports a previous suicide attempt about \"2 years ago\" he reported he \"cut his wrist\". Patient has past hospitalizations due to SI. Verbal: X    Attempt: X      Self-Injurious/Self-Mutilation:  Patient denies. Trauma Identified:    Patient denies. Protective Factors:    Patient has stable income and stable housing. Patient has insurance. Risk Factors:    Patient is one suboxone program through [facility]. Clinical Summary:    Patient is a 39year old  male who was brought to ED by [facility] after experiencing suicidal ideation. Patient reports he has been feeling this way for \"about a week\". Patient reports a previous suicide attempt about \"2 years ago\" he reported he \"cut his wrist\". Patient has past hospitalizations due to SI. Patient reports being connected to the [facility] and is on a suboxone program.  Patient has identified his diagnosis as \"bipolar, anxiety and depression\". Patient denies HI. Patient denies auditory or visual hallucinations. Patient denies alcohol use. Patient denies legal issues/concerns. Patient reports he is getting about \"2-3 hours\" of sleep a night. Patient also reports a decline in his appetite. Level of Care Disposition: This writer consulted with Dr SCOTT who recommended inpatient hospitalization for safety and stabilization.   Patient signed application for voluntary admission to [facility].

## 2020-10-21 NOTE — ED PROVIDER NOTES
Problem List  Patient Active Problem List   Diagnosis Code    Recurrent depression ([facility] [address] F33.9    MDD (major depressive disorder), recurrent episode ([address] F33.9    Bipolar I disorder, most recent episode depressed ([address] F31.30    Opioid type dependence, continuous ([address] F11.20    Mild recurrent major depression ([address] F33.0    Bipolar 1 disorder ([address] F31.9    Bipolar disorder with severe depression ([address] F31.4    Opioid dependence on agonist therapy ([address] F11.20    Chest pain R07.9    Suicidal ideation R45.851    Substance abuse ([address] F19.10    Severe recurrent major depression without psychotic features ([address] F33.2    Healthcare maintenance Z00.00    Major depression, single episode F32.9     SURGICAL HISTORY     History reviewed. No pertinent surgical history. CURRENT MEDICATIONS       Current Discharge Medication List      CONTINUE these medications which have NOT CHANGED    Details   buprenorphine-naloxone (SUBOXONE) 8-2 MG FILM SL film Place 1 Film under the tongue 2 times daily. Indications: last fill 10/16/20 for 14 days      ibuprofen (ADVIL;MOTRIN) 200 MG tablet Take 200 mg by mouth every 6 hours as needed for Pain      amitriptyline (ELAVIL) 75 MG tablet Take 1 tablet by mouth nightly for 14 days  Qty: 14 tablet, Refills: 0      buPROPion 450 MG TB24 Take 450 mg by mouth every morning  Qty: 30 tablet, Refills: 3      gabapentin (NEURONTIN) 600 MG tablet Take 1 tablet by mouth 3 times daily for 10 days. Qty: 30 tablet, Refills: 0      nicotine polacrilex (NICORETTE) 4 MG gum Take 1 each by mouth as needed for Smoking cessation  Qty: 110 each, Refills: 3           ALLERGIES     is allergic to duloxetine and levonorgestrel-ethinyl estrad. FAMILY HISTORY     He indicated that the status of his mother is unknown. He indicated that the status of his father is unknown. He indicated that the status of his other is unknown.      SOCIAL HISTORY       Social History     Tobacco Use    Smoking status: Current Every Day Smoker     Packs/day: 0.50     Years: 26.00     Pack years: 13.00     Types: Cigarettes    Smokeless tobacco: Never Used   Substance Use Topics    Alcohol use: Not Currently    Drug use: Yes     Frequency: 7.0 times per week     Types: Marijuana, IV, Cocaine     Comment: cocaine/crack IV     PHYSICAL EXAM     INITIAL VITALS: BP (!) 143/91   Pulse 112   Temp 98.1 °F (36.7 °C) (Oral)   Resp 16   Ht 6' (1.829 m)   Wt 217 lb (98.4 kg)   SpO2 97%   BMI 29.43 kg/m²    Physical Exam  Vitals signs and nursing note reviewed. Constitutional:       Appearance: Normal appearance. HENT:      Head: Normocephalic and atraumatic. Right Ear: External ear normal.      Left Ear: External ear normal.      Nose: Nose normal.      Mouth/Throat:      Mouth: Mucous membranes are moist.   Eyes:      Pupils: Pupils are equal, round, and reactive to light. Neck:      Musculoskeletal: Neck supple. Cardiovascular:      Rate and Rhythm: Normal rate and regular rhythm. Pulses: Normal pulses. Heart sounds: Normal heart sounds. Pulmonary:      Effort: Pulmonary effort is normal.      Breath sounds: Normal breath sounds. Abdominal:      General: Abdomen is flat. Palpations: Abdomen is soft. Tenderness: There is no abdominal tenderness. Musculoskeletal: Normal range of motion. General: No tenderness. Skin:     General: Skin is warm and dry. Capillary Refill: Capillary refill takes less than 2 seconds. Neurological:      General: No focal deficit present. Mental Status: He is alert and oriented to person, place, and time. Psychiatric:         Attention and Perception: Attention normal.         Speech: Speech normal.         Behavior: Behavior normal. Behavior is cooperative. Thought Content: Thought content includes suicidal ideation. Thought content includes suicidal plan.          MEDICAL DECISION MAKIN-year-old male presents with chief complaint of suicidal ideation. On initial exam patient with continued thoughts of suicide, potential active plans, patient was discussed with social work who agrees that patient would benefit from inpatient psychiatric treatment. Patient is agreeable to admission for inpatient psychiatric treatment, patient medically cleared         CRITICAL CARE:       PROCEDURES:    Procedures    DIAGNOSTIC RESULTS   EKG:All EKG's are interpreted by the Emergency Department Physician who either signs or Co-signs this chart in the absence of a cardiologist.        RADIOLOGY:All plain film, CT, MRI, and formal ultrasound images (except ED bedside ultrasound) are read by the radiologist, see reports below, unless otherwisenoted in MDM or here. No orders to display     LABS: All lab results were reviewed by myself, and all abnormals are listed below. Labs Reviewed   COVID-19       EMERGENCY DEPARTMENTCOURSE:         Vitals:    Vitals:    10/21/20 1319 10/21/20 1612   BP: (!) 141/87 (!) 143/91   Pulse: 84 112   Resp: 16 16   Temp: 97.8 °F (36.6 °C) 98.1 °F (36.7 °C)   TempSrc: Oral Oral   SpO2: 97%    Weight: 200 lb (90.7 kg) 217 lb (98.4 kg)   Height: 6' (1.829 m) 6' (1.829 m)       The patient was given the following medications while in the emergency department:  Orders Placed This Encounter   Medications    acetaminophen (TYLENOL) tablet 650 mg    aluminum & magnesium hydroxide-simethicone (MAALOX) 200-200-20 MG/5ML suspension 30 mL    hydrOXYzine (ATARAX) tablet 50 mg    ibuprofen (ADVIL;MOTRIN) tablet 400 mg    nicotine (NICODERM CQ) 14 MG/24HR 1 patch    nicotine polacrilex (NICORETTE) gum 2 mg    polyethylene glycol (GLYCOLAX) packet 17 g    traZODone (DESYREL) tablet 50 mg     CONSULTS:  IP CONSULT TO HISTORY AND PHYSICAL    FINAL IMPRESSION      1. Suicidal ideation          DISPOSITION/PLAN   DISPOSITION        PATIENT REFERRED TO:  No follow-up provider specified.   DISCHARGE MEDICATIONS:  Current Discharge Medication List        Edil Baker DO  Attending Emergency Physician                  Edil Baker DO  10/21/20 2801

## 2020-10-21 NOTE — BH NOTE
BPA advisory indicated patient be placed on suicide precautions. Patient is denying suicidal ideations and contracts for safety while on the unit. Patient does not meet criteria to be a 1:1. Patient placed on 15 min safety checks. Will continue to monitor patient.

## 2020-10-22 PROCEDURE — 99221 1ST HOSP IP/OBS SF/LOW 40: CPT | Performed by: PSYCHIATRY & NEUROLOGY

## 2020-10-22 PROCEDURE — 6370000000 HC RX 637 (ALT 250 FOR IP): Performed by: PSYCHIATRY & NEUROLOGY

## 2020-10-22 PROCEDURE — 1240000000 HC EMOTIONAL WELLNESS R&B

## 2020-10-22 RX ORDER — BUPROPION HYDROCHLORIDE 450 MG/1
450 TABLET, FILM COATED, EXTENDED RELEASE ORAL EVERY MORNING
Status: DISCONTINUED | OUTPATIENT
Start: 2020-10-23 | End: 2020-10-22

## 2020-10-22 RX ORDER — BUPROPION HYDROCHLORIDE 300 MG/1
300 TABLET ORAL ONCE
Status: COMPLETED | OUTPATIENT
Start: 2020-10-22 | End: 2020-10-22

## 2020-10-22 RX ADMIN — BUPRENORPHINE AND NALOXONE 1 FILM: 8; 2 FILM BUCCAL; SUBLINGUAL at 21:18

## 2020-10-22 RX ADMIN — GABAPENTIN 600 MG: 600 TABLET, FILM COATED ORAL at 14:18

## 2020-10-22 RX ADMIN — BUPROPION HYDROCHLORIDE 300 MG: 300 TABLET, FILM COATED, EXTENDED RELEASE ORAL at 15:33

## 2020-10-22 RX ADMIN — ACETAMINOPHEN 650 MG: 325 TABLET, FILM COATED ORAL at 08:55

## 2020-10-22 RX ADMIN — GABAPENTIN 600 MG: 600 TABLET, FILM COATED ORAL at 21:18

## 2020-10-22 RX ADMIN — NICOTINE POLACRILEX 2 MG: 2 GUM, CHEWING ORAL at 17:04

## 2020-10-22 RX ADMIN — BUPRENORPHINE AND NALOXONE 1 FILM: 8; 2 FILM BUCCAL; SUBLINGUAL at 08:21

## 2020-10-22 RX ADMIN — AMITRIPTYLINE HYDROCHLORIDE 75 MG: 25 TABLET, FILM COATED ORAL at 21:18

## 2020-10-22 RX ADMIN — GABAPENTIN 600 MG: 600 TABLET, FILM COATED ORAL at 08:21

## 2020-10-22 RX ADMIN — NICOTINE POLACRILEX 2 MG: 2 GUM, CHEWING ORAL at 12:00

## 2020-10-22 RX ADMIN — ACETAMINOPHEN 650 MG: 325 TABLET, FILM COATED ORAL at 17:04

## 2020-10-22 ASSESSMENT — PAIN SCALES - GENERAL
PAINLEVEL_OUTOF10: 4
PAINLEVEL_OUTOF10: 6

## 2020-10-22 NOTE — CARE COORDINATION
[facility] INTAKE: 3.5    Pt states he plans to dc from [facility] and re-enter 3.5 detox program @ [facility]. Writer spoke with Melissa Villasenor on this date and informed that it is 8:00am-1:00pm walk in assessment. Melissa Villasenor states \"technicall we do not admit anyone to the 3.5, it's taken care of at the assessment\".  Pt requested to be cabbed directly to [facility] date of dc for intake and assessment

## 2020-10-22 NOTE — GROUP NOTE
Group Therapy Note    Date: 10/22/2020    Group Start Time: 1330  Group End Time: 1743  Group Topic: Psychoeducation    BARBER Rivera, CTRS    Patient refused to attend Psychoeducation Group at 1330 after encouragement from staff. 1:1 talk time offered.     Signature:  Neeta Gordon

## 2020-10-22 NOTE — GROUP NOTE
Group Therapy Note    Date: 10/22/2020    Group Start Time: 0900  Group End Time: 0920  Group Topic: Community Meeting    [facility] EDNA Garcia, [address]    Patient refused to attend Goal Setting / Community Meeting Group at 0900 after encouragement from staff. 1:1 talk time offered.     Signature:  Deric Jensen
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
## 2020-10-22 NOTE — GROUP NOTE
Group Therapy Note    Date: 10/22/2020    Group Start Time: 1100  Group End Time: 2646  Group Topic: Psychotherapy    BARBER Holley, MSW, LSW        Group Therapy Note    Attendees: 6/12    Pt declined to attend psychotherapy at 1100 am despite encouragement. Pt offered 1:1 and refused.

## 2020-10-22 NOTE — H&P
HISTORY and Adelita Estevez [id number]       NAME:  Mauricio Sánchez  MRN: [id number]   YOB: 1975   Date: 10/22/2020   Age: 39 y.o. Gender: male     COMPLAINT AND PRESENT HISTORY:    Mauricio Sánchez is a 39 y.o.  male, admitted because of increasing depression with suicidal ideation. According to ED/ Admission notes, he was in with increased suicidal thoughts and was reportedly stating that he had been under a lot of stress. According to psychiatry visit patient came in to ED after leaving detox and using cocaine IV. She did have plans to jump in traffic. Patient has not history of cocaine/crack usage intravenously. Patient reports that he is on Suboxone presently. Patient voices that he has been compliant with his medications normally but did miss 4 days. Patient admits to sleep disturbances and racing thoughts. Patient denies any alcohol or drug abuse currently. Patient states upon discharge he will live with friends. Patient does have somatic complaint of headache. Patient denies any somatic complaints. No significant lab values or procedures. No  chest pain or  shortness of breath. No fever/chills. Please see patient's psychiatric hx for more information. DIAGNOSTIC RESULTS       PAST MEDICAL HISTORY     Past Medical History:   Diagnosis Date    Anxiety     Depression     Hep C w/ coma, chronic (HCC)     Substance abuse ([facility] [address]        Pt denies any history of Diabetes mellitus type 2, hypertension, stroke, heart disease, COPD, Asthma, GERD, HLD, Cancer, Seizures,Thyroid disease, Kidney Disease,  TB.    SURGICAL HISTORY     History reviewed. No pertinent surgical history. FAMILY HISTORY       Family History   Problem Relation Age of Onset    Diabetes Mother     Hypertension Mother     Depression Mother         & Uncles    Coronary Art Dis Father     Cancer Other         G. Parent  ?        SOCIAL HISTORY       Social History     Socioeconomic History  Marital status: Legally      Spouse name: Parth Wilkins Number of children: 2    Years of education: None    Highest education level: None   Occupational History    None   Social Needs    Financial resource strain: None    Food insecurity     Worry: None     Inability: None    Transportation needs     Medical: None     Non-medical: None   Tobacco Use    Smoking status: Current Every Day Smoker     Packs/day: 0.50     Years: 26.00     Pack years: 13.00     Types: Cigarettes    Smokeless tobacco: Never Used   Substance and Sexual Activity    Alcohol use: Not Currently    Drug use: Yes     Frequency: 7.0 times per week     Types: Marijuana, IV, Cocaine     Comment: cocaine/crack IV    Sexual activity: Yes     Partners: Female   Lifestyle    Physical activity     Days per week: None     Minutes per session: None    Stress: None   Relationships    Social connections     Talks on phone: None     Gets together: None     Attends Orthodoxy service: None     Active member of club or organization: None     Attends meetings of clubs or organizations: None     Relationship status: None    Intimate partner violence     Fear of current or ex partner: None     Emotionally abused: None     Physically abused: None     Forced sexual activity: None   Other Topics Concern    None   Social History Narrative    None           REVIEW OF SYSTEMS      Allergies   Allergen Reactions    Duloxetine      Other reaction(s): Headache  Pt complains of migraine headache       No current facility-administered medications on file prior to encounter. Current Outpatient Medications on File Prior to Encounter   Medication Sig Dispense Refill    buprenorphine-naloxone (SUBOXONE) 8-2 MG FILM SL film Place 1 Film under the tongue 2 times daily.  Indications: last fill 10/16/20 for 14 days      ibuprofen (ADVIL;MOTRIN) 200 MG tablet Take 200 mg by mouth every 6 hours as needed for Pain      amitriptyline (ELAVIL) 75 MG tablet Take 1 tablet by mouth nightly for 14 days 14 tablet 0    buPROPion 450 MG TB24 Take 450 mg by mouth every morning 30 tablet 3    gabapentin (NEURONTIN) 600 MG tablet Take 1 tablet by mouth 3 times daily for 10 days. 30 tablet 0    nicotine polacrilex (NICORETTE) 4 MG gum Take 1 each by mouth as needed for Smoking cessation 110 each 3                      General health:  Fairly good. No fever or chills. Skin:  No itching, redness or rash. Head, eyes, ears, nose, throat:  No headache, epistaxis, rhinorrhea hearing loss or sore throat. Neck:  No pain, stiffness or masses. Cardiovascular/Respiratory system:  No chest pain, palpitation, shortness of breath, coughing or expectoration. Gastrointestinal tract: No abdominal pain, nausea, vomiting, dysphagia, diarrhea or constipation. Genitourinary:  No burning on micturition. No hesitancy, urgency, frequency or discoloration of urine. Locomotor:  No bone or joint pains. No swelling or deformities. Neuropsychiatric:  See HPI. GENERAL PHYSICAL EXAM:     Vitals: /80   Pulse 95   Temp 98 °F (36.7 °C) (Oral)   Resp 14   Ht 6' (1.829 m)   Wt 217 lb (98.4 kg)   SpO2 99%   BMI 29.43 kg/m²  Body mass index is 29.43 kg/m². Pt was examined with a nurse present in the room. GENERAL APPEARANCE:  Jai Padilla is 39 y.o.,  male, moderately obese, nourished, conscious, alert. Does not appear to be distress or pain at this time. SKIN:  Warm, dry, no cyanosis or jaundice. HEAD:  Normocephalic, atraumatic, no swelling or tenderness. EYES:  Pupils equal, reactive to light, Conjunctiva is clear, EOMs intact danika. eyelids WNL. EARS:  No discharge, no marked hearing loss. NOSE:  No rhinorrhea, epistaxis or septal deformity. THROAT:  Not congested. No ulceration bleeding or discharge.      NECK:  No stiffness, trachea central.  No palpable masses or L.N.      CHEST: Symmetrical and equal on expansion. HEART:  Regular rate and rhythm. S1 > S2, No audible murmurs or gallops. LUNGS:  Equal on expansion, normal breath sounds. No adventitious sounds. ABDOMEN:  Obese. Soft on palpation. No localized tenderness. No guarding or rigidity. No palpable organomegaly. LYMPHATICS:  No palpable cervical Lymphadenopathy. LOCOMOTOR, BACK AND SPINE:  No tenderness or deformities. EXTREMITIES:  Symmetrical, no pretibial edema. Rosalvas sign negative. No discoloration or ulcerations. NEUROLOGIC:  The patient is conscious, alert, oriented,Cranial nerve II-XII intact, taste and smell were not examined. No apparent focal sensory or motor deficits. Muscle strength equal Desmond. No facial droop, tongue protrudes centrally, no slurring of the speech. PROVISIONAL DIAGNOSES:      Active Problems:    Major depression, single episode    Depression with suicidal ideation  Resolved Problems:    * No resolved hospital problems.  *      KRISTY MORALEZ, PRADEEP - CNP on 10/22/2020 at 4:07 PM

## 2020-10-22 NOTE — GROUP NOTE
Group Therapy Note    Date: 10/22/2020    Group Start Time: 1000  Group End Time: 7985  Group Topic: Recreational    [address] [facility], [place] [facility]    Patient refused to attend Recreational Therapy Group at 1000 after encouragement from staff. 1:1 talk time offered.     Signature:  Jareth Albarran

## 2020-10-22 NOTE — PLAN OF CARE
[address] [facility]  Initial Interdisciplinary Treatment Plan NO      Original treatment plan Date & Time: 10/22/2020 0818    Admission Type:  Admission Type: Voluntary    Reason for admission:   Reason for Admission: SI to jump in front of a train    Estimated Length of Stay:  5-7days  Estimated Discharge Date: to be determined by physician    PATIENT STRENGTHS:  Patient Strengths:Strengths: Communication, Connection to output provider  Patient Strengths and Limitations:Limitations: Inappropriate/potentially harmful leisure interests  Addictive Behavior: Addictive Behavior  In the past 3 months, have you felt or has someone told you that you have a problem with:  : None  Do you have a history of Chemical Use?: No  Do you have a history of Alcohol Use?: No  Do you have a history of Street Drug Abuse?: Yes  Histroy of Prescripton Drug Abuse?: No  Medical Problems:  Past Medical History:   Diagnosis Date    Anxiety     Depression     Hep C w/ coma, chronic (HCC)     Substance abuse ([facility] [address]      Status EXAM:Status and Exam  Normal: No  Facial Expression: Avoids Gaze, Flat  Affect: Blunt  Level of Consciousness: Alert  Mood:Normal: No  Mood: Depressed, Anxious, Helpless  Motor Activity:Normal: Yes  Interview Behavior: Cooperative  Preception: [place] to Person, [place]ryn Y[place] to Time, [place] to Place, [place] to Situation  Attention:Normal: Yes  Thought Content:Normal: Yes  Thought Content: Preoccupations  Hallucinations: None  Delusions: No  Memory:Normal: Yes  Insight and Judgment: No  Insight and Judgment: Poor Judgment, Poor Insight  Present Suicidal Ideation: No  Present Homicidal Ideation: No    EDUCATION:   Learner Progress Toward Treatment Goals: reviewed group plans and strategies for care    Method:group therapy, medication compliance, individualized assessments and care planning    Outcome: needs reinforcement    PATIENT GOALS: to be discussed with patient within 72 hours    PLAN/TREATMENT RECOMMENDATIONS:

## 2020-10-22 NOTE — PLAN OF CARE
This [facility] for the following reason(s). This is a Psychotherapy Note. Problem: Suicide risk  Goal: Provide patient with safe environment  Description: Provide patient with safe environment  Outcome: Ongoing   Pt safe on unit. Problem: Depressive Behavior With or Without Suicide Precautions:  Goal: Able to verbalize and/or display a decrease in depressive symptoms  Description: Able to verbalize and/or display a decrease in depressive symptoms  Outcome: Ongoing   Pt expresses depression at this time denies suicidal thoughts. Problem: Depressive Behavior With or Without Suicide Precautions:  Goal: Ability to disclose and discuss suicidal ideas will improve  Description: Ability to disclose and discuss suicidal ideas will improve  Outcome: Ongoing   Pt denies suicidal thoughts.

## 2020-10-22 NOTE — PLAN OF CARE
Problem: Suicide risk  Goal: Provide patient with safe environment  Description: Provide patient with safe environment  10/22/2020 1214 by Jayy Johansen LPN  Outcome: Ongoing   Q15 minute safety checks maintained. Problem: Depressive Behavior With or Without Suicide Precautions:  Goal: Able to verbalize and/or display a decrease in depressive symptoms  Description: Able to verbalize and/or display a decrease in depressive symptoms  10/22/2020 1214 by Jayy Johansen LPN  Outcome: Ongoing   Patient isolative to self out for needs. Reports depression with improved mood. Patient encouraged to participate in group sessions. Eating and sleeping okay. Will continue to monitor. Problem: Depressive Behavior With or Without Suicide Precautions:  Goal: Ability to disclose and discuss suicidal ideas will improve  Description: Ability to disclose and discuss suicidal ideas will improve  10/22/2020 1214 by Jayy Johansen LPN  Outcome: Ongoing   Patient denies thoughts of harming themself and verbally agrees to remain safe while on the unit.  No self harming behaviors are noted this shift

## 2020-10-23 PROBLEM — Z00.00 HEALTHCARE MAINTENANCE: Status: RESOLVED | Noted: 2020-09-23 | Resolved: 2020-10-23

## 2020-10-23 PROCEDURE — 6370000000 HC RX 637 (ALT 250 FOR IP): Performed by: PSYCHIATRY & NEUROLOGY

## 2020-10-23 PROCEDURE — 1240000000 HC EMOTIONAL WELLNESS R&B

## 2020-10-23 PROCEDURE — 99232 SBSQ HOSP IP/OBS MODERATE 35: CPT | Performed by: PSYCHIATRY & NEUROLOGY

## 2020-10-23 RX ADMIN — NICOTINE POLACRILEX 2 MG: 2 GUM, CHEWING ORAL at 12:23

## 2020-10-23 RX ADMIN — GABAPENTIN 600 MG: 600 TABLET, FILM COATED ORAL at 20:57

## 2020-10-23 RX ADMIN — BUPRENORPHINE AND NALOXONE 1 FILM: 8; 2 FILM BUCCAL; SUBLINGUAL at 20:56

## 2020-10-23 RX ADMIN — BUPROPION HYDROCHLORIDE 450 MG: 150 TABLET, EXTENDED RELEASE ORAL at 08:35

## 2020-10-23 RX ADMIN — AMITRIPTYLINE HYDROCHLORIDE 75 MG: 25 TABLET, FILM COATED ORAL at 20:56

## 2020-10-23 RX ADMIN — NICOTINE POLACRILEX 2 MG: 2 GUM, CHEWING ORAL at 17:56

## 2020-10-23 RX ADMIN — NICOTINE POLACRILEX 2 MG: 2 GUM, CHEWING ORAL at 19:21

## 2020-10-23 RX ADMIN — ACETAMINOPHEN 650 MG: 325 TABLET, FILM COATED ORAL at 12:23

## 2020-10-23 RX ADMIN — ACETAMINOPHEN 650 MG: 325 TABLET, FILM COATED ORAL at 20:59

## 2020-10-23 RX ADMIN — NICOTINE POLACRILEX 2 MG: 2 GUM, CHEWING ORAL at 20:59

## 2020-10-23 RX ADMIN — BUPRENORPHINE AND NALOXONE 1 FILM: 8; 2 FILM BUCCAL; SUBLINGUAL at 08:35

## 2020-10-23 RX ADMIN — GABAPENTIN 600 MG: 600 TABLET, FILM COATED ORAL at 14:06

## 2020-10-23 RX ADMIN — GABAPENTIN 600 MG: 600 TABLET, FILM COATED ORAL at 08:38

## 2020-10-23 ASSESSMENT — PAIN SCALES - GENERAL
PAINLEVEL_OUTOF10: 2
PAINLEVEL_OUTOF10: 0
PAINLEVEL_OUTOF10: 6
PAINLEVEL_OUTOF10: 0

## 2020-10-23 NOTE — GROUP NOTE
Group Therapy Note    Date: 10/23/2020    Group Start Time: 1600  Group End Time: 1640  Group Topic: Healthy Living/Wellness    BARBER BHI AYDIN Banks LPN    Patient refused to attend 1600 Health and Wellness group. 1:1 alternative was offered.     Group Therapy Note    Attendees: 7       Signature:  Aurelio Banks LPN

## 2020-10-23 NOTE — GROUP NOTE
Group Therapy Note    Date: 10/23/2020    Group Start Time: 1430  Group End Time: 0582  Group Topic: Psychoeducation    BARBER Jones, CTRS        Group Therapy Note    Attendees: 6    Pt did not attend Therapeutic Recreation d/t resting in room despite staff invitation to attend. 1:1 talk time offered as alternative to group session, pt declined.

## 2020-10-23 NOTE — PLAN OF CARE
[address] [place] [address]  Day 3 Interdisciplinary Treatment Plan NOTE    Review Date & Time: 10/23/2020   0947    Patient was not in treatment team    Admission Type:   Admission Type: Voluntary    Reason for admission:  Reason for Admission: SI to jump in front of a train  Estimated Length of Stay Update:  5-7 days   Estimated Discharge Date Update: to be determined by physician     PATIENT STRENGTHS:  Patient Strengths Strengths: Communication, Connection to output provider  Patient Strengths and Limitations:Limitations: Inappropriate/potentially harmful leisure interests, Tendency to isolate self, Difficulty problem solving/relies on others to help solve problems, Multiple barriers to leisure interests  Addictive Behavior:Addictive Behavior  In the past 3 months, have you felt or has someone told you that you have a problem with:  : None  Do you have a history of Chemical Use?: No  Do you have a history of Alcohol Use?: No  Do you have a history of Street Drug Abuse?: Yes  Histroy of Prescripton Drug Abuse?: No  Medical Problems:  Past Medical History:   Diagnosis Date    Anxiety     Depression     Hep C w/ coma, chronic ([facility] [address]     Substance abuse ([facility]        Risk:  Fall RiskTotal: 73  Jeff Scale Jeff Scale Score: 22  BVC Total: 0  Change in scores0.  Changes to plan of Care 0    Status EXAM:   Status and Exam  Normal: No  Facial Expression: Avoids Gaze, Flat  Affect: Blunt  Level of Consciousness: Alert  Mood:Normal: No  Mood: Depressed, Anxious  Motor Activity:Normal: Yes  Interview Behavior: Cooperative, Evasive  Preception: [place] to Person, Edie Colonel to Time, [place] to Place, [place] to Situation  Attention:Normal: Yes  Thought Processes: Circumstantial  Thought Content:Normal: Yes  Thought Content: Preoccupations  Hallucinations: None  Delusions: No  Memory:Normal: Yes  Insight and Judgment: No  Insight and Judgment: Poor Judgment, Poor Insight, Unmotivated  Present Suicidal Ideation: No  Present

## 2020-10-23 NOTE — GROUP NOTE
Group Therapy Note    Date: 10/23/2020    Group Start Time: 1100  Group End Time: 1200  Group Topic: Psychotherapy    STCZ DEIDREI UZAIR Duran, [facility]        Group Therapy Note    Patient declined to attend psychotherapy group at 1100AM despite encouragement by staff. 1:1 was offered as an alternative.         Signature:  UZAIR Kwok, [place]

## 2020-10-23 NOTE — PROGRESS NOTES
Behavioral health Daily progress note    10/23/2020     CHIEF COMPLAINT: Major depression with suicidal ideation    Reviewed patient's current plan of care and vital signs with nursing staff. Sleep:  Several hours last night  Attending groups: No    SUBJECTIVE:    Interviewed patient in his room. Per staff patient compliant with medications but has not been attending group, isolating to room leaves room only for meals. Patient reports feeling depressed rates mood 3 out of 10 (10 best). Endorses suicidal ideation denies plan or intent, contracts for safety while on unit. He denies any opiate or cocaine cravings. He had been off his medications and was just restarted upon admission. Encourage patient to attend groups and attend to hygiene. He denies any medication side effects    Mental Status Exam  Level of consciousness: Alert  Appearance: Hospital attire, lying in bed, with poor grooming and hygiene   Behavior/Motor: Appears calm, motor retardation noted   Attitude toward examiner:  Cooperative, attentive, poor eye contact  Speech:  spontaneous, slow rate, normal volume and well articulated  Mood: Depressed   Affect: Mood congruent  Thought processes:  Linear and coherent  Thought content:  denies homicidal ideation  Suicidal Ideation: Endorses suicidal ideation contracts for safety while on unit   delusions:  no evidence of delusions  Perceptual Disturbance:  denies any perceptual disturbance  Cognition:  Oriented to self, location, time, and situation  Memory: age appropriate  Insight & Judgement: Fair  Medication side effects:  denies       Data   height is 6' (1.829 m) and weight is 217 lb (98.4 kg). His oral temperature is 97.9 °F (36.6 °C). His blood pressure is 124/70 and his pulse is 92. His respiration is 14 and oxygen saturation is 99%.    Labs:   Admission on 10/21/2020   Component Date Value Ref Range Status    SARS-CoV-2 10/21/2020        Final    SARS-CoV-2, Rapid 10/21/2020 Not Detected  Not (NEURONTIN) tablet 600 mg, 600 mg, Oral, TID    ASSESSMENT  Depression with suicidal ideation     PLAN  Patient s symptoms no change  Discussed plan with Dr. Douglas  Attempt to develop insight  Psycho-education conducted. Supportive Therapy conducted. Probable discharge is per attending MD  Follow-up daily while inpatient      Electronically signed by PRADEEP Wilson CNP on 10/23/20 at 11:50 AM EDT    **This report has been created using voice recognition software. It may contain minor errors which are inherent in voice recognition technology. **  I independently saw and evaluated the patient. I reviewed the nurse practitioners documentation above. Any additional comments or changes to the nurse practitioners documentation are stated below otherwise agree with assessment. Patient continues to report severe depressive symptoms. He has not noticed any improvement. Patient s symptoms   show no change    PLAN  We will continue with combination of Elavil and Wellbutrin  Attempt to develop insight  Psycho-education conducted. Supportive Therapy conducted.   Probable discharge is next week  Follow-up daily while on inpatient unit    Electronically signed by Kanu Davies MD on 10/23/20 at 4:57 PM EDT

## 2020-10-23 NOTE — GROUP NOTE
Group Therapy Note    Date: 10/23/2020    Group Start Time: 0900  Group End Time: 0915  Group Topic: Community Meeting    BARBER Crandall, [address]        Group Therapy Note    Attendees: 5/22      Pt did not attend [employer] d/t resting in room despite staff invitation to attend. 1:1 talk time offered as alternative to group session, pt declined.

## 2020-10-23 NOTE — PLAN OF CARE
Problem: Suicide risk  Goal: Provide patient with safe environment  Description: Provide patient with safe environment  10/23/2020 1741 by Meghan Jansen LPN  Outcome: Ongoing  10/23/2020 1311 by Meghan Jansen LPN  Outcome: Ongoing  10/23/2020 0938 by DANA Garcia  Outcome: Ongoing   Pt remains in safe environment. Every 15 minute checks maintained for pt safety. Problem: Depressive Behavior With or Without Suicide Precautions:  Goal: Able to verbalize and/or display a decrease in depressive symptoms  Description: Able to verbalize and/or display a decrease in depressive symptoms  10/23/2020 1741 by Meghan Jansen LPN  Outcome: Ongoing  10/23/2020 1311 by Meghan Jansen LPN  Outcome: Ongoing  10/23/2020 0938 by DANA Garcia  Outcome: Ongoing  Pt stated he feels depressed but no suicidal thoughts. Pt is isolative and only comes out for needs. Pt did tell staff if thoughts arise to harm himself he will seek out staff.   Goal: Ability to disclose and discuss suicidal ideas will improve  Description: Ability to disclose and discuss suicidal ideas will improve  10/23/2020 1741 by Meghan Jansen LPN  Outcome: Ongoing  10/23/2020 1311 by Meghan Jansen LPN  Outcome: Ongoing  10/23/2020 0938 by DANA Garcia  Outcome: Ongoing     Problem: Tobacco Use:  Goal: Inpatient tobacco use cessation counseling participation  Description: Inpatient tobacco use cessation counseling participation  10/23/2020 1741 by Meghan Jansen LPN  Outcome: Ongoing  10/23/2020 1311 by Meghan Jansen LPN  Outcome: Ongoing  10/23/2020 0938 by DANA Garcia  Outcome: Ongoing     Problem: Pain:  Goal: Pain level will decrease  Description: Pain level will decrease  10/23/2020 1741 by Meghan Jansen LPN  Outcome: Ongoing  10/23/2020 1311 by Meghan Jansen LPN  Outcome: Ongoing  10/23/2020 0938 by DANA Garcia  Outcome: Ongoing  Goal: Control of acute pain  Description: Control of acute pain  10/23/2020 1741 by Jodee Roach LPN  Outcome: Ongoing  10/23/2020 1311 by Jodee Roach LPN  Outcome: Ongoing  10/23/2020 0938 by DANA De La Rosa  Outcome: Ongoing  Goal: Control of chronic pain  Description: Control of chronic pain  10/23/2020 1741 by Jodee Roach LPN  Outcome: Ongoing  10/23/2020 1311 by Jodee Roach LPN  Outcome: Ongoing  10/23/2020 0938 by DANA De La Rosa  Outcome: Ongoing

## 2020-10-23 NOTE — PLAN OF CARE
[facility] for the following reason(s). This is a Psychotherapy Note. Problem: Suicide risk  Goal: Provide patient with safe environment  Description: Provide patient with safe environment  10/22/2020 2145 by Harjit Jarrett RN  Outcome: Ongoing   Pt safe on unit. Problem: Depressive Behavior With or Without Suicide Precautions:  Goal: Able to verbalize and/or display a decrease in depressive symptoms  Description: Able to verbalize and/or display a decrease in depressive symptoms  10/22/2020 2145 by Harjit Jarrett RN  Outcome: Ongoing   Pt continues to endorse depressive symptoms, pt is isolative to room and self for long intervals. Problem: Depressive Behavior With or Without Suicide Precautions:  Goal: Ability to disclose and discuss suicidal ideas will improve  Description: Ability to disclose and discuss suicidal ideas will improve  10/22/2020 2145 by Harjit Jarrett RN  Outcome: Ongoing   Pt denies suicidal thoughts at this time.

## 2020-10-24 PROCEDURE — 99232 SBSQ HOSP IP/OBS MODERATE 35: CPT | Performed by: NURSE PRACTITIONER

## 2020-10-24 PROCEDURE — 6370000000 HC RX 637 (ALT 250 FOR IP): Performed by: PSYCHIATRY & NEUROLOGY

## 2020-10-24 PROCEDURE — 1240000000 HC EMOTIONAL WELLNESS R&B

## 2020-10-24 RX ADMIN — GABAPENTIN 600 MG: 600 TABLET, FILM COATED ORAL at 21:30

## 2020-10-24 RX ADMIN — BUPRENORPHINE AND NALOXONE 1 FILM: 8; 2 FILM BUCCAL; SUBLINGUAL at 21:30

## 2020-10-24 RX ADMIN — GABAPENTIN 600 MG: 600 TABLET, FILM COATED ORAL at 13:16

## 2020-10-24 RX ADMIN — NICOTINE POLACRILEX 2 MG: 2 GUM, CHEWING ORAL at 21:30

## 2020-10-24 RX ADMIN — NICOTINE POLACRILEX 2 MG: 2 GUM, CHEWING ORAL at 12:33

## 2020-10-24 RX ADMIN — NICOTINE POLACRILEX 2 MG: 2 GUM, CHEWING ORAL at 17:21

## 2020-10-24 RX ADMIN — GABAPENTIN 600 MG: 600 TABLET, FILM COATED ORAL at 08:41

## 2020-10-24 RX ADMIN — NICOTINE POLACRILEX 2 MG: 2 GUM, CHEWING ORAL at 17:55

## 2020-10-24 RX ADMIN — AMITRIPTYLINE HYDROCHLORIDE 75 MG: 25 TABLET, FILM COATED ORAL at 21:30

## 2020-10-24 RX ADMIN — BUPROPION HYDROCHLORIDE 450 MG: 150 TABLET, EXTENDED RELEASE ORAL at 08:41

## 2020-10-24 RX ADMIN — BUPRENORPHINE AND NALOXONE 1 FILM: 8; 2 FILM BUCCAL; SUBLINGUAL at 08:42

## 2020-10-24 ASSESSMENT — PAIN DESCRIPTION - LOCATION: LOCATION: BACK

## 2020-10-24 ASSESSMENT — PAIN DESCRIPTION - PAIN TYPE: TYPE: ACUTE PAIN

## 2020-10-24 ASSESSMENT — PAIN SCALES - GENERAL: PAINLEVEL_OUTOF10: 5

## 2020-10-24 NOTE — GROUP NOTE
Group Therapy Note    Date: 10/24/2020    Group Start Time: 1000  Group End Time: 0718  Group Topic: Psychoeducation    Via Rosalind Lama, MSW, LSW        Group Therapy Note    Attendees: Pt declined to attend psychotherapy at 1000 am despite encouragement. Pt offered 1:1 and refused.

## 2020-10-24 NOTE — BH NOTE
Pt did not participate in Wellness Group at 1600 due to resting in room and choosing to not attend. 1:1 talk time offered,  however Pt refused.

## 2020-10-24 NOTE — GROUP NOTE
Group Therapy Note    Date: 10/24/2020    Group Start Time: 0900  Group End Time: 0915  Group Topic: Community Meeting    [facility] EDNA Aguilar [place], [place]    Patient refused to attend Goal Setting / Community Meeting Group at 0900 after encouragement from staff. 1:1 talk time offered.     Signature:  Alex Jeong

## 2020-10-24 NOTE — PLAN OF CARE
Problem: Suicide risk  Goal: Provide patient with safe environment  Description: Provide patient with safe environment  10/23/2020 2153 by Kanu Walker RN  Note: Patient isolative  to room. 15 minute checks and environmental checks in place. Problem: Depressive Behavior With or Without Suicide Precautions:  Goal: Ability to disclose and discuss suicidal ideas will improve  Description: Ability to disclose and discuss suicidal ideas will improve  10/23/2020 2153 by Kanu Walker RN  Note: Patient reports some depression rated at a 6. He states he does feel safe and is willing to contract for safety.   10/23/2020 1741 by Joann Johnson LPN  Outcome: Ongoing  10/23/2020 1311 by Joann Johnson LPN  Outcome: Ongoing  10/23/2020 0938 by DANA Hook  Outcome: Ongoing

## 2020-10-24 NOTE — PROGRESS NOTES
Daily Progress Note  Sumaya Doll CNP  10/24/2020      CHIEF COMPLAINT: Major depression with suicidal ideation    Reviewed patient's current plan of care and vital signs with nursing staff. Sleep:  several hours last night  Attending groups: No: Remains isolative to room     SUBJECTIVE:    Staff reports no overnight events, they report that patient remains medication compliant. They report that he eats all meals in his room, he continues to isolate and does not attend group. Patient was interviewed bedside, he endorses feelings of depression. He reports no motivation to get out of bed or interact with others. He does endorse some irritability with other peers on the unit and states \"that is another reason I do not go out there\". He denies any auditory/visual hallucinations, he says that he does have a history of voices but has not experienced them with this bout of depression. He reports that his current symptoms have been going on for \"a couple weeks\". He endorses suicidal ideation, denies an active plan for self-harm and verbally agrees to contract for safety while on the inpatient unit. He reports feeling hopeless. Denies any withdrawal symptoms. Denies any concerns with current medication regimen, no side effects.     Mental Status Exam  Level of consciousness:  Awake and alert  Appearance: Hospital attire, laying in bed, with good grooming and hygiene   Behavior/Motor: Isolates to self, does not engage with staff or peers  Attitude toward examiner:  Cooperative, attentive, good eye contact  Speech:  spontaneous, normal rate, normal volume and well articulated  Mood: Depressed, hopeless  Affect: Mood congruent, flat, withdrawn  Thought processes:  linear and coherent  Thought content:  denies homicidal ideation  Suicidal Ideation: Endorses suicidal ideation, contracts for safety on the unit  Delusions:  no evidence of delusions  Perceptual Disturbance:  denies any perceptual disturbance  Cognition:

## 2020-10-24 NOTE — GROUP NOTE
Group Therapy Note    Date: 10/24/2020    Group Start Time: 1330  Group End Time: 1400  Group Topic: Recreational    [address] [facility], [facility]    Patient refused to attend Recreational Therapy Group at 1330 after encouragement from staff. 1:1 talk time offered.     Signature:  Ephraim Cortez

## 2020-10-24 NOTE — PLAN OF CARE
Problem: Suicide risk  Goal: Provide patient with safe environment  Description: Provide patient with safe environment  10/24/2020 1046 by Abi Taylor LPN  Outcome: Ongoing  Pt isolative to room but remains in safe environment. Problem: Depressive Behavior With or Without Suicide Precautions:  Goal: Able to verbalize and/or display a decrease in depressive symptoms  Description: Able to verbalize and/or display a decrease in depressive symptoms  Outcome: Ongoing  Goal: Ability to disclose and discuss suicidal ideas will improve  Description: Ability to disclose and discuss suicidal ideas will improve  10/24/2020 1046 by Abi Taylor LPN  Outcome: Ongoing  Patient reports some depression rated at a 6. He states he does feel safe and is willing to contract for safety.      Problem: Tobacco Use:  Goal: Inpatient tobacco use cessation counseling participation  Description: Inpatient tobacco use cessation counseling participation  Outcome: Ongoing     Problem: Pain:  Goal: Pain level will decrease  Description: Pain level will decrease  Outcome: Ongoing  Goal: Control of acute pain  Description: Control of acute pain  Outcome: Ongoing  Goal: Control of chronic pain  Description: Control of chronic pain  Outcome: Ongoing actual

## 2020-10-25 PROCEDURE — 99232 SBSQ HOSP IP/OBS MODERATE 35: CPT | Performed by: NURSE PRACTITIONER

## 2020-10-25 PROCEDURE — 1240000000 HC EMOTIONAL WELLNESS R&B

## 2020-10-25 PROCEDURE — 6370000000 HC RX 637 (ALT 250 FOR IP): Performed by: PSYCHIATRY & NEUROLOGY

## 2020-10-25 RX ADMIN — BUPRENORPHINE AND NALOXONE 1 FILM: 8; 2 FILM BUCCAL; SUBLINGUAL at 08:43

## 2020-10-25 RX ADMIN — NICOTINE POLACRILEX 2 MG: 2 GUM, CHEWING ORAL at 17:49

## 2020-10-25 RX ADMIN — GABAPENTIN 600 MG: 600 TABLET, FILM COATED ORAL at 13:30

## 2020-10-25 RX ADMIN — AMITRIPTYLINE HYDROCHLORIDE 75 MG: 25 TABLET, FILM COATED ORAL at 22:39

## 2020-10-25 RX ADMIN — GABAPENTIN 600 MG: 600 TABLET, FILM COATED ORAL at 08:43

## 2020-10-25 RX ADMIN — BUPROPION HYDROCHLORIDE 450 MG: 150 TABLET, EXTENDED RELEASE ORAL at 08:42

## 2020-10-25 RX ADMIN — NICOTINE POLACRILEX 2 MG: 2 GUM, CHEWING ORAL at 14:44

## 2020-10-25 RX ADMIN — BUPRENORPHINE AND NALOXONE 1 FILM: 8; 2 FILM BUCCAL; SUBLINGUAL at 22:39

## 2020-10-25 RX ADMIN — GABAPENTIN 600 MG: 600 TABLET, FILM COATED ORAL at 22:39

## 2020-10-25 RX ADMIN — NICOTINE POLACRILEX 2 MG: 2 GUM, CHEWING ORAL at 12:59

## 2020-10-25 ASSESSMENT — PAIN DESCRIPTION - LOCATION: LOCATION: BACK

## 2020-10-25 ASSESSMENT — PAIN SCALES - GENERAL: PAINLEVEL_OUTOF10: 5

## 2020-10-25 NOTE — BH NOTE
Pt did not participate in Wellness Group at 1600 due to resting in room and choosing not to attend. 1:1 talk time offered however Pt refused.

## 2020-10-25 NOTE — PROGRESS NOTES
Daily Progress Note  Paola Cain CNP  10/25/2020      CHIEF COMPLAINT: Major depression with suicidal ideation    Reviewed patient's current plan of care and vital signs with nursing staff. Sleep:  several hours last night  Attending groups: No: Remains isolative to room     SUBJECTIVE:    Staff reports no overnight events, they report that patient remains medication compliant. Jackie Linares reports that he is sleeping \"on and off\". He endorses a decreased appetite, though staff reports that he still eats at all meals. He endorses depression, rates his mood 5 out of 10 and feels that he has had some improvement since admission. He also endorses anxiety though feels that his depression is worse. He denies any auditory/visual hallucinations. He continues to endorse suicidal ideation and remains able to contract for safety while on the inpatient unit. We discussed his plans once ready for discharge and he does report that he would like to engage in some level of recovery services to remain sober. He was encouraged to participate in groups, reports that he is feeling low motivation and energy which is keeping him from participating. He verbalizes that he will try today. He also expresses some frustration with other patients in the milieu which is also preventing him from wanting to leave his room.       Mental Status Exam  Level of consciousness:  Awake and alert  Appearance: Hospital attire, laying in bed, with good grooming and hygiene   Behavior/Motor: Isolates to self, does not engage with staff or peers  Attitude toward examiner:  Cooperative, attentive, good eye contact  Speech:  spontaneous, normal rate, normal volume and well articulated  Mood: Depressed, anxious  Affect: Mood congruent, remains flat, withdrawn  Thought processes:  linear and coherent  Thought content:  denies homicidal ideation  Suicidal Ideation: Endorses suicidal ideation, contracts for safety on the unit  Delusions:  no evidence of delusions  Perceptual Disturbance:  denies any perceptual disturbance  Cognition:  Oriented to person, place, time/date, situation  Memory: age appropriate  Insight & Judgement: Fair  Medication side effects:  denies       Data   height is 6' (1.829 m) and weight is 217 lb (98.4 kg). His oral temperature is 97.5 °F (36.4 °C). His blood pressure is 144/86 (abnormal) and his pulse is 83. His respiration is 14 and oxygen saturation is 99%. Labs:   Admission on 10/21/2020   Component Date Value Ref Range Status    SARS-CoV-2 10/21/2020        Final    SARS-CoV-2, Rapid 10/21/2020 Not Detected  Not Detected Final    Comment:       Rapid NAAT:  The specimen is NEGATIVE for SARS-CoV-2, the novel coronavirus associated with   COVID-19. The ID NOW COVID-19 assay is designed to detect the virus that causes COVID-19 in patients   with signs and symptoms of infection who are suspected of COVID-19. An individual without symptoms of COVID-19 and who is not shedding SARS-CoV-2 virus would   expect to have a negative (not detected) result in this assay. Negative results should be treated as presumptive and, if inconsistent with clinical signs   and symptoms or necessary for patient management,  should be tested with an alternative molecular assay. Negative results do not preclude   SARS-CoV-2 infection and   should not be used as the sole basis for patient management decisions. Fact sheet for Healthcare Providers: [email or web address]  Fact sheet for Patients: [email or web address]          Methodology: Isothermal Nucleic Acid Amplification      Source 10/21/2020 . NASOPHARYNGEAL SWAB   Final    SARS-CoV-2 10/21/2020        Final            Medications  Current Facility-Administered Medications: buPROPion (WELLBUTRIN XL) extended release tablet 450 mg, 450 mg, Oral, Daily  acetaminophen (TYLENOL) tablet 650 mg, 650 mg, Oral, Q4H PRN  aluminum & magnesium hydroxide-simethicone (MAALOX) 980-652-52 MG/5ML suspension 30 mL, 30 mL, Oral, Q6H PRN  hydrOXYzine (ATARAX) tablet 50 mg, 50 mg, Oral, TID PRN  ibuprofen (ADVIL;MOTRIN) tablet 400 mg, 400 mg, Oral, Q6H PRN  nicotine (NICODERM CQ) 14 MG/24HR 1 patch, 1 patch, Transdermal, Daily  nicotine polacrilex (NICORETTE) gum 2 mg, 2 mg, Oral, PRN  polyethylene glycol (GLYCOLAX) packet 17 g, 17 g, Oral, Daily PRN  traZODone (DESYREL) tablet 50 mg, 50 mg, Oral, Nightly PRN  amitriptyline (ELAVIL) tablet 75 mg, 75 mg, Oral, Nightly  buprenorphine-naloxone (SUBOXONE) 8-2 MG SL film 1 Film, 1 Film, Sublingual, BID  gabapentin (NEURONTIN) tablet 600 mg, 600 mg, Oral, TID    ASSESSMENT  Depression with suicidal ideation     PLAN  Patients symptoms show some improvement  Monitor recent medication change, he is tolerating the increased dose of bupropion at this time  No new orders  Encouraged to participate in groups and milieu  Attempt to develop insight  Follow-up daily while on inpatient unit    Electronically signed by PRADEEP Villanueva CNP on 10/24/20 at 12:00 PM EDT    **This report has been created using voice recognition software. It may contain minor errors which are inherent in voice recognition technology. **

## 2020-10-25 NOTE — PLAN OF CARE
Problem: Suicide risk  Goal: Provide patient with safe environment  Description: Provide patient with safe environment  Outcome: Ongoing  Note: 15 minute safety checks, environment kept safe. Problem: Depressive Behavior With or Without Suicide Precautions:  Goal: Able to verbalize and/or display a decrease in depressive symptoms  Description: Able to verbalize and/or display a decrease in depressive symptoms  Outcome: Ongoing  Note: Patient reports depression and anxiety, patient is isolative to room for long intervals, out for needs only. Patient is compliant with medication interventions. Patient reports adequate sleep and appetite. Problem: Tobacco Use:  Goal: Inpatient tobacco use cessation counseling participation  Description: Inpatient tobacco use cessation counseling participation  Outcome: Ongoing  Note: Patient refused counseling.

## 2020-10-25 NOTE — GROUP NOTE
Group Therapy Note    Date: 10/25/2020    Group Start Time: 1330  Group End Time: 1969  Group Topic: Recreational    [facility] PERRIN    [facility] [place] [place]    Attendees: 5       Patient's Goal:  To demonstrate increased interpersonal skills. Notes:  Patient attended group and actively participated in task at hand. Patient conversed appropriately with peers and [address]. Status After Intervention:  Improved    Participation Level:  Active Listener and Interactive    Participation Quality: Appropriate, Attentive and Sharing      Speech:  normal      Thought Process/Content: Logical      Affective Functioning: Congruent      Mood: euthymic      Level of consciousness:  Alert, Oriented x4 and Attentive      Response to Learning: Able to verbalize current knowledge/experience, Able to verbalize/acknowledge new learning, Able to retain information, Capable of insight and Progressing to goal      Endings: None Reported       Modes of Intervention: Socialization, Exploration, Clarifying, Problem-solving, Activity, Limit-setting and Reality-testing      Discipline Responsible: Psychoeducational Specialist      Signature:  Bryan Pettit

## 2020-10-25 NOTE — GROUP NOTE
Group Therapy Note    Date: 10/25/2020    Group Start Time: 0900  Group End Time: 0915  Group Topic: Community Meeting    [address] [place] [address]    Patient refused to attend Goal Setting / Community Meeting Group at 0900 after encouragement from staff. 1:1 talk time offered.     Signature:  Berta Yepez

## 2020-10-25 NOTE — PLAN OF CARE
Problem: Suicide risk  Goal: Provide patient with safe environment  Description: Provide patient with safe environment  10/25/2020 1002 by Loki Munoz  Outcome: Ongoing   Pt spends long intervals in bed. Pt does relate to having fleeting suicidal thoughts. Pt denies any plan. Pt verbalizes agreement to approach staff if these thoughts get worse. Pt. Remains safe on the unit. Q 15 minute checks for safety maintained. Problem: Depressive Behavior With or Without Suicide Precautions:  Goal: Able to verbalize and/or display a decrease in depressive symptoms  Description: Able to verbalize and/or display a decrease in depressive symptoms  10/25/2020 1002 by Loki Munoz  Outcome: Ongoing   Pt remains depressed but does say he is less depressed than when he came in. Medication compliant. Pt has appropriate self care. Denies hallucinations. Eats well. Problem: Depressive Behavior With or Without Suicide Precautions:  Goal: Ability to disclose and discuss suicidal ideas will improve  Description: Ability to disclose and discuss suicidal ideas will improve  Outcome: Ongoing   Pt. Continues to relate to fleeting suicidal thoughts. Denies plan. Does say that he is feeling better and that he feels his medication is helping him. Only selectively interacts with peers. Problem: Pain:  Goal: Pain level will decrease  Description: Pain level will decrease  Outcome: Ongoing  Pt does relate to back pain. Says his bed is uncomfortable and he has been in bed a lot. Agrees to get up and walk around at times.

## 2020-10-26 PROCEDURE — 6370000000 HC RX 637 (ALT 250 FOR IP): Performed by: PSYCHIATRY & NEUROLOGY

## 2020-10-26 PROCEDURE — 1240000000 HC EMOTIONAL WELLNESS R&B

## 2020-10-26 PROCEDURE — 99232 SBSQ HOSP IP/OBS MODERATE 35: CPT | Performed by: PSYCHIATRY & NEUROLOGY

## 2020-10-26 RX ADMIN — NICOTINE POLACRILEX 2 MG: 2 GUM, CHEWING ORAL at 18:17

## 2020-10-26 RX ADMIN — NICOTINE POLACRILEX 2 MG: 2 GUM, CHEWING ORAL at 21:20

## 2020-10-26 RX ADMIN — LURASIDONE HYDROCHLORIDE 20 MG: 40 TABLET, FILM COATED ORAL at 17:34

## 2020-10-26 RX ADMIN — BUPRENORPHINE AND NALOXONE 1 FILM: 8; 2 FILM BUCCAL; SUBLINGUAL at 08:33

## 2020-10-26 RX ADMIN — AMITRIPTYLINE HYDROCHLORIDE 75 MG: 25 TABLET, FILM COATED ORAL at 21:14

## 2020-10-26 RX ADMIN — POLYETHYLENE GLYCOL 3350 17 G: 17 POWDER, FOR SOLUTION ORAL at 17:34

## 2020-10-26 RX ADMIN — NICOTINE POLACRILEX 2 MG: 2 GUM, CHEWING ORAL at 13:29

## 2020-10-26 RX ADMIN — NICOTINE POLACRILEX 2 MG: 2 GUM, CHEWING ORAL at 20:21

## 2020-10-26 RX ADMIN — NICOTINE POLACRILEX 2 MG: 2 GUM, CHEWING ORAL at 16:52

## 2020-10-26 RX ADMIN — BUPROPION HYDROCHLORIDE 450 MG: 150 TABLET, EXTENDED RELEASE ORAL at 08:33

## 2020-10-26 RX ADMIN — BUPRENORPHINE AND NALOXONE 1 FILM: 8; 2 FILM BUCCAL; SUBLINGUAL at 21:15

## 2020-10-26 RX ADMIN — GABAPENTIN 600 MG: 600 TABLET, FILM COATED ORAL at 13:43

## 2020-10-26 RX ADMIN — GABAPENTIN 600 MG: 600 TABLET, FILM COATED ORAL at 21:14

## 2020-10-26 RX ADMIN — GABAPENTIN 600 MG: 600 TABLET, FILM COATED ORAL at 08:33

## 2020-10-26 NOTE — GROUP NOTE
Group Therapy Note    Date: 10/26/2020    Group Start Time: 1000  Group End Time: 1005  Group Topic: Psychotherapy    [facility]        Group Therapy Note    Attendees: 6/23      Pt declined to attend psychotherapy at 1000 am despite encouragement. Pt offered 1:1 and refused.              Signature:  RED Rivero

## 2020-10-26 NOTE — GROUP NOTE
Group Therapy Note    Date: 10/26/2020    Group Start Time: 0900  Group End Time: 0915  Group Topic: Community Meeting    BARBER Sparks        Group Therapy Note    Pt did not attend community meeting group d/t resting in room despite staff invitation to attend. 1:1 talk time offered as alternative to group session, pt declined.

## 2020-10-26 NOTE — PROGRESS NOTES
Daily Progress Note  10/26/2020      CHIEF COMPLAINT: Major depression with suicidal ideation    Reviewed patient's current plan of care and vital signs with nursing staff. Sleep:  several hours last night  Attending groups: No: Remains isolative to room     SUBJECTIVE:    Interviewed patient in his bed. Per staff patient compliant with medication and isolating to room. Patient reports feeling depressed and anxiety, rates his mood 4 out of 10 (10 best). Endorses suicidal ideation is able to contract for safety while on unit. He reports that he slept a total of 4 hours last night, sleep is interrupted due to anxiety. Today patient reports appetite is normal.  He denies any cravings or withdrawal symptoms. He denies any medication side effects. Encourage patient to start attending groups. Mental Status Exam  Level of consciousness:  Awake and alert  Appearance: Hospital attire, laying in bed, with good grooming and hygiene   Behavior/Motor: Isolates to self, does not engage with staff or peers  Attitude toward examiner:  Cooperative, attentive, good eye contact  Speech:  spontaneous, normal rate, normal volume and well articulated  Mood: Depressed, anxious  Affect: Mood congruent, remains flat, withdrawn  Thought processes:  linear and coherent  Thought content:  denies homicidal ideation  Suicidal Ideation: Endorses suicidal ideation, contracts for safety on the unit  Delusions:  no evidence of delusions  Perceptual Disturbance:  denies any perceptual disturbance  Cognition:  Oriented to person, place, time/date, situation  Memory: age appropriate  Insight & Judgement: Fair  Medication side effects:  denies       Data   height is 6' (1.829 m) and weight is 217 lb (98.4 kg). His oral temperature is 97.7 °F (36.5 °C). His blood pressure is 120/84 and his pulse is 80. His respiration is 16 and oxygen saturation is 99%.    Labs:   Admission on 10/21/2020   Component Date Value Ref Range Status    SARS-CoV-2 10/21/2020        Final    SARS-CoV-2, Rapid 10/21/2020 Not Detected  Not Detected Final    Comment:       Rapid NAAT:  The specimen is NEGATIVE for SARS-CoV-2, the novel coronavirus associated with   COVID-19. The ID NOW COVID-19 assay is designed to detect the virus that causes COVID-19 in patients   with signs and symptoms of infection who are suspected of COVID-19. An individual without symptoms of COVID-19 and who is not shedding SARS-CoV-2 virus would   expect to have a negative (not detected) result in this assay. Negative results should be treated as presumptive and, if inconsistent with clinical signs   and symptoms or necessary for patient management,  should be tested with an alternative molecular assay. Negative results do not preclude   SARS-CoV-2 infection and   should not be used as the sole basis for patient management decisions. Fact sheet for Healthcare Providers: MaternityAdvisor.tn  Fact sheet for Patients: Maternitye-SENSor.tn          Methodology: Isothermal Nucleic Acid Amplification      Source 10/21/2020 . NASOPHARYNGEAL SWAB   Final    SARS-CoV-2 10/21/2020        Final            Medications  Current Facility-Administered Medications: buPROPion (WELLBUTRIN XL) extended release tablet 450 mg, 450 mg, Oral, Daily  acetaminophen (TYLENOL) tablet 650 mg, 650 mg, Oral, Q4H PRN  aluminum & magnesium hydroxide-simethicone (MAALOX) 200-200-20 MG/5ML suspension 30 mL, 30 mL, Oral, Q6H PRN  hydrOXYzine (ATARAX) tablet 50 mg, 50 mg, Oral, TID PRN  ibuprofen (ADVIL;MOTRIN) tablet 400 mg, 400 mg, Oral, Q6H PRN  nicotine (NICODERM CQ) 14 MG/24HR 1 patch, 1 patch, Transdermal, Daily  nicotine polacrilex (NICORETTE) gum 2 mg, 2 mg, Oral, PRN  polyethylene glycol (GLYCOLAX) packet 17 g, 17 g, Oral, Daily PRN  traZODone (DESYREL) tablet 50 mg, 50 mg, Oral, Nightly PRN  amitriptyline (ELAVIL) tablet 75 mg, 75 mg, Oral, Nightly  buprenorphine-naloxone (SUBOXONE) 8-2 MG SL film 1 Film, 1 Film, Sublingual, BID  gabapentin (NEURONTIN) tablet 600 mg, 600 mg, Oral, TID    ASSESSMENT  Depression with suicidal ideation     PLAN  Patients symptoms show no improvement  Encouraged to participate in groups and milieu  Attempt to develop insight  Follow-up daily while on inpatient unit      **This report has been created using voice recognition software. It may contain minor errors which are inherent in voice recognition technology. **  I independently saw and evaluated the patient. I reviewed the nurse practitioners documentation above. Any additional comments or changes to the nurse practitioners documentation are stated below otherwise agree with assessment. The patient reports that he feels very little improvement. He continues to have suicidal ideation. He believes he has benefited from [place] in the past.  We will try Latuda 20 mg daily    Patient s symptoms   show no change    PLAN  Start Latuda 20 mg  Attempt to develop insight  Psycho-education conducted. Supportive Therapy conducted.   Probable discharge is tomorrow  Follow-up daily while on inpatient unit    Electronically signed by Whitney Turner MD on 10/26/20 at 4:40 PM EDT

## 2020-10-26 NOTE — BH NOTE
Group Note:  Pt did not participate In Wellness at 16:00 . Patient stated they were not interested in attending. Staff explained benefits of participation and offered encouragement to attend. Patient was offered 1:1 talk time or an education which they refused.

## 2020-10-26 NOTE — GROUP NOTE
Group Therapy Note    Date: 10/26/2020    Group Start Time: 1430  Group End Time: 6606  Group Topic: Music Therapy    BARBER Saravia Mins        Group Therapy Note    Pt did not attend music therapy group d/t resting in room despite staff invitation to attend. 1:1 talk time offered as alternative to group session, pt declined.

## 2020-10-27 PROCEDURE — 1240000000 HC EMOTIONAL WELLNESS R&B

## 2020-10-27 PROCEDURE — 6370000000 HC RX 637 (ALT 250 FOR IP): Performed by: PSYCHIATRY & NEUROLOGY

## 2020-10-27 PROCEDURE — 99232 SBSQ HOSP IP/OBS MODERATE 35: CPT | Performed by: PSYCHIATRY & NEUROLOGY

## 2020-10-27 RX ADMIN — NICOTINE POLACRILEX 2 MG: 2 GUM, CHEWING ORAL at 21:52

## 2020-10-27 RX ADMIN — GABAPENTIN 600 MG: 600 TABLET, FILM COATED ORAL at 22:20

## 2020-10-27 RX ADMIN — NICOTINE POLACRILEX 2 MG: 2 GUM, CHEWING ORAL at 18:56

## 2020-10-27 RX ADMIN — POLYETHYLENE GLYCOL 3350 17 G: 17 POWDER, FOR SOLUTION ORAL at 12:14

## 2020-10-27 RX ADMIN — GABAPENTIN 600 MG: 600 TABLET, FILM COATED ORAL at 08:26

## 2020-10-27 RX ADMIN — LURASIDONE HYDROCHLORIDE 20 MG: 40 TABLET, FILM COATED ORAL at 08:26

## 2020-10-27 RX ADMIN — NICOTINE POLACRILEX 2 MG: 2 GUM, CHEWING ORAL at 17:29

## 2020-10-27 RX ADMIN — BUPROPION HYDROCHLORIDE 450 MG: 150 TABLET, EXTENDED RELEASE ORAL at 08:26

## 2020-10-27 RX ADMIN — NICOTINE POLACRILEX 2 MG: 2 GUM, CHEWING ORAL at 12:14

## 2020-10-27 RX ADMIN — AMITRIPTYLINE HYDROCHLORIDE 75 MG: 25 TABLET, FILM COATED ORAL at 22:20

## 2020-10-27 RX ADMIN — BUPRENORPHINE AND NALOXONE 1 FILM: 8; 2 FILM BUCCAL; SUBLINGUAL at 22:21

## 2020-10-27 RX ADMIN — NICOTINE POLACRILEX 2 MG: 2 GUM, CHEWING ORAL at 13:22

## 2020-10-27 RX ADMIN — GABAPENTIN 600 MG: 600 TABLET, FILM COATED ORAL at 13:22

## 2020-10-27 RX ADMIN — IBUPROFEN 400 MG: 400 TABLET, FILM COATED ORAL at 17:30

## 2020-10-27 RX ADMIN — NICOTINE POLACRILEX 2 MG: 2 GUM, CHEWING ORAL at 19:59

## 2020-10-27 RX ADMIN — BUPRENORPHINE AND NALOXONE 1 FILM: 8; 2 FILM BUCCAL; SUBLINGUAL at 08:26

## 2020-10-27 ASSESSMENT — PAIN SCALES - GENERAL
PAINLEVEL_OUTOF10: 4
PAINLEVEL_OUTOF10: 0
PAINLEVEL_OUTOF10: 3

## 2020-10-27 ASSESSMENT — PAIN DESCRIPTION - PAIN TYPE: TYPE: ACUTE PAIN

## 2020-10-27 ASSESSMENT — PAIN DESCRIPTION - ORIENTATION: ORIENTATION: LOWER

## 2020-10-27 ASSESSMENT — PAIN DESCRIPTION - LOCATION: LOCATION: BACK

## 2020-10-27 NOTE — PLAN OF CARE
Problem: Pain:  Goal: Pain level will decrease  Description: Pain level will decrease  Outcome: Ongoing   Patient denies pain this shift      Problem: Tobacco Use:  Goal: Inpatient tobacco use cessation counseling participation  Description: Inpatient tobacco use cessation counseling participation  Outcome: Ongoing   Patient refused      Problem: Depressive Behavior With or Without Suicide Precautions:  Goal: Ability to disclose and discuss suicidal ideas will improve  Description: Ability to disclose and discuss suicidal ideas will improve  10/27/2020 1219 by Praneeth Tamayo RN  Outcome: Ongoing   Patient admits to fleeting suicidal ideation. Patient denies plan and contracts for safety. Agrees to seek out staff. Patient denies homicidal ideation, visual hallucinations, and auditory hallucinations. Patient reports anxiety and depression. Patient is isolative to bed and only comes out for needs. Despite encouragement from staff patient refused shower. Remains behavioral control and med compliant. Q15 minute check maintained.

## 2020-10-27 NOTE — GROUP NOTE
Group Therapy Note    Date: 10/26/2020    Group Start Time: 2030  Group End Time: 2121  Group Topic: Wrap-Up    BARBER Hernandez        Group Therapy Note    Attendees: 13         Patient's Goal:  Go to groups    Notes:  I don't feel any different    Status After Intervention:  Unchanged    Participation Level:  Active Listener    Participation Quality: Attentive      Speech:  hesitant      Thought Process/Content: Linear      Affective Functioning: Blunted      Mood: depressed      Level of consciousness:  Alert      Response to Learning: Progressing to goal      Endings: None Reported    Modes of Intervention: Problem-solving      Discipline Responsible: [facility]      Signature:  Bhupinder Rome

## 2020-10-27 NOTE — GROUP NOTE
Group Therapy Note    Date: 10/27/2020    Group Start Time: 1000  Group End Time: 2882  Group Topic: Psychoeducation    [facility]    [place], [place]        Group Therapy Note    Attendees: 7/13         Pt did not attend RT skills group d/t resting in room despite staff invitation to attend. 1:1 talk time offered as alternative to group session, pt declined.

## 2020-10-27 NOTE — GROUP NOTE
Group Therapy Note    Date: 10/27/2020    Group Start Time: 1100  Group End Time: 0112  Group Topic: Psychotherapy    Via Rosalind Lama, MSW, LSW        Group Therapy Note    Attendees: Pt declined to attend psychotherapy at 1100 am despite encouragement. Pt offered 1:1 and refused.

## 2020-10-27 NOTE — GROUP NOTE
Group Therapy Note    Date: 10/27/2020    Group Start Time: 1330  Group End Time: 4321  Group Topic: Cognitive Skills    BARBER Crandall, [address]        Group Therapy Note    Attendees: 5/23      Pt did not attend RT skills group d/t resting in room despite staff invitation to attend. 1:1 talk time offered as alternative to group session, pt declined.

## 2020-10-27 NOTE — PLAN OF CARE
Problem: Suicide risk  Goal: Provide patient with safe environment  Description: Provide patient with safe environment  10/26/2020 2241 by Rik Gallagher  Outcome: Met This Shift  Problem: Depressive Behavior With or Without Suicide Precautions:  Goal: Ability to disclose and discuss suicidal ideas will improve  Description: Ability to disclose and discuss suicidal ideas will improve  10/26/2020 2241 by Rik Gallagher  Outcome: Ongoing   Pt remains safe & free from self harm behaviors. Pt does admit to fleeting suicidal ideation. Verbally contracts for safety & denies a plan. Problem: Depressive Behavior With or Without Suicide Precautions:  Goal: Able to verbalize and/or display a decrease in depressive symptoms  Description: Able to verbalize and/or display a decrease in depressive symptoms  Outcome: Ongoing   Pt relates he does not feel any improvement in mood. He is compliant with medicine & has a good appetite. Pt remains quiet & aloof from peers in day area. Attended group with minimal participation. Pt denies hallucinations & denies homicidal thoughts. Pt remains evasive discussing his stressors.

## 2020-10-27 NOTE — PROGRESS NOTES
Behavioral health Daily progress note    10/27/2020     CHIEF COMPLAINT: Major depression with suicidal ideation    Reviewed patient's current plan of care and vital signs with nursing staff. Sleep:  Several hours last night  Attending groups: No    SUBJECTIVE:    The patient has continued to isolate himself. He continues to report severe depressive symptoms. He is unable to contract for safety upon discharge. He has intrusive suicidal ideation. The patient says that he has benefited from Lenell Chambers in the past but he only does started taking it. Mental Status Exam  Level of consciousness: Alert  Appearance: Disheveled, lying in bed, with poor grooming and hygiene   Behavior/Motor: Psychomotor retardation  Attitude toward examiner: Withdrawn  poor eye contact  Speech:  spontaneous, slow rate, normal volume and well articulated  Mood: Depressed   Affect: Mood congruent  Thought processes:  Linear and coherent  Thought content:  denies homicidal ideation  Suicidal Ideation: Endorses suicidal ideation contracts for safety while on unit   delusions:  no evidence of delusions  Perceptual Disturbance:  denies any perceptual disturbance  Cognition:  Oriented to self, location, time, and situation  Memory: age appropriate  Insight & Judgement: Fair  Medication side effects:  denies       Data   height is 6' (1.829 m) and weight is 217 lb (98.4 kg). His oral temperature is 97.6 °F (36.4 °C). His blood pressure is 132/76 and his pulse is 79. His respiration is 16 and oxygen saturation is 99%. Labs:   Admission on 10/21/2020   Component Date Value Ref Range Status    SARS-CoV-2 10/21/2020        Final    SARS-CoV-2, Rapid 10/21/2020 Not Detected  Not Detected Final    Comment:       Rapid NAAT:  The specimen is NEGATIVE for SARS-CoV-2, the novel coronavirus associated with   COVID-19.         The ID NOW COVID-19 assay is designed to detect the virus that causes COVID-19 in patients   with signs and symptoms of infection analysis was discussed. The patient consents to treatment  Attempt to develop insight  Psycho-education conducted. Supportive Therapy conducted.   Probable discharge is next week  Follow-up daily while on inpatient unit    Patient was evaluated face-to-face    Electronically signed by Veronica Galicia MD on 10/27/2020 at 2:20 PM

## 2020-10-28 PROCEDURE — 1240000000 HC EMOTIONAL WELLNESS R&B

## 2020-10-28 PROCEDURE — 99232 SBSQ HOSP IP/OBS MODERATE 35: CPT | Performed by: PSYCHIATRY & NEUROLOGY

## 2020-10-28 PROCEDURE — 6370000000 HC RX 637 (ALT 250 FOR IP): Performed by: PSYCHIATRY & NEUROLOGY

## 2020-10-28 RX ORDER — SENNA PLUS 8.6 MG/1
1 TABLET ORAL NIGHTLY PRN
Status: DISCONTINUED | OUTPATIENT
Start: 2020-10-28 | End: 2020-10-30 | Stop reason: HOSPADM

## 2020-10-28 RX ORDER — DOCUSATE SODIUM 100 MG/1
200 CAPSULE, LIQUID FILLED ORAL 2 TIMES DAILY PRN
Status: DISCONTINUED | OUTPATIENT
Start: 2020-10-28 | End: 2020-10-30 | Stop reason: HOSPADM

## 2020-10-28 RX ADMIN — BUPRENORPHINE AND NALOXONE 1 FILM: 8; 2 FILM BUCCAL; SUBLINGUAL at 21:19

## 2020-10-28 RX ADMIN — SENNOSIDES 8.6 MG: 8.6 TABLET, FILM COATED ORAL at 19:35

## 2020-10-28 RX ADMIN — AMITRIPTYLINE HYDROCHLORIDE 75 MG: 25 TABLET, FILM COATED ORAL at 21:19

## 2020-10-28 RX ADMIN — NICOTINE POLACRILEX 2 MG: 2 GUM, CHEWING ORAL at 12:44

## 2020-10-28 RX ADMIN — HYDROXYZINE HYDROCHLORIDE 50 MG: 50 TABLET, FILM COATED ORAL at 21:19

## 2020-10-28 RX ADMIN — GABAPENTIN 600 MG: 600 TABLET, FILM COATED ORAL at 21:19

## 2020-10-28 RX ADMIN — ACETAMINOPHEN 650 MG: 325 TABLET, FILM COATED ORAL at 17:13

## 2020-10-28 RX ADMIN — DOCUSATE SODIUM 200 MG: 100 CAPSULE ORAL at 19:35

## 2020-10-28 RX ADMIN — BUPROPION HYDROCHLORIDE 450 MG: 150 TABLET, EXTENDED RELEASE ORAL at 08:23

## 2020-10-28 RX ADMIN — NICOTINE POLACRILEX 2 MG: 2 GUM, CHEWING ORAL at 14:21

## 2020-10-28 RX ADMIN — LURASIDONE HYDROCHLORIDE 20 MG: 40 TABLET, FILM COATED ORAL at 08:23

## 2020-10-28 RX ADMIN — BUPRENORPHINE AND NALOXONE 1 FILM: 8; 2 FILM BUCCAL; SUBLINGUAL at 08:23

## 2020-10-28 RX ADMIN — GABAPENTIN 600 MG: 600 TABLET, FILM COATED ORAL at 08:22

## 2020-10-28 RX ADMIN — NICOTINE POLACRILEX 2 MG: 2 GUM, CHEWING ORAL at 17:13

## 2020-10-28 RX ADMIN — NICOTINE POLACRILEX 2 MG: 2 GUM, CHEWING ORAL at 19:35

## 2020-10-28 RX ADMIN — POLYETHYLENE GLYCOL 3350 17 G: 17 POWDER, FOR SOLUTION ORAL at 13:23

## 2020-10-28 RX ADMIN — GABAPENTIN 600 MG: 600 TABLET, FILM COATED ORAL at 13:23

## 2020-10-28 ASSESSMENT — PAIN SCALES - GENERAL
PAINLEVEL_OUTOF10: 3
PAINLEVEL_OUTOF10: 1

## 2020-10-28 ASSESSMENT — PAIN DESCRIPTION - ORIENTATION: ORIENTATION: RIGHT

## 2020-10-28 ASSESSMENT — PAIN DESCRIPTION - LOCATION: LOCATION: LEG

## 2020-10-28 ASSESSMENT — PAIN DESCRIPTION - PAIN TYPE: TYPE: ACUTE PAIN

## 2020-10-28 NOTE — PLAN OF CARE
Problem: Suicide risk  Goal: Provide patient with safe environment  Description: Provide patient with safe environment  10/27/2020 2139 by Armida Ayala RN  Outcome: Ongoing  Note: Fleeting suicidal ideation no plan. No self harm noted this shift. Patient agrees to seek staff out if negative thoughts arise. Will continue to monitor Q15 minute and intermittently. Problem: Depressive Behavior With or Without Suicide Precautions:  Goal: Able to verbalize and/or display a decrease in depressive symptoms  Description: Able to verbalize and/or display a decrease in depressive symptoms  Outcome: Ongoing  Note: Patient has been calm, controlled and med complaint. Accepting of 1:1 talk time with staff. 1:1 with pt x ten minutes. Pt encouraged to attend unit programming and interact with peers and staff. Pt also encouraged to tend to hygiene and ADLs. Pt encouraged to discuss feelings with staff and feedback and reassurance provided. Patient has been out in the dayroom and social with peers. Problem: Depressive Behavior With or Without Suicide Precautions:  Goal: Ability to disclose and discuss suicidal ideas will improve  Description: Ability to disclose and discuss suicidal ideas will improve  10/27/2020 2139 by Armida Ayala RN  Outcome: Ongoing  Note: No self harm noted this shift. Patient agrees to seek staff out if negative thoughts arise. Will continue to monitor Q15 minute and intermittently.

## 2020-10-28 NOTE — GROUP NOTE
Group Therapy Note    Date: 10/28/2020    Group Start Time: 0900  Group End Time: 0915  Group Topic: Community Meeting    BARBER Sweet, [place]        Group Therapy Note    Attendees: 9/23      Pt did not attend [employer] d/t resting in room despite staff invitation to attend. 1:1 talk time offered as alternative to group session, pt declined.

## 2020-10-28 NOTE — PROGRESS NOTES
Pharmacy Med Education Group Note    Date: 10/28/20  Start Time: 36  End Time: 7745    Number Participants in Group:  7    Goal:  Patient will demonstrate an understanding of the medications intended purpose and possible adverse effects  Topic: [place] for Pharmacy Med Ed Group    Discipline Responsible:     OT  AT  [facility].  RT     X Other       Participation Level:     None  Minimal      X Active Listener    X Interactive    Monopolizing         Participation Quality:    X Appropriate  Inappropriate     X       Attentive        Intrusive          Sharing        Resistant          Supportive        Lethargic       Affective:     X Congruent  Incongruent  Blunted  Flat    Constricted  Anxious  Elated  Angry    Labile  Depressed  Other         Cognitive:    X Alert  Oriented PPTP     Concentration   X G  F  P   Attention Span   X G  F  P   Short-Term Memory   X G  F  P   Long-Term Memory  G  F  P   ProblemSolving/  Decision Making  G  F  P   Ability to Process  Information   X G  F  P      Contributing Factors             Delusional             Hallucinating             Flight of Ideas             Other:       Modes of Intervention:    X Education   X Support  Exploration    Clarifying  Problem Solving  Confrontation    Socialization  Limit Setting  Reality Testing    Activity  Movement  Media    Other:            Response to Learning:    X Able to verbalize current knowledge/experience    Able to verbalize/acknowledge new learning    Able to retain information    Capable of insight    Able to change behavior    Progressing to goal    Other:        Comments:     Qiana Retana,PharmD,  10/28/2020, 5:54 PM

## 2020-10-28 NOTE — PROGRESS NOTES
Behavioral health Daily progress note    10/28/2020     CHIEF COMPLAINT: Major depression with suicidal ideation    Reviewed patient's current plan of care and vital signs with nursing staff. Sleep:  Several hours last night  Attending groups: No    SUBJECTIVE:      The patient's mood is improved. He continues to have suicidal ideation. He continues to spend most of the time lying in bed. He has not noticed any side effects from [place]. He does not recall what dose he was taking before. We will increase the dose to 40 mg daily    Mental Status Exam  Level of consciousness: Drowsy  Appearance: Disheveled, lying in bed, with poor grooming and hygiene   Behavior/Motor: Psychomotor retardation  Attitude toward examiner: Cooperative  Speech:  spontaneous, slow rate, normal volume and well articulated  Mood: Depressed   Affect: Mood congruent  Thought processes:  Linear and coherent  Thought content:  denies homicidal ideation  Suicidal Ideation: Endorses suicidal ideation contracts for safety while on unit   delusions:  no evidence of delusions  Perceptual Disturbance:  denies any perceptual disturbance  Cognition:  Oriented to self, location, time, and situation  Memory: age appropriate  Insight & Judgement: Fair  Medication side effects:  denies       Data   height is 6' (1.829 m) and weight is 217 lb (98.4 kg). His oral temperature is 97.8 °F (36.6 °C). His blood pressure is 114/73 and his pulse is 78. His respiration is 14 and oxygen saturation is 99%. Labs:   Admission on 10/21/2020   Component Date Value Ref Range Status    SARS-CoV-2 10/21/2020        Final    SARS-CoV-2, Rapid 10/21/2020 Not Detected  Not Detected Final    Comment:       Rapid NAAT:  The specimen is NEGATIVE for SARS-CoV-2, the novel coronavirus associated with   COVID-19.         The ID NOW COVID-19 assay is designed to detect the virus that causes COVID-19 in patients   with signs and symptoms of infection who are suspected of COVID-19. An individual without symptoms of COVID-19 and who is not shedding SARS-CoV-2 virus would   expect to have a negative (not detected) result in this assay. Negative results should be treated as presumptive and, if inconsistent with clinical signs   and symptoms or necessary for patient management,  should be tested with an alternative molecular assay. Negative results do not preclude   SARS-CoV-2 infection and   should not be used as the sole basis for patient management decisions. Fact sheet for Healthcare Providers: Uday  Fact sheet for Patients: Uday          Methodology: Isothermal Nucleic Acid Amplification      Source 10/21/2020 . NASOPHARYNGEAL SWAB   Final    SARS-CoV-2 10/21/2020        Final            Medications  Current Facility-Administered Medications: senna (SENOKOT) tablet 8.6 mg, 1 tablet, Oral, Nightly PRN  docusate sodium (COLACE) capsule 200 mg, 200 mg, Oral, BID PRN  [START ON 10/29/2020] lurasidone (LATUDA) tablet 40 mg, 40 mg, Oral, Daily  buPROPion (WELLBUTRIN XL) extended release tablet 450 mg, 450 mg, Oral, Daily  acetaminophen (TYLENOL) tablet 650 mg, 650 mg, Oral, Q4H PRN  aluminum & magnesium hydroxide-simethicone (MAALOX) 200-200-20 MG/5ML suspension 30 mL, 30 mL, Oral, Q6H PRN  hydrOXYzine (ATARAX) tablet 50 mg, 50 mg, Oral, TID PRN  ibuprofen (ADVIL;MOTRIN) tablet 400 mg, 400 mg, Oral, Q6H PRN  nicotine (NICODERM CQ) 14 MG/24HR 1 patch, 1 patch, Transdermal, Daily  nicotine polacrilex (NICORETTE) gum 2 mg, 2 mg, Oral, PRN  polyethylene glycol (GLYCOLAX) packet 17 g, 17 g, Oral, Daily PRN  traZODone (DESYREL) tablet 50 mg, 50 mg, Oral, Nightly PRN  amitriptyline (ELAVIL) tablet 75 mg, 75 mg, Oral, Nightly  buprenorphine-naloxone (SUBOXONE) 8-2 MG SL film 1 Film, 1 Film, Sublingual, BID  gabapentin (NEURONTIN) tablet 600 mg, 600 mg, Oral, TID    ASSESSMENT  Bipolar disorder with severe depression ([facility] [address]       PLAN  Latuda increased to 40 mg daily. Other medications left unchanged. Risk-benefit analysis was discussed with the patient and patient consents to treatment    Indications and risk-benefit analysis was discussed. The patient consents to treatment  Attempt to develop insight  Psycho-education conducted. Supportive Therapy conducted.   Probable discharge is next week  Follow-up daily while on inpatient unit    Patient was evaluated face-to-face    Electronically signed by Lyndee Osgood, MD on 10/28/2020 at 6:21 PM

## 2020-10-28 NOTE — GROUP NOTE
Group Therapy Note    Date: 10/28/2020    Group Start Time: 1330  Group End Time: 5510  Group Topic: Recreational    BARBER PERRIN    [place], [place]    Attendees:  8         Patient's Goal:  To demonstrate increased interpersonal skills. Notes:  Patient attended group and actively participated in task at hand. Patient conversed appropriately with peers and [place]etna Spillers. Status After Intervention:  Improved    Participation Level:  Active Listener and Interactive    Participation Quality: Appropriate, Attentive and Sharing      Speech:  normal      Thought Process/Content: Logical      Affective Functioning: Congruent      Mood: euthymic      Level of consciousness:  Alert, Oriented x4 and Attentive      Response to Learning: Able to verbalize current knowledge/experience, Able to verbalize/acknowledge new learning, Able to retain information, Capable of insight and Progressing to goal      Endings: None Reported       Modes of Intervention: Socialization, Exploration, Clarifying, Problem-solving, Activity, Limit-setting and Reality-testing      Discipline Responsible: Psychoeducational Specialist      Signature:  Alex Jeong

## 2020-10-28 NOTE — CARE COORDINATION
PHI called [phone] and spoke with Kendall King who stated pt can return Zepf to complete the walk in assessment. Expected discharge date will be for Friday.

## 2020-10-28 NOTE — PLAN OF CARE
Problem: Pain:  Goal: Control of acute pain  Description: Control of acute pain  Outcome: Ongoing   Patient reports pain is controlled      Problem: Tobacco Use:  Goal: Inpatient tobacco use cessation counseling participation  Description: Inpatient tobacco use cessation counseling participation  Outcome: Ongoing   Patient refused      Problem: Depressive Behavior With or Without Suicide Precautions:  Goal: Ability to disclose and discuss suicidal ideas will improve  Description: Ability to disclose and discuss suicidal ideas will improve  Outcome: Ongoing   Patient reports fleeting suicidal ideation. Patient denies plan and contracts for safety. Patient denies homicidal ideation, visual hallucinations, and auditory hallucinations. Patient reports depression and anxiety continuing to remain isolative to room. Patient does come out for needs. Patient is accepting of nourishment from staff. Remains behavioral control and med compliant. Despite encouragement from staff patient refused shower. Q15 minute checks maintained.

## 2020-10-28 NOTE — GROUP NOTE
Group Therapy Note    Date: 10/28/2020    Group Start Time: 1430  Group End Time: 7139  Group Topic: Relaxation    [facility]    [place], [place]        Group Therapy Note    Attendees: 6/23      Pt did not attend RT skills group d/t resting in room despite staff invitation to attend. 1:1 talk time offered as alternative to group session, pt declined.

## 2020-10-28 NOTE — GROUP NOTE
Group Therapy Note    Date: 10/28/2020    Group Start Time: 1000  Group End Time: 5188  Group Topic: Recreational    [facility], [facility]    Patient refused to attend Recreational Therapy Group at 1000 after encouragement from staff. 1:1 talk time offered.     Signature:  Nlaini Hdez

## 2020-10-29 PROBLEM — K59.03 THERAPEUTIC OPIOID-INDUCED CONSTIPATION (OIC): Status: ACTIVE | Noted: 2020-10-29

## 2020-10-29 PROBLEM — T40.2X5A THERAPEUTIC OPIOID-INDUCED CONSTIPATION (OIC): Status: ACTIVE | Noted: 2020-10-29

## 2020-10-29 PROCEDURE — 6370000000 HC RX 637 (ALT 250 FOR IP): Performed by: INTERNAL MEDICINE

## 2020-10-29 PROCEDURE — 99232 SBSQ HOSP IP/OBS MODERATE 35: CPT | Performed by: PSYCHIATRY & NEUROLOGY

## 2020-10-29 PROCEDURE — 99222 1ST HOSP IP/OBS MODERATE 55: CPT | Performed by: INTERNAL MEDICINE

## 2020-10-29 PROCEDURE — 6370000000 HC RX 637 (ALT 250 FOR IP): Performed by: PSYCHIATRY & NEUROLOGY

## 2020-10-29 PROCEDURE — 1240000000 HC EMOTIONAL WELLNESS R&B

## 2020-10-29 RX ORDER — POLYETHYLENE GLYCOL 3350 17 G/17G
34 POWDER, FOR SOLUTION ORAL DAILY PRN
Status: DISCONTINUED | OUTPATIENT
Start: 2020-10-29 | End: 2020-10-30 | Stop reason: HOSPADM

## 2020-10-29 RX ORDER — SENNA PLUS 8.6 MG/1
1 TABLET ORAL NIGHTLY PRN
Qty: 8 TABLET | Refills: 0 | Status: SHIPPED | OUTPATIENT
Start: 2020-10-29 | End: 2020-11-28

## 2020-10-29 RX ORDER — BUPRENORPHINE AND NALOXONE 8; 2 MG/1; MG/1
1 FILM, SOLUBLE BUCCAL; SUBLINGUAL 2 TIMES DAILY
Qty: 14 FILM | Refills: 0 | Status: SHIPPED | OUTPATIENT
Start: 2020-10-29 | End: 2020-11-05

## 2020-10-29 RX ORDER — BUPROPION HYDROCHLORIDE 450 MG/1
450 TABLET, FILM COATED, EXTENDED RELEASE ORAL DAILY
Qty: 30 TABLET | Refills: 3 | Status: ON HOLD | OUTPATIENT
Start: 2020-10-30 | End: 2021-04-19 | Stop reason: HOSPADM

## 2020-10-29 RX ORDER — POLYETHYLENE GLYCOL 3350 17 G/17G
17 POWDER, FOR SOLUTION ORAL DAILY PRN
Qty: 527 G | Refills: 1 | Status: SHIPPED | OUTPATIENT
Start: 2020-10-29 | End: 2020-11-28

## 2020-10-29 RX ORDER — LUBIPROSTONE 8 UG/1
8 CAPSULE, GELATIN COATED ORAL 2 TIMES DAILY WITH MEALS
Status: DISCONTINUED | OUTPATIENT
Start: 2020-10-29 | End: 2020-10-30 | Stop reason: HOSPADM

## 2020-10-29 RX ORDER — GABAPENTIN 600 MG/1
600 TABLET ORAL 3 TIMES DAILY
Qty: 30 TABLET | Refills: 0 | Status: ON HOLD | OUTPATIENT
Start: 2020-10-29 | End: 2021-04-19 | Stop reason: HOSPADM

## 2020-10-29 RX ADMIN — LURASIDONE HYDROCHLORIDE 40 MG: 40 TABLET, FILM COATED ORAL at 09:08

## 2020-10-29 RX ADMIN — BUPROPION HYDROCHLORIDE 450 MG: 150 TABLET, EXTENDED RELEASE ORAL at 09:08

## 2020-10-29 RX ADMIN — NICOTINE POLACRILEX 2 MG: 2 GUM, CHEWING ORAL at 14:44

## 2020-10-29 RX ADMIN — NICOTINE POLACRILEX 2 MG: 2 GUM, CHEWING ORAL at 18:43

## 2020-10-29 RX ADMIN — NICOTINE POLACRILEX 2 MG: 2 GUM, CHEWING ORAL at 20:07

## 2020-10-29 RX ADMIN — LUBIPROSTONE 8 MCG: 8 CAPSULE, GELATIN COATED ORAL at 17:41

## 2020-10-29 RX ADMIN — GABAPENTIN 600 MG: 600 TABLET, FILM COATED ORAL at 09:08

## 2020-10-29 RX ADMIN — SENNOSIDES 8.6 MG: 8.6 TABLET, FILM COATED ORAL at 21:51

## 2020-10-29 RX ADMIN — DOCUSATE SODIUM 200 MG: 100 CAPSULE ORAL at 09:14

## 2020-10-29 RX ADMIN — BUPRENORPHINE AND NALOXONE 1 FILM: 8; 2 FILM BUCCAL; SUBLINGUAL at 21:52

## 2020-10-29 RX ADMIN — GABAPENTIN 600 MG: 600 TABLET, FILM COATED ORAL at 14:41

## 2020-10-29 RX ADMIN — NICOTINE POLACRILEX 2 MG: 2 GUM, CHEWING ORAL at 12:23

## 2020-10-29 RX ADMIN — NICOTINE POLACRILEX 2 MG: 2 GUM, CHEWING ORAL at 21:52

## 2020-10-29 RX ADMIN — ACETAMINOPHEN 650 MG: 325 TABLET, FILM COATED ORAL at 21:51

## 2020-10-29 RX ADMIN — AMITRIPTYLINE HYDROCHLORIDE 75 MG: 25 TABLET, FILM COATED ORAL at 21:51

## 2020-10-29 RX ADMIN — GABAPENTIN 600 MG: 600 TABLET, FILM COATED ORAL at 21:51

## 2020-10-29 RX ADMIN — BUPRENORPHINE AND NALOXONE 1 FILM: 8; 2 FILM BUCCAL; SUBLINGUAL at 09:08

## 2020-10-29 RX ADMIN — POLYETHYLENE GLYCOL 3350 17 G: 17 POWDER, FOR SOLUTION ORAL at 09:15

## 2020-10-29 RX ADMIN — NICOTINE POLACRILEX 2 MG: 2 GUM, CHEWING ORAL at 17:50

## 2020-10-29 ASSESSMENT — PAIN DESCRIPTION - LOCATION: LOCATION: GENERALIZED

## 2020-10-29 ASSESSMENT — PAIN SCALES - GENERAL
PAINLEVEL_OUTOF10: 2
PAINLEVEL_OUTOF10: 4

## 2020-10-29 NOTE — GROUP NOTE
Group Therapy Note    Date: 10/29/2020    Group Start Time: 1100  Group End Time: 2431  Group Topic: Psychotherapy    BARBER Pinto MSW, LSW        Group Therapy Note    Attendees: 8/12    Pt declined to attend psychotherapy at 1100 am despite encouragement. Pt offered 1:1 and refused.

## 2020-10-29 NOTE — GROUP NOTE
Group Therapy Note    Date: 10/29/2020    Group Start Time: 0900  Group End Time: 0915  Group Topic: Community Meeting    [address] [facility] [address]    Patient declined to attend community meeting/goal setting group at 0900 despite encouragement from staff. 1:1 talk time offered by staff as alternative to group session.       Signature:  Elizabeth Olsen

## 2020-10-29 NOTE — PLAN OF CARE
[facility] Interdisciplinary Treatment Plan Note     Review Date & Time: 10/29/2020 0932    Admission Type:   Admission Type: Voluntary    Reason for admission:  Reason for Admission: SI to jump in front of a train    Estimated Length of Stay :  5-7 days  Estimated Discharge Date Update: to be determined by physician    PATIENT STRENGTHS:  Patient Strengths:Strengths: Communication, Connection to output provider  Patient Strengths and Limitations:Limitations: Inappropriate/potentially harmful leisure interests, Tendency to isolate self, Difficulty problem solving/relies on others to help solve problems, Multiple barriers to leisure interests  Addictive Behavior:Addictive Behavior  In the past 3 months, have you felt or has someone told you that you have a problem with:  : None  Do you have a history of Chemical Use?: No  Do you have a history of Alcohol Use?: No  Do you have a history of Street Drug Abuse?: Yes  Histroy of Prescripton Drug Abuse?: No  Medical Problems:   Past Medical History:   Diagnosis Date    Anxiety     Depression     Hep C w/ coma, chronic ([facility] [address]     Substance abuse ([facility] [address]        Risk:  Fall RiskTotal: 61  Jeff Scale Jeff Scale Score: 22  BVC Total: 0    Change in scores no.  Changes to plan of Care no    Status EXAM:   Status and Exam  Normal: No  Facial Expression: Sad  Affect: Appropriate  Level of Consciousness: Alert  Mood:Normal: No  Mood: Depressed  Motor Activity:Normal: Yes  Motor Activity: Decreased  Interview Behavior: Cooperative  Preception: [place] to Situation  Attention:Normal: Yes  Attention: Distractible  Thought Processes: Circumstantial  Thought Content:Normal: Yes  Thought Content: Poverty of Content  Hallucinations: None  Delusions: No  Memory:Normal: Yes  Insight and Judgment: Yes  Insight and Judgment: Poor Judgment, Poor Insight  Present Suicidal Ideation: No  Present Homicidal Ideation: No    Daily Assessment Last Entry:   Daily Sleep (WDL): Within Defined Limits         Patient Currently in Pain: No  Daily Nutrition (WDL): Within Defined Limits  Appetite Change: Normal for patient  Barriers to Nutrition: None  Level of Assistance: Independent/Self    Patient Monitoring:  Frequency of Checks: 4 times per hour, close    Psychiatric Symptoms:   Depression Symptoms  Depression Symptoms: No problems reported or observed. Anxiety Symptoms  Anxiety Symptoms: Generalized  Lianna Symptoms  Lianna Symptoms: No problems reported or observed. Psychosis Symptoms  Delusion Type: No problems reported or observed. Suicide Risk CSSR-S:  1) Within the past month, have you wished you were dead or wished you could go to sleep and not wake up? : Yes  2) Have you actually had any thoughts of killing yourself? : No  3) Have you been thinking about how you might kill yourself? : No  5) Have you started to work out or worked out the details of how to kill yourself? Do you intend to carry out this plan? : No  6) Have you ever done anything, started to do anything, or prepared to do anything to end your life?: Yes  Change in Result: No Change in Plan of care: No    EDUCATION:   Learner Progress Toward Treatment Goals: Reviewed results and recommendations of this team, Reviewed group plan and strategies, Reviewed signs, symptoms and risk of self harm and violent behavior, Reviewed goals and plan of care    Method: individual education, small group, verbal education    Outcome: verbalized understanding, but needs reinforcement to obtain goals    PATIENT GOALS:  Short term: Continue to stabilize on medications. Long term: Utilize support from sponsor. Going to [facility] and attending groups there. PLAN/TREATMENT RECOMMENDATIONS UPDATE:   Continue with group therapies, education of coping skills   Continue to monitor patient on unit. Medications provided/ medication compliance by patient. Continue for plans to obtain long term goals after discharge.     SHORT-TERM GOALS UPDATE:  Time frame for Short-Term Goals: 8-14days     LONG-TERM GOALS UPDATE:  Time frame for Long-Term Goals: 6 months  Members Present in Team Meeting: See Signature Sheet    Sán[place]

## 2020-10-29 NOTE — PROGRESS NOTES
CLINICAL PHARMACY NOTE: MEDS TO [address] Select Patient?: No  Total # of Prescriptions Filled: 3   The following medications were delivered to the patient:  · Amitriptyline  · Bupropion 150 mg  · Bupropion 300 Mg  ·   Total # of Interventions Completed: 0  Time Spent (min): 30    Additional Documentation:  .

## 2020-10-29 NOTE — PLAN OF CARE
Problem: Suicide risk  Goal: Provide patient with safe environment  Description: Provide patient with safe environment  10/29/2020 1545 by Pippa Doherty RN  Outcome: Ongoing     Problem: Depressive Behavior With or Without Suicide Precautions:  Goal: Ability to disclose and discuss suicidal ideas will improve  Description: Ability to disclose and discuss suicidal ideas will improve  10/29/2020 1545 by Pippa Doherty RN  Outcome: Ongoing   Pt denies suicidal ideations and agrees to feeling safe on the unit. Pt isolates to room for most of the shift and is aloof of peers on the unit. Pt is medication compliant. Pt. Remains on q15 min checks and frequent spontaneous checks throughout shift. Pt. Safety maintained.

## 2020-10-29 NOTE — GROUP NOTE
Group Therapy Note    Date: 10/29/2020    Group Start Time: 1000  Group End Time: 2920  Group Topic: Psychoeducation    BARBER Skinner    Patient declined to attend social skills group at 1000 despite encouragement from staff. 1:1 talk time offered by staff as alternative to group session.       Signature:  Quinten Orr

## 2020-10-29 NOTE — GROUP NOTE
Group Therapy Note    Date: 10/28/2020    Group Start Time: 2030  Group End Time: 2130  Group Topic: Wrap-Up    BARBER Hernandez        Group Therapy Note    Attendees: 10       Patient's Goal:  Being D/C Friday    Notes: Working on feeling better by being more active & social    Status After Intervention:  Unchanged    Participation Level:  Active Listener    Participation Quality: Appropriate      Speech:  normal      Thought Process/Content: Logical      Affective Functioning: Congruent      Mood: depressed      Level of consciousness:  Alert      Response to Learning: Capable of insight      Endings: None Reported    Modes of Intervention: Problem-solving      Discipline Responsible: [employer]      Signature:  Melinda Dawn

## 2020-10-29 NOTE — GROUP NOTE
Group Therapy Note    Date: 10/29/2020    Group Start Time: 1330  Group End Time: 0747  Group Topic: Psychoeducation    BARBER Hodge, CTRS    Patient refused to attend Psychoeducation Group at 1330 after encouragement from staff. 1:1 talk time offered.     Signature:  Haily Rm

## 2020-10-29 NOTE — PROGRESS NOTES
(NEURONTIN) tablet 600 mg, 600 mg, Oral, TID    ASSESSMENT  Bipolar disorder with severe depression ([facility] [address]       PLAN  Continue current medications  Attempt to develop insight  Psycho-education conducted. Supportive Therapy conducted. Probable discharge is next week  Follow-up daily while on inpatient unit    Patient was evaluated face-to-face    Electronically signed by Jean Lesch, APRN - CNP on 10/29/2020 at 12:07 PM    I independently saw and evaluated the patient. I reviewed the nurse practitioners documentation above. Any additional comments or changes to the nurse practitioners documentation are stated below otherwise agree with assessment. The patient has continued to be isolative. He reports some improvement in mood. He is expressing more hope for his future. He is mohit for safety. Will aim to discharge tomorrow  Patient s symptoms   are improving    PLAN  Continue medications as above  Attempt to develop insight  Psycho-education conducted. Supportive Therapy conducted.   Probable discharge is tomorrow  Follow-up daily while on inpatient unit    Electronically signed by Kael Leal MD on 10/29/20 at 2:32 PM EDT

## 2020-10-29 NOTE — CONSULTS
[facility] Internal Medicine    CONSULTATION / HISTORY AND PHYSICAL EXAMINATION            Date:   10/29/2020  Patient name:  Janae Yang  Date of admission:  10/21/2020  1:04 PM  MRN:   [id number]  Account:  [de-identified]  YOB: 1975  PCP:    No primary care provider on file. Room:   [address] [place] [address]  Code Status:    Full Code    Physician Requesting Consult: Carlo Ordaz MD    Reason for Consult: Medical management    Chief Complaint:     Chief Complaint   Patient presents with   [address] Problem   Constipation    History Obtained From:     Patient medical record nursing    History of Present Illness:     Patient with a history of drug abuse now on Suboxone complains of constipation no bowel movements and more than 7 days denies any nausea vomiting passing gas no abdominal pain    Past Medical History:     Past Medical History:   Diagnosis Date    Anxiety     Depression     [facility] C w/ coma, chronic ([facility]     Substance abuse ([facility]         Past Surgical History:     History reviewed. No pertinent surgical history. Medications Prior to Admission:     Prior to Admission medications    Medication Sig Start Date End Date Taking? Authorizing Provider   buprenorphine-naloxone (SUBOXONE) 8-2 MG FILM SL film Place 1 Film under the tongue 2 times daily for 7 days. Indications: last fill 10/16/20 for 14 days 10/29/20 11/5/20 Yes Carlo Ordaz MD   senna (SENOKOT) 8.6 MG tablet Take 1 tablet by mouth nightly as needed (constipation second line) 10/29/20 11/28/20 Yes Carlo Ordaz MD   polyethylene glycol (GLYCOLAX) 17 g packet Take 17 g by mouth daily as needed for Constipation 10/29/20 11/28/20 Yes Carlo Ordaz MD   lurasidone (LATUDA) 40 MG TABS tablet Take 1 tablet by mouth daily 10/30/20  Yes aCrlo Ordaz MD   gabapentin (NEURONTIN) 600 MG tablet Take 1 tablet by mouth 3 times daily for 10 days.  10/29/20 11/8/20 Yes Carlo Ordaz MD   ibuprofen (ADVIL;MOTRIN) 200 MG tablet Take 200 mg by mouth every 6 hours as needed for Pain   Yes Historical Provider, MD   amitriptyline (ELAVIL) 75 MG tablet Take 1 tablet by mouth nightly for 14 days 9/15/20 10/21/20 Yes Federico Solo MD   buPROPion 450 MG TB24 Take 450 mg by mouth every morning 9/15/20  Yes Federico Solo MD   nicotine polacrilex (NICORETTE) 4 MG gum Take 1 each by mouth as needed for Smoking cessation 9/15/20  Yes Federico Solo MD        Allergies:     Duloxetine    Social History:     Tobacco:    reports that he has been smoking cigarettes. He has a 13.00 pack-year smoking history. He has never used smokeless tobacco.  Alcohol:      reports previous alcohol use. Drug Use:  reports current drug use. Frequency: 7.00 times per week. Drugs: Marijuana, IV, and Cocaine. Family History:     Family History   Problem Relation Age of Onset    Diabetes Mother     Hypertension Mother     Depression Mother         & Uncles    Coronary Art Dis Father     Cancer Other         G. Parent  ? Review of Systems:     Positive and Negative as described in HPI. CONSTITUTIONAL:  negative for fevers, chills, sweats, fatigue, weight loss  HEENT:  negative for vision, hearing changes, runny nose, throat pain  RESPIRATORY:  negative for shortness of breath, cough, congestion, wheezing. CARDIOVASCULAR:  negative for chest pain, palpitations.   GASTROINTESTINAL:  n negative for nausea vomiting positive for constipation  GENITOURINARY:  negative for difficulty of urination, burning with urination, frequency   INTEGUMENT:  negative for rash, skin lesions, easy bruising   HEMATOLOGIC/LYMPHATIC:  negative for swelling/edema   ALLERGIC/IMMUNOLOGIC:  negative for urticaria , itching  ENDOCRINE:  negative increase in drinking, increase in urination, hot or cold intolerance  MUSCULOSKELETAL:  negative joint pains, muscle aches, swelling of joints  NEUROLOGICAL:  negative for headaches, dizziness, lightheadedness, numbness, pain, tingling extremities      Physical Exam:     /66   Pulse 83   Temp 97.7 °F (36.5 °C) (Oral)   Resp 14   Ht 6' (1.829 m)   Wt 217 lb (98.4 kg)   SpO2 99%   BMI 29.43 kg/m²   Temp (24hrs), Av °F (36.7 °C), Min:97.7 °F (36.5 °C), Max:98.3 °F (36.8 °C)    No results for input(s): POCGLU in the last 72 hours. No intake or output data in the 24 hours ending 10/29/20 1404    General Appearance:  alert, well appearing, and in no acute distress  Mental status: oriented to person, place, and time with normal affect  Head:  normocephalic, atraumatic. Eye: no icterus, redness, pupils equal and reactive, extraocular eye movements intact, conjunctiva clear  Ear: normal external ear, no discharge, hearing intact  Nose:  no drainage noted  Mouth: mucous membranes moist  Neck: supple, no carotid bruits, thyroid not palpable  Lungs: Bilateral equal air entry, clear to ausculation, no wheezing, rales or rhonchi, normal effort  Cardiovascular: normal rate, regular rhythm, no murmur, gallop, rub. Abdomen: Soft, nontender, nondistended, normal bowel sounds, no hepatomegaly or splenomegaly  Neurologic: There are no new focal motor or sensory deficits, normal muscle tone and bulk, no abnormal sensation, normal speech, cranial nerves II through XII grossly intact  Skin: No gross lesions, rashes, bruising or bleeding on exposed skin area  Extremities:  peripheral pulses palpable, no pedal edema or calf pain with palpation      Investigations:      Laboratory Testing:  No results found for this or any previous visit (from the past 24 hour(s)). Imaging/Diagonstics:      Assessment :      Primary Problem  Bipolar disorder with severe depression [facility])    Active Hospital Problems    Diagnosis Date Noted    Therapeutic opioid-induced constipation (OIC) [K59.03, T40.2X5A] 10/29/2020    Bipolar disorder with severe depression ([facility] [F31.4] 2019       Plan:     1. Opioid-induced constipation  2.  No signs of bowel obstruction  3. Amitiza 8 mg twice a day  4. Increase MiraLAX to 34 twice daily    Consultations:   IP CONSULT TO HISTORY AND PHYSICAL  IP CONSULT TO INTERNAL MEDICINE      Federico Perez MD  10/29/2020  2:04 PM    Copy sent to Dr. Yasmany Long primary care provider on file. Please note that this chart was generated using voice recognition Dragon dictation software. Although every effort was made to ensure the accuracy of this automated transcription, some errors in transcription may have occurred.

## 2020-10-29 NOTE — BH NOTE
Pt did not participate in [facility]ness [facility] at 1600 due to resting in room and choosing to not attend. 1:1 talk time offered and Pt declined.

## 2020-10-30 VITALS
TEMPERATURE: 97.9 F | BODY MASS INDEX: 29.39 KG/M2 | RESPIRATION RATE: 16 BRPM | DIASTOLIC BLOOD PRESSURE: 95 MMHG | SYSTOLIC BLOOD PRESSURE: 145 MMHG | WEIGHT: 217 LBS | HEART RATE: 100 BPM | HEIGHT: 72 IN | OXYGEN SATURATION: 99 %

## 2020-10-30 PROCEDURE — 6370000000 HC RX 637 (ALT 250 FOR IP): Performed by: PSYCHIATRY & NEUROLOGY

## 2020-10-30 PROCEDURE — 6370000000 HC RX 637 (ALT 250 FOR IP): Performed by: INTERNAL MEDICINE

## 2020-10-30 RX ADMIN — DOCUSATE SODIUM 200 MG: 100 CAPSULE ORAL at 08:17

## 2020-10-30 RX ADMIN — BUPROPION HYDROCHLORIDE 450 MG: 150 TABLET, EXTENDED RELEASE ORAL at 08:14

## 2020-10-30 RX ADMIN — GABAPENTIN 600 MG: 600 TABLET, FILM COATED ORAL at 08:14

## 2020-10-30 RX ADMIN — BUPRENORPHINE AND NALOXONE 1 FILM: 8; 2 FILM BUCCAL; SUBLINGUAL at 08:15

## 2020-10-30 RX ADMIN — POLYETHYLENE GLYCOL 3350 34 G: 17 POWDER, FOR SOLUTION ORAL at 08:17

## 2020-10-30 RX ADMIN — LURASIDONE HYDROCHLORIDE 40 MG: 40 TABLET, FILM COATED ORAL at 08:14

## 2020-10-30 RX ADMIN — ACETAMINOPHEN 650 MG: 325 TABLET, FILM COATED ORAL at 08:17

## 2020-10-30 RX ADMIN — LUBIPROSTONE 8 MCG: 8 CAPSULE, GELATIN COATED ORAL at 08:14

## 2020-10-30 ASSESSMENT — PAIN SCALES - GENERAL
PAINLEVEL_OUTOF10: 1
PAINLEVEL_OUTOF10: 5

## 2020-10-30 ASSESSMENT — PAIN DESCRIPTION - LOCATION: LOCATION: GENERALIZED

## 2020-10-30 NOTE — BH NOTE
Patient given tobacco quitline number 85748971343 at this time, refusing to call at this time, states \" I just dont want to quit now\"- patient given information as to the dangers of long term tobacco use. Continue to reinforce the importance of tobacco cessation.

## 2020-10-30 NOTE — BH NOTE
[facility]  Discharge Note    Pt discharged with followings belongings:   Dentures: None  Vision - Corrective Lenses: Glasses  Hearing Aid: None  Jewelry: Ring, Earrings  Body Piercings Removed: Yes  Clothing: Footwear, Jacket / coat, Shirt, Socks, Other (Comment)(black cap)  Were All Patient Medications Collected?: Not Applicable  Other Valuables: Cell phone, Wallet   Valuables sent home with Pt. Valuables retrieved from safe, Security envelope number:  [phone] and returned to patient. Patient education on aftercare instructions: YES  Information faxed to [facility] by RN Patient verbalize understanding of AVS:  YES. Pt discharged ambulatory with self via cab to inpatient rehab.   Status EXAM upon discharge:  Status and Exam  Normal: Yes  Facial Expression: Flat  Affect: Appropriate  Level of Consciousness: Alert  Mood:Normal: Yes  Mood: Depressed  Motor Activity:Normal: Yes  Motor Activity: Decreased  Interview Behavior: Cooperative  Preception: [place] to Person, [place] to Time, [place] to Place, [place] to Situation  Attention:Normal: Yes  Attention: Distractible  Thought Processes: Circumstantial  Thought Content:Normal: Yes  Thought Content: Poverty of Content  Hallucinations: None  Delusions: No  Memory:Normal: Yes  Insight and Judgment: No  Insight and Judgment: Poor Judgment, Poor Insight  Present Suicidal Ideation: No  Present Homicidal Ideation: No      Metabolic Screening:    Lab Results   Component Value Date    LABA1C 5.9 12/07/2019       Lab Results   Component Value Date    CHOL 126 12/07/2019    CHOL 135 03/11/2019    CHOL 109 11/07/2018    CHOL 119 03/09/2018    CHOL 153 04/22/2014     Lab Results   Component Value Date    TRIG 113 12/07/2019    TRIG 69 03/11/2019    TRIG 48 11/07/2018    TRIG 78 03/09/2018    TRIG 112 04/22/2014     Lab Results   Component Value Date    HDL 40 (L) 12/07/2019    HDL 46 03/11/2019    HDL 46 11/07/2018    HDL 35 (L) 03/09/2018    HDL 51 04/22/2014     No components found for: LDLCAL  No results found for: Toby Gonzales RN

## 2020-10-30 NOTE — CARE COORDINATION
Name: Cheli Hughes    : 1975    Discharge Date: 10/30/20    Primary Auth/Cert #: [id number]     Discharge Medications:      Medication List      START taking these medications    lurasidone 40 MG Tabs tablet  Commonly known as:  LATUDA  Take 1 tablet by mouth daily  Notes to patient:  Mood stabilization     polyethylene glycol 17 g packet  Commonly known as:  GLYCOLAX  Take 17 g by mouth daily as needed for Constipation  Notes to patient:  constipation     senna 8.6 MG tablet  Commonly known as:  SENOKOT  Take 1 tablet by mouth nightly as needed (constipation second line)  Notes to patient:  constipation        CHANGE how you take these medications    buPROPion HCl ER (XL) 450 MG Tb24  Take 450 mg by mouth daily  What changed:  when to take this  Notes to patient:  depression        CONTINUE taking these medications    amitriptyline 75 MG tablet  Commonly known as:  ELAVIL  Take 1 tablet by mouth nightly for 14 days  Notes to patient:  depression     buprenorphine-naloxone 8-2 MG Film SL film  Commonly known as:  Suboxone  Place 1 Film under the tongue 2 times daily for 7 days. Indications: last fill 10/16/20 for 14 days  Notes to patient:  Opioid replacement     gabapentin 600 MG tablet  Commonly known as:  NEURONTIN  Take 1 tablet by mouth 3 times daily for 10 days.   Notes to patient:  Nerve pain     ibuprofen 200 MG tablet  Commonly known as:  ADVIL;MOTRIN  Notes to patient:  pain     nicotine polacrilex 4 MG gum  Commonly known as:  NICORETTE  Take 1 each by mouth as needed for Smoking cessation  Notes to patient:  Smoking cessation           Where to Get Your Medications      These medications were sent to [address] [place] [address], [address] [place] [id number]    Phone:  [phone]   · buPROPion HCl ER (XL) 450 MG Tb24  · gabapentin 600 MG tablet  · lurasidone 40 MG Tabs tablet  · polyethylene glycol 17 g packet  · senna 8.6 MG tablet     You can get these medications from any pharmacy    Bring a paper prescription for each of these medications  · buprenorphine-naloxone 8-2 MG Film SL film         Follow Up Appointment: Deborah Vargas   Fax D/C to: [phone]     Walk In 8:00am-1:00pm for 3.5 assessment/Detox.  BLACK and WHITE at dc due to time sensitive     [facility]  [address]   Fax D/C to: [phone]  [phone]   On 11/4/2020  @8:50am for medication management

## 2020-11-01 NOTE — DISCHARGE SUMMARY
DISCHARGE SUMMARY      Patient ID:  Soledad Berumen  [id number]  3 y.o.  1975    Admit date: 10/21/2020    Discharge date and time: 11/1/2020    Disposition: Home     Admitting Physician: Ankur Banuelos MD     Discharge Physician: Dr Sandy Allen MD    Admission Diagnoses: Major depression, single episode [F32.9]  Depression with suicidal ideation [F32.9, R45.851]    Admission Condition: poor    Discharged Condition: stable    Admission Circumstance: The patient was admitted to psychiatry after presenting to ER with suicidal ideation with a plan to jump in front of a train. He reported worsening depression for a few weeks and suicidal ideation for 1 week. The patient reported compliance with his psychotropic medications. He has been using cocaine and crack intravenously on the day prior to admission. PAST MEDICAL/PSYCHIATRIC HISTORY:   Past Medical History:   Diagnosis Date    Anxiety     Depression     Hep C w/ coma, chronic (HCC)     Substance abuse (HCC)        FAMILY/SOCIAL HISTORY:  Family History   Problem Relation Age of Onset    Diabetes Mother     Hypertension Mother     Depression Mother         & Uncles    Coronary Art Dis Father     Cancer Other         G. Parent  ?      Social History     Socioeconomic History    Marital status: Legally      Spouse name: Jade Grigsby Number of children: 2    Years of education: Not on file    Highest education level: Not on file   Occupational History    Not on file   Social Needs    Financial resource strain: Not on file    Food insecurity     Worry: Not on file     Inability: Not on file   [employer] needs     Medical: Not on file     Non-medical: Not on file   Tobacco Use    Smoking status: Current Every Day Smoker     Packs/day: 0.50     Years: 26.00     Pack years: 13.00     Types: Cigarettes    Smokeless tobacco: Never Used   Substance and Sexual Activity    Alcohol use: Not Currently    Drug use: Yes     Frequency: 7.0 times per week     Types: Marijuana, IV, Cocaine     Comment: cocaine/crack IV    Sexual activity: Yes     Partners: Female   Lifestyle    Physical activity     Days per week: Not on file     Minutes per session: Not on file    Stress: Not on file   Relationships    Social connections     Talks on phone: Not on file     Gets together: Not on file     Attends Latter day service: Not on file     Active member of club or organization: Not on file     Attends meetings of clubs or organizations: Not on file     Relationship status: Not on file    Intimate partner violence     Fear of current or ex partner: Not on file     Emotionally abused: Not on file     Physically abused: Not on file     Forced sexual activity: Not on file   Other Topics Concern    Not on file   Social History Narrative    Not on file       MEDICATIONS:  No current facility-administered medications for this encounter. Current Outpatient Medications:     buprenorphine-naloxone (SUBOXONE) 8-2 MG FILM SL film, Place 1 Film under the tongue 2 times daily for 7 days. Indications: last fill 10/16/20 for 14 days, Disp: 14 Film, Rfl: 0    senna (SENOKOT) 8.6 MG tablet, Take 1 tablet by mouth nightly as needed (constipation second line), Disp: 8 tablet, Rfl: 0    polyethylene glycol (GLYCOLAX) 17 g packet, Take 17 g by mouth daily as needed for Constipation, Disp: 527 g, Rfl: 1    lurasidone (LATUDA) 40 MG TABS tablet, Take 1 tablet by mouth daily, Disp: 30 tablet, Rfl: 0    gabapentin (NEURONTIN) 600 MG tablet, Take 1 tablet by mouth 3 times daily for 10 days. , Disp: 30 tablet, Rfl: 0    buPROPion 450 MG TB24, Take 450 mg by mouth daily, Disp: 30 tablet, Rfl: 3    ibuprofen (ADVIL;MOTRIN) 200 MG tablet, Take 200 mg by mouth every 6 hours as needed for Pain, Disp: , Rfl:     amitriptyline (ELAVIL) 75 MG tablet, Take 1 tablet by mouth nightly for 14 days, Disp: 14 tablet, Rfl: 0    nicotine polacrilex (NICORETTE) 4 MG gum, Take 1 each by mouth as needed for Smoking cessation, Disp: 110 each, Rfl: 3    Examination:  BP (!) 145/95   Pulse 100   Temp 97.9 °F (36.6 °C) (Oral)   Resp 16   Ht 6' (1.829 m)   Wt 217 lb (98.4 kg)   SpO2 99%   BMI 29.43 kg/m²   Gait - steady    HOSPITAL COURSE[de-identified]  Following admission to the hospital, patient had a complete physical exam and blood work up, which was unremarkable. Patient was monitored closely with suicide precaution  Patient was started on Suboxone , Wellbutrin, Latuda and Elavil as noted above  Was encouraged to participate in group and other milieu activity  Patient started to feel better with this combination of treatment. Significant progress in the symptoms since admission. The patient reported severe depressive symptoms through most of the admission  The patient denies AVH or paranoid thoughts  The patient denies any hopelessness or worthlessness  No active SI/HI  Appetite:  [x] Normal  [] Increased  [] Decreased    Sleep:       [x] Normal  [] Fair       [] Poor            Energy:    [x] Normal  [] Increased  [] Decreased     SI [] Present  [x] Absent  HI  []Present  [x] Absent   Aggression:  [] yes  [] no  Patient is [x] able  [] unable to CONTRACT FOR SAFETY   Medication side effects(SE):  [x] None(Psych. Meds.) [] Other      Mental Status Examination on discharge: The patient left early in the morning before being seen on the day of discharge please see mental state examination from yesterday's note    ASSESSMENT:  Patient symptoms are:  [x] Well controlled  [x] Improving  [] Worsening  [] No change      Diagnosis:  Principal Problem:    Bipolar disorder with severe [facility] ([facility]  Active Problems:    Therapeutic opioid-induced constipation (OIC)  Resolved Problems:    * No resolved hospital problems. *      LABS:    No results for input(s): WBC, HGB, PLT in the last 72 hours. No results for input(s): NA, K, CL, CO2, BUN, CREATININE, GLUCOSE in the last 72 hours.   No results for input(s): BILITOT, ALKPHOS, AST, ALT in the last 72 hours. Lab Results   Component Value Date    BARBSCNU NEGATIVE 04/10/2018    LABBENZ NEGATIVE 04/10/2018    LABMETH NEGATIVE 04/10/2018    PPXUR NOT REPORTED 04/10/2018     Lab Results   Component Value Date    TSH 1.02 12/07/2019     No results found for: LITHIUM  No results found for: VALPROATE, CBMZ    RISK ASSESSMENT AT DISCHARGE: Low risk for suicide and homicide. Treatment Plan:  Reviewed current Medications with the patient. Education provided on the complaince with treatment. Risks, benefits, side effects, drug-to-drug interactions and alternatives to treatment were discussed. Encourage patient to attend outpatient follow up appointment and therapy. Patient was advised to call the outpatient provider, visit the nearest ED or call 911 if symptoms are not manageable. Medication List      START taking these medications    lurasidone 40 MG Tabs tablet  Commonly known as:  LATUDA  Take 1 tablet by mouth daily  Notes to patient:  Mood stabilization     polyethylene glycol 17 g packet  Commonly known as:  GLYCOLAX  Take 17 g by mouth daily as needed for Constipation  Notes to patient:  constipation     senna 8.6 MG tablet  Commonly known as:  SENOKOT  Take 1 tablet by mouth nightly as needed (constipation second line)  Notes to patient:  constipation        CHANGE how you take these medications    buPROPion HCl ER (XL) 450 MG Tb24  Take 450 mg by mouth daily  What changed:  when to take this  Notes to patient:  depression        CONTINUE taking these medications    amitriptyline 75 MG tablet  Commonly known as:  ELAVIL  Take 1 tablet by mouth nightly for 14 days  Notes to patient:  depression     buprenorphine-naloxone 8-2 MG Film SL film  Commonly known as:  Suboxone  Place 1 Film under the tongue 2 times daily for 7 days.  Indications: last fill 10/16/20 for 14 days  Notes to patient:  Opioid replacement     gabapentin 600 MG tablet  Commonly known as:  NEURONTIN  Take 1 tablet by mouth 3 times daily for 10 days. Notes to patient:  Nerve pain     ibuprofen 200 MG tablet  Commonly known as:  ADVIL;MOTRIN  Notes to patient:  pain     nicotine polacrilex 4 MG gum  Commonly known as:  NICORETTE  Take 1 each by mouth as needed for Smoking cessation  Notes to patient:  Smoking cessation           Where to Get Your Medications      These medications were sent to [address] [place] [address], [address] [place] [place] [address] [address]    Phone:  [phone]   · buPROPion HCl ER (XL) 450 MG Tb24  · gabapentin 600 MG tablet  · lurasidone 40 MG Tabs tablet  · polyethylene glycol 17 g packet  · senna 8.6 MG tablet     You can get these medications from any pharmacy    Bring a paper prescription for each of these medications  · buprenorphine-naloxone 8-2 MG Film SL film             --Galo Torres MD on 10/30/2020at 4:57 PM    An electronic signature was used to authenticate this note. **This report has been created using voice recognition software. It may contain minor errors which are inherent in voice recognition technology. **

## 2021-04-10 ENCOUNTER — HOSPITAL ENCOUNTER (INPATIENT)
Age: 46
LOS: 9 days | Discharge: HOME | DRG: 753 | End: 2021-04-19
Attending: EMERGENCY MEDICINE | Admitting: PSYCHIATRY & NEUROLOGY
Payer: COMMERCIAL

## 2021-04-10 DIAGNOSIS — F11.20 OPIOID TYPE DEPENDENCE, CONTINUOUS (HCC): ICD-10-CM

## 2021-04-10 DIAGNOSIS — E87.6 HYPOKALEMIA: ICD-10-CM

## 2021-04-10 DIAGNOSIS — R45.851 DEPRESSION WITH SUICIDAL IDEATION: Primary | ICD-10-CM

## 2021-04-10 DIAGNOSIS — F32.A DEPRESSION WITH SUICIDAL IDEATION: Primary | ICD-10-CM

## 2021-04-10 LAB
ABSOLUTE EOS #: 0.09 K/UL (ref 0–0.4)
ABSOLUTE IMMATURE GRANULOCYTE: ABNORMAL K/UL (ref 0–0.3)
ABSOLUTE LYMPH #: 1.38 K/UL (ref 1–4.8)
ABSOLUTE MONO #: 0.95 K/UL (ref 0.1–1.3)
ACETAMINOPHEN LEVEL: <5 UG/ML (ref 10–30)
ALBUMIN SERPL-MCNC: 4.4 G/DL (ref 3.5–5.2)
ALBUMIN/GLOBULIN RATIO: ABNORMAL (ref 1–2.5)
ALP BLD-CCNC: 77 U/L (ref 40–129)
ALT SERPL-CCNC: 26 U/L (ref 5–41)
AMPHETAMINE SCREEN URINE: POSITIVE
ANION GAP SERPL CALCULATED.3IONS-SCNC: 11 MMOL/L (ref 9–17)
AST SERPL-CCNC: 26 U/L
BARBITURATE SCREEN URINE: POSITIVE
BASOPHILS # BLD: 1 % (ref 0–2)
BASOPHILS ABSOLUTE: 0.09 K/UL (ref 0–0.2)
BENZODIAZEPINE SCREEN, URINE: NEGATIVE
BILIRUB SERPL-MCNC: 0.58 MG/DL (ref 0.3–1.2)
BUN BLDV-MCNC: 15 MG/DL (ref 6–20)
BUN/CREAT BLD: ABNORMAL (ref 9–20)
BUPRENORPHINE URINE: ABNORMAL
CALCIUM SERPL-MCNC: 9.2 MG/DL (ref 8.6–10.4)
CANNABINOID SCREEN URINE: POSITIVE
CHLORIDE BLD-SCNC: 100 MMOL/L (ref 98–107)
CO2: 28 MMOL/L (ref 20–31)
COCAINE METABOLITE, URINE: POSITIVE
CREAT SERPL-MCNC: 0.71 MG/DL (ref 0.7–1.2)
DIFFERENTIAL TYPE: ABNORMAL
EOSINOPHILS RELATIVE PERCENT: 1 % (ref 0–4)
ETHANOL PERCENT: <0.01 %
ETHANOL: <10 MG/DL
GFR AFRICAN AMERICAN: >60 ML/MIN
GFR NON-AFRICAN AMERICAN: >60 ML/MIN
GFR SERPL CREATININE-BSD FRML MDRD: ABNORMAL ML/MIN/{1.73_M2}
GFR SERPL CREATININE-BSD FRML MDRD: ABNORMAL ML/MIN/{1.73_M2}
GLUCOSE BLD-MCNC: 148 MG/DL (ref 70–99)
HCT VFR BLD CALC: 34.3 % (ref 41–53)
HEMOGLOBIN: 10.8 G/DL (ref 13.5–17.5)
IMMATURE GRANULOCYTES: ABNORMAL %
LYMPHOCYTES # BLD: 16 % (ref 24–44)
MAGNESIUM: 2 MG/DL (ref 1.6–2.6)
MCH RBC QN AUTO: 24.8 PG (ref 26–34)
MCHC RBC AUTO-ENTMCNC: 31.4 G/DL (ref 31–37)
MCV RBC AUTO: 79 FL (ref 80–100)
MDMA URINE: ABNORMAL
METHADONE SCREEN, URINE: NEGATIVE
METHAMPHETAMINE, URINE: ABNORMAL
MONOCYTES # BLD: 11 % (ref 1–7)
MORPHOLOGY: ABNORMAL
NRBC AUTOMATED: ABNORMAL PER 100 WBC
OPIATES, URINE: NEGATIVE
OXYCODONE SCREEN URINE: NEGATIVE
PDW BLD-RTO: 17.9 % (ref 11.5–14.9)
PHENCYCLIDINE, URINE: NEGATIVE
PLATELET # BLD: 298 K/UL (ref 150–450)
PLATELET ESTIMATE: ABNORMAL
PMV BLD AUTO: 8.5 FL (ref 6–12)
POTASSIUM SERPL-SCNC: 2.7 MMOL/L (ref 3.7–5.3)
POTASSIUM SERPL-SCNC: 3.5 MMOL/L (ref 3.7–5.3)
PROPOXYPHENE, URINE: ABNORMAL
RBC # BLD: 4.34 M/UL (ref 4.5–5.9)
RBC # BLD: ABNORMAL 10*6/UL
SALICYLATE LEVEL: <1 MG/DL (ref 3–10)
SARS-COV-2, RAPID: NOT DETECTED
SEG NEUTROPHILS: 71 % (ref 36–66)
SEGMENTED NEUTROPHILS ABSOLUTE COUNT: 6.09 K/UL (ref 1.3–9.1)
SODIUM BLD-SCNC: 139 MMOL/L (ref 135–144)
SPECIMEN DESCRIPTION: NORMAL
TEST INFORMATION: ABNORMAL
TOTAL PROTEIN: 7.4 G/DL (ref 6.4–8.3)
TRICYCLIC ANTIDEPRESSANTS, UR: ABNORMAL
WBC # BLD: 8.6 K/UL (ref 3.5–11)
WBC # BLD: ABNORMAL 10*3/UL

## 2021-04-10 PROCEDURE — 80143 DRUG ASSAY ACETAMINOPHEN: CPT

## 2021-04-10 PROCEDURE — 6370000000 HC RX 637 (ALT 250 FOR IP): Performed by: EMERGENCY MEDICINE

## 2021-04-10 PROCEDURE — 84132 ASSAY OF SERUM POTASSIUM: CPT

## 2021-04-10 PROCEDURE — G0480 DRUG TEST DEF 1-7 CLASSES: HCPCS

## 2021-04-10 PROCEDURE — 85025 COMPLETE CBC W/AUTO DIFF WBC: CPT

## 2021-04-10 PROCEDURE — 80307 DRUG TEST PRSMV CHEM ANLYZR: CPT

## 2021-04-10 PROCEDURE — 80179 DRUG ASSAY SALICYLATE: CPT

## 2021-04-10 PROCEDURE — 6360000002 HC RX W HCPCS: Performed by: EMERGENCY MEDICINE

## 2021-04-10 PROCEDURE — 83735 ASSAY OF MAGNESIUM: CPT

## 2021-04-10 PROCEDURE — 87635 SARS-COV-2 COVID-19 AMP PRB: CPT

## 2021-04-10 PROCEDURE — 80053 COMPREHEN METABOLIC PANEL: CPT

## 2021-04-10 PROCEDURE — 6370000000 HC RX 637 (ALT 250 FOR IP): Performed by: PSYCHIATRY & NEUROLOGY

## 2021-04-10 PROCEDURE — 1240000000 HC EMOTIONAL WELLNESS R&B

## 2021-04-10 PROCEDURE — 36415 COLL VENOUS BLD VENIPUNCTURE: CPT

## 2021-04-10 PROCEDURE — 99285 EMERGENCY DEPT VISIT HI MDM: CPT

## 2021-04-10 RX ORDER — CLONIDINE HYDROCHLORIDE 0.1 MG/1
0.1 TABLET ORAL 3 TIMES DAILY PRN
Status: DISCONTINUED | OUTPATIENT
Start: 2021-04-10 | End: 2021-04-11

## 2021-04-10 RX ORDER — IBUPROFEN 400 MG/1
400 TABLET ORAL EVERY 6 HOURS PRN
Status: DISCONTINUED | OUTPATIENT
Start: 2021-04-10 | End: 2021-04-19 | Stop reason: HOSPADM

## 2021-04-10 RX ORDER — POTASSIUM CHLORIDE 7.45 MG/ML
10 INJECTION INTRAVENOUS ONCE
Status: COMPLETED | OUTPATIENT
Start: 2021-04-10 | End: 2021-04-10

## 2021-04-10 RX ORDER — HYDROXYZINE 50 MG/1
50 TABLET, FILM COATED ORAL 3 TIMES DAILY PRN
Status: DISCONTINUED | OUTPATIENT
Start: 2021-04-10 | End: 2021-04-19 | Stop reason: HOSPADM

## 2021-04-10 RX ORDER — POTASSIUM CHLORIDE 20 MEQ/1
40 TABLET, EXTENDED RELEASE ORAL ONCE
Status: COMPLETED | OUTPATIENT
Start: 2021-04-10 | End: 2021-04-10

## 2021-04-10 RX ORDER — DICYCLOMINE HCL 20 MG
20 TABLET ORAL 4 TIMES DAILY PRN
Status: DISCONTINUED | OUTPATIENT
Start: 2021-04-10 | End: 2021-04-19 | Stop reason: HOSPADM

## 2021-04-10 RX ORDER — PROMETHAZINE HYDROCHLORIDE 25 MG/1
25 TABLET ORAL 3 TIMES DAILY PRN
Status: DISCONTINUED | OUTPATIENT
Start: 2021-04-10 | End: 2021-04-19 | Stop reason: HOSPADM

## 2021-04-10 RX ORDER — TRAZODONE HYDROCHLORIDE 50 MG/1
50 TABLET ORAL NIGHTLY PRN
Status: DISCONTINUED | OUTPATIENT
Start: 2021-04-10 | End: 2021-04-19 | Stop reason: HOSPADM

## 2021-04-10 RX ORDER — BUTALBITAL, ACETAMINOPHEN AND CAFFEINE 300; 40; 50 MG/1; MG/1; MG/1
2 CAPSULE ORAL ONCE
Status: COMPLETED | OUTPATIENT
Start: 2021-04-10 | End: 2021-04-10

## 2021-04-10 RX ORDER — POLYETHYLENE GLYCOL 3350 17 G/17G
17 POWDER, FOR SOLUTION ORAL DAILY PRN
Status: DISCONTINUED | OUTPATIENT
Start: 2021-04-10 | End: 2021-04-19 | Stop reason: HOSPADM

## 2021-04-10 RX ORDER — MAGNESIUM HYDROXIDE/ALUMINUM HYDROXICE/SIMETHICONE 120; 1200; 1200 MG/30ML; MG/30ML; MG/30ML
30 SUSPENSION ORAL EVERY 6 HOURS PRN
Status: DISCONTINUED | OUTPATIENT
Start: 2021-04-10 | End: 2021-04-19 | Stop reason: HOSPADM

## 2021-04-10 RX ORDER — ACETAMINOPHEN 325 MG/1
650 TABLET ORAL EVERY 4 HOURS PRN
Status: DISCONTINUED | OUTPATIENT
Start: 2021-04-10 | End: 2021-04-19 | Stop reason: HOSPADM

## 2021-04-10 RX ADMIN — POTASSIUM CHLORIDE 40 MEQ: 1500 TABLET, EXTENDED RELEASE ORAL at 17:36

## 2021-04-10 RX ADMIN — NICOTINE POLACRILEX 2 MG: 2 GUM, CHEWING BUCCAL at 21:51

## 2021-04-10 RX ADMIN — ACETAMINOPHEN 650 MG: 325 TABLET ORAL at 21:51

## 2021-04-10 RX ADMIN — POTASSIUM CHLORIDE 40 MEQ: 1500 TABLET, EXTENDED RELEASE ORAL at 11:32

## 2021-04-10 RX ADMIN — CLONIDINE HYDROCHLORIDE 0.1 MG: 0.1 TABLET ORAL at 21:51

## 2021-04-10 RX ADMIN — PROMETHAZINE HYDROCHLORIDE 25 MG: 25 TABLET ORAL at 21:51

## 2021-04-10 RX ADMIN — POTASSIUM CHLORIDE 40 MEQ: 1500 TABLET, EXTENDED RELEASE ORAL at 13:38

## 2021-04-10 RX ADMIN — DICYCLOMINE HYDROCHLORIDE 20 MG: 20 TABLET ORAL at 21:51

## 2021-04-10 RX ADMIN — NICOTINE POLACRILEX 2 MG: 2 GUM, CHEWING BUCCAL at 20:23

## 2021-04-10 RX ADMIN — POTASSIUM CHLORIDE 10 MEQ: 7.46 INJECTION, SOLUTION INTRAVENOUS at 11:43

## 2021-04-10 RX ADMIN — BUTALBITA,ACETAMINOPHEN AND CAFFEINE 2 CAPSULE: 50; 300; 40 CAPSULE ORAL at 10:33

## 2021-04-10 ASSESSMENT — PATIENT HEALTH QUESTIONNAIRE - PHQ9: SUM OF ALL RESPONSES TO PHQ QUESTIONS 1-9: 11

## 2021-04-10 ASSESSMENT — ENCOUNTER SYMPTOMS
ABDOMINAL PAIN: 0
BACK PAIN: 0
COUGH: 0

## 2021-04-10 ASSESSMENT — LIFESTYLE VARIABLES: HISTORY_ALCOHOL_USE: NO

## 2021-04-10 ASSESSMENT — SLEEP AND FATIGUE QUESTIONNAIRES
AVERAGE NUMBER OF SLEEP HOURS: 5
DIFFICULTY FALLING ASLEEP: YES
SLEEP PATTERN: DIFFICULTY FALLING ASLEEP;DIFFICULTY ARISING;DISTURBED/INTERRUPTED SLEEP
RESTFUL SLEEP: NO

## 2021-04-10 ASSESSMENT — PAIN DESCRIPTION - PAIN TYPE
TYPE: CHRONIC PAIN

## 2021-04-10 ASSESSMENT — PAIN DESCRIPTION - LOCATION
LOCATION: BACK

## 2021-04-10 ASSESSMENT — PAIN SCALES - GENERAL: PAINLEVEL_OUTOF10: 5

## 2021-04-10 NOTE — ED NOTES
Pt declined a dinner tray at this time. No distress noted at this time. Safety maintained.        Casandra Mcfarland RN  04/10/21 [id number]

## 2021-04-10 NOTE — ED NOTES
RN left a message for access center to admit pt to [facility].        Melody Hector RN  04/10/21 0757

## 2021-04-10 NOTE — ED NOTES
Pt did take a dinner tray. Pt denies any needs at this time. No distress noted at this time. Safety maintained.        Alonso Ott RN  04/10/21 [id number]

## 2021-04-10 NOTE — ED NOTES
Pt placed into a blue safety gown. Pt personal belongings checked and secured by security.        Mandie Black RN  04/10/21 7189

## 2021-04-10 NOTE — ED PROVIDER NOTES
[address]  EMERGENCY DEPARTMENT ENCOUNTER      Pt Name: Yadiel Booth  MRN: [id number]  [place] 1975  Date of evaluation: 4/10/21      CHIEF COMPLAINT       Chief Complaint   Patient presents with    Suicidal     S/I with plan to \"jumping in front of a car. \"  Off medications for 1 month    Headache     HISTORY OF PRESENT ILLNESS   HPI 55 y.o. male presents with c/o depression and suicidal thoughts. The patient reports feeling depressed and having thought of suicide for the last 2 week, particularly severe over the last 3 days. The patient has been thinking of stepping into traffic. The patient does have a h/o of previous suicide attempt . The patient reports that their symptoms were provoked by being off his medications, recent break up, and relapsing on his drug use. The patient does have a h/o of previous psychiatric admission most recently in October of 2020. The patient reports methamphetamine use, but states that he has remained clean from opiates and is weaning his suboxone dose. No attempts at self harm to this point. The patient reports that he has had a left frontal sharp headache for the last two days. Reports that he gets frequent migraine headaches, and this feels similar to his chronic headache. States that they usually go away with Excedrin migraine. REVIEW OF SYSTEMS     Review of Systems   Constitutional: Negative for fever. HENT: Negative for congestion. Eyes: Negative for visual disturbance. Respiratory: Negative for cough. Cardiovascular: Negative for chest pain. Gastrointestinal: Negative for abdominal pain. Genitourinary: Negative for dysuria. Musculoskeletal: Negative for back pain. Skin: Positive for rash (scratches and bruises from a few days ago). Neurological: Positive for headaches. Psychiatric/Behavioral: Positive for decreased concentration, dysphoric mood and suicidal ideas.          PAST MEDICAL HISTORY     Past Medical History: Diagnosis Date    Anxiety     Depression     Hep C w/ coma, chronic (HCC)     Polysubstance abuse ([facility] [address]     pt currently on Suboxone 4mg therapy; drug abuse includes IV heroin, IV cocaine, cannabis, opiates, \"just about every drug in the world. \"    Substance abuse ([facility]        SURGICAL HISTORY     History reviewed. No pertinent surgical history. CURRENT MEDICATIONS       Previous Medications    AMITRIPTYLINE (ELAVIL) 75 MG TABLET    Take 1 tablet by mouth nightly for 14 days    BUPROPION 450 MG TB24    Take 450 mg by mouth daily    GABAPENTIN (NEURONTIN) 600 MG TABLET    Take 1 tablet by mouth 3 times daily for 10 days. IBUPROFEN (ADVIL;MOTRIN) 200 MG TABLET    Take 200 mg by mouth every 6 hours as needed for Pain    LURASIDONE (LATUDA) 40 MG TABS TABLET    Take 1 tablet by mouth daily    NICOTINE POLACRILEX (NICORETTE) 4 MG GUM    Take 1 each by mouth as needed for Smoking cessation       ALLERGIES     is allergic to duloxetine. FAMILY HISTORY     He indicated that the status of his mother is unknown. He indicated that the status of his father is unknown. He indicated that the status of his other is unknown. SOCIAL HISTORY      reports that he has been smoking cigarettes. He has a 15.50 pack-year smoking history. He has never used smokeless tobacco. He reports current alcohol use. He reports current drug use. Frequency: 7.00 times per week. Drugs: Marijuana, IV, and Cocaine.     PHYSICAL EXAM     INITIAL VITALS: /65   Pulse 85   Temp 98.1 °F (36.7 °C) (Oral)   Resp 16   Ht 6' (1.829 m)   Wt 190 lb (86.2 kg)   SpO2 99%   BMI 25.77 kg/m²   Gen: NAD  Head: Normocephalic, atraumatic  Eye: Pupils equal round reactive to light, no conjunctivitis  Heart: Regular rate and rhythm no murmurs  Lungs: Clear to auscultation bilaterally, no respiratory distress  Chest wall: No crepitus, no tenderness palpation  Abdomen: Soft, nontender, nondistended, with no peritoneal signs  Skin: No diaphoresis. no lacerations. Neurologic: Patient is alert and oriented x3, motor and sensation is intact in all 4 extremities, speech is fluent  Extremities: Full range of motion, no cyanosis, no edema, no signs of trauma, no tenderness to palpation    MEDICAL DECISION MAKING:     MDM  55 y.o. male with depression, suicidal thoughts, suicidal plan. previous suicide attempt. The patient does not appear intoxicated. The patient is showing no signs of any acute active toxidrome. The patient denies any overdose attempt or  medical complaints at this time. We'll screen for any acetaminophen or salicylate ingestion, we'll check baseline renal function, liver function, a drug screen, cbc and reassess. Emergency Department course:    K+ low, replaced IV and oral.    Repeat K+ 3.5. Pt able to replace orally. He is medically clear for psychiatric evaluation. DIAGNOSTIC RESULTS     LABS: All lab results were reviewed by myself, and all abnormals are listed below.   Labs Reviewed   CBC WITH AUTO DIFFERENTIAL - Abnormal; Notable for the following components:       Result Value    RBC 4.34 (*)     Hemoglobin 10.8 (*)     Hematocrit 34.3 (*)     MCV 79.0 (*)     MCH 24.8 (*)     RDW 17.9 (*)     Seg Neutrophils 71 (*)     Lymphocytes 16 (*)     Monocytes 11 (*)     All other components within normal limits   COMPREHENSIVE METABOLIC PANEL - Abnormal; Notable for the following components:    Glucose 148 (*)     Potassium 2.7 (*)     All other components within normal limits   URINE DRUG SCREEN - Abnormal; Notable for the following components:    Amphetamine Screen, Ur POSITIVE (*)     Barbiturate Screen, Ur POSITIVE (*)     Cocaine Metabolite, Urine POSITIVE (*)     Cannabinoid Scrn, Ur POSITIVE (*)     All other components within normal limits   ACETAMINOPHEN LEVEL - Abnormal; Notable for the following components:    Acetaminophen Level <5 (*)     All other components within normal limits   SALICYLATE LEVEL - Abnormal; Notable for the following components:    Salicylate Lvl <1 (*)     All other components within normal limits   POTASSIUM - Abnormal; Notable for the following components:    Potassium 3.5 (*)     All other components within normal limits   COVID-19, RAPID   ETHANOL   MAGNESIUM       EMERGENCY DEPARTMENT COURSE:   Vitals:    Vitals:    04/10/21 0921 04/10/21 1754   BP: (!) 158/71 109/65   Pulse: 115 85   Resp: 18 16   Temp: 98 °F (36.7 °C) 98.1 °F (36.7 °C)   TempSrc: Oral Oral   SpO2: 98% 99%   Weight: 190 lb (86.2 kg)    Height: 6' (1.829 m)        The patient was given the following medications while in the emergency department:  Orders Placed This Encounter   Medications    butalbital-APAP-caffeine -40 MG per capsule 2 capsule    potassium chloride 10 mEq/100 mL IVPB (Peripheral Line)    potassium chloride (KLOR-CON M) extended release tablet 40 mEq    potassium chloride (KLOR-CON M) extended release tablet 40 mEq    potassium chloride (KLOR-CON M) extended release tablet 40 mEq     -------------------------  CRITICAL CARE:   CONSULTS: None  PROCEDURES: Procedures     FINAL IMPRESSION      1. Depression with suicidal ideation    2. Hypokalemia          DISPOSITION/PLAN   DISPOSITION Decision To Admit 04/10/2021 05:07:15 PM      PATIENT REFERRED TO:  No follow-up provider specified.     DISCHARGE MEDICATIONS:  New Prescriptions    No medications on file         Kavitha Israel MD  Attending Emergency Physician                     Kavitha Israel MD  04/10/21 2728

## 2021-04-11 ENCOUNTER — APPOINTMENT (OUTPATIENT)
Dept: GENERAL RADIOLOGY | Age: 46
DRG: 753 | End: 2021-04-11
Payer: COMMERCIAL

## 2021-04-11 PROBLEM — F15.10 AMPHETAMINE ABUSE (HCC): Status: ACTIVE | Noted: 2021-04-11

## 2021-04-11 PROBLEM — F14.10 COCAINE ABUSE (HCC): Status: ACTIVE | Noted: 2021-04-11

## 2021-04-11 PROBLEM — F12.10 CANNABIS ABUSE: Status: ACTIVE | Noted: 2021-04-11

## 2021-04-11 PROBLEM — F13.10 BARBITURATE ABUSE (HCC): Status: ACTIVE | Noted: 2021-04-11

## 2021-04-11 LAB — METHADONE SCREEN, URINE: NEGATIVE

## 2021-04-11 PROCEDURE — G0480 DRUG TEST DEF 1-7 CLASSES: HCPCS

## 2021-04-11 PROCEDURE — 73120 X-RAY EXAM OF HAND: CPT

## 2021-04-11 PROCEDURE — 80307 DRUG TEST PRSMV CHEM ANLYZR: CPT

## 2021-04-11 PROCEDURE — 1240000000 HC EMOTIONAL WELLNESS R&B

## 2021-04-11 PROCEDURE — 99223 1ST HOSP IP/OBS HIGH 75: CPT | Performed by: PSYCHIATRY & NEUROLOGY

## 2021-04-11 PROCEDURE — 6370000000 HC RX 637 (ALT 250 FOR IP): Performed by: PSYCHIATRY & NEUROLOGY

## 2021-04-11 PROCEDURE — 6370000000 HC RX 637 (ALT 250 FOR IP): Performed by: NURSE PRACTITIONER

## 2021-04-11 RX ORDER — BUPRENORPHINE AND NALOXONE 4; 1 MG/1; MG/1
1 FILM, SOLUBLE BUCCAL; SUBLINGUAL DAILY
Status: DISCONTINUED | OUTPATIENT
Start: 2021-04-11 | End: 2021-04-19 | Stop reason: HOSPADM

## 2021-04-11 RX ORDER — CLONIDINE HYDROCHLORIDE 0.1 MG/1
0.1 TABLET ORAL 3 TIMES DAILY PRN
Status: DISCONTINUED | OUTPATIENT
Start: 2021-04-11 | End: 2021-04-19 | Stop reason: HOSPADM

## 2021-04-11 RX ORDER — BUPROPION HYDROCHLORIDE 150 MG/1
150 TABLET ORAL DAILY
Status: DISCONTINUED | OUTPATIENT
Start: 2021-04-11 | End: 2021-04-12

## 2021-04-11 RX ORDER — BUPROPION HYDROCHLORIDE 150 MG/1
450 TABLET ORAL DAILY
Status: DISCONTINUED | OUTPATIENT
Start: 2021-04-11 | End: 2021-04-11

## 2021-04-11 RX ORDER — AMITRIPTYLINE HYDROCHLORIDE 25 MG/1
50 TABLET, FILM COATED ORAL NIGHTLY
Status: DISCONTINUED | OUTPATIENT
Start: 2021-04-11 | End: 2021-04-15

## 2021-04-11 RX ORDER — AMITRIPTYLINE HYDROCHLORIDE 25 MG/1
75 TABLET, FILM COATED ORAL NIGHTLY
Status: DISCONTINUED | OUTPATIENT
Start: 2021-04-11 | End: 2021-04-11

## 2021-04-11 RX ADMIN — NICOTINE POLACRILEX 2 MG: 2 GUM, CHEWING BUCCAL at 15:04

## 2021-04-11 RX ADMIN — NICOTINE POLACRILEX 2 MG: 2 GUM, CHEWING BUCCAL at 12:51

## 2021-04-11 RX ADMIN — BUPROPION HYDROCHLORIDE 150 MG: 150 TABLET, EXTENDED RELEASE ORAL at 12:15

## 2021-04-11 RX ADMIN — IBUPROFEN 400 MG: 400 TABLET, FILM COATED ORAL at 09:03

## 2021-04-11 RX ADMIN — ACETAMINOPHEN 650 MG: 325 TABLET ORAL at 21:08

## 2021-04-11 RX ADMIN — CLONIDINE HYDROCHLORIDE 0.1 MG: 0.1 TABLET ORAL at 21:08

## 2021-04-11 RX ADMIN — CLONIDINE HYDROCHLORIDE 0.1 MG: 0.1 TABLET ORAL at 09:08

## 2021-04-11 RX ADMIN — NICOTINE POLACRILEX 2 MG: 2 GUM, CHEWING BUCCAL at 09:02

## 2021-04-11 RX ADMIN — NICOTINE POLACRILEX 2 MG: 2 GUM, CHEWING BUCCAL at 10:21

## 2021-04-11 RX ADMIN — AMITRIPTYLINE HYDROCHLORIDE 50 MG: 25 TABLET, FILM COATED ORAL at 21:08

## 2021-04-11 RX ADMIN — DICYCLOMINE HYDROCHLORIDE 20 MG: 20 TABLET ORAL at 09:08

## 2021-04-11 RX ADMIN — NICOTINE POLACRILEX 4 MG: 4 GUM, CHEWING BUCCAL at 16:26

## 2021-04-11 RX ADMIN — BUPRENORPHINE AND NALOXONE 1 FILM: 4; 1 FILM BUCCAL; SUBLINGUAL at 12:15

## 2021-04-11 RX ADMIN — NICOTINE POLACRILEX 4 MG: 4 GUM, CHEWING BUCCAL at 21:08

## 2021-04-11 ASSESSMENT — SLEEP AND FATIGUE QUESTIONNAIRES
DIFFICULTY STAYING ASLEEP: YES
DO YOU USE A SLEEP AID: NO
AVERAGE NUMBER OF SLEEP HOURS: 4
DIFFICULTY FALLING ASLEEP: YES
DIFFICULTY ARISING: NO

## 2021-04-11 ASSESSMENT — PAIN DESCRIPTION - LOCATION: LOCATION: GENERALIZED

## 2021-04-11 ASSESSMENT — PAIN SCALES - GENERAL
PAINLEVEL_OUTOF10: 2
PAINLEVEL_OUTOF10: 10
PAINLEVEL_OUTOF10: 3

## 2021-04-11 ASSESSMENT — PAIN DESCRIPTION - PAIN TYPE: TYPE: ACUTE PAIN

## 2021-04-11 ASSESSMENT — PATIENT HEALTH QUESTIONNAIRE - PHQ9: SUM OF ALL RESPONSES TO PHQ QUESTIONS 1-9: 16

## 2021-04-11 NOTE — PROGRESS NOTES
Behavioral Services  Medicare Certification Upon Admission    I certify that this patient's inpatient psychiatric hospital admission is medically necessary for:    [x] (1) Treatment which could reasonably be expected to improve this patient's condition,       [x] (2) Or for diagnostic study;     AND     [x](2) The inpatient psychiatric services are provided while the individual is under the care of a physician and are included in the individualized plan of care.     Estimated length of stay/service 3-5 days    Plan for post-hospital care [facility]    Electronically signed by Sudarshan Bello MD on 4/11/2021 at 11:29 AM

## 2021-04-11 NOTE — CARE COORDINATION
patient refused to attend psychoeducation group at [address] [place] [address] after encouragement from staff.   1:1 talk time provided as alternative to group session
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
## 2021-04-11 NOTE — PLAN OF CARE
[facility]  Initial Interdisciplinary Treatment Plan NOTE    Original treatment plan Date & Time: 4/11/2021 0736    Admission Type:  Admission Type: Voluntary    Reason for admission:   Reason for Admission: Suicidal ideations to jump into traffic    Estimated Length of Stay:  5-7days  Estimated Discharge Date:   to be determined by physician    PATIENT STRENGTHS:  Patient Strengths:Strengths: Medication Compliance, Social Skills  Patient Strengths and Limitations:Limitations: Lacks leisure interests, Difficulty problem solving/relies on others to help solve problems  Addictive Behavior: Addictive Behavior  In the past 3 months, have you felt or has someone told you that you have a problem with:  : None  Do you have a history of Chemical Use?: No  Do you have a history of Alcohol Use?: No  Do you have a history of Street Drug Abuse?: No  Histroy of Prescripton Drug Abuse?: No  Medical Problems:  Past Medical History:   Diagnosis Date    Anxiety     Depression     Hep C w/ coma, chronic ([facility]     Polysubstance abuse ([facility]     pt currently on Suboxone 4mg therapy; drug abuse includes IV heroin, IV cocaine, cannabis, opiates, \"just about every drug in the world. \"    Substance abuse ([facility]      Status EXAM:Status and Exam  Normal: No  Facial Expression: Flat  Affect: Appropriate  Level of Consciousness: Alert  Mood:Normal: No  Mood: Empty  Motor Activity:Normal: Yes  Interview Behavior: Cooperative  Preception: [place] to Person, [place] to Time, [place] to Situation, [place] to Place  Attention:Normal: No  Thought Content:Normal: Yes  Hallucinations: None  Delusions: No  Memory:Normal: No  Memory: Confabulation  Insight and Judgment: No  Insight and Judgment: Poor Judgment, Poor Insight  Present Suicidal Ideation: No  Present Homicidal Ideation: No    EDUCATION:   Learner Progress Toward Treatment Goals:  Reviewed group plans and strategies for care    Method:  Group therapy, medication compliance,

## 2021-04-11 NOTE — GROUP NOTE
Group Therapy Note    Date: 4/11/2021    Group Start Time: 1330  Group End Time: 4864  Group Topic: Recreational    [facility]    Rico Day    patient refused to attend recreation therapy group at 1330 after encouragement from staff.   1:1 talk time provided as alternative to group session     Signature:  Elizabeth Higginbotham

## 2021-04-11 NOTE — GROUP NOTE
Group Therapy Note    Date: 4/11/2021    Group Start Time: 0900  Group End Time: 0920  Group Topic: Community Meeting    BARBER RIZZO    Rico Day    patient refused to attend community meeting group at 0900 after encouragement from staff.   1:1 talk time provided as alternative to group session     Signature:  Evelio Joyce

## 2021-04-11 NOTE — SUICIDE SAFETY PLAN
SAFETY PLAN    A suicide Safety Plan is a document that supports someone when they are having thoughts of suicide. Warning Signs that indicate a suicidal crisis may be developing: What (situations, thoughts, feelings, body sensations, behaviors, etc.) do you experience that lets you know you are beginning to think about suicide? 1. Lose interest in doing things  2. Going off medication     Internal Coping Strategies:  What things can I do (relaxation techniques, hobbies, physical activities, etc.) to take my mind off my problems without contacting another person? 1. Work out  2. Read  3. Count to 10     People and social settings that provide distraction: Who can I call or where can I go to distract me? 1. Family   2. Fishing       People whom I can ask for help: Who can I call when I need help - for example, friends, family, clergy, someone else? 1. Family     Professionals or [address] [place], [place] [address]vd I can contact during a crisis: Who can I call for help - for example, my doctor, my psychiatrist, my psychologist, a mental health provider, a suicide hotline? Brant Rao    2. Suicide Prevention Lifeline: 6-816-028-TALK (7843)    3. Rescue Crisis: [facility]. [facility], [address] [place] [address]   [phone]    Making the environment safe: How can I make my environment (house/apartment/living space) safer? For example, can I remove guns, medications, and other items? 1.  Continue to take medications

## 2021-04-12 PROCEDURE — 1240000000 HC EMOTIONAL WELLNESS R&B

## 2021-04-12 PROCEDURE — 6370000000 HC RX 637 (ALT 250 FOR IP): Performed by: NURSE PRACTITIONER

## 2021-04-12 PROCEDURE — 99232 SBSQ HOSP IP/OBS MODERATE 35: CPT | Performed by: PSYCHIATRY & NEUROLOGY

## 2021-04-12 PROCEDURE — 6370000000 HC RX 637 (ALT 250 FOR IP): Performed by: PSYCHIATRY & NEUROLOGY

## 2021-04-12 RX ORDER — BUPROPION HYDROCHLORIDE 300 MG/1
300 TABLET ORAL DAILY
Status: DISCONTINUED | OUTPATIENT
Start: 2021-04-13 | End: 2021-04-19 | Stop reason: HOSPADM

## 2021-04-12 RX ADMIN — BUPROPION HYDROCHLORIDE 150 MG: 150 TABLET, EXTENDED RELEASE ORAL at 08:48

## 2021-04-12 RX ADMIN — ACETAMINOPHEN 650 MG: 325 TABLET ORAL at 21:03

## 2021-04-12 RX ADMIN — ACETAMINOPHEN 650 MG: 325 TABLET ORAL at 12:15

## 2021-04-12 RX ADMIN — HYDROXYZINE HYDROCHLORIDE 50 MG: 50 TABLET, FILM COATED ORAL at 21:03

## 2021-04-12 RX ADMIN — BUPRENORPHINE AND NALOXONE 1 FILM: 4; 1 FILM BUCCAL; SUBLINGUAL at 08:48

## 2021-04-12 RX ADMIN — AMITRIPTYLINE HYDROCHLORIDE 50 MG: 25 TABLET, FILM COATED ORAL at 21:03

## 2021-04-12 RX ADMIN — NICOTINE POLACRILEX 4 MG: 4 GUM, CHEWING BUCCAL at 19:44

## 2021-04-12 RX ADMIN — CLONIDINE HYDROCHLORIDE 0.1 MG: 0.1 TABLET ORAL at 21:03

## 2021-04-12 RX ADMIN — NICOTINE POLACRILEX 4 MG: 4 GUM, CHEWING BUCCAL at 12:16

## 2021-04-12 RX ADMIN — NICOTINE POLACRILEX 4 MG: 4 GUM, CHEWING BUCCAL at 17:24

## 2021-04-12 RX ADMIN — NICOTINE POLACRILEX 4 MG: 4 GUM, CHEWING BUCCAL at 21:03

## 2021-04-12 ASSESSMENT — PAIN SCALES - GENERAL
PAINLEVEL_OUTOF10: 4
PAINLEVEL_OUTOF10: 6
PAINLEVEL_OUTOF10: 0

## 2021-04-12 NOTE — GROUP NOTE
Group Therapy Note    Date: 4/12/2021    Group Start Time: 1100  Group End Time: 1130  Group Topic: Psychotherapy    [facility] Havery Bamberger, MSW, LSW        Group Therapy Note    patient refused to attend psychoeducational group at 11:00am after encouragement from staff.        Signature:  Havery Bamberger, MSW, LSW

## 2021-04-12 NOTE — GROUP NOTE
Group Therapy Note    Date: 4/12/2021    Group Start Time: 0900  Group End Time: 0930  Group Topic: Community Meeting    [address] [facility] [address]    Patient declined to attend community meeting/goal setting group at 0900 despite encouragement from staff. 1:1 talk time offered by staff as alternative to group session.       Signature:  Megan Colón

## 2021-04-12 NOTE — GROUP NOTE
Group Therapy Note    Date: 4/12/2021    Group Start Time: 1000  Group End Time: 7424  Group Topic: Recreational    [address], [facility]        Group Therapy Note    Attendees: 4/8    patient refused to attend recreation and leisure  group at [phone] after encouragement from staff. 1:1 talk time provided as alternative to group session.          Signature:  Nash Kate [place]

## 2021-04-12 NOTE — PROGRESS NOTES
Daily Progress Note  4/12/2021  CHIEF COMPLAINT: Suicidal ideation with plan to walk into traffic    Reviewed patient's current plan of care and vital signs with nursing staff. Sleep: Several hours last night  Attending groups: No    SUBJECTIVE:    Staff report patient has maintained medication adherence and has been free of acute behavioral changes since admission. Patient reports he continues to struggle with sleep as he is experiencing body aches related to withdrawal process. He offers that his Suboxone was decreased per his request yesterday and he is willing to process through these symptoms utilizing as needed medications. He endorses poor appetite but confirms he was able to tolerate his breakfast and lunch approximating 50 to 75% of each. He endorses depression and rates 5 out of 10 on a 0-10 scale (10 being the worst). He endorses anxiety related to hospitalization. He continues to endorse suicidal ideation and remains able to contract for safety while on the inpatient unit although would be concerned related to his safety in the community. Discussed his plans to transition to residential sober living and he continues to explore those options. He does request having his gabapentin restarted and we discussed the abuse potential related to this medication and he is accepting of discussion. He denies side effects related to starting Wellbutrin and we mutually agreed to titrate  tomorrow. Staff report patient has remained isolative and he was encouraged to participate in groups and milieu programming. he reports currently having low motivation and energy as he continues to transition through the opiate withdrawal process and will set group attendance as a goal tomorrow and additionally verbalizes his \"ultimate goal\" is to no longer utilize Suboxone or other MAT. Patient denies having questions or concerns related to treatment plan.     Mental Status Exam  Level of consciousness:  Within normal limits  Appearance: Hospital attire, lying in bed, with fair grooming and hygiene   Behavior/Motor: Approachable, no psychomotor abnormalities noted  Attitude toward examiner:cooperative, attentive, fair eye contact  Speech:  normal rate, normal volume and well articulated  Mood: Depressed, anxious  Affect: Mood congruent, remains flat and withdrawn  Thought processes:  linear, goal directed and coherent  Thought content:  denies homicidal ideation  Suicidal Ideation endorses suicidal ideation, contracts for safety on the unit  Delusions:  no evidence of delusions  Perceptual Disturbance:  denies any perceptual disturbance  Cognition:  Oriented to self, location, time, and situation  Memory: age appropriate  Insight & Judgement: Fair  Medication side effects:  denies       Data   height is 6' (1.829 m) and weight is 190 lb (86.2 kg). His oral temperature is 98 °F (36.7 °C). His blood pressure is 112/59 (abnormal) and his pulse is 66. His respiration is 14 and oxygen saturation is 99%.    Labs:   Admission on 04/10/2021   Component Date Value Ref Range Status    WBC 04/10/2021 8.6  3.5 - 11.0 k/uL Final    RBC 04/10/2021 4.34* 4.5 - 5.9 m/uL Final    Hemoglobin 04/10/2021 10.8* 13.5 - 17.5 g/dL Final    Hematocrit 04/10/2021 34.3* 41 - 53 % Final    MCV 04/10/2021 79.0* 80 - 100 fL Final    MCH 04/10/2021 24.8* 26 - 34 pg Final    MCHC 04/10/2021 31.4  31 - 37 g/dL Final    RDW 04/10/2021 17.9* 11.5 - 14.9 % Final    Platelets 13/13/5454 298  150 - 450 k/uL Final    MPV 04/10/2021 8.5  6.0 - 12.0 fL Final    NRBC Automated 04/10/2021 NOT REPORTED  per 100 WBC Final    Differential Type 04/10/2021 NOT REPORTED   Final    Immature Granulocytes 04/10/2021 NOT REPORTED  0 % Final    Absolute Immature Granulocyte 04/10/2021 NOT REPORTED  0.00 - 0.30 k/uL Final    WBC Morphology 04/10/2021 NOT REPORTED   Final    RBC Morphology 04/10/2021 NOT REPORTED   Final    Platelet Estimate 02/02/5936 NOT REPORTED Final    Seg Neutrophils 04/10/2021 71* 36 - 66 % Final    Lymphocytes 04/10/2021 16* 24 - 44 % Final    Monocytes 04/10/2021 11* 1 - 7 % Final    Eosinophils % 04/10/2021 1  0 - 4 % Final    Basophils 04/10/2021 1  0 - 2 % Final    Segs Absolute 04/10/2021 6.09  1.3 - 9.1 k/uL Final    Absolute Lymph # 04/10/2021 1.38  1.0 - 4.8 k/uL Final    Absolute Mono # 04/10/2021 0.95  0.1 - 1.3 k/uL Final    Absolute Eos # 04/10/2021 0.09  0.0 - 0.4 k/uL Final    Basophils Absolute 04/10/2021 0.09  0.0 - 0.2 k/uL Final    Morphology 04/10/2021 ANISOCYTOSIS PRESENT   Final    Morphology 04/10/2021 HYPOCHROMIA PRESENT   Final    Morphology 04/10/2021 FEW GIANT PLATELETS   Final    Morphology 04/10/2021 1+ ELLIPTOCYTES   Final    Morphology 04/10/2021 1+ POLYCHROMASIA   Final    Glucose 04/10/2021 148* 70 - 99 mg/dL Final    BUN 04/10/2021 15  6 - 20 mg/dL Final    CREATININE 04/10/2021 0.71  0.70 - 1.20 mg/dL Final    Bun/Cre Ratio 04/10/2021 NOT REPORTED  9 - 20 Final    Calcium 04/10/2021 9.2  8.6 - 10.4 mg/dL Final    Sodium 04/10/2021 139  135 - 144 mmol/L Final    Potassium 04/10/2021 2.7* 3.7 - 5.3 mmol/L Final    Chloride 04/10/2021 100  98 - 107 mmol/L Final    CO2 04/10/2021 28  20 - 31 mmol/L Final    Anion Gap 04/10/2021 11  9 - 17 mmol/L Final    Alkaline Phosphatase 04/10/2021 77  40 - 129 U/L Final    ALT 04/10/2021 26  5 - 41 U/L Final    AST 04/10/2021 26  <40 U/L Final    Total Bilirubin 04/10/2021 0.58  0.3 - 1.2 mg/dL Final    Total Protein 04/10/2021 7.4  6.4 - 8.3 g/dL Final    Albumin 04/10/2021 4.4  3.5 - 5.2 g/dL Final    Albumin/Globulin Ratio 04/10/2021 NOT REPORTED  1.0 - 2.5 Final    GFR Non- 04/10/2021 >60  >60 mL/min Final    GFR  04/10/2021 >60  >60 mL/min Final    GFR Comment 04/10/2021        Final    Comment: Average GFR for 38-51 years old:   80 mL/min/1.73sq m  Chronic Kidney Disease:   <60 mL/min/1.73sq m  Kidney failure: timing of collection. Final    Comment: Testing for legal purposes should be confirmed by another method. To request confirmation   of test result, please call the lab within 7 days of sample submission.  Acetaminophen Level 04/10/2021 <5* 10 - 30 ug/mL Final    Salicylate Lvl 96/87/9887 <1* 3 - 10 mg/dL Final    Specimen Description 04/10/2021 . NASOPHARYNGEAL SWAB   Final    SARS-CoV-2, Rapid 04/10/2021 Not Detected  Not Detected Final    Comment:       Rapid NAAT:  The specimen is NEGATIVE for SARS-CoV-2, the novel coronavirus associated with   COVID-19. The ID NOW COVID-19 assay is designed to detect the virus that causes COVID-19 in patients   with signs and symptoms of infection who are suspected of COVID-19. An individual without symptoms of COVID-19 and who is not shedding SARS-CoV-2 virus would   expect to have a negative (not detected) result in this assay. Negative results should be treated as presumptive and, if inconsistent with clinical signs   and symptoms or necessary for patient management,  should be tested with an alternative molecular assay. Negative results do not preclude   SARS-CoV-2 infection and   should not be used as the sole basis for patient management decisions. Fact sheet for Healthcare Providers: BuildHer.es  Fact sheet for Patients: BuildHer.es          Methodology: Isothermal Nucleic Acid Amplification      Magnesium 04/10/2021 2.0  1.6 - 2.6 mg/dL Final    Potassium 04/10/2021 3.5* 3.7 - 5.3 mmol/L Final    Methadone Screen, Urine 04/11/2021 NEGATIVE  NEGATIVE Final    Comment:       (Positive cutoff 300 ng/mL)              Assay provides medical screening only. The absence of expected drug(s) and/or metabolite(s)   may indicate diluted or adulterated urine, limitations of testing or timing of collection. Testing for legal purposes should be confirmed by another method.   To request confirmation   of test result, please call the lab within 7 days of sample submission. Medications  Current Facility-Administered Medications: cloNIDine (CATAPRES) tablet 0.1 mg, 0.1 mg, Oral, TID PRN  buPROPion (WELLBUTRIN XL) extended release tablet 150 mg, 150 mg, Oral, Daily  amitriptyline (ELAVIL) tablet 50 mg, 50 mg, Oral, Nightly  buprenorphine-naloxone (SUBOXONE) 4-1 MG SL film 1 Film, 1 Film, Sublingual, Daily  nicotine polacrilex (NICORETTE) gum 4 mg, 4 mg, Oral, PRN  acetaminophen (TYLENOL) tablet 650 mg, 650 mg, Oral, Q4H PRN  aluminum & magnesium hydroxide-simethicone (MAALOX) 200-200-20 MG/5ML suspension 30 mL, 30 mL, Oral, Q6H PRN  hydrOXYzine (ATARAX) tablet 50 mg, 50 mg, Oral, TID PRN  ibuprofen (ADVIL;MOTRIN) tablet 400 mg, 400 mg, Oral, Q6H PRN  polyethylene glycol (GLYCOLAX) packet 17 g, 17 g, Oral, Daily PRN  traZODone (DESYREL) tablet 50 mg, 50 mg, Oral, Nightly PRN  dicyclomine (BENTYL) tablet 20 mg, 20 mg, Oral, 4x Daily PRN  promethazine (PHENERGAN) tablet 25 mg, 25 mg, Oral, TID PRN    ASSESSMENT  Severe recurrent major depression without psychotic features (HCC)     PLAN  Discussed with Dr. Lorenza Arguelles and treatment team   Patient symptoms remain the same  Increase Wellbutrin  mg daily (LD 4/12 at 0848 Wellbutrin  mg)  Attempt to develop insight  Psycho-education conducted. Supportive Therapy conducted. Probable discharge is currently undetermined  Follow-up daily while inpatient    More than 16 mins of the session was spent doing Supportive psychotherapy. .     Electronically signed by PRADEEP Jones CNP on 4/12/21 at 3:49 PM EDT    **This report has been created using voice recognition software. It may contain minor errors which are inherent in voice recognition technology. **    I independently saw and evaluated the patient. I reviewed the nurse practitioners documentation above.   Any additional comments or changes to the nurse practitioners documentation

## 2021-04-12 NOTE — GROUP NOTE
Wellness Group Note   Group Topic: Emotional Wellness      Date: April 12, 2021  [facility]      Patient declined to attend wellness group despite staff encouragement. 1:1 talk time was offered as an alternative. Writer will continue to encourage patient to attend unit programming.       Signature:  SHARON Garcia

## 2021-04-12 NOTE — PLAN OF CARE
Problem: Suicide risk  Goal: Provide patient with safe environment  Description: Provide patient with safe environment  4/12/2021 1051 by Kaye Villafuerte  Outcome: Ongoing  Safe environment maintained and patient remains free from harm. Agreeable to seeking staff should thoughts to harm self or others arise. Q15min checks for safety. Problem: Depressive Behavior With or Without Suicide Precautions:  Goal: Ability to disclose and discuss suicidal ideas will improve  Description: Ability to disclose and discuss suicidal ideas will improve  4/12/2021 1051 by Kaye Villafuerte  Outcome: Ongoing  Patient denies suicidal ideations. Agreeable to talking with staff if thoughts to harm self arise. Q15min checks maintained for safety. Problem: Depressive Behavior With or Without Suicide Precautions:  Goal: Absence of self-harm  Description: Absence of self-harm  4/12/2021 1051 by Kaye Villafuerte  Outcome: Ongoing  Safe environment maintained and patient remains free from self-harm. Agreeable to seeking staff should thoughts to harm self or others arise. Q15min checks for safety.

## 2021-04-12 NOTE — GROUP NOTE
Group Therapy Note    Date: 4/12/2021    Group Start Time: 1330  Group End Time: 2905  Group Topic: Psychoeducation    BARBER RIZZO    Rosalie Skinner        Group Therapy Note    Attendees: 14/19         Patient's Goal:  To improve patient knowledge of positive coping skills     Notes:  Patient was pleasant and appropriate throughout the session     Status After Intervention:  Improved    Participation Level:  Active Listener and Interactive    Participation Quality: Appropriate, Attentive, Sharing and Supportive      Speech:  normal      Thought Process/Content: Logical      Affective Functioning: Congruent      Mood: euthymic      Level of consciousness:  Alert, Oriented x4 and Attentive      Response to Learning: Able to verbalize current knowledge/experience, Able to verbalize/acknowledge new learning, Capable of insight and Progressing to goal      Endings: None Reported    Modes of Intervention: Education, Support, Socialization, Exploration, Clarifying and Problem-solving      Discipline Responsible: Psychoeducational Specialist      Signature:  Kaycee Case

## 2021-04-12 NOTE — PLAN OF CARE
[address] [place] [address]  Day 3 Interdisciplinary Treatment Plan NOTE    Review Date & Time:  4/12/21    Admission Type:   Admission Type: Voluntary    Reason for admission:  Reason for Admission: Suicidal ideations to jump into traffic  Estimated Length of Stay: 5-7 days  Estimated Discharge Date Update: to be determined by physician    PATIENT STRENGTHS:  Patient Strengths Strengths: Communication, Social Skills  Patient Strengths and Limitations:Limitations: Difficulty problem solving/relies on others to help solve problems  Addictive Behavior:Addictive Behavior  In the past 3 months, have you felt or has someone told you that you have a problem with:  : None  Do you have a history of Chemical Use?: No  Do you have a history of Alcohol Use?: No  Do you have a history of Street Drug Abuse?: Yes  Histroy of Prescripton Drug Abuse?: No  Medical Problems:  Past Medical History:   Diagnosis Date    Anxiety     Depression     Hep C w/ coma, chronic ([facility]     Polysubstance abuse ([facility]     pt currently on Suboxone 4mg therapy; drug abuse includes IV heroin, IV cocaine, cannabis, opiates, \"just about every drug in the world. \"    Substance abuse ([facility] [facility]        Risk:  Fall RiskTotal: 83  Jeff Scale Jeff Scale Score: 22  BVC Total: 0  Change in scores no Changes to plan of Care no    Status EXAM:   Status and Exam  Normal: No  Facial Expression: Flat  Affect: Blunt, Constricted  Level of Consciousness: Alert  Mood:Normal: No  Mood: Anxious, Depressed  Motor Activity:Normal: No  Motor Activity: Decreased  Interview Behavior: Evasive  Preception: Stirling to Person, Stirling to Time, Stirling to Place, Stirling to Situation  Attention:Normal: No  Attention: Distractible  Thought Processes: Circumstantial  Thought Content:Normal: No  Thought Content: Preoccupations  Hallucinations: None  Delusions: No  Memory:Normal: Yes  Memory: Confabulation  Insight and Judgment: No  Insight and Judgment: Unmotivated  Present Suicidal Ideation: No  Present Homicidal Ideation: No    Daily Assessment Last Entry:   Daily Sleep (WDL): Within Defined Limits         Patient Currently in Pain: No  Daily Nutrition (WDL): Within Defined Limits    Patient Monitoring:  Frequency of Checks: 4 times per hour, close    Psychiatric Symptoms:   Depression Symptoms  Depression Symptoms: Change in energy level, Impaired concentration, Isolative, Loss of interest  Anxiety Symptoms  Anxiety Symptoms: Generalized  Lianna Symptoms  Lianna Symptoms: No problems reported or observed. Psychosis Symptoms  Delusion Type: No problems reported or observed. Suicide Risk CSSR-S:  1) Within the past month, have you wished you were dead or wished you could go to sleep and not wake up? : Yes  2) Have you actually had any thoughts of killing yourself? : Yes  3) Have you been thinking about how you might kill yourself? : Yes  5) Have you started to work out or worked out the details of how to kill yourself?  Do you intend to carry out this plan? : Yes  6) Have you ever done anything, started to do anything, or prepared to do anything to end your life?: Yes  Change in Result na  Change in Plan of care na        EDUCATION:   EDUCATION:   Learner Progress Toward Treatment Goals: Reviewed results and recommendations of this team, Reviewed group plan and strategies, Reviewed signs, symptoms and risk of self harm and violent behavior, Reviewed goals and plan of care    Method:small group, individual verbal education    Outcome:verbalized by patient, but needs reinforcement to obtain goals    PATIENT GOALS:  Short term:Patient is encouraged to set goals   Long term: patient is encouraged to set goals     PLAN/TREATMENT RECOMMENDATIONS UPDATE: continue with group therapies, increased socialization, continue planning for after discharge goals, continue with medication compliance    SHORT-TERM GOALS UPDATE:   Time frame for Short-Term Goals: 5-7 days    LONG-TERM GOALS UPDATE:   Time frame for

## 2021-04-13 PROCEDURE — 6370000000 HC RX 637 (ALT 250 FOR IP): Performed by: PSYCHIATRY & NEUROLOGY

## 2021-04-13 PROCEDURE — 6370000000 HC RX 637 (ALT 250 FOR IP): Performed by: NURSE PRACTITIONER

## 2021-04-13 PROCEDURE — 1240000000 HC EMOTIONAL WELLNESS R&B

## 2021-04-13 PROCEDURE — 99232 SBSQ HOSP IP/OBS MODERATE 35: CPT | Performed by: PSYCHIATRY & NEUROLOGY

## 2021-04-13 RX ADMIN — ACETAMINOPHEN 650 MG: 325 TABLET ORAL at 09:07

## 2021-04-13 RX ADMIN — NICOTINE POLACRILEX 4 MG: 4 GUM, CHEWING BUCCAL at 17:50

## 2021-04-13 RX ADMIN — NICOTINE POLACRILEX 4 MG: 4 GUM, CHEWING BUCCAL at 12:27

## 2021-04-13 RX ADMIN — ACETAMINOPHEN 650 MG: 325 TABLET ORAL at 20:59

## 2021-04-13 RX ADMIN — AMITRIPTYLINE HYDROCHLORIDE 50 MG: 25 TABLET, FILM COATED ORAL at 20:59

## 2021-04-13 RX ADMIN — BUPRENORPHINE AND NALOXONE 1 FILM: 4; 1 FILM BUCCAL; SUBLINGUAL at 09:05

## 2021-04-13 RX ADMIN — NICOTINE POLACRILEX 4 MG: 4 GUM, CHEWING BUCCAL at 14:29

## 2021-04-13 RX ADMIN — BUPROPION HYDROCHLORIDE 300 MG: 300 TABLET, FILM COATED, EXTENDED RELEASE ORAL at 09:05

## 2021-04-13 RX ADMIN — NICOTINE POLACRILEX 4 MG: 4 GUM, CHEWING BUCCAL at 21:01

## 2021-04-13 RX ADMIN — HYDROXYZINE HYDROCHLORIDE 50 MG: 50 TABLET, FILM COATED ORAL at 20:59

## 2021-04-13 ASSESSMENT — PAIN SCALES - GENERAL: PAINLEVEL_OUTOF10: 4

## 2021-04-13 ASSESSMENT — PAIN DESCRIPTION - DESCRIPTORS: DESCRIPTORS: ACHING

## 2021-04-13 ASSESSMENT — PAIN DESCRIPTION - LOCATION: LOCATION: BACK

## 2021-04-13 NOTE — GROUP NOTE
Group Therapy Note    Date: 4/13/2021    Group Start Time: 1430  Group End Time: 1006  Group Topic: Healthy Living/Wellness    [address], [facility]        Group Therapy Note    Attendees: 8/12    patient refused to attend learning how to prioritize group at [id number] after encouragement from staff. 1:1 talk time provided as alternative to group session.        Signature:  Yunier Sultana [place]

## 2021-04-13 NOTE — PLAN OF CARE
Problem: Suicide risk  Goal: Provide patient with safe environment  Description: Provide patient with safe environment  4/12/2021 2111 by Brionna Henriquez LPN  Outcome: Ongoing     Problem: Depressive Behavior With or Without Suicide Precautions:  Goal: Ability to disclose and discuss suicidal ideas will improve  Description: Ability to disclose and discuss suicidal ideas will improve  4/12/2021 2111 by Brionna Henriquez LPN  Outcome: Ongoing     Problem: Depressive Behavior With or Without Suicide Precautions:  Goal: Absence of self-harm  Description: Absence of self-harm  4/12/2021 2111 by Brionna Henriquez LPN  Outcome: Ongoing     Patient states that he is still having suicidal ideations but has no plan and contracts for safety. Patient encouraged to seek nursing staff if a plan or more intense thoughts to harm self arise, patient verbalized understanding. Patient was isolative to room but friendly with staff. Patient is medication and behavioral compliant. Patient provided with safe environment. Will continue to monitor Q15 minute safety checks.

## 2021-04-13 NOTE — GROUP NOTE
Group Therapy Note    Date: 4/13/2021    Group Start Time: 1100  Group End Time: 8050  Group Topic: Psychotherapy    [facility] [address], [place]    patient refused to attend psychotherapy group at 11a after encouragement from staff.   1:1 talk time provided as alternative to group session

## 2021-04-13 NOTE — PLAN OF CARE
Problem: Suicide risk  Goal: Provide patient with safe environment  Description: Provide patient with safe environment  4/13/2021 0929 by Valencia Alicea LPN  Outcome: Ongoing  Note: Patient denies suicidal ideation. 15 minute safety checks continued for patient safety. Problem: Depressive Behavior With or Without Suicide Precautions:  Goal: Ability to disclose and discuss suicidal ideas will improve  Description: Ability to disclose and discuss suicidal ideas will improve  4/13/2021 0929 by Valencia Alicea LPN  Outcome: Ongoing  Note: Patient has been free of self harm and denies thoughts of harming self at this time. Patient has agreed to seek staff if he begins having thoughts of harming self or needs to talk. Q15 minute safety checks continue. Problem: Depressive Behavior With or Without Suicide Precautions:  Goal: Absence of self-harm  Description: Absence of self-harm  4/13/2021 0929 by Valencia Alicea LPN  Outcome: Ongoing  Note: Patient has been free of self harm and denies thoughts of harming self at this time. Patient has agreed to seek staff if he begins having thoughts of harming self or needs to talk. Q15 minute safety checks continue.

## 2021-04-13 NOTE — CARE COORDINATION
SW attempted to meet with pt to discuss discharge planning. Pt expressed still having SI and can't think about discharge, however he wants to go to [facility] when discharged.

## 2021-04-13 NOTE — PROGRESS NOTES
Daily Progress Note  4/13/2021     CHIEF COMPLAINT: Suicidal ideation with plan to walk into traffic    Reviewed patient's current plan of care and vital signs with nursing staff. Sleep: Several hours last night  Attending groups: No    SUBJECTIVE:    Staff report patient has maintained medication adherence and has been free of acute behavioral changes in the last 24 hours. Mr. Marcie Garcia continues to isolate coming to the unit for his personal needs only. Today he is encouraged to explore his plans related to AOD treatment once his symptoms are stable and he is aware that he needs to walk to the social work team office to obtain required information for an application at [employer]. Patient continues to report mild withdrawal symptoms including poor appetite. He states he was only able to tolerate a few bites however staff confirm that he ate approximately 75 to 100% of each meal.  He reports a low mood and rates 3-4 on a 1-10 scale  (10 being the best ) and continues to endorse suicidal ideation but is able to contract for safety well on unit. Patient denies side effects related to his current medication regimen or having additional questions or concerns.     Mental Status Exam  Level of consciousness:  Within normal limits  Appearance: Hospital attire, lying in bed, with fair grooming and hygiene   Behavior/Motor: Approachable, no psychomotor abnormalities noted  Attitude toward examiner:cooperative, attentive, fair eye contact  Speech:  normal rate, normal volume and well articulated  Mood: Depressed, anxious  Affect: Mood congruent, remains flat and withdrawn  Thought processes:  linear, goal directed and coherent  Thought content:  denies homicidal ideation  Suicidal Ideation endorses suicidal ideation, contracts for safety on the unit  Delusions:  no evidence of delusions  Perceptual Disturbance:  denies any perceptual disturbance  Cognition:  Oriented to self, location, time, and situation  Memory: age appropriate  Insight & Judgement: Fair  Medication side effects:  denies       Data   height is 6' (1.829 m) and weight is 190 lb (86.2 kg). His oral temperature is 97.5 °F (36.4 °C). His blood pressure is 102/77 and his pulse is 62. His respiration is 12 and oxygen saturation is 99%.    Labs:   Admission on 04/10/2021   Component Date Value Ref Range Status    WBC 04/10/2021 8.6  3.5 - 11.0 k/uL Final    RBC 04/10/2021 4.34* 4.5 - 5.9 m/uL Final    Hemoglobin 04/10/2021 10.8* 13.5 - 17.5 g/dL Final    Hematocrit 04/10/2021 34.3* 41 - 53 % Final    MCV 04/10/2021 79.0* 80 - 100 fL Final    MCH 04/10/2021 24.8* 26 - 34 pg Final    MCHC 04/10/2021 31.4  31 - 37 g/dL Final    RDW 04/10/2021 17.9* 11.5 - 14.9 % Final    Platelets 06/92/7273 298  150 - 450 k/uL Final    MPV 04/10/2021 8.5  6.0 - 12.0 fL Final    NRBC Automated 04/10/2021 NOT REPORTED  per 100 WBC Final    Differential Type 04/10/2021 NOT REPORTED   Final    Immature Granulocytes 04/10/2021 NOT REPORTED  0 % Final    Absolute Immature Granulocyte 04/10/2021 NOT REPORTED  0.00 - 0.30 k/uL Final    WBC Morphology 04/10/2021 NOT REPORTED   Final    RBC Morphology 04/10/2021 NOT REPORTED   Final    Platelet Estimate 25/97/8192 NOT REPORTED   Final    Seg Neutrophils 04/10/2021 71* 36 - 66 % Final    Lymphocytes 04/10/2021 16* 24 - 44 % Final    Monocytes 04/10/2021 11* 1 - 7 % Final    Eosinophils % 04/10/2021 1  0 - 4 % Final    Basophils 04/10/2021 1  0 - 2 % Final    Segs Absolute 04/10/2021 6.09  1.3 - 9.1 k/uL Final    Absolute Lymph # 04/10/2021 1.38  1.0 - 4.8 k/uL Final    Absolute Mono # 04/10/2021 0.95  0.1 - 1.3 k/uL Final    Absolute Eos # 04/10/2021 0.09  0.0 - 0.4 k/uL Final    Basophils Absolute 04/10/2021 0.09  0.0 - 0.2 k/uL Final    Morphology 04/10/2021 ANISOCYTOSIS PRESENT   Final    Morphology 04/10/2021 HYPOCHROMIA PRESENT   Final    Morphology 04/10/2021 FEW GIANT PLATELETS   Final    Morphology 04/10/2021 1+ ELLIPTOCYTES   Final    Morphology 04/10/2021 1+ POLYCHROMASIA   Final    Glucose 04/10/2021 148* 70 - 99 mg/dL Final    BUN 04/10/2021 15  6 - 20 mg/dL Final    CREATININE 04/10/2021 0.71  0.70 - 1.20 mg/dL Final    Bun/Cre Ratio 04/10/2021 NOT REPORTED  9 - 20 Final    Calcium 04/10/2021 9.2  8.6 - 10.4 mg/dL Final    Sodium 04/10/2021 139  135 - 144 mmol/L Final    Potassium 04/10/2021 2.7* 3.7 - 5.3 mmol/L Final    Chloride 04/10/2021 100  98 - 107 mmol/L Final    CO2 04/10/2021 28  20 - 31 mmol/L Final    Anion Gap 04/10/2021 11  9 - 17 mmol/L Final    Alkaline Phosphatase 04/10/2021 77  40 - 129 U/L Final    ALT 04/10/2021 26  5 - 41 U/L Final    AST 04/10/2021 26  <40 U/L Final    Total Bilirubin 04/10/2021 0.58  0.3 - 1.2 mg/dL Final    Total Protein 04/10/2021 7.4  6.4 - 8.3 g/dL Final    Albumin 04/10/2021 4.4  3.5 - 5.2 g/dL Final    Albumin/Globulin Ratio 04/10/2021 NOT REPORTED  1.0 - 2.5 Final    GFR Non- 04/10/2021 >60  >60 mL/min Final    GFR  04/10/2021 >60  >60 mL/min Final    GFR Comment 04/10/2021        Final    Comment: Average GFR for 38-51 years old:   80 mL/min/1.73sq m  Chronic Kidney Disease:   <60 mL/min/1.73sq m  Kidney failure:   <15 mL/min/1.73sq m              eGFR calculated using average adult body mass.  Additional eGFR calculator available at:        [email or web address]            GFR Staging 04/10/2021 NOT REPORTED   Final    Ethanol 04/10/2021 <10  <10 mg/dL Final    Ethanol percent 04/10/2021 <0.010  % Final    Amphetamine Screen, Ur 04/10/2021 POSITIVE* NEGATIVE Final    Comment:       (Positive cutoff 1000 ng/mL)                  Barbiturate Screen, Ur 04/10/2021 POSITIVE* NEGATIVE Final    Comment:       (Positive cutoff 200 ng/mL)                  Benzodiazepine Screen, Urine 04/10/2021 NEGATIVE  NEGATIVE Final    Comment:       (Positive cutoff 200 ng/mL)                  Cocaine Metabolite, Urine 04/10/2021 POSITIVE* NEGATIVE Final    Comment:       (Positive cutoff 300 ng/mL)                  Methadone Screen, Urine 04/10/2021 NEGATIVE  NEGATIVE Final    Comment:       (Positive cutoff 300 ng/mL)                  Opiates, Urine 04/10/2021 NEGATIVE  NEGATIVE Final    Comment:       (Positive cutoff 300 ng/mL)                  Phencyclidine, Urine 04/10/2021 NEGATIVE  NEGATIVE Final    Comment:       (Positive cutoff 25 ng/mL)                  Propoxyphene, Urine 04/10/2021 NOT REPORTED  NEGATIVE Final    Cannabinoid Scrn, Ur 04/10/2021 POSITIVE* NEGATIVE Final    Comment:       (Positive cutoff 50 ng/mL)                  Oxycodone Screen, Ur 04/10/2021 NEGATIVE  NEGATIVE Final    Comment:       (Positive cutoff 100 ng/mL)                  Methamphetamine, Urine 04/10/2021 NOT REPORTED  NEGATIVE Final    Tricyclic Antidepressants, Urine 04/10/2021 NOT REPORTED  NEGATIVE Final    MDMA, Urine 04/10/2021 NOT REPORTED  NEGATIVE Final    Buprenorphine Urine 04/10/2021 NOT REPORTED  NEGATIVE Final    Test Information 04/10/2021 Assay provides medical screening only. The absence of expected drug(s) and/or metabolite(s) may indicate diluted or adulterated urine, limitations of testing or timing of collection. Final    Comment: Testing for legal purposes should be confirmed by another method. To request confirmation   of test result, please call the lab within 7 days of sample submission.  Acetaminophen Level 04/10/2021 <5* 10 - 30 ug/mL Final    Salicylate Lvl 30/01/7394 <1* 3 - 10 mg/dL Final    Specimen Description 04/10/2021 . NASOPHARYNGEAL SWAB   Final    SARS-CoV-2, Rapid 04/10/2021 Not Detected  Not Detected Final    Comment:       Rapid NAAT:  The specimen is NEGATIVE for SARS-CoV-2, the novel coronavirus associated with   COVID-19.         The ID NOW COVID-19 assay is designed to detect the virus that causes COVID-19 in patients   with signs and symptoms of infection who are suspected of COVID-19. An individual without symptoms of COVID-19 and who is not shedding SARS-CoV-2 virus would   expect to have a negative (not detected) result in this assay. Negative results should be treated as presumptive and, if inconsistent with clinical signs   and symptoms or necessary for patient management,  should be tested with an alternative molecular assay. Negative results do not preclude   SARS-CoV-2 infection and   should not be used as the sole basis for patient management decisions. Fact sheet for Healthcare Providers: Uday  Fact sheet for Patients: Stan.yane          Methodology: Isothermal Nucleic Acid Amplification      Magnesium 04/10/2021 2.0  1.6 - 2.6 mg/dL Final    Potassium 04/10/2021 3.5* 3.7 - 5.3 mmol/L Final    Methadone Screen, Urine 04/11/2021 NEGATIVE  NEGATIVE Final    Comment:       (Positive cutoff 300 ng/mL)              Assay provides medical screening only. The absence of expected drug(s) and/or metabolite(s)   may indicate diluted or adulterated urine, limitations of testing or timing of collection. Testing for legal purposes should be confirmed by another method. To request confirmation   of test result, please call the lab within 7 days of sample submission.               Medications  Current Facility-Administered Medications: buPROPion (WELLBUTRIN XL) extended release tablet 300 mg, 300 mg, Oral, Daily  cloNIDine (CATAPRES) tablet 0.1 mg, 0.1 mg, Oral, TID PRN  amitriptyline (ELAVIL) tablet 50 mg, 50 mg, Oral, Nightly  buprenorphine-naloxone (SUBOXONE) 4-1 MG SL film 1 Film, 1 Film, Sublingual, Daily  nicotine polacrilex (NICORETTE) gum 4 mg, 4 mg, Oral, PRN  acetaminophen (TYLENOL) tablet 650 mg, 650 mg, Oral, Q4H PRN  aluminum & magnesium hydroxide-simethicone (MAALOX) 200-200-20 MG/5ML suspension 30 mL, 30 mL, Oral, Q6H PRN  hydrOXYzine (ATARAX) tablet 50 mg, 50 mg, Oral, TID PRN  ibuprofen (ADVIL;MOTRIN) tablet 400 mg, 400 mg, Oral, Q6H PRN  polyethylene glycol (GLYCOLAX) packet 17 g, 17 g, Oral, Daily PRN  traZODone (DESYREL) tablet 50 mg, 50 mg, Oral, Nightly PRN  dicyclomine (BENTYL) tablet 20 mg, 20 mg, Oral, 4x Daily PRN  promethazine (PHENERGAN) tablet 25 mg, 25 mg, Oral, TID PRN    ASSESSMENT  Severe recurrent major depression without psychotic features (HCC)     PLAN  Discussed with Dr. Eleanor Betancur and treatment team   Patient symptoms remain the same  Maintain current medication regimen  Attempt to develop insight  Psycho-education conducted. Supportive Therapy conducted. Probable discharge is currently undetermined  Follow-up daily while inpatient    More than 16 mins of the session was spent doing Supportive psychotherapy. .     Electronically signed by PRADEEP Rutledge CNP on 4/13/21 at 4:29 PM EDT      I independently saw and evaluated the patient. I reviewed the nurse practitioners documentation above. Any additional comments or changes to the nurse practitioners documentation are stated below otherwise agree with assessment. Plan will be as follows:  Patient denying any side effects to increase in Wellbutrin. Not really reporting significant benefit from increased dose thus far but he is only had 1 day. Denying auditory visual hallucinations. Able to contract for safety. Still feeling depressed and endorses suicidal ideations but again able to contract for safety on the unit. Explored his disposition planning patient has folder and states he intends to call [facility]  Patient s symptoms   show no change  Continue with recent medication adjustments for now  Attempt to develop insight  Psycho-education conducted. Supportive Therapy conducted.   Probable discharge is undetermined at this time  Follow-up daily while on inpatient unit

## 2021-04-13 NOTE — GROUP NOTE
Group Therapy Note    Date: 4/13/2021    Group Start Time: 1330  Group End Time: 0553  Group Topic: Psychoeducation    BARBER Zimmerman, CTRS    Patient refused to attend music for coping group at 1330 after encouragement from staff. 1:1 talk time offered by staff as alternative to group session.

## 2021-04-13 NOTE — PLAN OF CARE
Problem: Depressive Behavior With or Without Suicide Precautions:  Goal: Ability to disclose and discuss suicidal ideas will improve  Description: Ability to disclose and discuss suicidal ideas will improve  4/13/2021 1233 by Dominique Guzman LPN  Outcome: Ongoing. Patient voiced mild depression but hopeful of feeling well soon. Patient need much encouragement  with attending groups and socializing more with his peers. Patient has been medication compliant. Eating % during meals. Problem: Suicide risk  Goal: Provide patient with safe environment  Description: Provide patient with safe environment  4/13/2021 1233 by Dominique Guzman LPN  Outcome: Ongoing, Patient remain safe while on the unit. 15 MIN safety checks maintained. Problem: Depressive Behavior With or Without Suicide Precautions:  Goal: Absence of self-harm  Description: Absence of self-harm  4/13/2021 1233 by Dominique Guzman LPN  Outcome: Ongoing, Patient denied suicidal ideations at present time. Patient contract for safety.

## 2021-04-13 NOTE — GROUP NOTE
Group Therapy Note    Date: 4/13/2021    Group Start Time: 0900  Group End Time: 5997  Group Topic: Community Meeting    [facility]    Patient refused to attend community meeting and goal setting group at 0900 after encouragement from staff. 1:1 talk time offered by staff as alternative to group session.

## 2021-04-14 PROCEDURE — 1240000000 HC EMOTIONAL WELLNESS R&B

## 2021-04-14 PROCEDURE — 6370000000 HC RX 637 (ALT 250 FOR IP): Performed by: NURSE PRACTITIONER

## 2021-04-14 PROCEDURE — 6370000000 HC RX 637 (ALT 250 FOR IP): Performed by: PSYCHIATRY & NEUROLOGY

## 2021-04-14 PROCEDURE — 99232 SBSQ HOSP IP/OBS MODERATE 35: CPT | Performed by: PSYCHIATRY & NEUROLOGY

## 2021-04-14 RX ORDER — LANOLIN ALCOHOL/MO/W.PET/CERES
3 CREAM (GRAM) TOPICAL NIGHTLY PRN
Status: DISCONTINUED | OUTPATIENT
Start: 2021-04-14 | End: 2021-04-15

## 2021-04-14 RX ADMIN — NICOTINE POLACRILEX 4 MG: 4 GUM, CHEWING BUCCAL at 11:48

## 2021-04-14 RX ADMIN — ACETAMINOPHEN 650 MG: 325 TABLET ORAL at 09:01

## 2021-04-14 RX ADMIN — BUPROPION HYDROCHLORIDE 300 MG: 300 TABLET, FILM COATED, EXTENDED RELEASE ORAL at 09:01

## 2021-04-14 RX ADMIN — HYDROXYZINE HYDROCHLORIDE 50 MG: 50 TABLET, FILM COATED ORAL at 21:07

## 2021-04-14 RX ADMIN — ACETAMINOPHEN 650 MG: 325 TABLET ORAL at 21:07

## 2021-04-14 RX ADMIN — AMITRIPTYLINE HYDROCHLORIDE 50 MG: 25 TABLET, FILM COATED ORAL at 21:07

## 2021-04-14 RX ADMIN — NICOTINE POLACRILEX 4 MG: 4 GUM, CHEWING BUCCAL at 17:26

## 2021-04-14 RX ADMIN — Medication 3 MG: at 21:07

## 2021-04-14 RX ADMIN — BUPRENORPHINE AND NALOXONE 1 FILM: 4; 1 FILM BUCCAL; SUBLINGUAL at 09:02

## 2021-04-14 RX ADMIN — NICOTINE POLACRILEX 4 MG: 4 GUM, CHEWING BUCCAL at 14:26

## 2021-04-14 ASSESSMENT — PAIN SCALES - GENERAL
PAINLEVEL_OUTOF10: 3
PAINLEVEL_OUTOF10: 5

## 2021-04-14 ASSESSMENT — PAIN DESCRIPTION - DESCRIPTORS: DESCRIPTORS: ACHING

## 2021-04-14 NOTE — GROUP NOTE
Group Therapy Note    Date: 4/14/2021    Group Start Time: 1330  Group End Time: 0157  Group Topic: Psychoeducation    ALEXX McnairS    Group Therapy Note    Attendees: 7    Patient's Goal:  Pt will demonstrate improved interpersonal skills    Notes:  Pt attended group and participated    Status After Intervention:  Improved    Participation Level:  Active Listener and Interactive    Participation Quality: Appropriate, Attentive, Sharing and Supportive      Speech:  normal      Thought Process/Content: Logical  Linear      Affective Functioning: Constricted      Mood: euthymic      Level of consciousness:  Alert, Oriented x4 and Attentive      Response to Learning: Able to verbalize current knowledge/experience, Able to verbalize/acknowledge new learning, Able to retain information, Capable of insight, Able to change behavior and Progressing to goal      Endings: None Reported    Modes of Intervention: Education, Support, Socialization, Exploration and Activity      Discipline Responsible: Psychoeducational Specialist      Signature:  Humza Kim [place]

## 2021-04-14 NOTE — PLAN OF CARE
Problem: Depressive Behavior With or Without Suicide Precautions:  Goal: Ability to disclose and discuss suicidal ideas will improve  Description: Ability to disclose and discuss suicidal ideas will improve  4/14/2021 0947 by Alessia Lincoln LPN  Outcome: Ongoing  Note: Patient denies any thoughts to suicidal ideations and no signs of self harm were noted. Patient encouraged to inform staff if he starts to develop any thoughts to harm self. Patient voiced understanding. Problem: Suicide risk  Goal: Provide patient with safe environment  Description: Provide patient with safe environment  4/14/2021 0947 by Alessia Lincoln LPN  Outcome: Ongoing  Note: Patient is currently on a locked psychiatric unit where every 15 minute checks are performed on patients for safety.

## 2021-04-14 NOTE — GROUP NOTE
Group Therapy Note    Date: 4/14/2021    Group Start Time: 0900  Group End Time: 0915  Group Topic: Community Meeting    BARBER Cheung    Patient refused to attend community meeting/ goals group at 0900 after encouragement from staff. 1:1 talk time provided by staff.       Signature:  Ronnie Cheung

## 2021-04-14 NOTE — GROUP NOTE
Group Therapy Note    Date: 4/14/2021    Group Start Time: 1000  Group End Time: 1725  Group Topic: Psychotherapy    [address], LSW    patient refused to attend psychotherapy group at [facility] after encouragement from staff.   1:1 talk time provided as alternative to group session

## 2021-04-14 NOTE — PLAN OF CARE
Problem: Suicide risk  Goal: Provide patient with safe environment  Description: Provide patient with safe environment  4/14/2021 0120 by Nat Ashley RN  Outcome: Ongoing  Note: Patient is currently on a locked psychiatric unit where every 15 minute checks are performed on patients for safety. Problem: Depressive Behavior With or Without Suicide Precautions:  Goal: Ability to disclose and discuss suicidal ideas will improve  Description: Ability to disclose and discuss suicidal ideas will improve  4/14/2021 0120 by Nat Ashley RN  Outcome: Ongoing  Note: Patient denies any thoughts to suicidal ideations and no signs of self harm were noted. Patient encouraged to inform staff if he starts to develop any thoughts to harm self. Patient voiced understanding. Problem: Depressive Behavior With or Without Suicide Precautions:  Goal: Absence of self-harm  Description: Absence of self-harm  4/14/2021 0120 by Nat Ashley RN  Outcome: Ongoing  Note: Patient denies any thoughts to harm self and no signs of self harm were noted. Patient encouraged to inform staff if he starts to develop any thoughts to harm self. Patient voiced understanding. Problem: Tobacco Use:  Goal: Inpatient tobacco use cessation counseling participation  Description: Inpatient tobacco use cessation counseling participation  4/14/2021 0120 by Nat Ashley RN  Outcome: Ongoing  Note: Pt reports no desire to quit smoking at this time. Problem: Pain:  Goal: Pain level will decrease  Description: Pain level will decrease  4/14/2021 0120 by Nat Ashley RN  Outcome: Ongoing  Note: Patient reports chronic pain in his lower back rated a 3. Pt was offered repositioning, distraction and exercise (due to pt laying in bed all day and night) to help with the pain. Pt requested tylenol for pain which was provided. Pt stated tylenol was effective for pain.       Problem: Pain:  Goal: Control of acute pain  Description: Control of acute pain  4/14/2021 0120 by Trino Mitchell RN  Outcome: Ongoing  Note: Patient denies any acute pain currently. Pt encouraged to inform staff if he develops any acute pain. Pt voiced understanding.

## 2021-04-14 NOTE — PROGRESS NOTES
Daily Progress Note  4/14/2021     CHIEF COMPLAINT: Suicidal ideation with plan to walk into traffic    Reviewed patient's current plan of care and vital signs with nursing staff. Sleep: Several hours last night  Attending groups: Yes, selectively    SUBJECTIVE:    Staff report patient has maintained medication adherence and has been free of acute behavioral changes in the last 24 hours. Mr. HART [facility] continues to utilize acetaminophen related to body aches but reports that his overall comfort has improved. He continues to endorse poor sleep however reports it may be associated with the style of bed and napping during the day. After discussion of prior trazodone use that led to reported sinus congestion mutually agreed to trial melatonin 3 mg tonight. Patient denies side effects associated with recent medication changes including increased dose of bupropion extended release 300 mg daily. Patient does state that his motivation has improved and he was able to tolerate 2 of the group programming today. He continues to endorse fleeting suicidal ideation but is able to contract for safety while on unit. He reports his appetite is fair and has several snacks at bedside. He confirms that his application for [facility] has been faxed. He is more forward thinking today and denies having questions or concerns related to current treatment plan.     Mental Status Exam  Level of consciousness:  Within normal limits  Appearance: Hospital attire, sitting on bed, with improved grooming and hygiene   Behavior/Motor: Approachable, no psychomotor abnormalities noted  Attitude toward examiner:cooperative, attentive, fair eye contact  Speech:  normal rate, normal volume and well articulated  Mood: Depressed  Affect: Mood congruent  Thought processes:  linear, goal directed and coherent  Thought content:  denies homicidal ideation  Suicidal Ideation endorses fleeting suicidal ideation, contracts for safety on the unit  Delusions:  no evidence of delusions  Perceptual Disturbance:  denies any perceptual disturbance  Cognition:  Oriented to self, location, time, and situation  Memory: age appropriate  Insight & Judgement: Fair  Medication side effects:  denies       Data   height is 6' (1.829 m) and weight is 190 lb (86.2 kg). His oral temperature is 97 °F (36.1 °C). His blood pressure is 110/57 (abnormal) and his pulse is 77. His respiration is 16 and oxygen saturation is 99%.    Labs:   Admission on 04/10/2021   Component Date Value Ref Range Status    WBC 04/10/2021 8.6  3.5 - 11.0 k/uL Final    RBC 04/10/2021 4.34* 4.5 - 5.9 m/uL Final    Hemoglobin 04/10/2021 10.8* 13.5 - 17.5 g/dL Final    Hematocrit 04/10/2021 34.3* 41 - 53 % Final    MCV 04/10/2021 79.0* 80 - 100 fL Final    MCH 04/10/2021 24.8* 26 - 34 pg Final    MCHC 04/10/2021 31.4  31 - 37 g/dL Final    RDW 04/10/2021 17.9* 11.5 - 14.9 % Final    Platelets 58/65/6536 298  150 - 450 k/uL Final    MPV 04/10/2021 8.5  6.0 - 12.0 fL Final    NRBC Automated 04/10/2021 NOT REPORTED  per 100 WBC Final    Differential Type 04/10/2021 NOT REPORTED   Final    Immature Granulocytes 04/10/2021 NOT REPORTED  0 % Final    Absolute Immature Granulocyte 04/10/2021 NOT REPORTED  0.00 - 0.30 k/uL Final    WBC Morphology 04/10/2021 NOT REPORTED   Final    RBC Morphology 04/10/2021 NOT REPORTED   Final    Platelet Estimate 76/93/1005 NOT REPORTED   Final    Seg Neutrophils 04/10/2021 71* 36 - 66 % Final    Lymphocytes 04/10/2021 16* 24 - 44 % Final    Monocytes 04/10/2021 11* 1 - 7 % Final    Eosinophils % 04/10/2021 1  0 - 4 % Final    Basophils 04/10/2021 1  0 - 2 % Final    Segs Absolute 04/10/2021 6.09  1.3 - 9.1 k/uL Final    Absolute Lymph # 04/10/2021 1.38  1.0 - 4.8 k/uL Final    Absolute Mono # 04/10/2021 0.95  0.1 - 1.3 k/uL Final    Absolute Eos # 04/10/2021 0.09  0.0 - 0.4 k/uL Final    Basophils Absolute 04/10/2021 0.09  0.0 - 0.2 k/uL Final    Morphology 04/10/2021 ANISOCYTOSIS PRESENT   Final    Morphology 04/10/2021 HYPOCHROMIA PRESENT   Final    Morphology 04/10/2021 FEW GIANT PLATELETS   Final    Morphology 04/10/2021 1+ ELLIPTOCYTES   Final    Morphology 04/10/2021 1+ POLYCHROMASIA   Final    Glucose 04/10/2021 148* 70 - 99 mg/dL Final    BUN 04/10/2021 15  6 - 20 mg/dL Final    CREATININE 04/10/2021 0.71  0.70 - 1.20 mg/dL Final    Bun/Cre Ratio 04/10/2021 NOT REPORTED  9 - 20 Final    Calcium 04/10/2021 9.2  8.6 - 10.4 mg/dL Final    Sodium 04/10/2021 139  135 - 144 mmol/L Final    Potassium 04/10/2021 2.7* 3.7 - 5.3 mmol/L Final    Chloride 04/10/2021 100  98 - 107 mmol/L Final    CO2 04/10/2021 28  20 - 31 mmol/L Final    Anion Gap 04/10/2021 11  9 - 17 mmol/L Final    Alkaline Phosphatase 04/10/2021 77  40 - 129 U/L Final    ALT 04/10/2021 26  5 - 41 U/L Final    AST 04/10/2021 26  <40 U/L Final    Total Bilirubin 04/10/2021 0.58  0.3 - 1.2 mg/dL Final    Total Protein 04/10/2021 7.4  6.4 - 8.3 g/dL Final    Albumin 04/10/2021 4.4  3.5 - 5.2 g/dL Final    Albumin/Globulin Ratio 04/10/2021 NOT REPORTED  1.0 - 2.5 Final    GFR Non- 04/10/2021 >60  >60 mL/min Final    GFR  04/10/2021 >60  >60 mL/min Final    GFR Comment 04/10/2021        Final    Comment: Average GFR for 38-51 years old:   80 mL/min/1.73sq m  Chronic Kidney Disease:   <60 mL/min/1.73sq m  Kidney failure:   <15 mL/min/1.73sq m              eGFR calculated using average adult body mass.  Additional eGFR calculator available at:        GigsTime.br            GFR Staging 04/10/2021 NOT REPORTED   Final    Ethanol 04/10/2021 <10  <10 mg/dL Final    Ethanol percent 04/10/2021 <0.010  % Final    Amphetamine Screen, Ur 04/10/2021 POSITIVE* NEGATIVE Final    Comment:       (Positive cutoff 1000 ng/mL)                  Barbiturate Screen, Ur 04/10/2021 POSITIVE* NEGATIVE Final    Comment:       (Positive cutoff 200 ng/mL)                  Benzodiazepine Screen, Urine 04/10/2021 NEGATIVE  NEGATIVE Final    Comment:       (Positive cutoff 200 ng/mL)                  Cocaine Metabolite, Urine 04/10/2021 POSITIVE* NEGATIVE Final    Comment:       (Positive cutoff 300 ng/mL)                  Methadone Screen, Urine 04/10/2021 NEGATIVE  NEGATIVE Final    Comment:       (Positive cutoff 300 ng/mL)                  Opiates, Urine 04/10/2021 NEGATIVE  NEGATIVE Final    Comment:       (Positive cutoff 300 ng/mL)                  Phencyclidine, Urine 04/10/2021 NEGATIVE  NEGATIVE Final    Comment:       (Positive cutoff 25 ng/mL)                  Propoxyphene, Urine 04/10/2021 NOT REPORTED  NEGATIVE Final    Cannabinoid Scrn, Ur 04/10/2021 POSITIVE* NEGATIVE Final    Comment:       (Positive cutoff 50 ng/mL)                  Oxycodone Screen, Ur 04/10/2021 NEGATIVE  NEGATIVE Final    Comment:       (Positive cutoff 100 ng/mL)                  Methamphetamine, Urine 04/10/2021 NOT REPORTED  NEGATIVE Final    Tricyclic Antidepressants, Urine 04/10/2021 NOT REPORTED  NEGATIVE Final    MDMA, Urine 04/10/2021 NOT REPORTED  NEGATIVE Final    Buprenorphine Urine 04/10/2021 NOT REPORTED  NEGATIVE Final    Test Information 04/10/2021 Assay provides medical screening only. The absence of expected drug(s) and/or metabolite(s) may indicate diluted or adulterated urine, limitations of testing or timing of collection. Final    Comment: Testing for legal purposes should be confirmed by another method. To request confirmation   of test result, please call the lab within 7 days of sample submission.  Acetaminophen Level 04/10/2021 <5* 10 - 30 ug/mL Final    Salicylate Lvl 79/23/5488 <1* 3 - 10 mg/dL Final    Specimen Description 04/10/2021 . NASOPHARYNGEAL SWAB   Final    SARS-CoV-2, Rapid 04/10/2021 Not Detected  Not Detected Final    Comment:       Rapid NAAT:  The specimen is NEGATIVE for SARS-CoV-2, the novel coronavirus associated with   COVID-19. The ID NOW COVID-19 assay is designed to detect the virus that causes COVID-19 in patients   with signs and symptoms of infection who are suspected of COVID-19. An individual without symptoms of COVID-19 and who is not shedding SARS-CoV-2 virus would   expect to have a negative (not detected) result in this assay. Negative results should be treated as presumptive and, if inconsistent with clinical signs   and symptoms or necessary for patient management,  should be tested with an alternative molecular assay. Negative results do not preclude   SARS-CoV-2 infection and   should not be used as the sole basis for patient management decisions. Fact sheet for Healthcare Providers: BuildHer.es  Fact sheet for Patients: BuildHer.es          Methodology: Isothermal Nucleic Acid Amplification      Magnesium 04/10/2021 2.0  1.6 - 2.6 mg/dL Final    Potassium 04/10/2021 3.5* 3.7 - 5.3 mmol/L Final    Methadone Screen, Urine 04/11/2021 NEGATIVE  NEGATIVE Final    Comment:       (Positive cutoff 300 ng/mL)              Assay provides medical screening only. The absence of expected drug(s) and/or metabolite(s)   may indicate diluted or adulterated urine, limitations of testing or timing of collection. Testing for legal purposes should be confirmed by another method. To request confirmation   of test result, please call the lab within 7 days of sample submission.               Medications  Current Facility-Administered Medications: buPROPion (WELLBUTRIN XL) extended release tablet 300 mg, 300 mg, Oral, Daily  cloNIDine (CATAPRES) tablet 0.1 mg, 0.1 mg, Oral, TID PRN  amitriptyline (ELAVIL) tablet 50 mg, 50 mg, Oral, Nightly  buprenorphine-naloxone (SUBOXONE) 4-1 MG SL film 1 Film, 1 Film, Sublingual, Daily  nicotine polacrilex (NICORETTE) gum 4 mg, 4 mg, Oral, PRN  acetaminophen (TYLENOL) tablet 650 mg, 650 mg, Oral, Q4H PRN  aluminum & magnesium hydroxide-simethicone (MAALOX) 200-200-20 MG/5ML suspension 30 mL, 30 mL, Oral, Q6H PRN  hydrOXYzine (ATARAX) tablet 50 mg, 50 mg, Oral, TID PRN  ibuprofen (ADVIL;MOTRIN) tablet 400 mg, 400 mg, Oral, Q6H PRN  polyethylene glycol (GLYCOLAX) packet 17 g, 17 g, Oral, Daily PRN  traZODone (DESYREL) tablet 50 mg, 50 mg, Oral, Nightly PRN  dicyclomine (BENTYL) tablet 20 mg, 20 mg, Oral, 4x Daily PRN  promethazine (PHENERGAN) tablet 25 mg, 25 mg, Oral, TID PRN    ASSESSMENT  Severe recurrent major depression without psychotic features ([facility] [address]     PLAN  Discussed with Dr. Rachana Aguilar and treatment team   Patient symptoms show modest improvement as he has attended groups  Start melatonin 3 mg as needed at hour of sleep  Continue with all other currently prescribed medications and monitor symptoms  Attempt to develop insight  Psycho-education conducted. Supportive Therapy conducted. Probable discharge is currently undetermined  Follow-up daily while inpatient    More than 16 mins of the session was spent doing Supportive psychotherapy. .     Electronically signed by PRADEEP Pryor CNP on 4/14/21 at 4:09 PM EDT    I independently saw and evaluated the patient. I reviewed the nurse practitioners documentation above. Any additional comments or changes to the nurse practitioners documentation are stated below otherwise agree with assessment. Plan will be as follows:  Patient states he is feeling modestly better. Less intense suicidal ideation. Remains able to contract for safety on the unit. Agreeable for melatonin supplement at bedtime. Made phone calls today with regards to substance use rehab. States he started to feel some slight optimism. Denying side effects to medication  PLAN  Patient s symptoms   are improving  Continue with current medications with the exception of addition of melatonin  Attempt to develop insight  Psycho-education conducted.   Supportive Therapy conducted.   Probable discharge is 1 to 3 days  Follow-up daily while on inpatient unit

## 2021-04-15 LAB
FENTANYL AND METABOLITES, URINE: <1 NG/ML
NORFENTANYL, URINE: <1 NG/ML

## 2021-04-15 PROCEDURE — 6370000000 HC RX 637 (ALT 250 FOR IP): Performed by: NURSE PRACTITIONER

## 2021-04-15 PROCEDURE — 6370000000 HC RX 637 (ALT 250 FOR IP): Performed by: PSYCHIATRY & NEUROLOGY

## 2021-04-15 PROCEDURE — 99232 SBSQ HOSP IP/OBS MODERATE 35: CPT | Performed by: PSYCHIATRY & NEUROLOGY

## 2021-04-15 PROCEDURE — 1240000000 HC EMOTIONAL WELLNESS R&B

## 2021-04-15 RX ORDER — LANOLIN ALCOHOL/MO/W.PET/CERES
1.5 CREAM (GRAM) TOPICAL NIGHTLY PRN
Status: DISCONTINUED | OUTPATIENT
Start: 2021-04-15 | End: 2021-04-19 | Stop reason: HOSPADM

## 2021-04-15 RX ADMIN — NICOTINE POLACRILEX 4 MG: 4 GUM, CHEWING BUCCAL at 15:14

## 2021-04-15 RX ADMIN — ACETAMINOPHEN 650 MG: 325 TABLET ORAL at 12:29

## 2021-04-15 RX ADMIN — ACETAMINOPHEN 650 MG: 325 TABLET ORAL at 20:38

## 2021-04-15 RX ADMIN — NICOTINE POLACRILEX 4 MG: 4 GUM, CHEWING BUCCAL at 17:35

## 2021-04-15 RX ADMIN — HYDROXYZINE HYDROCHLORIDE 50 MG: 50 TABLET, FILM COATED ORAL at 20:39

## 2021-04-15 RX ADMIN — NICOTINE POLACRILEX 4 MG: 4 GUM, CHEWING BUCCAL at 19:45

## 2021-04-15 RX ADMIN — CLONIDINE HYDROCHLORIDE 0.1 MG: 0.1 TABLET ORAL at 20:38

## 2021-04-15 RX ADMIN — BUPROPION HYDROCHLORIDE 300 MG: 300 TABLET, FILM COATED, EXTENDED RELEASE ORAL at 09:31

## 2021-04-15 RX ADMIN — BUPRENORPHINE AND NALOXONE 1 FILM: 4; 1 FILM BUCCAL; SUBLINGUAL at 09:31

## 2021-04-15 RX ADMIN — NICOTINE POLACRILEX 4 MG: 4 GUM, CHEWING BUCCAL at 12:29

## 2021-04-15 RX ADMIN — Medication 1.5 MG: at 20:39

## 2021-04-15 RX ADMIN — NICOTINE POLACRILEX 4 MG: 4 GUM, CHEWING BUCCAL at 20:41

## 2021-04-15 ASSESSMENT — PAIN SCALES - GENERAL
PAINLEVEL_OUTOF10: 6
PAINLEVEL_OUTOF10: 5

## 2021-04-15 NOTE — GROUP NOTE
Group Therapy Note    Date: 4/15/2021    Group Start Time: 1110  Group End Time: 1200  Group Topic: Psychoeducation    [facility]    Patient refused to attend coping skills group at 1100 after encouragement from staff. 1:1 talk time offered by staff as alternative to group session.

## 2021-04-15 NOTE — GROUP NOTE
Group Therapy Note    Date: 4/15/2021    Group Start Time: 1330  Group End Time: 9178  Group Topic: Music Therapy    CZ BHTISHA RIZZO    [facility]        Group Therapy Note    Pt did not attend recreational group d/t resting in room despite staff invitation to attend. 1:1 talk time offered as alternative to group session, pt declined.

## 2021-04-15 NOTE — PLAN OF CARE
Problem: Suicide risk  Goal: Provide patient with safe environment  Description: Provide patient with safe environment  Outcome: Ongoing  Note: 15 minutes safety checks maintained. Problem: Depressive Behavior With or Without Suicide Precautions:  Goal: Ability to disclose and discuss suicidal ideas will improve  Description: Ability to disclose and discuss suicidal ideas will improve  Outcome: Ongoing  Note: Patient denies suicidal ideation at this time. Problem: Depressive Behavior With or Without Suicide Precautions:  Goal: Absence of self-harm  Description: Absence of self-harm  Outcome: Ongoing  Note: Patient denies thoughts of self-harm at this time. Problem: Tobacco Use:  Goal: Inpatient tobacco use cessation counseling participation  Description: Inpatient tobacco use cessation counseling participation  Outcome: Ongoing  Note: Patient participated in inpatient tobacco use cessation counseling. Problem: Pain:  Goal: Pain level will decrease  Description: Pain level will decrease  Outcome: Ongoing  Note: Patient admits that his pain level has decreased at this time.

## 2021-04-15 NOTE — GROUP NOTE
Group Therapy Note    Date: 4/15/2021    Group Start Time: 1000  Group End Time: 8945  Group Topic: Psychotherapy    [address], LSW    patient refused to attend psychotherapy group at [facility] after encouragement from staff.   1:1 talk time provided as alternative to group session

## 2021-04-15 NOTE — GROUP NOTE
Group Therapy Note    Date: 4/15/2021    Group Start Time: 7410  Group End Time: 2089  Group Topic: Psychoeducation    BARBER Whitmore, ALEXXS    Group Therapy Note    Attendees: 7    Patient's Goal:  Pt will identify communication styles and demonstrate improved communication skills    Notes:  Pt attended group and participated. Status After Intervention:  Improved    Participation Level:  Active Listener and Interactive    Participation Quality: Appropriate, Attentive and Supportive      Speech:  normal      Thought Process/Content: Logical  Linear      Affective Functioning: Constricted/Restricted      Mood: euthymic      Level of consciousness:  Alert, Oriented x4 and Attentive      Response to Learning: Able to verbalize current knowledge/experience, Able to verbalize/acknowledge new learning, Able to retain information, Capable of insight, Able to change behavior and Progressing to goal      Endings: None Reported    Modes of Intervention: Education, Support, Socialization, Exploration and Problem-solving      Discipline Responsible: Psychoeducational Specialist      Signature:  Carline Sharpe, [address]

## 2021-04-15 NOTE — PROGRESS NOTES
Daily Progress Note  4/15/2021     CHIEF COMPLAINT: Suicidal ideation with plan to walk into traffic    Reviewed patient's current plan of care and vital signs with nursing staff. Sleep: Endorses difficulty staying asleep  Attending groups: Attended only 1 group today    SUBJECTIVE:    Staff reports no overnight events, patient remains medication compliant. He is using as needed melatonin and Atarax in the evening for rest and relaxation. He is interviewed today bedside. He reports that he is only attended 1 group session. He states that he did not sleep well last night. He reports that the melatonin helped him fall asleep but he was unable to stay asleep. He endorses good appetite. He continues to endorse depression. He denies any auditory or visual hallucinations. He endorses fleeting suicidal ideation, he reports that he had \"a couple hours this afternoon that he felt pretty down\". He does verbally contract for safety and reports that he would reach out to staff if symptoms worsen. Mental Status Exam  Level of consciousness: Awake and alert  Appearance: Hospital attire, laying on bed, with improved grooming and hygiene   Behavior/Motor: Approachable, no psychomotor abnormalities noted  Attitude toward examiner:cooperative, attentive, fair eye contact  Speech:  normal rate, normal volume and well articulated  Mood: Depressed  Affect: Mood congruent  Thought processes:  linear, goal directed and coherent  Thought content:  denies homicidal ideation  Suicidal Ideation endorses fleeting suicidal ideation, describes it as \"more frequent today\", contracts for safety on the unit  Delusions:  no evidence of delusions  Perceptual Disturbance:  denies any perceptual disturbance  Cognition:  Oriented to self, location, time, and situation  Memory: age appropriate  Insight & Judgement: Fair  Medication side effects:  denies       Data   height is 6' (1.829 m) and weight is 190 lb (86.2 kg).  His oral temperature is 98.7 °F (37.1 °C). His blood pressure is 103/58 (abnormal) and his pulse is 75. His respiration is 16 and oxygen saturation is 99%.    Labs:   Admission on 04/10/2021   Component Date Value Ref Range Status    WBC 04/10/2021 8.6  3.5 - 11.0 k/uL Final    RBC 04/10/2021 4.34* 4.5 - 5.9 m/uL Final    Hemoglobin 04/10/2021 10.8* 13.5 - 17.5 g/dL Final    Hematocrit 04/10/2021 34.3* 41 - 53 % Final    MCV 04/10/2021 79.0* 80 - 100 fL Final    MCH 04/10/2021 24.8* 26 - 34 pg Final    MCHC 04/10/2021 31.4  31 - 37 g/dL Final    RDW 04/10/2021 17.9* 11.5 - 14.9 % Final    Platelets 10/03/4202 298  150 - 450 k/uL Final    MPV 04/10/2021 8.5  6.0 - 12.0 fL Final    NRBC Automated 04/10/2021 NOT REPORTED  per 100 WBC Final    Differential Type 04/10/2021 NOT REPORTED   Final    Immature Granulocytes 04/10/2021 NOT REPORTED  0 % Final    Absolute Immature Granulocyte 04/10/2021 NOT REPORTED  0.00 - 0.30 k/uL Final    WBC Morphology 04/10/2021 NOT REPORTED   Final    RBC Morphology 04/10/2021 NOT REPORTED   Final    Platelet Estimate 36/71/7884 NOT REPORTED   Final    Seg Neutrophils 04/10/2021 71* 36 - 66 % Final    Lymphocytes 04/10/2021 16* 24 - 44 % Final    Monocytes 04/10/2021 11* 1 - 7 % Final    Eosinophils % 04/10/2021 1  0 - 4 % Final    Basophils 04/10/2021 1  0 - 2 % Final    Segs Absolute 04/10/2021 6.09  1.3 - 9.1 k/uL Final    Absolute Lymph # 04/10/2021 1.38  1.0 - 4.8 k/uL Final    Absolute Mono # 04/10/2021 0.95  0.1 - 1.3 k/uL Final    Absolute Eos # 04/10/2021 0.09  0.0 - 0.4 k/uL Final    Basophils Absolute 04/10/2021 0.09  0.0 - 0.2 k/uL Final    Morphology 04/10/2021 ANISOCYTOSIS PRESENT   Final    Morphology 04/10/2021 HYPOCHROMIA PRESENT   Final    Morphology 04/10/2021 FEW GIANT PLATELETS   Final    Morphology 04/10/2021 1+ ELLIPTOCYTES   Final    Morphology 04/10/2021 1+ POLYCHROMASIA   Final    Glucose 04/10/2021 148* 70 - 99 mg/dL Final    BUN 04/10/2021 15  6 - 20 mg/dL Final    CREATININE 04/10/2021 0.71  0.70 - 1.20 mg/dL Final    Bun/Cre Ratio 04/10/2021 NOT REPORTED  9 - 20 Final    Calcium 04/10/2021 9.2  8.6 - 10.4 mg/dL Final    Sodium 04/10/2021 139  135 - 144 mmol/L Final    Potassium 04/10/2021 2.7* 3.7 - 5.3 mmol/L Final    Chloride 04/10/2021 100  98 - 107 mmol/L Final    CO2 04/10/2021 28  20 - 31 mmol/L Final    Anion Gap 04/10/2021 11  9 - 17 mmol/L Final    Alkaline Phosphatase 04/10/2021 77  40 - 129 U/L Final    ALT 04/10/2021 26  5 - 41 U/L Final    AST 04/10/2021 26  <40 U/L Final    Total Bilirubin 04/10/2021 0.58  0.3 - 1.2 mg/dL Final    Total Protein 04/10/2021 7.4  6.4 - 8.3 g/dL Final    Albumin 04/10/2021 4.4  3.5 - 5.2 g/dL Final    Albumin/Globulin Ratio 04/10/2021 NOT REPORTED  1.0 - 2.5 Final    GFR Non- 04/10/2021 >60  >60 mL/min Final    GFR  04/10/2021 >60  >60 mL/min Final    GFR Comment 04/10/2021        Final    Comment: Average GFR for 38-51 years old:   80 mL/min/1.73sq m  Chronic Kidney Disease:   <60 mL/min/1.73sq m  Kidney failure:   <15 mL/min/1.73sq m              eGFR calculated using average adult body mass.  Additional eGFR calculator available at:        [employer]            GFR Staging 04/10/2021 NOT REPORTED   Final    Ethanol 04/10/2021 <10  <10 mg/dL Final    Ethanol percent 04/10/2021 <0.010  % Final    Amphetamine Screen, Ur 04/10/2021 POSITIVE* NEGATIVE Final    Comment:       (Positive cutoff 1000 ng/mL)                  Barbiturate Screen, Ur 04/10/2021 POSITIVE* NEGATIVE Final    Comment:       (Positive cutoff 200 ng/mL)                  Benzodiazepine Screen, Urine 04/10/2021 NEGATIVE  NEGATIVE Final    Comment:       (Positive cutoff 200 ng/mL)                  Cocaine Metabolite, Urine 04/10/2021 POSITIVE* NEGATIVE Final    Comment:       (Positive cutoff 300 ng/mL)                  Methadone Screen, Urine 04/10/2021 NEGATIVE  NEGATIVE Final    Comment:       (Positive cutoff 300 ng/mL)                  Opiates, Urine 04/10/2021 NEGATIVE  NEGATIVE Final    Comment:       (Positive cutoff 300 ng/mL)                  Phencyclidine, Urine 04/10/2021 NEGATIVE  NEGATIVE Final    Comment:       (Positive cutoff 25 ng/mL)                  Propoxyphene, Urine 04/10/2021 NOT REPORTED  NEGATIVE Final    Cannabinoid Scrn, Ur 04/10/2021 POSITIVE* NEGATIVE Final    Comment:       (Positive cutoff 50 ng/mL)                  Oxycodone Screen, Ur 04/10/2021 NEGATIVE  NEGATIVE Final    Comment:       (Positive cutoff 100 ng/mL)                  Methamphetamine, Urine 04/10/2021 NOT REPORTED  NEGATIVE Final    Tricyclic Antidepressants, Urine 04/10/2021 NOT REPORTED  NEGATIVE Final    MDMA, Urine 04/10/2021 NOT REPORTED  NEGATIVE Final    Buprenorphine Urine 04/10/2021 NOT REPORTED  NEGATIVE Final    Test Information 04/10/2021 Assay provides medical screening only. The absence of expected drug(s) and/or metabolite(s) may indicate diluted or adulterated urine, limitations of testing or timing of collection. Final    Comment: Testing for legal purposes should be confirmed by another method. To request confirmation   of test result, please call the lab within 7 days of sample submission.  Acetaminophen Level 04/10/2021 <5* 10 - 30 ug/mL Final    Salicylate Lvl 68/36/2468 <1* 3 - 10 mg/dL Final    Specimen Description 04/10/2021 . NASOPHARYNGEAL SWAB   Final    SARS-CoV-2, Rapid 04/10/2021 Not Detected  Not Detected Final    Comment:       Rapid NAAT:  The specimen is NEGATIVE for SARS-CoV-2, the novel coronavirus associated with   COVID-19. The ID NOW COVID-19 assay is designed to detect the virus that causes COVID-19 in patients   with signs and symptoms of infection who are suspected of COVID-19.   An individual without symptoms of COVID-19 and who is not shedding SARS-CoV-2 virus would   expect to have a negative (not detected) result in this assay. Negative results should be treated as presumptive and, if inconsistent with clinical signs   and symptoms or necessary for patient management,  should be tested with an alternative molecular assay. Negative results do not preclude   SARS-CoV-2 infection and   should not be used as the sole basis for patient management decisions. Fact sheet for Healthcare Providers: Stan.yane  Fact sheet for Patients: [employer]          Methodology: Isothermal Nucleic Acid Amplification      Magnesium 04/10/2021 2.0  1.6 - 2.6 mg/dL Final    Potassium 04/10/2021 3.5* 3.7 - 5.3 mmol/L Final    Methadone Screen, Urine 04/11/2021 NEGATIVE  NEGATIVE Final    Comment:       (Positive cutoff 300 ng/mL)              Assay provides medical screening only. The absence of expected drug(s) and/or metabolite(s)   may indicate diluted or adulterated urine, limitations of testing or timing of collection. Testing for legal purposes should be confirmed by another method. To request confirmation   of test result, please call the lab within 7 days of sample submission.  Fentanyl and Metabolites, Urine 04/11/2021 <1.0  ng/mL Final    Comment: (NOTE)  INTERPRETIVE INFORMATION: Fentanyl and Metabolite, Urine  Methodology: Quantitative Liquid Chromatography-Tandem Mass   Spectrometry  Positive cutoff: 1.0 ng/mL  For medical purposes only; not valid for forensic use. The absence of expected drug(s) and/or drug metabolite(s) may   indicate non-compliance, inappropriate timing of specimen   collection relative to drug administration, poor drug absorption,   diluted/adulterated urine, or limitations of testing. The   concentration value must be greater than or equal to the cutoff to   be reported as positive. Interpretive questions should be directed   to the laboratory.   This test was developed and its performance characteristics   determined by [facility]. It has not been cleared or   approved by the [employer] and Drug Administration. This test was   performed in a CLIA certified laboratory and is intended for   clinical purposes.  Norfentanyl, Urine 04/11/2021 <1.0  ng/mL Final    Comment: (NOTE)  Performed By: [facility]aly  [address]  [place], [address] [facility]  : Natty Melton. Zion David MD              Medications  Current Facility-Administered Medications: melatonin tablet 1.5 mg, 1.5 mg, Oral, Nightly PRN  buPROPion (WELLBUTRIN XL) extended release tablet 300 mg, 300 mg, Oral, Daily  cloNIDine (CATAPRES) tablet 0.1 mg, 0.1 mg, Oral, TID PRN  buprenorphine-naloxone (SUBOXONE) 4-1 MG SL film 1 Film, 1 Film, Sublingual, Daily  nicotine polacrilex (NICORETTE) gum 4 mg, 4 mg, Oral, PRN  acetaminophen (TYLENOL) tablet 650 mg, 650 mg, Oral, Q4H PRN  aluminum & magnesium hydroxide-simethicone (MAALOX) 200-200-20 MG/5ML suspension 30 mL, 30 mL, Oral, Q6H PRN  hydrOXYzine (ATARAX) tablet 50 mg, 50 mg, Oral, TID PRN  ibuprofen (ADVIL;MOTRIN) tablet 400 mg, 400 mg, Oral, Q6H PRN  polyethylene glycol (GLYCOLAX) packet 17 g, 17 g, Oral, Daily PRN  traZODone (DESYREL) tablet 50 mg, 50 mg, Oral, Nightly PRN  dicyclomine (BENTYL) tablet 20 mg, 20 mg, Oral, 4x Daily PRN  promethazine (PHENERGAN) tablet 25 mg, 25 mg, Oral, TID PRN    ASSESSMENT  Severe recurrent major depression without psychotic features (HCC)     PLAN  Discussed with Dr. Anita Varma and treatment team   Patient symptoms show modest improvement   Continue current medication regimen  Patient does have trazodone available for sleep as needed, staff may encourage use  Monitor need and frequency of as needed medication  Encourage participation in groups and milieu  Attempt to develop insight  Psycho-education conducted. Supportive Therapy conducted.   Probable discharge is currently undetermined, placement pending with regards to substance use rehab  Follow-up daily while inpatient    I independently saw and evaluated the patient. I reviewed the nurse practitioners documentation above. Any additional comments or changes to the nurse practitioners documentation are stated below otherwise agree with assessment. Plan will be as follows:  Patient actively working for AOD placement. Denying side effects to medication. Does report some difficulty with sleeping. Unclear if he is angling for more controlled substances is he refuses multiple options for as needed sleep medication. Discussed flexibility and discharge planning. He seemed fixated on getting inpatient treatment as opposed to recovery housing. Explored this with him today and he did state he was willing to go to recovery housing if needed  PLAN  Patient s symptoms   are improving  Continue with current medication  Attempt to develop insight  Psycho-education conducted. Supportive Therapy conducted.   Probable discharge is pending confirmation for placement and stability of symptoms  Follow-up daily while on inpatient unit

## 2021-04-15 NOTE — GROUP NOTE
Group Therapy Note    Date: 4/15/2021    Group Start Time: 4396  Group End Time: 0920  Group Topic: Community Meeting    BARBER Brown         Group Therapy Note    Pt did not attend recreational group d/t resting in room despite staff invitation to attend. 1:1 talk time offered as alternative to group session, pt declined.

## 2021-04-16 PROCEDURE — 6370000000 HC RX 637 (ALT 250 FOR IP): Performed by: NURSE PRACTITIONER

## 2021-04-16 PROCEDURE — 6370000000 HC RX 637 (ALT 250 FOR IP): Performed by: PSYCHIATRY & NEUROLOGY

## 2021-04-16 PROCEDURE — 99232 SBSQ HOSP IP/OBS MODERATE 35: CPT | Performed by: PSYCHIATRY & NEUROLOGY

## 2021-04-16 PROCEDURE — 1240000000 HC EMOTIONAL WELLNESS R&B

## 2021-04-16 RX ORDER — MIRTAZAPINE 15 MG/1
7.5 TABLET, FILM COATED ORAL NIGHTLY
Status: DISCONTINUED | OUTPATIENT
Start: 2021-04-16 | End: 2021-04-17

## 2021-04-16 RX ADMIN — BUPROPION HYDROCHLORIDE 300 MG: 300 TABLET, FILM COATED, EXTENDED RELEASE ORAL at 08:47

## 2021-04-16 RX ADMIN — BUPRENORPHINE AND NALOXONE 1 FILM: 4; 1 FILM BUCCAL; SUBLINGUAL at 08:47

## 2021-04-16 RX ADMIN — MIRTAZAPINE 7.5 MG: 15 TABLET, FILM COATED ORAL at 20:29

## 2021-04-16 RX ADMIN — NICOTINE POLACRILEX 4 MG: 4 GUM, CHEWING BUCCAL at 17:31

## 2021-04-16 RX ADMIN — HYDROXYZINE HYDROCHLORIDE 50 MG: 50 TABLET, FILM COATED ORAL at 20:29

## 2021-04-16 RX ADMIN — NICOTINE POLACRILEX 4 MG: 4 GUM, CHEWING BUCCAL at 20:29

## 2021-04-16 RX ADMIN — NICOTINE POLACRILEX 4 MG: 4 GUM, CHEWING BUCCAL at 11:02

## 2021-04-16 RX ADMIN — ACETAMINOPHEN 650 MG: 325 TABLET ORAL at 20:29

## 2021-04-16 ASSESSMENT — PAIN DESCRIPTION - LOCATION: LOCATION: BACK

## 2021-04-16 ASSESSMENT — PAIN SCALES - GENERAL: PAINLEVEL_OUTOF10: 5

## 2021-04-16 NOTE — CARE COORDINATION
SW made attempt to reach out [facility] regarding pt being admitted to their program. Sw left message with call back number.

## 2021-04-16 NOTE — PLAN OF CARE
Problem: Suicide risk  Goal: Provide patient with safe environment  Description: Provide patient with safe environment  4/15/2021 2116 by [address]  Outcome: Ongoing  4/15/2021 0955 by Shonda Verma RN  Outcome: Met This Shift     Problem: Depressive Behavior With or Without Suicide Precautions:  Goal: Ability to disclose and discuss suicidal ideas will improve  Description: Ability to disclose and discuss suicidal ideas will improve  4/15/2021 2116 by Harlan Wyatt  Outcome: Ongoing  4/15/2021 0955 by Shonda Verma RN  Outcome: Ongoing   Patient has been in his room reading a book much of the night. He rates his depression 7/10 and has had some fleeting suicidal thoughts without a plan and racing thoughts.

## 2021-04-16 NOTE — GROUP NOTE
Group Therapy Note    Date: 4/16/2021    Group Start Time: 0900  Group End Time: 0930  Group Topic: Community Meeting    [address], [address]        Group Therapy Note    Attendees: 5/11    patient refused to attend community meeting and goal setting group at [phone] after encouragement from staff. 1:1 talk time provided as alternative to group session.          Signature:  Vahe Dhaliwal, [address]

## 2021-04-16 NOTE — PLAN OF CARE
Problem: Suicide risk  Goal: Provide patient with safe environment  Description: Provide patient with safe environment  4/16/2021 0943 by Ludin Henry RN  Outcome: Ongoing  Note: Patient remains in a safe environment. Problem: Depressive Behavior With or Without Suicide Precautions:  Goal: Ability to disclose and discuss suicidal ideas will improve  Description: Ability to disclose and discuss suicidal ideas will improve  4/16/2021 0943 by Ludin Henry RN  Outcome: Ongoing  Note: Patient admits to fleeting suicidal ideation, contracts for safety while on the unit.

## 2021-04-16 NOTE — PROGRESS NOTES
Daily Progress Note  4/16/2021     CHIEF COMPLAINT: Suicidal ideation with plan to walk into traffic    Reviewed patient's current plan of care and vital signs with nursing staff. Sleep: Endorses difficulty staying asleep  Attending groups: Attended only 1 group today    SUBJECTIVE:    Patient is medication compliant and behaviorally in control. Staff report no overnight events. Patient's Elavil was discontinued yesterday following a taper, and he is denying any side effects or withdrawal concerns. Patient was started on Wellbutrin and his dose was increased to 300 mg 4 days ago. Patient continues to endorse poor sleep overnight and is agreeable to starting mirtazapine 7.5 mg this evening. He has been utilizing his Atarax and clonidine and reports no improvement in sleep. He does appear irritable, and states multiple times that \"getting good sleep would be really helpful\". Patient endorses continued suicidal ideation and is not able to contract for safety if not on the unit. States that something has to be done regarding the sleep situation. Patient has not attended any groups today and is resting in his room at time of assessment. He is primarily isolating to self. Discussed patient during treatment team.  He has been accepted to [facility] on Monday, pending potassium redraw and improvement in suicidal ideation. Patient is denying any perceptual disturbances or psychotic phenomena. Reports fair appetite, denies any nausea or digestive issues. At this time, patient requires continued inpatient hospitalization for safety and stability. We will add mirtazapine and assess for improvement in sleep as well as improvement in suicidal ideation.       Mental Status Exam  Level of consciousness: Awake and alert  Appearance: Hospital attire, laying on bed, with improved grooming and hygiene   Behavior/Motor: Approachable, no psychomotor abnormalities noted  Attitude toward examiner:cooperative, attentive, fair eye contact  Speech:  normal rate, normal volume and well articulated  Mood: Depressed  Affect: Mood congruent  Thought processes:  linear, goal directed and coherent  Thought content:  denies homicidal ideation  Suicidal Ideation endorses continued suicidal ideation, able to contract for safety on unit  Delusions:  no evidence of delusions  Perceptual Disturbance:  denies any perceptual disturbance  Cognition:  Oriented to self, location, time, and situation  Memory: age appropriate  Insight & Judgement: Fair  Medication side effects:  denies       Data   height is 6' (1.829 m) and weight is 190 lb (86.2 kg). His oral temperature is 97.5 °F (36.4 °C). His blood pressure is 107/53 (abnormal) and his pulse is 72. His respiration is 14 and oxygen saturation is 99%.    Labs:   Admission on 04/10/2021   Component Date Value Ref Range Status    WBC 04/10/2021 8.6  3.5 - 11.0 k/uL Final    RBC 04/10/2021 4.34* 4.5 - 5.9 m/uL Final    Hemoglobin 04/10/2021 10.8* 13.5 - 17.5 g/dL Final    Hematocrit 04/10/2021 34.3* 41 - 53 % Final    MCV 04/10/2021 79.0* 80 - 100 fL Final    MCH 04/10/2021 24.8* 26 - 34 pg Final    MCHC 04/10/2021 31.4  31 - 37 g/dL Final    RDW 04/10/2021 17.9* 11.5 - 14.9 % Final    Platelets 65/79/2224 298  150 - 450 k/uL Final    MPV 04/10/2021 8.5  6.0 - 12.0 fL Final    NRBC Automated 04/10/2021 NOT REPORTED  per 100 WBC Final    Differential Type 04/10/2021 NOT REPORTED   Final    Immature Granulocytes 04/10/2021 NOT REPORTED  0 % Final    Absolute Immature Granulocyte 04/10/2021 NOT REPORTED  0.00 - 0.30 k/uL Final    WBC Morphology 04/10/2021 NOT REPORTED   Final    RBC Morphology 04/10/2021 NOT REPORTED   Final    Platelet Estimate 19/12/5033 NOT REPORTED   Final    Seg Neutrophils 04/10/2021 71* 36 - 66 % Final    Lymphocytes 04/10/2021 16* 24 - 44 % Final    Monocytes 04/10/2021 11* 1 - 7 % Final    Eosinophils % 04/10/2021 1  0 - 4 % Final    Basophils 04/10/2021 1  0 - 2 % Final    Segs Absolute 04/10/2021 6.09  1.3 - 9.1 k/uL Final    Absolute Lymph # 04/10/2021 1.38  1.0 - 4.8 k/uL Final    Absolute Mono # 04/10/2021 0.95  0.1 - 1.3 k/uL Final    Absolute Eos # 04/10/2021 0.09  0.0 - 0.4 k/uL Final    Basophils Absolute 04/10/2021 0.09  0.0 - 0.2 k/uL Final    Morphology 04/10/2021 ANISOCYTOSIS PRESENT   Final    Morphology 04/10/2021 HYPOCHROMIA PRESENT   Final    Morphology 04/10/2021 FEW GIANT PLATELETS   Final    Morphology 04/10/2021 1+ ELLIPTOCYTES   Final    Morphology 04/10/2021 1+ POLYCHROMASIA   Final    Glucose 04/10/2021 148* 70 - 99 mg/dL Final    BUN 04/10/2021 15  6 - 20 mg/dL Final    CREATININE 04/10/2021 0.71  0.70 - 1.20 mg/dL Final    Bun/Cre Ratio 04/10/2021 NOT REPORTED  9 - 20 Final    Calcium 04/10/2021 9.2  8.6 - 10.4 mg/dL Final    Sodium 04/10/2021 139  135 - 144 mmol/L Final    Potassium 04/10/2021 2.7* 3.7 - 5.3 mmol/L Final    Chloride 04/10/2021 100  98 - 107 mmol/L Final    CO2 04/10/2021 28  20 - 31 mmol/L Final    Anion Gap 04/10/2021 11  9 - 17 mmol/L Final    Alkaline Phosphatase 04/10/2021 77  40 - 129 U/L Final    ALT 04/10/2021 26  5 - 41 U/L Final    AST 04/10/2021 26  <40 U/L Final    Total Bilirubin 04/10/2021 0.58  0.3 - 1.2 mg/dL Final    Total Protein 04/10/2021 7.4  6.4 - 8.3 g/dL Final    Albumin 04/10/2021 4.4  3.5 - 5.2 g/dL Final    Albumin/Globulin Ratio 04/10/2021 NOT REPORTED  1.0 - 2.5 Final    GFR Non- 04/10/2021 >60  >60 mL/min Final    GFR  04/10/2021 >60  >60 mL/min Final    GFR Comment 04/10/2021        Final    Comment: Average GFR for 38-51 years old:   80 mL/min/1.73sq m  Chronic Kidney Disease:   <60 mL/min/1.73sq m  Kidney failure:   <15 mL/min/1.73sq m              eGFR calculated using average adult body mass.  Additional eGFR calculator available at:        [employer]            GFR Staging 04/10/2021 NOT REPORTED Final    Ethanol 04/10/2021 <10  <10 mg/dL Final    Ethanol percent 04/10/2021 <0.010  % Final    Amphetamine Screen, Ur 04/10/2021 POSITIVE* NEGATIVE Final    Comment:       (Positive cutoff 1000 ng/mL)                  Barbiturate Screen, Ur 04/10/2021 POSITIVE* NEGATIVE Final    Comment:       (Positive cutoff 200 ng/mL)                  Benzodiazepine Screen, Urine 04/10/2021 NEGATIVE  NEGATIVE Final    Comment:       (Positive cutoff 200 ng/mL)                  Cocaine Metabolite, Urine 04/10/2021 POSITIVE* NEGATIVE Final    Comment:       (Positive cutoff 300 ng/mL)                  Methadone Screen, Urine 04/10/2021 NEGATIVE  NEGATIVE Final    Comment:       (Positive cutoff 300 ng/mL)                  Opiates, Urine 04/10/2021 NEGATIVE  NEGATIVE Final    Comment:       (Positive cutoff 300 ng/mL)                  Phencyclidine, Urine 04/10/2021 NEGATIVE  NEGATIVE Final    Comment:       (Positive cutoff 25 ng/mL)                  Propoxyphene, Urine 04/10/2021 NOT REPORTED  NEGATIVE Final    Cannabinoid Scrn, Ur 04/10/2021 POSITIVE* NEGATIVE Final    Comment:       (Positive cutoff 50 ng/mL)                  Oxycodone Screen, Ur 04/10/2021 NEGATIVE  NEGATIVE Final    Comment:       (Positive cutoff 100 ng/mL)                  Methamphetamine, Urine 04/10/2021 NOT REPORTED  NEGATIVE Final    Tricyclic Antidepressants, Urine 04/10/2021 NOT REPORTED  NEGATIVE Final    MDMA, Urine 04/10/2021 NOT REPORTED  NEGATIVE Final    Buprenorphine Urine 04/10/2021 NOT REPORTED  NEGATIVE Final    Test Information 04/10/2021 Assay provides medical screening only. The absence of expected drug(s) and/or metabolite(s) may indicate diluted or adulterated urine, limitations of testing or timing of collection. Final    Comment: Testing for legal purposes should be confirmed by another method. To request confirmation   of test result, please call the lab within 7 days of sample submission.       Acetaminophen Level 04/10/2021 <5* 10 - 30 ug/mL Final    Salicylate Lvl 75/48/1195 <1* 3 - 10 mg/dL Final    Specimen Description 04/10/2021 . NASOPHARYNGEAL SWAB   Final    SARS-CoV-2, Rapid 04/10/2021 Not Detected  Not Detected Final    Comment:       Rapid NAAT:  The specimen is NEGATIVE for SARS-CoV-2, the novel coronavirus associated with   COVID-19. The ID NOW COVID-19 assay is designed to detect the virus that causes COVID-19 in patients   with signs and symptoms of infection who are suspected of COVID-19. An individual without symptoms of COVID-19 and who is not shedding SARS-CoV-2 virus would   expect to have a negative (not detected) result in this assay. Negative results should be treated as presumptive and, if inconsistent with clinical signs   and symptoms or necessary for patient management,  should be tested with an alternative molecular assay. Negative results do not preclude   SARS-CoV-2 infection and   should not be used as the sole basis for patient management decisions. Fact sheet for Healthcare Providers: [employer]  Fact sheet for Patients: [employer]          Methodology: Isothermal Nucleic Acid Amplification      Magnesium 04/10/2021 2.0  1.6 - 2.6 mg/dL Final    Potassium 04/10/2021 3.5* 3.7 - 5.3 mmol/L Final    Methadone Screen, Urine 04/11/2021 NEGATIVE  NEGATIVE Final    Comment:       (Positive cutoff 300 ng/mL)              Assay provides medical screening only. The absence of expected drug(s) and/or metabolite(s)   may indicate diluted or adulterated urine, limitations of testing or timing of collection. Testing for legal purposes should be confirmed by another method. To request confirmation   of test result, please call the lab within 7 days of sample submission.       Fentanyl and Metabolites, Urine 04/11/2021 <1.0  ng/mL Final    Comment: (NOTE)  INTERPRETIVE INFORMATION: Fentanyl and Metabolite, Urine  Methodology: Quantitative Liquid Chromatography-Tandem Mass   Spectrometry  Positive cutoff: 1.0 ng/mL  For medical purposes only; not valid for forensic use. The absence of expected drug(s) and/or drug metabolite(s) may   indicate non-compliance, inappropriate timing of specimen   collection relative to drug administration, poor drug absorption,   diluted/adulterated urine, or limitations of testing. The   concentration value must be greater than or equal to the cutoff to   be reported as positive. Interpretive questions should be directed   to the laboratory. This test was developed and its performance characteristics   determined by [facility]. It has not been cleared or   approved by the [employer] and Drug Administration. This test was   performed in a CLIA certified laboratory and is intended for   clinical purposes.  Norfentanyl, Urine 04/11/2021 <1.0  ng/mL Final    Comment: (NOTE)  Performed By: [facility] [address]  [place], [address] [facility]  : Kevin Marshall.  Dino Shah MD              Medications  Current Facility-Administered Medications: mirtazapine (REMERON) tablet 7.5 mg, 7.5 mg, Oral, Nightly  melatonin tablet 1.5 mg, 1.5 mg, Oral, Nightly PRN  buPROPion (WELLBUTRIN XL) extended release tablet 300 mg, 300 mg, Oral, Daily  cloNIDine (CATAPRES) tablet 0.1 mg, 0.1 mg, Oral, TID PRN  buprenorphine-naloxone (SUBOXONE) 4-1 MG SL film 1 Film, 1 Film, Sublingual, Daily  nicotine polacrilex (NICORETTE) gum 4 mg, 4 mg, Oral, PRN  acetaminophen (TYLENOL) tablet 650 mg, 650 mg, Oral, Q4H PRN  aluminum & magnesium hydroxide-simethicone (MAALOX) 200-200-20 MG/5ML suspension 30 mL, 30 mL, Oral, Q6H PRN  hydrOXYzine (ATARAX) tablet 50 mg, 50 mg, Oral, TID PRN  ibuprofen (ADVIL;MOTRIN) tablet 400 mg, 400 mg, Oral, Q6H PRN  polyethylene glycol (GLYCOLAX) packet 17 g, 17 g, Oral, Daily PRN  traZODone (DESYREL) tablet 50 mg, 50 mg, Oral, Nightly PRN  dicyclomine (BENTYL) tablet unit

## 2021-04-16 NOTE — CARE COORDINATION
PHI spoke with Jairo Miller from [facility] regarding pt. [facility] will not be able to take him until Monday and they are asking for the pts potassium to be retested due to it being a 2.7 and the doctor at [facility] will not approve pt unless the test reads 3.5 or above. [facility] is also requesting pt have dose of suboxone before leaving and to please send 30 days of medication. PHI prefect severemando regarding the potassium level.

## 2021-04-16 NOTE — GROUP NOTE
Group Therapy Note    Date: 4/16/2021    Group Start Time: 1000  Group End Time: 1050  Group Topic: Psychoeducation    [facility]    Patient refused to attend creative expression skills group at 1000 after encouragement from staff. 1:1 talk time offered by staff as alternative to group session.

## 2021-04-16 NOTE — GROUP NOTE
Group Therapy Note    Date: 4/16/2021    Group Start Time: 1330  Group End Time: 7335  Group Topic: Recreational    [address], [facility]        Group Therapy Note    Attendees: 6/15    patient refused to attend recreation and leisure group at [id number] after encouragement from staff. 1:1 talk time provided as alternative to group session.          Signature:  Mojgan Randle, [place]

## 2021-04-17 PROCEDURE — 6370000000 HC RX 637 (ALT 250 FOR IP): Performed by: PSYCHIATRY & NEUROLOGY

## 2021-04-17 PROCEDURE — 6370000000 HC RX 637 (ALT 250 FOR IP): Performed by: NURSE PRACTITIONER

## 2021-04-17 PROCEDURE — 99232 SBSQ HOSP IP/OBS MODERATE 35: CPT | Performed by: NURSE PRACTITIONER

## 2021-04-17 PROCEDURE — 1240000000 HC EMOTIONAL WELLNESS R&B

## 2021-04-17 RX ORDER — MIRTAZAPINE 15 MG/1
15 TABLET, FILM COATED ORAL NIGHTLY
Status: DISCONTINUED | OUTPATIENT
Start: 2021-04-17 | End: 2021-04-19 | Stop reason: HOSPADM

## 2021-04-17 RX ADMIN — ACETAMINOPHEN 650 MG: 325 TABLET ORAL at 20:38

## 2021-04-17 RX ADMIN — BUPROPION HYDROCHLORIDE 300 MG: 300 TABLET, FILM COATED, EXTENDED RELEASE ORAL at 08:40

## 2021-04-17 RX ADMIN — MIRTAZAPINE 15 MG: 15 TABLET, FILM COATED ORAL at 20:38

## 2021-04-17 RX ADMIN — NICOTINE POLACRILEX 4 MG: 4 GUM, CHEWING BUCCAL at 20:38

## 2021-04-17 RX ADMIN — HYDROXYZINE HYDROCHLORIDE 50 MG: 50 TABLET, FILM COATED ORAL at 20:38

## 2021-04-17 RX ADMIN — NICOTINE POLACRILEX 4 MG: 4 GUM, CHEWING BUCCAL at 17:55

## 2021-04-17 RX ADMIN — Medication 1.5 MG: at 20:38

## 2021-04-17 RX ADMIN — BUPRENORPHINE AND NALOXONE 1 FILM: 4; 1 FILM BUCCAL; SUBLINGUAL at 08:40

## 2021-04-17 RX ADMIN — NICOTINE POLACRILEX 4 MG: 4 GUM, CHEWING BUCCAL at 12:18

## 2021-04-17 ASSESSMENT — PAIN DESCRIPTION - LOCATION: LOCATION: BACK

## 2021-04-17 ASSESSMENT — PAIN SCALES - GENERAL
PAINLEVEL_OUTOF10: 3
PAINLEVEL_OUTOF10: 5

## 2021-04-17 NOTE — GROUP NOTE
Group Therapy Note    Date: 4/17/2021    Group Start Time: 1000  Group End Time: 5358  Group Topic: Psychoeducation    [address], LSW    patient refused to attend psychoeducation group at 10a after encouragement from staff.   1:1 talk time provided as alternative to group session

## 2021-04-17 NOTE — GROUP NOTE
Wellness Group Note   Group Topic: Hygiene/ADLs     Date: 4/17/21   Group Start Time: 1100   Group End Time: 8747   Attendance: 10/17   BARBER Santoyo     Patient's Goal:  Increased ADL's and hygiene awareness   Status After Intervention:  Improved   Participation Level:  Active Listener and Interactive   Participation Quality: Appropriate and Attentive   Speech:  normal   Thought Process/Content: Logical   Affective Functioning: Congruent   Mood: WDL   Level of consciousness:  Alert, Oriented x4 and Attentive   Response to Learning: Able to verbalize current knowledge/experience, Able to verbalize/acknowledge new learning, Able to retain information, Capable of insight and Progressing to goal   Endings: None Reported   Modes of Intervention: Education and Activity     Discipline Responsible: Behavioral Health Tech   Signature:  Yuliya Santoyo

## 2021-04-17 NOTE — PROGRESS NOTES
Daily Progress Note  4/17/2021     CHIEF COMPLAINT: Suicidal ideation with plan to walk into traffic    Reviewed patient's current plan of care and vital signs with nursing staff. Sleep: Reports slight improvement in sleep overnight  Attending groups: Attended only 1 group today    SUBJECTIVE:    No significant interval change. Patient has remained in behavioral control and medication compliant. He was started on mirtazapine 7.5 mg last night and does not report a slight improvement in sleep quality. Denies any side effects to the medication and is agreeable to titrating dose to 15 mg this evening. We will continue to observe for improvement. Patient is resting at time of assessment and reports a mild headache. He utilized Tylenol last night, but has not requested any today. He does slightly brighten with continued approach. Later in the afternoon, patient is observed participating in group. Staff report that he is engaged in group activity and that his mood is slightly improved today. However, he continues to primarily isolate to self. Patient reporting continued suicidal ideation, but denies any plan or method at this time. Feels that he is able to contract for safety on unit. Would not feel safe from self if at a lower level of care. Discussed disposition planning, and [facility]. Per review of documentation from , physician with [facility] requested potassium to be retested as documentation noted that it was at 2.7 at time of admission. Most recent result on 4/10 shows a potassium of 3.5. Need clarification if [facility]ep[facility] requires a repeat CMP at this time. Do not want to delay discharge. At this time, patient continues to endorse suicidal thoughts and requires current level of care for safety.     Mental Status Exam  Level of consciousness: Awake and alert  Appearance: Hospital attire, laying on bed, with improved grooming and hygiene Behavior/Motor: Approachable, no psychomotor abnormalities noted  Attitude toward examiner:cooperative, attentive, fair eye contact, irritable at x2 to weeks  Speech:  normal rate, normal volume and well articulated  Mood: Depressed  Affect: Mood congruent  Thought processes:  linear, goal directed and coherent  Thought content:  denies homicidal ideation  Suicidal Ideation: endorses continued suicidal ideation, able to contract for safety on unit  Delusions:  no evidence of delusions  Perceptual Disturbance:  denies any perceptual disturbance  Cognition:  Oriented to self, location, time, and situation  Memory: age appropriate  Insight & Judgement: Fair  Medication side effects:  denies       Data   height is 6' (1.829 m) and weight is 190 lb (86.2 kg). His oral temperature is 97.8 °F (36.6 °C). His blood pressure is 104/49 (abnormal) and his pulse is 67. His respiration is 12 and oxygen saturation is 99%.    Labs:   Admission on 04/10/2021   Component Date Value Ref Range Status    WBC 04/10/2021 8.6  3.5 - 11.0 k/uL Final    RBC 04/10/2021 4.34* 4.5 - 5.9 m/uL Final    Hemoglobin 04/10/2021 10.8* 13.5 - 17.5 g/dL Final    Hematocrit 04/10/2021 34.3* 41 - 53 % Final    MCV 04/10/2021 79.0* 80 - 100 fL Final    MCH 04/10/2021 24.8* 26 - 34 pg Final    MCHC 04/10/2021 31.4  31 - 37 g/dL Final    RDW 04/10/2021 17.9* 11.5 - 14.9 % Final    Platelets 38/74/4895 298  150 - 450 k/uL Final    MPV 04/10/2021 8.5  6.0 - 12.0 fL Final    NRBC Automated 04/10/2021 NOT REPORTED  per 100 WBC Final    Differential Type 04/10/2021 NOT REPORTED   Final    Immature Granulocytes 04/10/2021 NOT REPORTED  0 % Final    Absolute Immature Granulocyte 04/10/2021 NOT REPORTED  0.00 - 0.30 k/uL Final    WBC Morphology 04/10/2021 NOT REPORTED   Final    RBC Morphology 04/10/2021 NOT REPORTED   Final    Platelet Estimate 04/60/6087 NOT REPORTED   Final    Seg Neutrophils 04/10/2021 71* 36 - 66 % Final    Lymphocytes mass. Additional eGFR calculator available at:        [employer]            GFR Staging 04/10/2021 NOT REPORTED   Final    Ethanol 04/10/2021 <10  <10 mg/dL Final    Ethanol percent 04/10/2021 <0.010  % Final    Amphetamine Screen, Ur 04/10/2021 POSITIVE* NEGATIVE Final    Comment:       (Positive cutoff 1000 ng/mL)                  Barbiturate Screen, Ur 04/10/2021 POSITIVE* NEGATIVE Final    Comment:       (Positive cutoff 200 ng/mL)                  Benzodiazepine Screen, Urine 04/10/2021 NEGATIVE  NEGATIVE Final    Comment:       (Positive cutoff 200 ng/mL)                  Cocaine Metabolite, Urine 04/10/2021 POSITIVE* NEGATIVE Final    Comment:       (Positive cutoff 300 ng/mL)                  Methadone Screen, Urine 04/10/2021 NEGATIVE  NEGATIVE Final    Comment:       (Positive cutoff 300 ng/mL)                  Opiates, Urine 04/10/2021 NEGATIVE  NEGATIVE Final    Comment:       (Positive cutoff 300 ng/mL)                  Phencyclidine, Urine 04/10/2021 NEGATIVE  NEGATIVE Final    Comment:       (Positive cutoff 25 ng/mL)                  Propoxyphene, Urine 04/10/2021 NOT REPORTED  NEGATIVE Final    Cannabinoid Scrn, Ur 04/10/2021 POSITIVE* NEGATIVE Final    Comment:       (Positive cutoff 50 ng/mL)                  Oxycodone Screen, Ur 04/10/2021 NEGATIVE  NEGATIVE Final    Comment:       (Positive cutoff 100 ng/mL)                  Methamphetamine, Urine 04/10/2021 NOT REPORTED  NEGATIVE Final    Tricyclic Antidepressants, Urine 04/10/2021 NOT REPORTED  NEGATIVE Final    MDMA, Urine 04/10/2021 NOT REPORTED  NEGATIVE Final    Buprenorphine Urine 04/10/2021 NOT REPORTED  NEGATIVE Final    Test Information 04/10/2021 Assay provides medical screening only. The absence of expected drug(s) and/or metabolite(s) may indicate diluted or adulterated urine, limitations of testing or timing of collection.    Final    Comment: Testing for legal purposes should be confirmed by another method. To request confirmation   of test result, please call the lab within 7 days of sample submission.  Acetaminophen Level 04/10/2021 <5* 10 - 30 ug/mL Final    Salicylate Lvl 18/36/4534 <1* 3 - 10 mg/dL Final    Specimen Description 04/10/2021 . NASOPHARYNGEAL SWAB   Final    SARS-CoV-2, Rapid 04/10/2021 Not Detected  Not Detected Final    Comment:       Rapid NAAT:  The specimen is NEGATIVE for SARS-CoV-2, the novel coronavirus associated with   COVID-19. The ID NOW COVID-19 assay is designed to detect the virus that causes COVID-19 in patients   with signs and symptoms of infection who are suspected of COVID-19. An individual without symptoms of COVID-19 and who is not shedding SARS-CoV-2 virus would   expect to have a negative (not detected) result in this assay. Negative results should be treated as presumptive and, if inconsistent with clinical signs   and symptoms or necessary for patient management,  should be tested with an alternative molecular assay. Negative results do not preclude   SARS-CoV-2 infection and   should not be used as the sole basis for patient management decisions. Fact sheet for Healthcare Providers: [employer].es  Fact sheet for Patients: [employer]Her.es          Methodology: Isothermal Nucleic Acid Amplification      Magnesium 04/10/2021 2.0  1.6 - 2.6 mg/dL Final    Potassium 04/10/2021 3.5* 3.7 - 5.3 mmol/L Final    Methadone Screen, Urine 04/11/2021 NEGATIVE  NEGATIVE Final    Comment:       (Positive cutoff 300 ng/mL)              Assay provides medical screening only. The absence of expected drug(s) and/or metabolite(s)   may indicate diluted or adulterated urine, limitations of testing or timing of collection. Testing for legal purposes should be confirmed by another method.   To request confirmation   of test result, please call the lab within 7 days of sample submission.  Fentanyl and Metabolites, Urine 04/11/2021 <1.0  ng/mL Final    Comment: (NOTE)  INTERPRETIVE INFORMATION: Fentanyl and Metabolite, Urine  Methodology: Quantitative Liquid Chromatography-Tandem Mass   Spectrometry  Positive cutoff: 1.0 ng/mL  For medical purposes only; not valid for forensic use. The absence of expected drug(s) and/or drug metabolite(s) may   indicate non-compliance, inappropriate timing of specimen   collection relative to drug administration, poor drug absorption,   diluted/adulterated urine, or limitations of testing. The   concentration value must be greater than or equal to the cutoff to   be reported as positive. Interpretive questions should be directed   to the laboratory. This test was developed and its performance characteristics   determined by [facility]. It has not been cleared or   approved by the [employer] and Drug Administration. This test was   performed in a CLIA certified laboratory and is intended for   clinical purposes.  Norfentanyl, Urine 04/11/2021 <1.0  ng/mL Final    Comment: (NOTE)  Performed By: [address]  [place], [address] [facility]  : Ava Dyson.  Aura Herrera MD              Medications  Current Facility-Administered Medications: mirtazapine (REMERON) tablet 15 mg, 15 mg, Oral, Nightly  melatonin tablet 1.5 mg, 1.5 mg, Oral, Nightly PRN  buPROPion (WELLBUTRIN XL) extended release tablet 300 mg, 300 mg, Oral, Daily  cloNIDine (CATAPRES) tablet 0.1 mg, 0.1 mg, Oral, TID PRN  buprenorphine-naloxone (SUBOXONE) 4-1 MG SL film 1 Film, 1 Film, Sublingual, Daily  nicotine polacrilex (NICORETTE) gum 4 mg, 4 mg, Oral, PRN  acetaminophen (TYLENOL) tablet 650 mg, 650 mg, Oral, Q4H PRN  aluminum & magnesium hydroxide-simethicone (MAALOX) 200-200-20 MG/5ML suspension 30 mL, 30 mL, Oral, Q6H PRN  hydrOXYzine (ATARAX) tablet 50 mg, 50 mg, Oral, TID PRN  ibuprofen (ADVIL;MOTRIN) tablet 400 mg, 400 mg, Oral, Q6H PRN  polyethylene glycol (GLYCOLAX) packet 17 g, 17 g, Oral, Daily PRN  traZODone (DESYREL) tablet 50 mg, 50 mg, Oral, Nightly PRN  dicyclomine (BENTYL) tablet 20 mg, 20 mg, Oral, 4x Daily PRN  promethazine (PHENERGAN) tablet 25 mg, 25 mg, Oral, TID PRN    ASSESSMENT  Severe recurrent major depression without psychotic features ([facility] [address]     PLAN  Patient symptoms show no change   Medication changes: Titrate mirtazapine 15 mg nightly, starting this evening  Monitor need and frequency of as needed medication  Encourage participation in groups and milieu  Attempt to develop insight  Psycho-education conducted. Supportive Therapy conducted.   Probable discharge per attending MD, accepted to [facility] Monday    Electronically signed by ZABRINA Moon on 4/17/2021

## 2021-04-17 NOTE — PLAN OF CARE
Problem: Suicide risk  Goal: Provide patient with safe environment  Description: Provide patient with safe environment  4/16/2021 2130 by Shelly Mesa LPN  Outcome: Ongoing     Problem: Depressive Behavior With or Without Suicide Precautions:  Goal: Ability to disclose and discuss suicidal ideas will improve  Description: Ability to disclose and discuss suicidal ideas will improve  4/16/2021 2130 by Shelly Mesa LPN  Outcome: Ongoing     Problem: Depressive Behavior With or Without Suicide Precautions:  Goal: Absence of self-harm  Description: Absence of self-harm  Outcome: Ongoing     Patient states that he is still having fleeting SI but contracts for safety on the unit and says they are getting better. Patient is isolative to room but friendly with staff when interacting. Patient is medication and behavioral compliant. Patient encouraged to seek nursing staff if thoughts of self harm self worsen to seek nursing staff, patient verbalized understanding. Will continue to monitor Q15 minute safety checks.

## 2021-04-17 NOTE — PLAN OF CARE
Problem: Depressive Behavior With or Without Suicide Precautions:  Goal: Ability to disclose and discuss suicidal ideas will improve  Description: Ability to disclose and discuss suicidal ideas will improve  4/17/2021 1004 by Michael Vee LPN  Outcome: Ongoing     Problem: Depressive Behavior With or Without Suicide Precautions:  Goal: Absence of self-harm  Description: Absence of self-harm  4/17/2021 1004 by Michael Vee LPN  Outcome: Ongoing   Patient denies intent to harm self on the unit, remains free from self harm.  Support and encouragement provided

## 2021-04-17 NOTE — GROUP NOTE
Group Therapy Note    Date: 4/17/2021    Group Start Time: 1330  Group End Time: 1584  Group Topic: Cognitive Skills    [address], [facility]        Group Therapy Note    Attendees:6/17      patient refused to attend self esteem group at [phone] after encouragement from staff. 1:1 talk time provided as alternative to group session.

## 2021-04-17 NOTE — GROUP NOTE
Wellness Group Note   Group Topic: Positive Coping Skills   Date: 4/17   Time: 1600   [facility]     Patient declined to attend wellness group despite staff encouragement. 1:1 talk time was offered by other staff as an alternative. Staff will continue to encourage patient to attend unit programming.      Signature:  Wong Sosa

## 2021-04-18 PROCEDURE — 6370000000 HC RX 637 (ALT 250 FOR IP): Performed by: NURSE PRACTITIONER

## 2021-04-18 PROCEDURE — 6370000000 HC RX 637 (ALT 250 FOR IP): Performed by: PSYCHIATRY & NEUROLOGY

## 2021-04-18 PROCEDURE — 1240000000 HC EMOTIONAL WELLNESS R&B

## 2021-04-18 PROCEDURE — 99232 SBSQ HOSP IP/OBS MODERATE 35: CPT | Performed by: PSYCHIATRY & NEUROLOGY

## 2021-04-18 RX ADMIN — BUPRENORPHINE AND NALOXONE 1 FILM: 4; 1 FILM BUCCAL; SUBLINGUAL at 08:34

## 2021-04-18 RX ADMIN — NICOTINE POLACRILEX 4 MG: 4 GUM, CHEWING BUCCAL at 20:13

## 2021-04-18 RX ADMIN — NICOTINE POLACRILEX 4 MG: 4 GUM, CHEWING BUCCAL at 17:51

## 2021-04-18 RX ADMIN — ACETAMINOPHEN 650 MG: 325 TABLET ORAL at 17:51

## 2021-04-18 RX ADMIN — IBUPROFEN 400 MG: 400 TABLET, FILM COATED ORAL at 20:36

## 2021-04-18 RX ADMIN — NICOTINE POLACRILEX 4 MG: 4 GUM, CHEWING BUCCAL at 14:15

## 2021-04-18 RX ADMIN — MIRTAZAPINE 15 MG: 15 TABLET, FILM COATED ORAL at 20:37

## 2021-04-18 RX ADMIN — Medication 1.5 MG: at 20:37

## 2021-04-18 RX ADMIN — BUPROPION HYDROCHLORIDE 300 MG: 300 TABLET, FILM COATED, EXTENDED RELEASE ORAL at 08:10

## 2021-04-18 RX ADMIN — HYDROXYZINE HYDROCHLORIDE 50 MG: 50 TABLET, FILM COATED ORAL at 20:37

## 2021-04-18 RX ADMIN — NICOTINE POLACRILEX 4 MG: 4 GUM, CHEWING BUCCAL at 15:33

## 2021-04-18 ASSESSMENT — PAIN SCALES - GENERAL
PAINLEVEL_OUTOF10: 3
PAINLEVEL_OUTOF10: 0

## 2021-04-18 ASSESSMENT — PAIN DESCRIPTION - LOCATION: LOCATION: GENERALIZED

## 2021-04-18 NOTE — GROUP NOTE
Group Therapy Note    Date: 4/18/2021    Group Start Time: 0900  Group End Time: 3770  Group Topic: Community Meeting    [address], [facility] Therapy Note    Attendees: 5/19    patient refused to attend community meeting and goal setting  group at [phone] after encouragement from staff. 1:1 talk time provided as alternative to group session.

## 2021-04-18 NOTE — PLAN OF CARE
Problem: Depressive Behavior With or Without Suicide Precautions:  Goal: Ability to disclose and discuss suicidal ideas will improve  Description: Ability to disclose and discuss suicidal ideas will improve  4/18/2021 1115 by Ke Rangel LPN  Outcome: Ongoing, Patient voiced depression with unknown reasons. Patient need  Much encouragement ;PT doesn't attend any groups nor socialize with peers. Encouraged to continue his medication regime. Problem: Suicide risk  Goal: Provide patient with safe environment  Description: Provide patient with safe environment  4/18/2021 1115 by Ke Rangel LPN  Outcome: Ongoing, patient remain safe while on the unit. 15 MIN safety checks maintained.

## 2021-04-18 NOTE — GROUP NOTE
Group Therapy Note    Date: 4/18/2021    Group Start Time: 1330  Group End Time: 1234  Group Topic: Cognitive Skills    [address], [facility]        Group Therapy Note    Attendees: 6/19      patient refused to attend triggers to anxiety group at [phone] after encouragement from staff. 1:1 talk time provided as alternative to group session.           Signature:  Curtis Chatman, [address]

## 2021-04-18 NOTE — PROGRESS NOTES
oral temperature is 98 °F (36.7 °C). His blood pressure is 108/62 and his pulse is 80. His respiration is 14 and oxygen saturation is 99%.    Labs:   Admission on 04/10/2021   Component Date Value Ref Range Status    WBC 04/10/2021 8.6  3.5 - 11.0 k/uL Final    RBC 04/10/2021 4.34* 4.5 - 5.9 m/uL Final    Hemoglobin 04/10/2021 10.8* 13.5 - 17.5 g/dL Final    Hematocrit 04/10/2021 34.3* 41 - 53 % Final    MCV 04/10/2021 79.0* 80 - 100 fL Final    MCH 04/10/2021 24.8* 26 - 34 pg Final    MCHC 04/10/2021 31.4  31 - 37 g/dL Final    RDW 04/10/2021 17.9* 11.5 - 14.9 % Final    Platelets 72/77/1405 298  150 - 450 k/uL Final    MPV 04/10/2021 8.5  6.0 - 12.0 fL Final    NRBC Automated 04/10/2021 NOT REPORTED  per 100 WBC Final    Differential Type 04/10/2021 NOT REPORTED   Final    Immature Granulocytes 04/10/2021 NOT REPORTED  0 % Final    Absolute Immature Granulocyte 04/10/2021 NOT REPORTED  0.00 - 0.30 k/uL Final    WBC Morphology 04/10/2021 NOT REPORTED   Final    RBC Morphology 04/10/2021 NOT REPORTED   Final    Platelet Estimate 34/12/9880 NOT REPORTED   Final    Seg Neutrophils 04/10/2021 71* 36 - 66 % Final    Lymphocytes 04/10/2021 16* 24 - 44 % Final    Monocytes 04/10/2021 11* 1 - 7 % Final    Eosinophils % 04/10/2021 1  0 - 4 % Final    Basophils 04/10/2021 1  0 - 2 % Final    Segs Absolute 04/10/2021 6.09  1.3 - 9.1 k/uL Final    Absolute Lymph # 04/10/2021 1.38  1.0 - 4.8 k/uL Final    Absolute Mono # 04/10/2021 0.95  0.1 - 1.3 k/uL Final    Absolute Eos # 04/10/2021 0.09  0.0 - 0.4 k/uL Final    Basophils Absolute 04/10/2021 0.09  0.0 - 0.2 k/uL Final    Morphology 04/10/2021 ANISOCYTOSIS PRESENT   Final    Morphology 04/10/2021 HYPOCHROMIA PRESENT   Final    Morphology 04/10/2021 FEW GIANT PLATELETS   Final    Morphology 04/10/2021 1+ ELLIPTOCYTES   Final    Morphology 04/10/2021 1+ POLYCHROMASIA   Final    Glucose 04/10/2021 148* 70 - 99 mg/dL Final    BUN 04/10/2021 15  6 - 20 mg/dL Final    CREATININE 04/10/2021 0.71  0.70 - 1.20 mg/dL Final    Bun/Cre Ratio 04/10/2021 NOT REPORTED  9 - 20 Final    Calcium 04/10/2021 9.2  8.6 - 10.4 mg/dL Final    Sodium 04/10/2021 139  135 - 144 mmol/L Final    Potassium 04/10/2021 2.7* 3.7 - 5.3 mmol/L Final    Chloride 04/10/2021 100  98 - 107 mmol/L Final    CO2 04/10/2021 28  20 - 31 mmol/L Final    Anion Gap 04/10/2021 11  9 - 17 mmol/L Final    Alkaline Phosphatase 04/10/2021 77  40 - 129 U/L Final    ALT 04/10/2021 26  5 - 41 U/L Final    AST 04/10/2021 26  <40 U/L Final    Total Bilirubin 04/10/2021 0.58  0.3 - 1.2 mg/dL Final    Total Protein 04/10/2021 7.4  6.4 - 8.3 g/dL Final    Albumin 04/10/2021 4.4  3.5 - 5.2 g/dL Final    Albumin/Globulin Ratio 04/10/2021 NOT REPORTED  1.0 - 2.5 Final    GFR Non- 04/10/2021 >60  >60 mL/min Final    GFR  04/10/2021 >60  >60 mL/min Final    GFR Comment 04/10/2021        Final    Comment: Average GFR for 38-51 years old:   80 mL/min/1.73sq m  Chronic Kidney Disease:   <60 mL/min/1.73sq m  Kidney failure:   <15 mL/min/1.73sq m              eGFR calculated using average adult body mass.  Additional eGFR calculator available at:        [employer]            GFR Staging 04/10/2021 NOT REPORTED   Final    Ethanol 04/10/2021 <10  <10 mg/dL Final    Ethanol percent 04/10/2021 <0.010  % Final    Amphetamine Screen, Ur 04/10/2021 POSITIVE* NEGATIVE Final    Comment:       (Positive cutoff 1000 ng/mL)                  Barbiturate Screen, Ur 04/10/2021 POSITIVE* NEGATIVE Final    Comment:       (Positive cutoff 200 ng/mL)                  Benzodiazepine Screen, Urine 04/10/2021 NEGATIVE  NEGATIVE Final    Comment:       (Positive cutoff 200 ng/mL)                  Cocaine Metabolite, Urine 04/10/2021 POSITIVE* NEGATIVE Final    Comment:       (Positive cutoff 300 ng/mL)                  Methadone Screen, Urine 04/10/2021 NEGATIVE  NEGATIVE Final    Comment:       (Positive cutoff 300 ng/mL)                  Opiates, Urine 04/10/2021 NEGATIVE  NEGATIVE Final    Comment:       (Positive cutoff 300 ng/mL)                  Phencyclidine, Urine 04/10/2021 NEGATIVE  NEGATIVE Final    Comment:       (Positive cutoff 25 ng/mL)                  Propoxyphene, Urine 04/10/2021 NOT REPORTED  NEGATIVE Final    Cannabinoid Scrn, Ur 04/10/2021 POSITIVE* NEGATIVE Final    Comment:       (Positive cutoff 50 ng/mL)                  Oxycodone Screen, Ur 04/10/2021 NEGATIVE  NEGATIVE Final    Comment:       (Positive cutoff 100 ng/mL)                  Methamphetamine, Urine 04/10/2021 NOT REPORTED  NEGATIVE Final    Tricyclic Antidepressants, Urine 04/10/2021 NOT REPORTED  NEGATIVE Final    MDMA, Urine 04/10/2021 NOT REPORTED  NEGATIVE Final    Buprenorphine Urine 04/10/2021 NOT REPORTED  NEGATIVE Final    Test Information 04/10/2021 Assay provides medical screening only. The absence of expected drug(s) and/or metabolite(s) may indicate diluted or adulterated urine, limitations of testing or timing of collection. Final    Comment: Testing for legal purposes should be confirmed by another method. To request confirmation   of test result, please call the lab within 7 days of sample submission.  Acetaminophen Level 04/10/2021 <5* 10 - 30 ug/mL Final    Salicylate Lvl 23/63/3785 <1* 3 - 10 mg/dL Final    Specimen Description 04/10/2021 . NASOPHARYNGEAL SWAB   Final    SARS-CoV-2, Rapid 04/10/2021 Not Detected  Not Detected Final    Comment:       Rapid NAAT:  The specimen is NEGATIVE for SARS-CoV-2, the novel coronavirus associated with   COVID-19. The ID NOW COVID-19 assay is designed to detect the virus that causes COVID-19 in patients   with signs and symptoms of infection who are suspected of COVID-19.   An individual without symptoms of COVID-19 and who is not shedding SARS-CoV-2 virus would   expect to have a negative (not detected) result in this assay. Negative results should be treated as presumptive and, if inconsistent with clinical signs   and symptoms or necessary for patient management,  should be tested with an alternative molecular assay. Negative results do not preclude   SARS-CoV-2 infection and   should not be used as the sole basis for patient management decisions. Fact sheet for Healthcare Providers: [employer]es  Fact sheet for Patients: [employer]es          Methodology: Isothermal Nucleic Acid Amplification      Magnesium 04/10/2021 2.0  1.6 - 2.6 mg/dL Final    Potassium 04/10/2021 3.5* 3.7 - 5.3 mmol/L Final    Methadone Screen, Urine 04/11/2021 NEGATIVE  NEGATIVE Final    Comment:       (Positive cutoff 300 ng/mL)              Assay provides medical screening only. The absence of expected drug(s) and/or metabolite(s)   may indicate diluted or adulterated urine, limitations of testing or timing of collection. Testing for legal purposes should be confirmed by another method. To request confirmation   of test result, please call the lab within 7 days of sample submission.  Fentanyl and Metabolites, Urine 04/11/2021 <1.0  ng/mL Final    Comment: (NOTE)  INTERPRETIVE INFORMATION: Fentanyl and Metabolite, Urine  Methodology: Quantitative Liquid Chromatography-Tandem Mass   Spectrometry  Positive cutoff: 1.0 ng/mL  For medical purposes only; not valid for forensic use. The absence of expected drug(s) and/or drug metabolite(s) may   indicate non-compliance, inappropriate timing of specimen   collection relative to drug administration, poor drug absorption,   diluted/adulterated urine, or limitations of testing. The   concentration value must be greater than or equal to the cutoff to   be reported as positive. Interpretive questions should be directed   to the laboratory.   This test was developed and its performance characteristics   determined by [facility]. It has not been cleared or   approved by the [employer] and Drug Administration. This test was   performed in a CLIA certified laboratory and is intended for   clinical purposes.  Norfentanyl, Urine 04/11/2021 <1.0  ng/mL Final    Comment: (NOTE)  Performed By: [address]  [place], [address] [facility]  : Ava Dyson. Aura Herrera MD              Medications  Current Facility-Administered Medications: mirtazapine (REMERON) tablet 15 mg, 15 mg, Oral, Nightly  melatonin tablet 1.5 mg, 1.5 mg, Oral, Nightly PRN  buPROPion (WELLBUTRIN XL) extended release tablet 300 mg, 300 mg, Oral, Daily  cloNIDine (CATAPRES) tablet 0.1 mg, 0.1 mg, Oral, TID PRN  buprenorphine-naloxone (SUBOXONE) 4-1 MG SL film 1 Film, 1 Film, Sublingual, Daily  nicotine polacrilex (NICORETTE) gum 4 mg, 4 mg, Oral, PRN  acetaminophen (TYLENOL) tablet 650 mg, 650 mg, Oral, Q4H PRN  aluminum & magnesium hydroxide-simethicone (MAALOX) 200-200-20 MG/5ML suspension 30 mL, 30 mL, Oral, Q6H PRN  hydrOXYzine (ATARAX) tablet 50 mg, 50 mg, Oral, TID PRN  ibuprofen (ADVIL;MOTRIN) tablet 400 mg, 400 mg, Oral, Q6H PRN  polyethylene glycol (GLYCOLAX) packet 17 g, 17 g, Oral, Daily PRN  traZODone (DESYREL) tablet 50 mg, 50 mg, Oral, Nightly PRN  dicyclomine (BENTYL) tablet 20 mg, 20 mg, Oral, 4x Daily PRN  promethazine (PHENERGAN) tablet 25 mg, 25 mg, Oral, TID PRN    ASSESSMENT  Severe recurrent major depression without psychotic features ([address]     PLAN  Patient symptoms show modest improvement as he has attended groups  Continue with currently prescribed medications and monitor symptoms  Attempt to develop insight  Psycho-education conducted. Supportive Therapy conducted.   Probable discharge is currently undetermined  Follow-up daily while inpatient       Electronically signed by PRADEEP Rutledge CNP on 4/18/21 at 2:32 PM EDT

## 2021-04-18 NOTE — GROUP NOTE
Group Therapy Note    Date: 4/18/2021    Group Start Time: 1010  Group End Time: 1050  Group Topic: Psychoeducation    [address], LSW    patient refused to attend psychoeducation group at 10a after encouragement from staff.   1:1 talk time provided as alternative to group session

## 2021-04-18 NOTE — PLAN OF CARE
Problem: Depressive Behavior With or Without Suicide Precautions:  Goal: Ability to disclose and discuss suicidal ideas will improve  Description: Ability to disclose and discuss suicidal ideas will improve  4/17/2021 2144 by Dinh Byrd LPN  Outcome: Ongoing     Problem: Suicide risk  Goal: Provide patient with safe environment  Description: Provide patient with safe environment  Outcome: Ongoing     Problem: Depressive Behavior With or Without Suicide Precautions:  Goal: Absence of self-harm  Description: Absence of self-harm  4/17/2021 2144 by Dinh Byrd LPN  Outcome: Ongoing     Patient states that he is still having fleeting SI but contracts for safety on the unit and says they are getting better. Patient is isolative to room but friendly with staff when interacting. Patient is medication and behavioral compliant. Patient encouraged to seek nursing staff if thoughts of self harm self worsen to seek nursing staff, patient verbalized understanding. Will continue to monitor Q15 minute safety checks.

## 2021-04-18 NOTE — PROGRESS NOTES
Patient didn't participate in the 2 pm open REC group despite staff invite to attend, patient spent most of his  time reading in his room.

## 2021-04-19 VITALS
WEIGHT: 190 LBS | SYSTOLIC BLOOD PRESSURE: 113 MMHG | HEIGHT: 72 IN | TEMPERATURE: 97.7 F | HEART RATE: 71 BPM | DIASTOLIC BLOOD PRESSURE: 71 MMHG | BODY MASS INDEX: 25.73 KG/M2 | OXYGEN SATURATION: 99 % | RESPIRATION RATE: 14 BRPM

## 2021-04-19 PROCEDURE — 6370000000 HC RX 637 (ALT 250 FOR IP): Performed by: PSYCHIATRY & NEUROLOGY

## 2021-04-19 PROCEDURE — 6370000000 HC RX 637 (ALT 250 FOR IP): Performed by: NURSE PRACTITIONER

## 2021-04-19 PROCEDURE — 99239 HOSP IP/OBS DSCHRG MGMT >30: CPT | Performed by: PSYCHIATRY & NEUROLOGY

## 2021-04-19 RX ORDER — IBUPROFEN 400 MG/1
400 TABLET ORAL EVERY 6 HOURS PRN
Qty: 120 TABLET | Refills: 0 | Status: SHIPPED | OUTPATIENT
Start: 2021-04-19 | End: 2021-10-26 | Stop reason: DRUGHIGH

## 2021-04-19 RX ORDER — BUPRENORPHINE AND NALOXONE 4; 1 MG/1; MG/1
1 FILM, SOLUBLE BUCCAL; SUBLINGUAL DAILY
Qty: 3 FILM | Refills: 0 | Status: SHIPPED | OUTPATIENT
Start: 2021-04-20 | End: 2021-04-23

## 2021-04-19 RX ORDER — HYDROXYZINE 50 MG/1
50 TABLET, FILM COATED ORAL 3 TIMES DAILY PRN
Qty: 30 TABLET | Refills: 0 | Status: SHIPPED | OUTPATIENT
Start: 2021-04-19 | End: 2021-04-29

## 2021-04-19 RX ORDER — MIRTAZAPINE 15 MG/1
15 TABLET, FILM COATED ORAL NIGHTLY
Qty: 30 TABLET | Refills: 0 | Status: ON HOLD | OUTPATIENT
Start: 2021-04-19 | End: 2021-10-14 | Stop reason: ALTCHOICE

## 2021-04-19 RX ORDER — LANOLIN ALCOHOL/MO/W.PET/CERES
1.5 CREAM (GRAM) TOPICAL NIGHTLY PRN
Qty: 15 TABLET | Refills: 0 | Status: SHIPPED | OUTPATIENT
Start: 2021-04-19 | End: 2021-10-26 | Stop reason: DRUGHIGH

## 2021-04-19 RX ORDER — BUPROPION HYDROCHLORIDE 300 MG/1
300 TABLET ORAL DAILY
Qty: 30 TABLET | Refills: 3 | Status: SHIPPED | OUTPATIENT
Start: 2021-04-20 | End: 2021-10-26 | Stop reason: DRUGHIGH

## 2021-04-19 RX ORDER — BUPRENORPHINE AND NALOXONE 4; 1 MG/1; MG/1
1 FILM, SOLUBLE BUCCAL; SUBLINGUAL DAILY
Qty: 3 FILM | Refills: 0
Start: 2021-04-20 | End: 2021-04-19

## 2021-04-19 RX ADMIN — BUPROPION HYDROCHLORIDE 300 MG: 300 TABLET, FILM COATED, EXTENDED RELEASE ORAL at 08:27

## 2021-04-19 RX ADMIN — BUPRENORPHINE AND NALOXONE 1 FILM: 4; 1 FILM BUCCAL; SUBLINGUAL at 08:27

## 2021-04-19 RX ADMIN — NICOTINE POLACRILEX 4 MG: 4 GUM, CHEWING BUCCAL at 09:48
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
## 2021-04-19 NOTE — GROUP NOTE
Group Therapy Note    Date: 4/19/2021    Group Start Time: 0900  Group End Time: 0920  Group Topic: Community Meeting    BARBER Hernandez        Group Therapy Note    Pt did not attend com,unity meeting group d/t resting in room despite staff invitation to attend. 1:1 talk time offered as alternative to group session, pt declined.

## 2021-04-19 NOTE — BH NOTE
Atarax prn given for anxiety.
On call provider notified of best practice advisory suggesting patient to be placed on suicide precautions. Provider to discontinue order as patient does not meet criteria at this time.
Patient did not participate in wellness group at 4:00pm.
Patient given tobacco quitline number 50273647470 at this time, refusing to call at this time, states \" I just dont want to quit now\"- patient given information as to the dangers of long term tobacco use. Continue to reinforce the importance of tobacco cessation.
Patient given tobacco quitline number 87920120478 at this time, refusing to call at this time, states \" I just dont want to quit now\"- patient given information as to the dangers of long term tobacco use. Continue to reinforce the importance of tobacco cessation.
Patient refused to attend wellness group at 1100 after encouragement from staff.   1:1 talk time offered as alternative to group session
Patient refused to attend wellness group at 1600 after encouragement from staff.   1:1 talk time offered as alternative to group session
Reviewed LPN charting for this shift.
Writer has reviewed all LPN documentation.
Writer has reviewed all LPN documentation.
`[facility]  Admission Note     Admission Type:   Admission Type: Voluntary    Reason for admission:  Reason for Admission: Suicidal ideations to jump into traffic    PATIENT STRENGTHS:  Strengths: Medication Compliance, Social Skills    Patient Strengths and Limitations:  Limitations: Lacks leisure interests, Difficulty problem solving/relies on others to help solve problems    Addictive Behavior:   Addictive Behavior  In the past 3 months, have you felt or has someone told you that you have a problem with:  : None  Do you have a history of Chemical Use?: No  Do you have a history of Alcohol Use?: No  Do you have a history of Street Drug Abuse?: No  Histroy of Prescripton Drug Abuse?: No    Medical Problems:   Past Medical History:   Diagnosis Date    Anxiety     Depression     Hep C w/ coma, chronic ([facility]     Polysubstance abuse ([facility]     pt currently on Suboxone 4mg therapy; drug abuse includes IV heroin, IV cocaine, cannabis, opiates, \"just about every drug in the world. \"    Substance abuse ([facility]        Status EXAM:  Status and Exam  Normal: No  Facial Expression: Flat  Affect: Appropriate  Level of Consciousness: Alert  Mood:Normal: No  Mood: Empty  Motor Activity:Normal: Yes  Interview Behavior: Cooperative  Preception: [place] to Person, [place]ie [place] to Time, [place] to Situation, [place] to Place  Attention:Normal: No  Thought Content:Normal: Yes  Hallucinations: None  Delusions: No  Memory:Normal: No  Memory: Confabulation  Insight and Judgment: No  Insight and Judgment: Poor Judgment, Poor Insight  Present Suicidal Ideation: No  Present Homicidal Ideation: No    Tobacco Screening:  Practical Counseling, on admission, lali X, if applicable and completed (first 3 are required if patient doesn't refuse):            ( )  Recognizing danger situations (included triggers and roadblocks)                    ( )  Coping skills (new ways to manage stress, exercise, relaxation techniques, changing
patient refused to attend wellness group at 1600 after encouragement from staff.   1:1 talk time provided as alternative to group session
HDL 35 (L) 03/09/2018    HDL 51 04/22/2014     No components found for: LDLCAL  No results found for: Daily Scale, RN

## 2021-04-19 NOTE — CARE COORDINATION
Name: Suni Campbell    : 1975    Discharge Date: 2021    Primary Auth/Cert #: [id number]     Destination: Pt discharged ambulatory via cab to inpatient treatment ([facility]    Discharge Medications:      Medication List      START taking these medications    buprenorphine-naloxone 4-1 MG Film SL film  Commonly known as: SUBOXONE  Place 1 Film under the tongue daily for 3 days.   Start taking on: 2021     hydrOXYzine 50 MG tablet  Commonly known as: ATARAX  Take 1 tablet by mouth 3 times daily as needed for Anxiety  Notes to patient: anxiety     melatonin 3 MG Tabs tablet  Take 0.5 tablets by mouth nightly as needed (prn insomnia)  Notes to patient: Sleep aid     mirtazapine 15 MG tablet  Commonly known as: REMERON  Take 1 tablet by mouth nightly  Notes to patient: Mood stabilization/sleep        CHANGE how you take these medications    buPROPion 300 MG extended release tablet  Commonly known as: WELLBUTRIN XL  Take 1 tablet by mouth daily  Start taking on: 2021  What changed:   · medication strength  · how much to take  Notes to patient: depression     ibuprofen 400 MG tablet  Commonly known as: ADVIL;MOTRIN  Take 1 tablet by mouth every 6 hours as needed for Pain  What changed:   · medication strength  · how much to take  Notes to patient: pain        CONTINUE taking these medications    nicotine polacrilex 4 MG gum  Commonly known as: NICORETTE  Take 1 each by mouth as needed for Smoking cessation  Notes to patient: smoking cessation        STOP taking these medications    amitriptyline 75 MG tablet  Commonly known as: ELAVIL     gabapentin 600 MG tablet  Commonly known as: NEURONTIN     lurasidone 40 MG Tabs tablet  Commonly known as: [place] Hunger           Where to Get Your Medications      These medications were sent to [facility] - [address] [place] [address] - [address]  [address] [facility], [place] [address]    Phone: [phone]

## 2021-04-19 NOTE — PLAN OF CARE
Problem: Depressive Behavior With or Without Suicide Precautions:  Goal: Ability to disclose and discuss suicidal ideas will improve  Description: Ability to disclose and discuss suicidal ideas will improve  4/18/2021 2026 by Marlena Perry LPN  Outcome: Ongoing  Note: Patient denies suicidal ideations at this time. Patient agrees to seek out staff if they begin having suicidal ideations or need to talk. Q15min safety checks continue      Problem: Depressive Behavior With or Without Suicide Precautions:  Goal: Absence of self-harm  Description: Absence of self-harm  4/18/2021 2026 by Marlena Perry LPN  Outcome: Ongoing  Note: Patient denies suicidal thoughts and hallucinations. He reports mild depression and anxiety and states that they are improved since admission. He has been out in the dayroom, social with peers, and calm.  Q15min safety checks continue

## 2021-04-21 ENCOUNTER — HOSPITAL ENCOUNTER (EMERGENCY)
Age: 46
Discharge: HOME | End: 2021-04-21
Attending: EMERGENCY MEDICINE
Payer: COMMERCIAL

## 2021-04-21 VITALS
RESPIRATION RATE: 14 BRPM | HEART RATE: 90 BPM | WEIGHT: 180 LBS | HEIGHT: 72 IN | SYSTOLIC BLOOD PRESSURE: 141 MMHG | BODY MASS INDEX: 24.38 KG/M2 | TEMPERATURE: 98.1 F | DIASTOLIC BLOOD PRESSURE: 88 MMHG | OXYGEN SATURATION: 98 %

## 2021-04-21 DIAGNOSIS — Z76.89 ENCOUNTER FOR PSYCHIATRIC ASSESSMENT: Primary | ICD-10-CM

## 2021-04-21 LAB
ABSOLUTE EOS #: 0.08 K/UL (ref 0–0.4)
ABSOLUTE IMMATURE GRANULOCYTE: ABNORMAL K/UL (ref 0–0.3)
ABSOLUTE LYMPH #: 1.78 K/UL (ref 1–4.8)
ABSOLUTE MONO #: 0.81 K/UL (ref 0.1–1.3)
ALBUMIN SERPL-MCNC: 4.3 G/DL (ref 3.5–5.2)
ALBUMIN/GLOBULIN RATIO: ABNORMAL (ref 1–2.5)
ALP BLD-CCNC: 73 U/L (ref 40–129)
ALT SERPL-CCNC: 14 U/L (ref 5–41)
ANION GAP SERPL CALCULATED.3IONS-SCNC: 8 MMOL/L (ref 9–17)
AST SERPL-CCNC: 16 U/L
BASOPHILS # BLD: 1 % (ref 0–2)
BASOPHILS ABSOLUTE: 0.08 K/UL (ref 0–0.2)
BILIRUB SERPL-MCNC: 0.21 MG/DL (ref 0.3–1.2)
BUN BLDV-MCNC: 14 MG/DL (ref 6–20)
BUN/CREAT BLD: ABNORMAL (ref 9–20)
CALCIUM SERPL-MCNC: 8.8 MG/DL (ref 8.6–10.4)
CHLORIDE BLD-SCNC: 104 MMOL/L (ref 98–107)
CO2: 25 MMOL/L (ref 20–31)
CREAT SERPL-MCNC: 0.94 MG/DL (ref 0.7–1.2)
DIFFERENTIAL TYPE: ABNORMAL
EOSINOPHILS RELATIVE PERCENT: 1 % (ref 0–4)
ETHANOL PERCENT: <0.01 %
ETHANOL: <10 MG/DL
GFR AFRICAN AMERICAN: >60 ML/MIN
GFR NON-AFRICAN AMERICAN: >60 ML/MIN
GFR SERPL CREATININE-BSD FRML MDRD: ABNORMAL ML/MIN/{1.73_M2}
GFR SERPL CREATININE-BSD FRML MDRD: ABNORMAL ML/MIN/{1.73_M2}
GLUCOSE BLD-MCNC: 105 MG/DL (ref 70–99)
HCT VFR BLD CALC: 31.4 % (ref 41–53)
HEMOGLOBIN: 10.2 G/DL (ref 13.5–17.5)
IMMATURE GRANULOCYTES: ABNORMAL %
LYMPHOCYTES # BLD: 22 % (ref 24–44)
MCH RBC QN AUTO: 25.7 PG (ref 26–34)
MCHC RBC AUTO-ENTMCNC: 32.5 G/DL (ref 31–37)
MCV RBC AUTO: 78.8 FL (ref 80–100)
MONOCYTES # BLD: 10 % (ref 1–7)
MORPHOLOGY: ABNORMAL
NRBC AUTOMATED: ABNORMAL PER 100 WBC
PDW BLD-RTO: 17.8 % (ref 11.5–14.9)
PLATELET # BLD: 250 K/UL (ref 150–450)
PLATELET ESTIMATE: ABNORMAL
PMV BLD AUTO: 8.6 FL (ref 6–12)
POTASSIUM SERPL-SCNC: 4 MMOL/L (ref 3.7–5.3)
RBC # BLD: 3.98 M/UL (ref 4.5–5.9)
RBC # BLD: ABNORMAL 10*6/UL
SARS-COV-2, RAPID: NOT DETECTED
SEG NEUTROPHILS: 66 % (ref 36–66)
SEGMENTED NEUTROPHILS ABSOLUTE COUNT: 5.35 K/UL (ref 1.3–9.1)
SODIUM BLD-SCNC: 137 MMOL/L (ref 135–144)
SPECIMEN DESCRIPTION: NORMAL
TOTAL PROTEIN: 7.6 G/DL (ref 6.4–8.3)
WBC # BLD: 8.1 K/UL (ref 3.5–11)
WBC # BLD: ABNORMAL 10*3/UL

## 2021-04-21 PROCEDURE — G0480 DRUG TEST DEF 1-7 CLASSES: HCPCS

## 2021-04-21 PROCEDURE — 99284 EMERGENCY DEPT VISIT MOD MDM: CPT

## 2021-04-21 PROCEDURE — 80307 DRUG TEST PRSMV CHEM ANLYZR: CPT

## 2021-04-21 PROCEDURE — 85025 COMPLETE CBC W/AUTO DIFF WBC: CPT

## 2021-04-21 PROCEDURE — 36415 COLL VENOUS BLD VENIPUNCTURE: CPT

## 2021-04-21 PROCEDURE — 87635 SARS-COV-2 COVID-19 AMP PRB: CPT

## 2021-04-21 PROCEDURE — 80053 COMPREHEN METABOLIC PANEL: CPT

## 2021-04-21 ASSESSMENT — ENCOUNTER SYMPTOMS
EYE PAIN: 0
ABDOMINAL PAIN: 0
SHORTNESS OF BREATH: 0
COLOR CHANGE: 0
BACK PAIN: 0

## 2021-04-21 NOTE — ED NOTES
Patient provided sandwich, chips and soda pop after labs were drawn. Patient reports he cannot urinate to provide UA at this time. Will continue to provide liquids for UA collection or [facility] can collect.
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
## 2021-04-21 NOTE — ED NOTES
Pt states he was discharged from the [facility] on Monday. Pt states he was given prescriptions and sent to [facility] for a 30 program but when he got there, he states they told him there was no bed for him. Pt states he has been unable to get his prescriptions filled due to some kind of \"block\" at the pharmacy. Pt reports he has not had his medications since Monday and is feeling like he wants to kill himself.         Rajan Calvert, [place]  04/21/21 [id number]

## 2021-04-21 NOTE — ED NOTES
Counselor began [address] Access request for psych assessment for admission, Dr SCOTT on call and not available for consult. Access to call counselor back when Dr SCOTT available for consult screening. @ [place] [phone] Dr SCOTT declined patient and reported he needs to be sent to [facility] crisis. Counselor called [facility] and spoke with Luis F Licona and reported there are open beds for males and  He will be assessed there, counselor to speak with ED doctor for disposition decision on this. @ [phone] counselor spoke with Dr Tiffanie Rodríguez and he is in agreement to rescue and patient is in agreement to be taxied to rescue for CSU unit. @ 5293 rescue called back and reported they will not provide suboxone nor gabapentin for patient and he got upset and refused to go and wants to be taxied to [address]  now for \"detox\" from his suboxone and there he can get his MAT. Counselor provided inpatient AOD resources and walk [facility] Dual programs for outpatient [facility] that patient can access also. / black and white to  within 20 minutes.      @ 1140 patient signed AVS and escorted out with 2 bags of personal belongings for cab to

## 2021-04-21 NOTE — ED PROVIDER NOTES
EMERGENCY DEPARTMENT ENCOUNTER    Pt Name: Marily Gannon  MRN: [id number]  [place] 1975  Date of evaluation: 4/21/21  CHIEF COMPLAINT       Chief Complaint   Patient presents with    Mental Health Problem     HISTORY OF PRESENT ILLNESS   60-year-old male presents for complaint of health evaluation and suicidal ideation. Patient reports that he was recently admitted for same complaint was reportedly discharged to a rehab facility, patient reports that when he got to the rehab facility he was told that there was no room for him, patient reports that he has been homeless for the last 3 days, reports that he has not been able to fill his medications and has been without his meds for the last 3 days as well, states that since being discharged she is again feeling suicidal, patient reports that he has a plan to jump into traffic, does have history of prior attempts, denies homicidal ideation, denies any visual or auditory hallucinations, denies any recent alcohol or drug use, denies any other complaints at this time. The history is provided by the patient. REVIEW OF SYSTEMS     Review of Systems   Constitutional: Negative for chills and fever. HENT: Negative for congestion and ear pain. Eyes: Negative for pain. Respiratory: Negative for shortness of breath. Cardiovascular: Negative for chest pain, palpitations and leg swelling. Gastrointestinal: Negative for abdominal pain. Genitourinary: Negative for dysuria and flank pain. Musculoskeletal: Negative for back pain. Skin: Negative for color change. Neurological: Negative for numbness and headaches. Psychiatric/Behavioral: Positive for suicidal ideas. Negative for confusion. The patient is nervous/anxious. All other systems reviewed and are negative.     PASTMEDICAL HISTORY     Past Medical History:   Diagnosis Date    Anxiety     Depression     Hep C w/ coma, chronic ([facility]     [facility] ([facility]     pt currently on Suboxone 4mg therapy; drug abuse includes IV heroin, IV cocaine, cannabis, opiates, \"just about every drug in the world. \"    Substance abuse ([address]      Past Problem List  Patient Active Problem List   Diagnosis Code    Recurrent depression ([address] F33.9    MDD (major depressive disorder), recurrent episode ([address] F33.9    Bipolar I disorder, most recent episode depressed ([address] F31.30    Opioid type dependence, continuous ([address] F11.20    Mild recurrent major depression ([address] F33.0    Bipolar 1 disorder ([address] F31.9    Bipolar disorder with severe depression ([address] F31.4    Opioid dependence on agonist therapy ([address] F11.20    Chest pain R07.9    Suicidal ideation R45.851    Substance abuse ([address] F19.10    Severe recurrent major depression without psychotic features ([address] F33.2    Major depression, single episode F32.9    Depression with suicidal ideation F32.9, R45.851    Therapeutic opioid-induced constipation (OIC) K59.03, T40.2X5A    Cocaine abuse ([address] F14.10    Amphetamine abuse ([address] F15.10    Barbiturate abuse ([address] F13.10    Cannabis abuse F12.10     SURGICAL HISTORY     History reviewed. No pertinent surgical history. CURRENT MEDICATIONS       Previous Medications    BUPRENORPHINE-NALOXONE (SUBOXONE) 4-1 MG FILM SL FILM    Place 1 Film under the tongue daily for 3 days. BUPROPION (WELLBUTRIN XL) 300 MG EXTENDED RELEASE TABLET    Take 1 tablet by mouth daily    HYDROXYZINE (ATARAX) 50 MG TABLET    Take 1 tablet by mouth 3 times daily as needed for Anxiety    IBUPROFEN (ADVIL;MOTRIN) 400 MG TABLET    Take 1 tablet by mouth every 6 hours as needed for Pain    MELATONIN 3 MG TABS TABLET    Take 0.5 tablets by mouth nightly as needed (prn insomnia)    MIRTAZAPINE (REMERON) 15 MG TABLET    Take 1 tablet by mouth nightly    NICOTINE POLACRILEX (NICORETTE) 4 MG GUM    Take 1 each by mouth as needed for Smoking cessation     ALLERGIES     is allergic to duloxetine.   FAMILY HISTORY     He indicated that the status of his mother is unknown. He indicated that the status of his father is unknown. He indicated that the status of his other is unknown. SOCIAL HISTORY       Social History     Tobacco Use    Smoking status: Current Every Day Smoker     Packs/day: 0.50     Years: 31.00     Pack years: 15.50     Types: Cigarettes    Smokeless tobacco: Never Used   Substance Use Topics    Alcohol use: Yes     Comment: occasional ETOH use    Drug use: Yes     Frequency: 7.0 times per week     Types: Marijuana, IV, Cocaine     Comment: pt currently taking Suboxone; drug abuse includes IV heroin, IV cocaine, cannabis, opiates, \"just about every drug in the world. \"     PHYSICAL EXAM     INITIAL VITALS: BP (!) 141/88   Pulse 90   Temp 98.1 °F (36.7 °C)   Resp 14   Ht 6' (1.829 m)   Wt 180 lb (81.6 kg)   SpO2 98%   BMI 24.41 kg/m²    Physical Exam  Vitals signs and nursing note reviewed. Constitutional:       Appearance: Normal appearance. HENT:      Head: Normocephalic and atraumatic. Right Ear: External ear normal.      Left Ear: External ear normal.      Nose: Nose normal.      Mouth/Throat:      Mouth: Mucous membranes are moist.   Eyes:      Pupils: Pupils are equal, round, and reactive to light. Neck:      Musculoskeletal: Neck supple. Cardiovascular:      Rate and Rhythm: Normal rate and regular rhythm. Pulses: Normal pulses. Heart sounds: Normal heart sounds. Pulmonary:      Effort: Pulmonary effort is normal.      Breath sounds: Normal breath sounds. Abdominal:      General: Abdomen is flat. Palpations: Abdomen is soft. Tenderness: There is no abdominal tenderness. Musculoskeletal: Normal range of motion. General: No tenderness. Skin:     General: Skin is warm and dry. Capillary Refill: Capillary refill takes less than 2 seconds. Neurological:      General: No focal deficit present.       Mental Status: He is alert and oriented to person, place, and time.   Psychiatric:         Mood and Affect: Mood is anxious. Behavior: Behavior normal.         Thought Content: Thought content includes suicidal ideation. Thought content includes suicidal plan. MEDICAL DECISION MAKIN-year-old male presents for complaint of suicidal ideation. On initial exam patient does appear anxious, with active suicidal thoughts and plan to jump into traffic, will obtain labs for medical clearance, patient to be evaluated by social work, will discuss with psychiatry    Labs reviewed and unremarkable    Patient was discussed with psychiatry, psychiatry Dr. Tana Tucker does not feel that patient would benefit from as he was recently admitted for same complaint    Patient will be discharged to [facility] for further addiction treatment    Discussed plan with patient he is agreeable, discussed need to go directly to [facility], discussed return precautions, patient voiced understanding    Patient/Guardian was informed of their diagnosis and told to follow up with PCP & [facility] in 1-3 days. Patient demonstrates understanding and agreement with the plan. They were given the opportunity to ask questions and those questions were answered to the best of our ability with the available information. Patient/Guardian told to return to the ED for any new, worsening, changing or persistent symptoms. This dictation was prepared using [employer] voice recognition software. CRITICAL CARE:       PROCEDURES:    Procedures    DIAGNOSTIC RESULTS   EKG:All EKG's are interpreted by the Emergency Department Physician who either signs or Co-signs this chart in the absence of a cardiologist.        RADIOLOGY:All plain film, CT, MRI, and formal ultrasound images (except ED bedside ultrasound) are read by the radiologist, see reports below, unless otherwisenoted in MDM or here.   No orders to display     LABS: All lab results were reviewed by myself, and all abnormals are listed below. Labs Reviewed   CBC WITH AUTO DIFFERENTIAL - Abnormal; Notable for the following components:       Result Value    RBC 3.98 (*)     Hemoglobin 10.2 (*)     Hematocrit 31.4 (*)     MCV 78.8 (*)     MCH 25.7 (*)     RDW 17.8 (*)     Lymphocytes 22 (*)     Monocytes 10 (*)     All other components within normal limits   COMPREHENSIVE METABOLIC PANEL W/ REFLEX TO MG FOR LOW K - Abnormal; Notable for the following components:    Glucose 105 (*)     Anion Gap 8 (*)     Total Bilirubin 0.21 (*)     All other components within normal limits   COVID-19, RAPID   ETHANOL   URINE DRUG SCREEN       EMERGENCY DEPARTMENTCOURSE:         Vitals:    Vitals:    04/21/21 0935   BP: (!) 141/88   Pulse: 90   Resp: 14   Temp: 98.1 °F (36.7 °C)   SpO2: 98%   Weight: 180 lb (81.6 kg)   Height: 6' (1.829 m)       The patient was given the following medications while in the emergency department:  No orders of the defined types were placed in this encounter. CONSULTS:  None    FINAL IMPRESSION      1. Encounter for psychiatric assessment          DISPOSITION/PLAN   DISPOSITION        PATIENT REFERRED TO:  [facility] ED  [facility] [address]  [address] [facility] [address]  [phone]    As needed, If symptoms worsen    Manpreet Chaudhary  [phone]    Go to       [address] [address] [phone]  Schedule an appointment as soon as possible for a visit       DISCHARGE MEDICATIONS:  New Prescriptions    No medications on file     Carol Montes DO  Attending Emergency Physician                  Carol Montes DO  04/21/21 9854

## 2021-10-13 ENCOUNTER — APPOINTMENT (OUTPATIENT)
Dept: CT IMAGING | Age: 46
End: 2021-10-13
Payer: COMMERCIAL

## 2021-10-13 ENCOUNTER — APPOINTMENT (OUTPATIENT)
Dept: GENERAL RADIOLOGY | Age: 46
End: 2021-10-13
Payer: COMMERCIAL

## 2021-10-13 ENCOUNTER — HOSPITAL ENCOUNTER (OUTPATIENT)
Age: 46
Setting detail: OBSERVATION
Discharge: HOME | End: 2021-10-14
Attending: EMERGENCY MEDICINE | Admitting: EMERGENCY MEDICINE
Payer: COMMERCIAL

## 2021-10-13 DIAGNOSIS — R07.9 CHEST PAIN, UNSPECIFIED TYPE: Primary | ICD-10-CM

## 2021-10-13 LAB
ABSOLUTE EOS #: 0.15 K/UL (ref 0–0.44)
ABSOLUTE IMMATURE GRANULOCYTE: <0.03 K/UL (ref 0–0.3)
ABSOLUTE LYMPH #: 2.6 K/UL (ref 1.1–3.7)
ABSOLUTE MONO #: 0.47 K/UL (ref 0.1–1.2)
ALBUMIN SERPL-MCNC: 4.6 G/DL (ref 3.5–5.2)
ALBUMIN/GLOBULIN RATIO: 1.4 (ref 1–2.5)
ALP BLD-CCNC: 87 U/L (ref 40–129)
ALT SERPL-CCNC: 21 U/L (ref 5–41)
AMPHETAMINE SCREEN URINE: NEGATIVE
ANION GAP SERPL CALCULATED.3IONS-SCNC: 10 MMOL/L (ref 9–17)
AST SERPL-CCNC: 20 U/L
BARBITURATE SCREEN URINE: NEGATIVE
BASOPHILS # BLD: 1 % (ref 0–2)
BASOPHILS ABSOLUTE: 0.06 K/UL (ref 0–0.2)
BENZODIAZEPINE SCREEN, URINE: NEGATIVE
BILIRUB SERPL-MCNC: 0.16 MG/DL (ref 0.3–1.2)
BUN BLDV-MCNC: 16 MG/DL (ref 6–20)
BUN/CREAT BLD: ABNORMAL (ref 9–20)
BUPRENORPHINE URINE: NORMAL
CALCIUM SERPL-MCNC: 9.3 MG/DL (ref 8.6–10.4)
CANNABINOID SCREEN URINE: NEGATIVE
CHLORIDE BLD-SCNC: 105 MMOL/L (ref 98–107)
CO2: 24 MMOL/L (ref 20–31)
COCAINE METABOLITE, URINE: NEGATIVE
CREAT SERPL-MCNC: 1.15 MG/DL (ref 0.7–1.2)
DIFFERENTIAL TYPE: ABNORMAL
EOSINOPHILS RELATIVE PERCENT: 2 % (ref 1–4)
GFR AFRICAN AMERICAN: >60 ML/MIN
GFR NON-AFRICAN AMERICAN: >60 ML/MIN
GFR SERPL CREATININE-BSD FRML MDRD: ABNORMAL ML/MIN/{1.73_M2}
GFR SERPL CREATININE-BSD FRML MDRD: ABNORMAL ML/MIN/{1.73_M2}
GLUCOSE BLD-MCNC: 98 MG/DL (ref 70–99)
HCT VFR BLD CALC: 41.8 % (ref 40.7–50.3)
HEMOGLOBIN: 12.8 G/DL (ref 13–17)
IMMATURE GRANULOCYTES: 0 %
LIPASE: 22 U/L (ref 13–60)
LYMPHOCYTES # BLD: 39 % (ref 24–43)
MCH RBC QN AUTO: 26.5 PG (ref 25.2–33.5)
MCHC RBC AUTO-ENTMCNC: 30.6 G/DL (ref 28.4–34.8)
MCV RBC AUTO: 86.5 FL (ref 82.6–102.9)
MDMA URINE: NORMAL
METHADONE SCREEN, URINE: NEGATIVE
METHAMPHETAMINE, URINE: NORMAL
MONOCYTES # BLD: 7 % (ref 3–12)
NRBC AUTOMATED: 0 PER 100 WBC
OPIATES, URINE: NEGATIVE
OXYCODONE SCREEN URINE: NEGATIVE
PDW BLD-RTO: 16.6 % (ref 11.8–14.4)
PHENCYCLIDINE, URINE: NEGATIVE
PLATELET # BLD: 252 K/UL (ref 138–453)
PLATELET ESTIMATE: ABNORMAL
PMV BLD AUTO: 11.1 FL (ref 8.1–13.5)
POTASSIUM SERPL-SCNC: 4.3 MMOL/L (ref 3.7–5.3)
PROPOXYPHENE, URINE: NORMAL
RBC # BLD: 4.83 M/UL (ref 4.21–5.77)
RBC # BLD: ABNORMAL 10*6/UL
SARS-COV-2, RAPID: NOT DETECTED
SEG NEUTROPHILS: 51 % (ref 36–65)
SEGMENTED NEUTROPHILS ABSOLUTE COUNT: 3.35 K/UL (ref 1.5–8.1)
SODIUM BLD-SCNC: 139 MMOL/L (ref 135–144)
SPECIMEN DESCRIPTION: NORMAL
TEST INFORMATION: NORMAL
TOTAL PROTEIN: 7.8 G/DL (ref 6.4–8.3)
TRICYCLIC ANTIDEPRESSANTS, UR: NORMAL
TROPONIN INTERP: NORMAL
TROPONIN INTERP: NORMAL
TROPONIN T: NORMAL NG/ML
TROPONIN T: NORMAL NG/ML
TROPONIN, HIGH SENSITIVITY: <6 NG/L (ref 0–22)
TROPONIN, HIGH SENSITIVITY: <6 NG/L (ref 0–22)
WBC # BLD: 6.7 K/UL (ref 3.5–11.3)
WBC # BLD: ABNORMAL 10*3/UL

## 2021-10-13 PROCEDURE — 71275 CT ANGIOGRAPHY CHEST: CPT

## 2021-10-13 PROCEDURE — 93005 ELECTROCARDIOGRAM TRACING: CPT | Performed by: STUDENT IN AN ORGANIZED HEALTH CARE EDUCATION/TRAINING PROGRAM

## 2021-10-13 PROCEDURE — 74174 CTA ABD&PLVS W/CONTRAST: CPT

## 2021-10-13 PROCEDURE — 71045 X-RAY EXAM CHEST 1 VIEW: CPT

## 2021-10-13 PROCEDURE — 87635 SARS-COV-2 COVID-19 AMP PRB: CPT

## 2021-10-13 PROCEDURE — 6360000004 HC RX CONTRAST MEDICATION: Performed by: STUDENT IN AN ORGANIZED HEALTH CARE EDUCATION/TRAINING PROGRAM

## 2021-10-13 PROCEDURE — 99283 EMERGENCY DEPT VISIT LOW MDM: CPT

## 2021-10-13 PROCEDURE — 83690 ASSAY OF LIPASE: CPT

## 2021-10-13 PROCEDURE — 80053 COMPREHEN METABOLIC PANEL: CPT

## 2021-10-13 PROCEDURE — 80307 DRUG TEST PRSMV CHEM ANLYZR: CPT

## 2021-10-13 PROCEDURE — 84484 ASSAY OF TROPONIN QUANT: CPT

## 2021-10-13 PROCEDURE — G0378 HOSPITAL OBSERVATION PER HR: HCPCS

## 2021-10-13 PROCEDURE — 85025 COMPLETE CBC W/AUTO DIFF WBC: CPT

## 2021-10-13 RX ORDER — SODIUM CHLORIDE, SODIUM LACTATE, POTASSIUM CHLORIDE, CALCIUM CHLORIDE 600; 310; 30; 20 MG/100ML; MG/100ML; MG/100ML; MG/100ML
INJECTION, SOLUTION INTRAVENOUS CONTINUOUS
Status: DISCONTINUED | OUTPATIENT
Start: 2021-10-13 | End: 2021-10-14 | Stop reason: HOSPADM

## 2021-10-13 RX ORDER — ACETAMINOPHEN 325 MG/1
650 TABLET ORAL EVERY 4 HOURS PRN
Status: DISCONTINUED | OUTPATIENT
Start: 2021-10-13 | End: 2021-10-14

## 2021-10-13 RX ORDER — SODIUM CHLORIDE 0.9 % (FLUSH) 0.9 %
5-40 SYRINGE (ML) INJECTION EVERY 12 HOURS SCHEDULED
Status: DISCONTINUED | OUTPATIENT
Start: 2021-10-13 | End: 2021-10-14 | Stop reason: HOSPADM

## 2021-10-13 RX ORDER — ARIPIPRAZOLE 15 MG/1
30 TABLET ORAL DAILY
Status: DISCONTINUED | OUTPATIENT
Start: 2021-10-14 | End: 2021-10-14 | Stop reason: HOSPADM

## 2021-10-13 RX ORDER — MIRTAZAPINE 15 MG/1
15 TABLET, FILM COATED ORAL NIGHTLY
Status: DISCONTINUED | OUTPATIENT
Start: 2021-10-13 | End: 2021-10-14 | Stop reason: HOSPADM

## 2021-10-13 RX ORDER — ARIPIPRAZOLE 30 MG/1
30 TABLET ORAL DAILY
Status: ON HOLD | COMMUNITY
End: 2021-12-13 | Stop reason: HOSPADM

## 2021-10-13 RX ORDER — SODIUM CHLORIDE 0.9 % (FLUSH) 0.9 %
5-40 SYRINGE (ML) INJECTION PRN
Status: DISCONTINUED | OUTPATIENT
Start: 2021-10-13 | End: 2021-10-14 | Stop reason: HOSPADM

## 2021-10-13 RX ORDER — ONDANSETRON 2 MG/ML
4 INJECTION INTRAMUSCULAR; INTRAVENOUS EVERY 6 HOURS PRN
Status: DISCONTINUED | OUTPATIENT
Start: 2021-10-13 | End: 2021-10-14 | Stop reason: HOSPADM

## 2021-10-13 RX ORDER — LANOLIN ALCOHOL/MO/W.PET/CERES
1.5 CREAM (GRAM) TOPICAL NIGHTLY PRN
Status: DISCONTINUED | OUTPATIENT
Start: 2021-10-13 | End: 2021-10-14 | Stop reason: HOSPADM

## 2021-10-13 RX ORDER — LABETALOL HYDROCHLORIDE 5 MG/ML
10 INJECTION, SOLUTION INTRAVENOUS ONCE
Status: DISCONTINUED | OUTPATIENT
Start: 2021-10-13 | End: 2021-10-13

## 2021-10-13 RX ORDER — SODIUM CHLORIDE 9 MG/ML
25 INJECTION, SOLUTION INTRAVENOUS PRN
Status: DISCONTINUED | OUTPATIENT
Start: 2021-10-13 | End: 2021-10-14 | Stop reason: HOSPADM

## 2021-10-13 RX ORDER — BUPROPION HYDROCHLORIDE 150 MG/1
300 TABLET ORAL DAILY
Status: DISCONTINUED | OUTPATIENT
Start: 2021-10-14 | End: 2021-10-14 | Stop reason: HOSPADM

## 2021-10-13 RX ADMIN — IOPAMIDOL 100 ML: 755 INJECTION, SOLUTION INTRAVENOUS at 15:01

## 2021-10-13 ASSESSMENT — ENCOUNTER SYMPTOMS
NAUSEA: 0
DIARRHEA: 0
CONSTIPATION: 0
ABDOMINAL PAIN: 0
VOMITING: 0
SHORTNESS OF BREATH: 0
TROUBLE SWALLOWING: 0
SORE THROAT: 0

## 2021-10-13 ASSESSMENT — PAIN DESCRIPTION - PAIN TYPE: TYPE: ACUTE PAIN

## 2021-10-13 ASSESSMENT — PAIN SCALES - GENERAL: PAINLEVEL_OUTOF10: 8

## 2021-10-13 ASSESSMENT — PAIN DESCRIPTION - LOCATION: LOCATION: CHEST

## 2021-10-13 NOTE — ED PROVIDER NOTES
[address] [facility]  ED  eMERGENCY dEPARTMENT eNCOUnter   Attending Attestation     Pt Name: Spring Simpson  MRN: [id number]  [place] 1975  Date of evaluation: 10/13/21       Spring Simpson is a 55 y.o. male who presents with Chest Pain (pt states chest pain while watching tv, pt feels like it is tearing)      History: Patient is with chest pain. Patient said it started while he is watching TV. Patient said it was tearing and having episodes where he was having bouts of racing heart and tachycardia. Exam: Heart rate and rhythm are regular. Lungs are clear to station bilaterally. Abdomen is soft, nontender. Patient is awake and alert and acting appropriately. EKG shows normal sinus rhythm with rate 97 beats minute. Normal axis. No ST elevation depression. T waves are upright. No Q waves. Nonspecific EKG with a any blocks or arrhythmias. Plan for cardiac work-up and CT chest for dissection given the tender nature of the pain. Likely admit overnight for further ACS work-up. I performed a history and physical examination of the patient and discussed management with the resident. I reviewed the residents note and agree with the documented findings and plan of care. Any areas of disagreement are noted on the chart. I was personally present for the key portions of any procedures. I have documented in the chart those procedures where I was not present during the key portions. I have personally reviewed all images and agree with the resident's interpretation. I have reviewed the emergency nurses triage note. I agree with the chief complaint, past medical history, past surgical history, allergies, medications, social and family history as documented unless otherwise noted below. Documentation of the HPI, Physical Exam and Medical Decision Making performed by medical students or scribes is based on my personal performance of the HPI, PE and MDM.  For Phys Assistant/ Nurse Practitioner cases/documentation I have had a face to face evaluation of this patient and have completed at least one if not all key elements of the E/M (history, physical exam, and MDM). Additional findings are as noted. For APC cases I have personally evaluated and examined the patient in conjunction with the APC and agree with the treatment plan and disposition of the patient as recorded by the APC.     Roberta Earl MD  Attending Emergency  Physician       Johnathan Faria MD  10/13/21 2484

## 2021-10-13 NOTE — ED NOTES
The following labs labeled with pt sticker and tubed to lab:     [] Blue     [] Lavender   [] on ice  [] Green/yellow  [] Green/black [] on ice  [] Yellow  [] Red  [] Pink      [x] COVID-19 swab    [x] Rapid  [] PCR  [] Peds Viral Panel     [] Urine Sample  [] Pelvic Cultures  [] Blood Cultures           Helen Mahmood RN  10/13/21 1521

## 2021-10-13 NOTE — ED NOTES
Pt reports to the ED via EMS with c/o chest pain. Pt states he was at a recovery house and watching tv when he felt mid sternal chest pain rated 7/10, pt states it feels like a tearing feeling, pt denies this ever happening before, pt denies any recent drug use and last use x6 months ago. Pt was given asprin and nitro by ems. Pt is A&Ox4, RR even and non labored.       Leida Vaughan, RN  10/13/21 5500

## 2021-10-13 NOTE — ED PROVIDER NOTES
[facility]  ED  Emergency Department Encounter  Emergency Medicine Resident     Pt Teja Maldonado  MRN: [id number]  [place] 1975  Date of evaluation: 10/13/21  PCP:  No primary care provider on file. CHIEF COMPLAINT       Chief Complaint   Patient presents with    Chest Pain     pt states chest pain while watching tv, pt feels like it is tearing       HISTORY OF PRESENT ILLNESS  (Location/Symptom, Timing/Onset, Context/Setting, Quality, Duration, Modifying Factors, Severity.)      Duyen Martinez is a 55 y.o. male who presents with acute onset substernal/left-sided chest pain described as a tearing sensation with each beat of his heart. History of polysubstance abuse including cocaine however has not used recently, approximately 6 months clean. Patient with significant family history of cardiac disease, notes all of his male family members in his immediate incident and family have either  or needed significant cardiac surgery before the age of 48. Now that patient is sober he is trying to take care of himself, has not previously followed with cardiology or PCP. No fevers chills nausea vomiting headache vision changes shortness of breath abdominal pain constipation or diarrhea    PAST MEDICAL / SURGICAL / SOCIAL / FAMILY HISTORY      has a past medical history of Anxiety, Depression, Hep C w/ coma, chronic, Polysubstance abuse ([facility] [address] and Substance abuse ([facility] [address] has no past surgical history on file.     Social History     Socioeconomic History    Marital status: Legally      Spouse name: Vinita Panda Number of children: 2    Years of education: Not on file    Highest education level: Not on file   Occupational History    Not on file   Tobacco Use    Smoking status: Current Every Day Smoker     Packs/day: 0.50     Years: 31.00     Pack years: 15.50     Types: Cigarettes    Smokeless tobacco: Never Used   Vaping Use    Vaping Use: Never used   Substance and Sexual Activity    Alcohol use: Yes     Comment: occasional ETOH use    Drug use: Yes     Frequency: 7.0 times per week     Types: Marijuana, IV, Cocaine     Comment: pt currently taking Suboxone; drug abuse includes IV heroin, IV cocaine, cannabis, opiates, \"just about every drug in the world. \"    Sexual activity: Yes     Partners: Female   Other Topics Concern    Not on file   Social History Narrative    Not on file     Social Determinants of Health     Financial Resource Strain:     Difficulty of Paying Living Expenses:    Food Insecurity:     Worried About Running Out of Food in the Last Year:     [address] in the Last Year:    Transportation Needs:     Lack of Transportation (Medical):  Lack of Transportation (Non-Medical):    Physical Activity:     Days of Exercise per Week:     Minutes of Exercise per Session:    Stress:     Feeling of Stress :    Social Connections:     Frequency of Communication with Friends and Family:     Frequency of Social Gatherings with Friends and Family:     Attends Baptism Services:     Active Member of Clubs or Organizations:     Attends Club or Organization Meetings:     Marital Status:    Intimate Partner Violence:     Fear of Current or Ex-Partner:     Emotionally Abused:     Physically Abused:     Sexually Abused:        Family History   Problem Relation Age of Onset    Diabetes Mother     Hypertension Mother     Depression Mother         & Uncles    Coronary Art Dis Father     Cancer Other         G. Parent  ? Allergies:  Duloxetine    Home Medications:  Prior to Admission medications    Medication Sig Start Date End Date Taking?  Authorizing Provider   ARIPiprazole (ABILIFY) 30 MG tablet Take 30 mg by mouth daily    Historical Provider, MD   buPROPion (WELLBUTRIN XL) 300 MG extended release tablet Take 1 tablet by mouth daily 4/20/21   Krystyan Ferguson MD   ibuprofen (ADVIL;MOTRIN) 400 MG tablet Take 1 tablet by mouth every 6 hours as rhythm. Pulses: Normal pulses. Heart sounds: Normal heart sounds. No murmur heard. No friction rub. No gallop. Pulmonary:      Effort: Pulmonary effort is normal.      Breath sounds: Normal breath sounds. No stridor. No wheezing, rhonchi or rales. Abdominal:      Palpations: Abdomen is soft. Tenderness: There is no abdominal tenderness. There is no guarding. Musculoskeletal:         General: No deformity. Normal range of motion. Cervical back: Normal range of motion. Skin:     General: Skin is warm and dry. Capillary Refill: Capillary refill takes less than 2 seconds. Neurological:      General: No focal deficit present. Mental Status: He is alert and oriented to person, place, and time. Cranial Nerves: No cranial nerve deficit.    Psychiatric:         Mood and Affect: Mood normal.         Behavior: Behavior normal.         DIFFERENTIAL  DIAGNOSIS     PLAN (LABS / IMAGING / EKG):  Orders Placed This Encounter   Procedures    COVID-19, Rapid    XR CHEST PORTABLE    CTA CHEST W WO CONTRAST    CTA ABDOMEN PELVIS W CONTRAST    CBC Auto Differential    Comprehensive Metabolic Panel w/ Reflex to MG    Lipase    Troponin    Urine Drug Screen    Telemetry monitoring - continuous duration    Inpatient consult to Cardiology    EKG 12 Lead    Place CT Compatible peripheral IV    PATIENT STATUS (FROM ED OR OR/PROCEDURAL) Observation       MEDICATIONS ORDERED:  Orders Placed This Encounter   Medications    labetalol (NORMODYNE;TRANDATE) injection 10 mg    iopamidol (ISOVUE-370) 76 % injection 100 mL       DDX: ACS versus dissection versus AAA versus costochondritis versus pleuritis versus pneumonia versus other    DIAGNOSTIC RESULTS / EMERGENCY DEPARTMENT COURSE / MDM     LABS:  Results for orders placed or performed during the hospital encounter of 10/13/21   COVID-19, Rapid    Specimen: Nasopharyngeal Swab   Result Value Ref Range    Specimen Description Troponin Interp NOT REPORTED    Troponin   Result Value Ref Range    Troponin, High Sensitivity <6 0 - 22 ng/L    Troponin T NOT REPORTED <0.03 ng/mL    Troponin Interp NOT REPORTED          RADIOLOGY:  CTA CHEST W WO CONTRAST    Result Date: 10/13/2021  EXAMINATION: CTA OF THE CHEST WITH AND WITHOUT CONTRAST 10/13/2021 2:56 pm TECHNIQUE: CTA of the chest was performed before and after the administration of intravenous contrast.  Multiplanar reformatted images are provided for review. MIP images are provided for review. Dose modulation, iterative reconstruction, and/or weight based adjustment of the mA/kV was utilized to reduce the radiation dose to as low as reasonably achievable. COMPARISON: Chest x-ray from today and CT chest May 8, 2020 HISTORY: ORDERING SYSTEM PROVIDED HISTORY: Rule out dissection, left-sided ripping tearing sensation, left upper quadrant pain TECHNOLOGIST PROVIDED HISTORY: Rule out dissection, left-sided ripping tearing sensation, left upper quadrant pain Reason for Exam: poss dissection Acuity: Unknown Type of Exam: Unknown FINDINGS: Aorta: No evidence of thoracic aortic aneurysm or dissection. No acute abnormality of the aorta. Mediastinum: No evidence of mediastinal lymphadenopathy. The heart and pericardium demonstrate no acute abnormality. Lungs/Pleura: The lungs are without acute process. No focal consolidation or pulmonary edema. No evidence of pleural effusion or pneumothorax. Upper Abdomen: Moderate hiatal hernia or paraesophageal hernia. Soft Tissues/Bones: No acute bone or soft tissue abnormality. Para esophageal or moderate hiatal hernia unchanged. No acute disease. XR CHEST PORTABLE    Result Date: 10/13/2021  EXAMINATION: ONE XRAY VIEW OF THE CHEST 10/13/2021 2:05 pm COMPARISON: Chest radiograph performed 09/09/2020.  HISTORY: ORDERING SYSTEM PROVIDED HISTORY: L sided CP TECHNOLOGIST PROVIDED HISTORY: L sided CP Reason for Exam: Port upr/left sided chest pain Acuity: Unknown Type of Exam: Unknown FINDINGS: There is no acute consolidation or effusion. There is no pneumothorax. The mediastinal structures are unremarkable. The upper abdomen is unremarkable. The extrathoracic soft tissues are unremarkable. There is no acute osseous abnormality. No acute cardiopulmonary process. CTA ABDOMEN PELVIS W CONTRAST    Result Date: 10/13/2021  EXAMINATION: CTA OF THE ABDOMEN AND PELVIS WITH CONTRAST 10/13/2021 2:56 pm: TECHNIQUE: CTA of the abdomen and pelvis was performed with the administration of intravenous contrast. Multiplanar reformatted images are provided for review. MIP images are provided for review. Dose modulation, iterative reconstruction, and/or weight based adjustment of the mA/kV was utilized to reduce the radiation dose to as low as reasonably achievable. COMPARISON: March 9, 2019 HISTORY: ORDERING SYSTEM PROVIDED HISTORY: Abdominal pain TECHNOLOGIST PROVIDED HISTORY: poss dissection Decision Support Exception - unselect if not a suspected or confirmed emergency medical condition->Emergency Medical Condition (MA) FINDINGS: CTA ABDOMEN: Abdominal aorta is normal in caliber. No evidence of dissection or acute aortic pathology. Celiac, superior mesenteric arteries and renal arteries appear widely patent. Accessory renal arteries noted bilaterally. The ROLAND is patent. Liver, gallbladder, spleen, pancreas, kidneys and adrenals demonstrate no acute abnormality. No abnormal bowel dilatation or wall thickening. Moderate hiatal hernia. Normal appendix. No retroperitoneal adenopathy. No intra-abdominal free fluid. Severe disc height loss at L5-S1. CTA PELVIS: Iliac arteries are normal in caliber. No significant stenosis. No evidence of aortic aneurysm, dissection or acute aortic pathology. No acute intra-abdominal pathology identified. Moderate hiatal hernia, increased in size since prior study.        EKG  EKG Interpretation    Interpreted by me    Rhythm: normal sinus   Rate: normal  Axis: normal  Ectopy: none  Conduction: normal  ST Segments: no acute change  T Waves: no acute change  Q Waves: none    Clinical Impression: no acute changes and normal EKG    All EKG's are interpreted by the Emergency Department Physician who either signs or Co-signs this chart in the absence of a cardiologist.    EMERGENCY DEPARTMENT COURSE:  Patient found seated upright in bed, no acute distress, not ill or toxic appearing. Engaged in cooperative exam.  Physical exam notable for significant hypertension and mild tachycardia secondary to distress. Prior to arrival patient did receive aspirin and nitroglycerin however patient notes that the severity of his pain was as gone prior to EMS arrival.  Pulses equal bilaterally however the story is concerning for dissection given the way the patient describes it. CTA of the chest abdomen pelvis negative for dissection, normal EKG, normal troponins, normal laboratory work-up. However of note patient does have of a moderate hiatal hernia which could be eliciting patient's symptoms. However as patient does have significant risk factors both family and with substance abuse will admit patient to observation service to establish care with cardiology for high risk chest pain. Admission plan discussed with patient is in agreement. Observation plan:  1. Cardiology evaluation, already paged, recommendations appreciated  2.  A.m. labs and EKG  3. Disposition pending cardiology  4. Outpatient follow-up for hiatal hernia    PROCEDURES:  None    CONSULTS:  IP CONSULT TO CARDIOLOGY    CRITICAL CARE:  None    FINAL IMPRESSION      1. Chest pain, unspecified type          DISPOSITION / PLAN     DISPOSITION Admitted 10/13/2021 05:11:46 PM      PATIENT REFERRED TO:  No follow-up provider specified.     DISCHARGE MEDICATIONS:  New Prescriptions    No medications on file       Kecia Witt MD  Emergency Medicine Resident    (Please note that portions of this note were completed with a voice recognition program.  Efforts were made to edit the dictations but occasionally words are mis-transcribed.)        Clary Sidhu MD  Resident  10/13/21 [id number]

## 2021-10-13 NOTE — ED PROVIDER NOTES
FACULTY SIGN-OUT  ADDENDUM       Patient: Ronald Corley   MRN: [id number]  PCP:  No primary care provider on file. Attestation  I was available and discussed any additional care issues that arose and coordinated the management plans with the resident(s) caring for the patient during my duty period. Any areas of disagreement with resident's documentation of care or procedures are noted on the chart. I was personally present for the key portions of any/all procedures during my duty period. I have documented in the chart those procedures where I was not present during the key portions. The patient's initial evaluation and plan have been discussed with the prior provider who initially evaluated the patient. Pertinent Comments: The patient is a 55 y.o. male taken in signout with chest pain going to the back. We are awaiting work-up including CTA of the chest/abdomen/pelvis.    Attempt symptomatic control and reevaluate after with likely recommendation for admission    ED COURSE      The patient was given the following medications:  Orders Placed This Encounter   Medications    labetalol (NORMODYNE;TRANDATE) injection 10 mg    iopamidol (ISOVUE-370) 76 % injection 100 mL       RECENT VITALS:   BP: (!) 135/103  Pulse: 94  Resp: 17    SpO2: 94 %    (Please note that portions of this note were completed with a voice recognition program.  Efforts were made to edit the dictations but occasionally words are mis-transcribed.)    MD Mellissa Boyle  Attending Emergency Medicine Physician       Erin Silva MD  10/13/21 [id number]

## 2021-10-13 NOTE — ED NOTES
The following labs labeled with pt sticker and tubed to lab:     [x] Blue     [x] Lavender   [] on ice  [x] Green/yellow  [x] Green/black [] on ice  [x] Yellow  [] Red  [] Pink      [] COVID-19 swab    [] Rapid  [] PCR  [] Peds Viral Panel     [] Urine Sample  [] Pelvic Cultures  [] Blood Cultures           Tricia Wong RN  10/13/21 [id number]

## 2021-10-13 NOTE — ED NOTES
Bed: 13  Expected date:   Expected time:   Means of arrival:   Comments:   Delmis Corona RN  10/13/21 [id number]

## 2021-10-14 VITALS
SYSTOLIC BLOOD PRESSURE: 120 MMHG | RESPIRATION RATE: 20 BRPM | OXYGEN SATURATION: 97 % | HEIGHT: 72 IN | DIASTOLIC BLOOD PRESSURE: 72 MMHG | HEART RATE: 86 BPM | TEMPERATURE: 97.2 F | BODY MASS INDEX: 24.38 KG/M2 | WEIGHT: 180 LBS

## 2021-10-14 LAB
ABSOLUTE EOS #: 0.24 K/UL (ref 0–0.44)
ABSOLUTE IMMATURE GRANULOCYTE: <0.03 K/UL (ref 0–0.3)
ABSOLUTE LYMPH #: 2.38 K/UL (ref 1.1–3.7)
ABSOLUTE MONO #: 0.55 K/UL (ref 0.1–1.2)
ANION GAP SERPL CALCULATED.3IONS-SCNC: 12 MMOL/L (ref 9–17)
BASOPHILS # BLD: 1 % (ref 0–2)
BASOPHILS ABSOLUTE: 0.05 K/UL (ref 0–0.2)
BUN BLDV-MCNC: 15 MG/DL (ref 6–20)
BUN/CREAT BLD: ABNORMAL (ref 9–20)
CALCIUM SERPL-MCNC: 8.9 MG/DL (ref 8.6–10.4)
CHLORIDE BLD-SCNC: 108 MMOL/L (ref 98–107)
CO2: 22 MMOL/L (ref 20–31)
CREAT SERPL-MCNC: 1.18 MG/DL (ref 0.7–1.2)
DIFFERENTIAL TYPE: ABNORMAL
EOSINOPHILS RELATIVE PERCENT: 4 % (ref 1–4)
GFR AFRICAN AMERICAN: >60 ML/MIN
GFR NON-AFRICAN AMERICAN: >60 ML/MIN
GFR SERPL CREATININE-BSD FRML MDRD: ABNORMAL ML/MIN/{1.73_M2}
GFR SERPL CREATININE-BSD FRML MDRD: ABNORMAL ML/MIN/{1.73_M2}
GLUCOSE BLD-MCNC: 101 MG/DL (ref 70–99)
HCT VFR BLD CALC: 39.5 % (ref 40.7–50.3)
HEMOGLOBIN: 11.8 G/DL (ref 13–17)
IMMATURE GRANULOCYTES: 0 %
LYMPHOCYTES # BLD: 39 % (ref 24–43)
MAGNESIUM: 2.3 MG/DL (ref 1.6–2.6)
MCH RBC QN AUTO: 26.2 PG (ref 25.2–33.5)
MCHC RBC AUTO-ENTMCNC: 29.9 G/DL (ref 28.4–34.8)
MCV RBC AUTO: 87.6 FL (ref 82.6–102.9)
MONOCYTES # BLD: 9 % (ref 3–12)
NRBC AUTOMATED: 0 PER 100 WBC
PDW BLD-RTO: 17.2 % (ref 11.8–14.4)
PLATELET # BLD: 215 K/UL (ref 138–453)
PLATELET ESTIMATE: ABNORMAL
PMV BLD AUTO: 10.8 FL (ref 8.1–13.5)
POTASSIUM SERPL-SCNC: 4.5 MMOL/L (ref 3.7–5.3)
RBC # BLD: 4.51 M/UL (ref 4.21–5.77)
RBC # BLD: ABNORMAL 10*6/UL
SEG NEUTROPHILS: 47 % (ref 36–65)
SEGMENTED NEUTROPHILS ABSOLUTE COUNT: 2.89 K/UL (ref 1.5–8.1)
SODIUM BLD-SCNC: 142 MMOL/L (ref 135–144)
TROPONIN INTERP: NORMAL
TROPONIN T: NORMAL NG/ML
TROPONIN, HIGH SENSITIVITY: <6 NG/L (ref 0–22)
WBC # BLD: 6.1 K/UL (ref 3.5–11.3)
WBC # BLD: ABNORMAL 10*3/UL

## 2021-10-14 PROCEDURE — 6370000000 HC RX 637 (ALT 250 FOR IP): Performed by: STUDENT IN AN ORGANIZED HEALTH CARE EDUCATION/TRAINING PROGRAM

## 2021-10-14 PROCEDURE — 36415 COLL VENOUS BLD VENIPUNCTURE: CPT

## 2021-10-14 PROCEDURE — 80048 BASIC METABOLIC PNL TOTAL CA: CPT

## 2021-10-14 PROCEDURE — 6370000000 HC RX 637 (ALT 250 FOR IP): Performed by: PEDIATRICS

## 2021-10-14 PROCEDURE — G0378 HOSPITAL OBSERVATION PER HR: HCPCS

## 2021-10-14 PROCEDURE — 83735 ASSAY OF MAGNESIUM: CPT

## 2021-10-14 PROCEDURE — 84484 ASSAY OF TROPONIN QUANT: CPT

## 2021-10-14 PROCEDURE — 85025 COMPLETE CBC W/AUTO DIFF WBC: CPT

## 2021-10-14 PROCEDURE — 2580000003 HC RX 258: Performed by: STUDENT IN AN ORGANIZED HEALTH CARE EDUCATION/TRAINING PROGRAM

## 2021-10-14 PROCEDURE — 6360000002 HC RX W HCPCS: Performed by: PEDIATRICS

## 2021-10-14 PROCEDURE — 93005 ELECTROCARDIOGRAM TRACING: CPT | Performed by: STUDENT IN AN ORGANIZED HEALTH CARE EDUCATION/TRAINING PROGRAM

## 2021-10-14 RX ORDER — GABAPENTIN 600 MG/1
600 TABLET ORAL 3 TIMES DAILY
Status: DISCONTINUED | OUTPATIENT
Start: 2021-10-14 | End: 2021-10-14 | Stop reason: HOSPADM

## 2021-10-14 RX ORDER — BUTALBITAL, ACETAMINOPHEN AND CAFFEINE 50; 325; 40 MG/1; MG/1; MG/1
1 TABLET ORAL EVERY 4 HOURS PRN
Status: DISCONTINUED | OUTPATIENT
Start: 2021-10-14 | End: 2021-10-14 | Stop reason: HOSPADM

## 2021-10-14 RX ORDER — TOPIRAMATE 100 MG/1
100 TABLET, FILM COATED ORAL 2 TIMES DAILY
Status: ON HOLD | COMMUNITY
End: 2021-12-13 | Stop reason: HOSPADM

## 2021-10-14 RX ORDER — GABAPENTIN 600 MG/1
600 TABLET ORAL 3 TIMES DAILY
COMMUNITY
End: 2021-10-26 | Stop reason: SDUPTHER

## 2021-10-14 RX ORDER — BUPRENORPHINE AND NALOXONE 12; 3 MG/1; MG/1
1 FILM, SOLUBLE BUCCAL; SUBLINGUAL DAILY
Status: ON HOLD | COMMUNITY
End: 2021-12-13 | Stop reason: HOSPADM

## 2021-10-14 RX ORDER — BUPRENORPHINE HYDROCHLORIDE AND NALOXONE HYDROCHLORIDE DIHYDRATE 2; .5 MG/1; MG/1
6 TABLET SUBLINGUAL DAILY
Status: DISCONTINUED | OUTPATIENT
Start: 2021-10-14 | End: 2021-10-14 | Stop reason: HOSPADM

## 2021-10-14 RX ADMIN — BUPROPION HYDROCHLORIDE 300 MG: 150 TABLET, EXTENDED RELEASE ORAL at 09:19

## 2021-10-14 RX ADMIN — GABAPENTIN 600 MG: 600 TABLET ORAL at 09:19

## 2021-10-14 RX ADMIN — ACETAMINOPHEN 650 MG: 325 TABLET ORAL at 09:23

## 2021-10-14 RX ADMIN — BUPRENORPHINE AND NALOXONE 6 TABLET: 2; .5 TABLET SUBLINGUAL at 09:19

## 2021-10-14 RX ADMIN — SODIUM CHLORIDE, PRESERVATIVE FREE 10 ML: 5 INJECTION INTRAVENOUS at 09:20

## 2021-10-14 RX ADMIN — BUTALBITAL, ACETAMINOPHEN AND CAFFEINE 1 TABLET: 50; 325; 40 TABLET ORAL at 11:51

## 2021-10-14 RX ADMIN — Medication 1.5 MG: at 00:42

## 2021-10-14 RX ADMIN — SODIUM CHLORIDE, POTASSIUM CHLORIDE, SODIUM LACTATE AND CALCIUM CHLORIDE: 600; 310; 30; 20 INJECTION, SOLUTION INTRAVENOUS at 00:41

## 2021-10-14 ASSESSMENT — PAIN SCALES - GENERAL
PAINLEVEL_OUTOF10: 3
PAINLEVEL_OUTOF10: 2
PAINLEVEL_OUTOF10: 3
PAINLEVEL_OUTOF10: 2
PAINLEVEL_OUTOF10: 0

## 2021-10-14 NOTE — DISCHARGE INSTR - DIET

## 2021-10-14 NOTE — PROGRESS NOTES
Pt states he takes 12 mg suboxone film. Pt stated he was previously on 8 mg but was just switched to 12 mg yesterday by sil. However drug screen came back negative for Buprenorphine.

## 2021-10-14 NOTE — CARE COORDINATION
Case Management Initial Discharge Plan  Sumit Ruiz,             Met with:patient to discuss discharge plans. Information verified: address, contacts, phone number, , insurance Yes  Insurance Provider: [facility]    Emergency Contact/Next of Kin name & number: Saumya Nesbitt [phone]  Who are involved in patient's support system? [facility]     PCP: No primary care provider on file. Date of last visit: Clinic list is given       Discharge Planning    Living Arrangements:  Other (Comment) (sober house)     Home has 2 stories  4 stairs to climb to get into front door, 1 flight stairs to climb to reach second floor  Location of bedroom/bathroom in home second floor     Patient able to perform ADL's:Independent    Current Services (outpatient & in home) [facility]   DME equipment: none   DME provider: na     Is patient receiving oral anticoagulation therapy? No    If indicated:   Physician managing anticoagulation treatment: na  Where does patient obtain lab work for ATC treatment? na      Potential Assistance Needed:  N/A    Patient agreeable to home care: No  [place] of choice provided:  n/a    Prior SNF/Rehab Placement and Facility: none   Agreeable to SNF/Rehab: No  [place] of choice provided: n/a     Evaluation: no    Expected Discharge date:  10/14/21    Patient expects to be discharged to: If home: is the family and/or caregiver wiling & able to provide support at home? Yes   Who will be providing this support? Staff at [facility]     Follow Up Appointment: Best Day/ Time: Monday AM    Transportation provider: staff at [facility] arrangements needed for discharge: No    Readmission Risk              Risk of Unplanned Readmission:  0             Does patient have a readmission risk score greater than 14?: No  If yes, follow-up appointment must be made within 7 days of discharge.      Goals of Care: Rule out heart attack       Educated patient  on transitional options, provided freedom of choice and are agreeable with plan      Discharge Plan: Return to sober living house.   No meds to beds - patient is locked into his pharmacy            Electronically signed by Boone Peraza RN on 10/14/21 at 11:32 AM EDT

## 2021-10-14 NOTE — H&P
[address]  CDU / OBSERVATION ENCOUNTER  RESIDENT NOTE     Pt Name: Cj Walton  MRN: [id number]  [place] 1975  Date of evaluation: 10/13/21  Patient's PCP is : No primary care provider on file. CHIEF COMPLAINT       Chief Complaint   Patient presents with    Chest Pain     pt states chest pain while watching tv, pt feels like it is tearing         HISTORY OF PRESENT ILLNESS    Cj Walton is a 55 y.o. male who presents with complaints of acute left-sided chest pain that began around 2 PM today while he was sitting watching television. Patient describes the pain as a throbbing, tearing sensation that is nonradiating. Patient denies any associated symptoms such as shortness of breath, numbness, tingling. Patient states he has no previous cardiac history and has never had an episode like this before. Patient has history of substance abuse and states he has been sober for the last 6 months. Patient currently denies fever, chills, nausea and vomiting    Location/Symptom: Left-sided chest pain  Timing/Onset: 2 PM  Provocation: Unknown  Quality: Throbbing  Radiation: Nonradiating  Severity: 8/10  Timing/Duration: Intermittent  Modifying Factors: n/a    REVIEW OF SYSTEMS       Review of Systems   Constitutional: Negative for appetite change, diaphoresis and fatigue. HENT: Negative for trouble swallowing. Eyes: Negative for visual disturbance. Respiratory: Negative for shortness of breath. Cardiovascular: Positive for chest pain. Gastrointestinal: Negative for abdominal pain, nausea and vomiting. Neurological: Negative for dizziness, light-headedness and numbness. Psychiatric/Behavioral: Negative for agitation and behavioral problems. (PQRS) Advance directives on face sheet per hospital policy.  No change unless specifically mentioned in chart    PAST MEDICAL HISTORY    has a past medical history of Anxiety, Depression, Hep C w/ coma, chronic, Polysubstance abuse ([facility] [address] [facility] and [facility] I have reviewed the past medical history with the patient and it is pertinent to this complaint. SURGICAL HISTORY      has no past surgical history on file. I have reviewed and agree with Surgical History entered and it is pertinent to this complaint. CURRENT MEDICATIONS     labetalol (NORMODYNE;TRANDATE) injection 10 mg, Once  nicotine polacrilex (NICORETTE) gum 4 mg, PRN        All medication charted and reviewed. ALLERGIES     is allergic to duloxetine. FAMILY HISTORY     He indicated that the status of his mother is unknown. He indicated that the status of his father is unknown. He indicated that the status of his other is unknown.     family history includes Cancer in an other family member; Coronary Art Dis in his father; Depression in his mother; Diabetes in his mother; Hypertension in his mother. The patient denies any pertinent family history. I have reviewed and agree with the family history entered. I have reviewed the Family History and it is not significant to the case    SOCIAL HISTORY      reports that he has been smoking cigarettes. He has a 15.50 pack-year smoking history. He has never used smokeless tobacco. He reports current alcohol use. He reports current drug use. Frequency: 7.00 times per week. Drugs: Marijuana, IV, and Cocaine. I have reviewed and agree with all Social.  There are no concerns for substance abuse/use. PHYSICAL EXAM     INITIAL VITALS:  height is 6' (1.829 m) and weight is 180 lb (81.6 kg). His oral temperature is 98.3 °F (36.8 °C). His blood pressure is 135/103 (abnormal) and his pulse is 94. His respiration is 17 and oxygen saturation is 94%. Physical Exam  Vitals and nursing note reviewed. HENT:      Head: Normocephalic. Mouth/Throat:      Pharynx: Oropharynx is clear. Eyes:      Pupils: Pupils are equal, round, and reactive to light.    Cardiovascular:      Rate and Rhythm: Normal rate and regular rhythm. Pulses: Normal pulses. Heart sounds: Normal heart sounds. Pulmonary:      Effort: Pulmonary effort is normal.      Breath sounds: Normal breath sounds. Abdominal:      General: Bowel sounds are normal.   Musculoskeletal:         General: Normal range of motion. Cervical back: Normal range of motion. Skin:     General: Skin is warm. Neurological:      General: No focal deficit present. Mental Status: He is alert and oriented to person, place, and time. Psychiatric:         Mood and Affect: Mood normal.             DIFFERENTIAL DIAGNOSIS/MDM:     DDx: Unstable Angina    DIAGNOSTIC RESULTS       RADIOLOGY:   I directly visualized the following  images and reviewed the radiologist interpretations:    CTA CHEST W WO CONTRAST    Result Date: 10/13/2021  EXAMINATION: CTA OF THE CHEST WITH AND WITHOUT CONTRAST 10/13/2021 2:56 pm TECHNIQUE: CTA of the chest was performed before and after the administration of intravenous contrast.  Multiplanar reformatted images are provided for review. MIP images are provided for review. Dose modulation, iterative reconstruction, and/or weight based adjustment of the mA/kV was utilized to reduce the radiation dose to as low as reasonably achievable. COMPARISON: Chest x-ray from today and CT chest May 8, 2020 HISTORY: ORDERING SYSTEM PROVIDED HISTORY: Rule out dissection, left-sided ripping tearing sensation, left upper quadrant pain TECHNOLOGIST PROVIDED HISTORY: Rule out dissection, left-sided ripping tearing sensation, left upper quadrant pain Reason for Exam: poss dissection Acuity: Unknown Type of Exam: Unknown FINDINGS: Aorta: No evidence of thoracic aortic aneurysm or dissection. No acute abnormality of the aorta. Mediastinum: No evidence of mediastinal lymphadenopathy. The heart and pericardium demonstrate no acute abnormality. Lungs/Pleura: The lungs are without acute process. No focal consolidation or pulmonary edema.   No evidence of pleural effusion or pneumothorax. Upper Abdomen: Moderate hiatal hernia or paraesophageal hernia. Soft Tissues/Bones: No acute bone or soft tissue abnormality. Para esophageal or moderate hiatal hernia unchanged. No acute disease. XR CHEST PORTABLE    Result Date: 10/13/2021  EXAMINATION: ONE XRAY VIEW OF THE CHEST 10/13/2021 2:05 pm COMPARISON: Chest radiograph performed 09/09/2020. HISTORY: ORDERING SYSTEM PROVIDED HISTORY: L sided CP TECHNOLOGIST PROVIDED HISTORY: L sided CP Reason for Exam: Port upr/left sided chest pain Acuity: Unknown Type of Exam: Unknown FINDINGS: There is no acute consolidation or effusion. There is no pneumothorax. The mediastinal structures are unremarkable. The upper abdomen is unremarkable. The extrathoracic soft tissues are unremarkable. There is no acute osseous abnormality. No acute cardiopulmonary process. CTA ABDOMEN PELVIS W CONTRAST    Result Date: 10/13/2021  EXAMINATION: CTA OF THE ABDOMEN AND PELVIS WITH CONTRAST 10/13/2021 2:56 pm: TECHNIQUE: CTA of the abdomen and pelvis was performed with the administration of intravenous contrast. Multiplanar reformatted images are provided for review. MIP images are provided for review. Dose modulation, iterative reconstruction, and/or weight based adjustment of the mA/kV was utilized to reduce the radiation dose to as low as reasonably achievable. COMPARISON: March 9, 2019 HISTORY: ORDERING SYSTEM PROVIDED HISTORY: Abdominal pain TECHNOLOGIST PROVIDED HISTORY: poss dissection Decision Support Exception - unselect if not a suspected or confirmed emergency medical condition->Emergency Medical Condition (MA) FINDINGS: CTA ABDOMEN: Abdominal aorta is normal in caliber. No evidence of dissection or acute aortic pathology. Celiac, superior mesenteric arteries and renal arteries appear widely patent. Accessory renal arteries noted bilaterally. The ROLAND is patent.  Liver, gallbladder, spleen, pancreas, kidneys and adrenals demonstrate no acute abnormality. No abnormal bowel dilatation or wall thickening. Moderate hiatal hernia. Normal appendix. No retroperitoneal adenopathy. No intra-abdominal free fluid. Severe disc height loss at L5-S1. CTA PELVIS: Iliac arteries are normal in caliber. No significant stenosis. No evidence of aortic aneurysm, dissection or acute aortic pathology. No acute intra-abdominal pathology identified. Moderate hiatal hernia, increased in size since prior study. LABS:  I have reviewed and interpreted all available lab results. Labs Reviewed   CBC WITH AUTO DIFFERENTIAL - Abnormal; Notable for the following components:       Result Value    Hemoglobin 12.8 (*)     RDW 16.6 (*)     All other components within normal limits   COMPREHENSIVE METABOLIC PANEL W/ REFLEX TO MG FOR LOW K - Abnormal; Notable for the following components:     Total Bilirubin 0.16 (*)     All other components within normal limits   COVID-19, RAPID   LIPASE   TROPONIN   TROPONIN   URINE DRUG SCREEN       SCREENING TOOLS:    HEART Risk Score for Chest Pain Patients   History and Physical Exam Suspicion Level  (Nausea, Vomiting, Diaphoresis, Radiation, Exertion)   Slightly Suspicious (0 pts)   Moderately Suspicious (1 pt)   Highly Suspicious (2 pts)   EKG Interpretation   Normal (0 pts)   Non-Specific Repolarization Disturbance (1 pt)   Significant ST-Depression (2 pts)   Age of Patient (in years)   = 39 (0 pts)   46-64 (1 pt)   = 65 (2 pts)   Risk Factors   No Risk Factors (0 pts)   1-2 Risk Factors (1 pt)   = 3 Risk Factors (2 pts)   Risk Factors Include:   Hypercholesterolemia   Hypertension   Diabetes Mellitus   Cigarette smoking   Positive family history   Obesity   CAD   (SLE, CKDz, HIV, Cocaine abuse)   Troponin Levels   = Normal Limit (0 pts)   1-3 Times Normal Limit (1 pt)   > 3 Times Normal Limit (2 pts)  TOTAL:    Percent Risk for Major Adverse Cardiac Event (MACE)  0-3 pts indicates low risk for MACE 2.5% (DISCHARGE)   4-7 pts indicates moderate risk for MACE  20.3% (OBS)  8-10 pts indicates high risk for MACE  72.7% (EARLY INVASIVE TX)    CDU IMPRESSION / PLAN      Miracle Boo is a 55 y.o. male who presents with complaints of acute left-sided chest pain that began around 2 PM today while he was sitting watching television. Patient describes the pain as a throbbing, tearing sensation that is nonradiating. Patient denies any associated symptoms such as shortness of breath, numbness, tingling. Patient states he has no previous cardiac history and has never had an episode like this before. Patient has history of substance abuse and states he has been sober for the last 6 months. Given clinical presentation will admit for further observation and cardiac evaluation. 1. Inpatient consult to cardiology-appreciate recommendations  · Symptom management  · Continue home medications and pain control  · Monitor vitals, labs, and imaging  · DISPO: pending consults and clinical improvement    CONSULTS:    IP CONSULT TO CARDIOLOGY    PROCEDURES:  Not indicated       PATIENT REFERRED TO:    No follow-up provider specified. --  My Supervising Physician for today is Dr. Kaila Castellanos  We discussed and agreed upon a treatment plan  Flavio Mcfarland, [facility]   Emergency Medicine Resident     This dictation was generated by voice recognition computer software. Although all attempts are made to edit the dictation for accuracy, there may be errors in the transcription that are not intended.

## 2021-10-14 NOTE — PROGRESS NOTES
Pt states he lives in a sober house at [facility]. pts home meds need reconciled.

## 2021-10-14 NOTE — DISCHARGE SUMMARY
CDU Discharge Summary        Patient:  Cj Walton  YOB: 1975    MRN: [id number]   Acct: [de-identified]    Primary Care Physician: No primary care provider on file. Admit date:  10/13/2021  1:29 PM  Discharge date: No discharge date for patient encounter. 10/14/21      Discharge Diagnoses:     Acute atypical chest pain due to unknown etiology. Cardiac workup negative. Cardiology recommended outpatient ECHO and stress. Improved with time, no interventions. Follow-up:  Call today/tomorrow for a follow up appointment with No primary care provider on file. , or return to the Emergency Room with worsening symptoms    Stressed to patient the importance of following up with primary care doctor for further workup/management of symptoms. Pt verbalizes understanding and agrees with plan. Discharge Medications:  Changes to medications        [facility]   Home Medication Instructions [id number]    Printed on:10/14/21 0818   Medication Information                      ARIPiprazole (ABILIFY) 30 MG tablet  Take 30 mg by mouth daily             buprenorphine-naloxone (SUBOXONE) 12-3 MG sublingual film  Place 1 Film under the tongue daily. buPROPion (WELLBUTRIN XL) 300 MG extended release tablet  Take 1 tablet by mouth daily             gabapentin (NEURONTIN) 600 MG tablet  Take 600 mg by mouth 3 times daily. ibuprofen (ADVIL;MOTRIN) 400 MG tablet  Take 1 tablet by mouth every 6 hours as needed for Pain             melatonin 3 MG TABS tablet  Take 0.5 tablets by mouth nightly as needed (prn insomnia)             nicotine polacrilex (NICORETTE) 4 MG gum  Take 1 each by mouth as needed for Smoking cessation             topiramate (TOPAMAX) 100 MG tablet  Take 100 mg by mouth 2 times daily                 Diet:  ADULT DIET;  Regular; 4 carb choices (60 gm/meal) , Advance as tolerated     Activity:  As tolerated    Consultants: IP CONSULT TO CARDIOLOGY    Procedures:  Not indicated     Diagnostic Test:   Results for orders placed or performed during the hospital encounter of 10/13/21   COVID-19, Rapid    Specimen: Nasopharyngeal Swab   Result Value Ref Range    Specimen Description . NASOPHARYNGEAL SWAB     SARS-CoV-2, Rapid Not Detected Not Detected   CBC Auto Differential   Result Value Ref Range    WBC 6.7 3.5 - 11.3 k/uL    RBC 4.83 4.21 - 5.77 m/uL    Hemoglobin 12.8 (L) 13.0 - 17.0 g/dL    Hematocrit 41.8 40.7 - 50.3 %    MCV 86.5 82.6 - 102.9 fL    MCH 26.5 25.2 - 33.5 pg    MCHC 30.6 28.4 - 34.8 g/dL    RDW 16.6 (H) 11.8 - 14.4 %    Platelets 124 289 - 537 k/uL    MPV 11.1 8.1 - 13.5 fL    NRBC Automated 0.0 0.0 per 100 WBC    Differential Type NOT REPORTED     Seg Neutrophils 51 36 - 65 %    Lymphocytes 39 24 - 43 %    Monocytes 7 3 - 12 %    Eosinophils % 2 1 - 4 %    Basophils 1 0 - 2 %    Immature Granulocytes 0 0 %    Segs Absolute 3.35 1.50 - 8.10 k/uL    Absolute Lymph # 2.60 1.10 - 3.70 k/uL    Absolute Mono # 0.47 0.10 - 1.20 k/uL    Absolute Eos # 0.15 0.00 - 0.44 k/uL    Basophils Absolute 0.06 0.00 - 0.20 k/uL    Absolute Immature Granulocyte <0.03 0.00 - 0.30 k/uL    WBC Morphology NOT REPORTED     RBC Morphology ANISOCYTOSIS PRESENT     Platelet Estimate NOT REPORTED    Comprehensive Metabolic Panel w/ Reflex to MG   Result Value Ref Range    Glucose 98 70 - 99 mg/dL    BUN 16 6 - 20 mg/dL    CREATININE 1.15 0.70 - 1.20 mg/dL    Bun/Cre Ratio NOT REPORTED 9 - 20    Calcium 9.3 8.6 - 10.4 mg/dL    Sodium 139 135 - 144 mmol/L    Potassium 4.3 3.7 - 5.3 mmol/L    Chloride 105 98 - 107 mmol/L    CO2 24 20 - 31 mmol/L    Anion Gap 10 9 - 17 mmol/L    Alkaline Phosphatase 87 40 - 129 U/L    ALT 21 5 - 41 U/L    AST 20 <40 U/L    Total Bilirubin 0.16 (L) 0.3 - 1.2 mg/dL    Total Protein 7.8 6.4 - 8.3 g/dL    Albumin 4.6 3.5 - 5.2 g/dL    Albumin/Globulin Ratio 1.4 1.0 - 2.5    GFR Non-African American >60 >60 mL/min    GFR African American >60 >60 mL/min    GFR Comment GFR Staging NOT REPORTED    Lipase   Result Value Ref Range    Lipase 22 13 - 60 U/L   Troponin   Result Value Ref Range    Troponin, High Sensitivity <6 0 - 22 ng/L    Troponin T NOT REPORTED <0.03 ng/mL    Troponin Interp NOT REPORTED    Troponin   Result Value Ref Range    Troponin, High Sensitivity <6 0 - 22 ng/L    Troponin T NOT REPORTED <0.03 ng/mL    Troponin Interp NOT REPORTED    Urine Drug Screen   Result Value Ref Range    Amphetamine Screen, Ur NEGATIVE NEGATIVE    Barbiturate Screen, Ur NEGATIVE NEGATIVE    Benzodiazepine Screen, Urine NEGATIVE NEGATIVE    Cocaine Metabolite, Urine NEGATIVE NEGATIVE    Methadone Screen, Urine NEGATIVE NEGATIVE    Opiates, Urine NEGATIVE NEGATIVE    Phencyclidine, Urine NEGATIVE NEGATIVE    Propoxyphene, Urine NOT REPORTED NEGATIVE    Cannabinoid Scrn, Ur NEGATIVE NEGATIVE    Oxycodone Screen, Ur NEGATIVE NEGATIVE    Methamphetamine, Urine NOT REPORTED NEGATIVE    Tricyclic Antidepressants, Urine NOT REPORTED NEGATIVE    MDMA, Urine NOT REPORTED NEGATIVE    Buprenorphine Urine NOT REPORTED NEGATIVE    Test Information       Assay provides medical screening only. The absence of expected drug(s) and/or metabolite(s) may indicate diluted or adulterated urine, limitations of testing or timing of collection.    BASIC METABOLIC PANEL   Result Value Ref Range    Glucose 101 (H) 70 - 99 mg/dL    BUN 15 6 - 20 mg/dL    CREATININE 1.18 0.70 - 1.20 mg/dL    Bun/Cre Ratio NOT REPORTED 9 - 20    Calcium 8.9 8.6 - 10.4 mg/dL    Sodium 142 135 - 144 mmol/L    Potassium 4.5 3.7 - 5.3 mmol/L    Chloride 108 (H) 98 - 107 mmol/L    CO2 22 20 - 31 mmol/L    Anion Gap 12 9 - 17 mmol/L    GFR Non-African American >60 >60 mL/min    GFR African American >60 >60 mL/min    GFR Comment          GFR Staging NOT REPORTED    CBC WITH AUTO DIFFERENTIAL   Result Value Ref Range    WBC 6.1 3.5 - 11.3 k/uL    RBC 4.51 4.21 - 5.77 m/uL    Hemoglobin 11.8 (L) 13.0 - 17.0 g/dL    Hematocrit 39.5 utilized to reduce the radiation dose to as low as reasonably achievable. COMPARISON: Chest x-ray from today and CT chest May 8, 2020 HISTORY: ORDERING SYSTEM PROVIDED HISTORY: Rule out dissection, left-sided ripping tearing sensation, left upper quadrant pain TECHNOLOGIST PROVIDED HISTORY: Rule out dissection, left-sided ripping tearing sensation, left upper quadrant pain Reason for Exam: poss dissection Acuity: Unknown Type of Exam: Unknown FINDINGS: Aorta: No evidence of thoracic aortic aneurysm or dissection. No acute abnormality of the aorta. Mediastinum: No evidence of mediastinal lymphadenopathy. The heart and pericardium demonstrate no acute abnormality. Lungs/Pleura: The lungs are without acute process. No focal consolidation or pulmonary edema. No evidence of pleural effusion or pneumothorax. Upper Abdomen: Moderate hiatal hernia or paraesophageal hernia. Soft Tissues/Bones: No acute bone or soft tissue abnormality. Para esophageal or moderate hiatal hernia unchanged. No acute disease. XR CHEST PORTABLE    Result Date: 10/13/2021  EXAMINATION: ONE XRAY VIEW OF THE CHEST 10/13/2021 2:05 pm COMPARISON: Chest radiograph performed 09/09/2020. HISTORY: ORDERING SYSTEM PROVIDED HISTORY: L sided CP TECHNOLOGIST PROVIDED HISTORY: L sided CP Reason for Exam: Port upr/left sided chest pain Acuity: Unknown Type of Exam: Unknown FINDINGS: There is no acute consolidation or effusion. There is no pneumothorax. The mediastinal structures are unremarkable. The upper abdomen is unremarkable. The extrathoracic soft tissues are unremarkable. There is no acute osseous abnormality. No acute cardiopulmonary process.      CTA ABDOMEN PELVIS W CONTRAST    Result Date: 10/13/2021  EXAMINATION: CTA OF THE ABDOMEN AND PELVIS WITH CONTRAST 10/13/2021 2:56 pm: TECHNIQUE: CTA of the abdomen and pelvis was performed with the administration of intravenous contrast. Multiplanar reformatted images are provided for to be discharged. Disposition: Home    Patient stated that they will not drive themselves home from the hospital if they have gotten pain killers/ narcotics earlier that day and that they will arrange for transportation on their own or work with the  for a ride. Patient counseled NOT to drive while under the influence of narcotics/ pain killers. Condition: Good    Patient stable and ready for discharge home. I have discussed plan of care with patient and they are in understanding. They were instructed to read discharge paperwork. All of their questions and concerns were addressed. Time Spent: 0 day      --  Roe King MD  Emergency Medicine Resident Physician    This dictation was generated by voice recognition computer software. Although all attempts are made to edit the dictation for accuracy, there may be errors in the transcription that are not intended.

## 2021-10-14 NOTE — CONSULTS
Attestation signed by      Attending Physician Statement:    I have discussed the care of  Romelia Barrios , including pertinent history and exam findings, with the Cardiology fellow/resident. I have seen and examined the patient and the key elements of all parts of the encounter have been performed by me. I agree with the assessment, plan and orders as documented by the fellow/resident, after I modified exam findings and plan of treatments, and the final version is my approved version of the assessment. Additional Comments:   Atypical chest pain no relation with exertion  Had  stress test that was negative  Normal EKG no tropes  No further cardiac work-up  Talk in detail regarding quitting smoking  Dr. Tawana Mays Cardiology Cardiology    Consult / H&P               Today's Date: 10/14/2021  Patient Name: Romelia Barrios  Date of admission: 10/13/2021  1:29 PM  Patient's age: 55 y.o., 1975  Admission Dx: Chest pain [R07.9]  Chest pain, unspecified type [R07.9]    Reason for Consult:  Cardiac evaluation    Requesting Physician: Ney Esteban MD    CHIEF COMPLAINT:  Chest pain     History Obtained From:  patient    HISTORY OF PRESENT ILLNESS:      59-year-old male presented with complaint of chest pain. Chest pain is left-sided sharp in nature. Patient has history of cocaine use in the past.  Has history of hepatitis C. As per patient he has family strep coronary artery disease. EKG showed normal sinus rhythm without significant abnormality. Troponin x2 -. Patient had a stress test done last year and it was normal.    Past Medical History:   has a past medical history of Anxiety, Depression, Hep C w/ coma, chronic, Polysubstance abuse ([facility] and Substance abuse ([facility] [facility] Past Surgical History:   has no past surgical history on file. Home Medications:    Prior to Admission medications    Medication Sig Start Date End Date Taking?  Authorizing Provider   gabapentin (NEURONTIN) 600 MG tablet Take 600 mg by mouth 3 times daily. Yes Historical Provider, MD   buprenorphine-naloxone (SUBOXONE) 12-3 MG sublingual film Place 1 Film under the tongue daily. Yes Historical Provider, MD   topiramate (TOPAMAX) 100 MG tablet Take 100 mg by mouth 2 times daily   Yes Historical Provider, MD   ARIPiprazole (ABILIFY) 30 MG tablet Take 30 mg by mouth daily   Yes Historical Provider, MD   buPROPion (WELLBUTRIN XL) 300 MG extended release tablet Take 1 tablet by mouth daily 4/20/21  Yes Tej Garrison MD   ibuprofen (ADVIL;MOTRIN) 400 MG tablet Take 1 tablet by mouth every 6 hours as needed for Pain 4/19/21  Yes Tej Garrison MD   nicotine polacrilex (NICORETTE) 4 MG gum Take 1 each by mouth as needed for Smoking cessation 4/19/21  Yes Tej Garrison MD   melatonin 3 MG TABS tablet Take 0.5 tablets by mouth nightly as needed (prn insomnia) 4/19/21  Yes Tej Garrison MD      Current Facility-Administered Medications: gabapentin (NEURONTIN) tablet 600 mg, 600 mg, Oral, TID  ARIPiprazole (ABILIFY) tablet 30 mg, 30 mg, Oral, Daily  buPROPion (WELLBUTRIN XL) extended release tablet 300 mg, 300 mg, Oral, Daily  melatonin tablet 1.5 mg, 1.5 mg, Oral, Nightly PRN  mirtazapine (REMERON) tablet 15 mg, 15 mg, Oral, Nightly  nicotine polacrilex (NICORETTE) gum 4 mg, 4 mg, Oral, PRN  sodium chloride flush 0.9 % injection 5-40 mL, 5-40 mL, IntraVENous, 2 times per day  sodium chloride flush 0.9 % injection 5-40 mL, 5-40 mL, IntraVENous, PRN  0.9 % sodium chloride infusion, 25 mL, IntraVENous, PRN  enoxaparin (LOVENOX) injection 40 mg, 40 mg, SubCUTAneous, Daily  acetaminophen (TYLENOL) tablet 650 mg, 650 mg, Oral, Q4H PRN  ondansetron (ZOFRAN) injection 4 mg, 4 mg, IntraVENous, Q6H PRN  lactated ringers infusion, , IntraVENous, Continuous    Allergies:  Duloxetine    Social History:   reports that he has been smoking cigarettes. He has a 15.50 pack-year smoking history.  He has never used smokeless tobacco. mentation, or behavior are noted    DATA:    Diagnostics:      CBC:   Recent Labs     10/13/21  1346 10/14/21  0628   WBC 6.7 6.1   HGB 12.8* 11.8*   HCT 41.8 39.5*    215     BMP:   Recent Labs     10/13/21  1346 10/14/21  0628    142   K 4.3 4.5   CO2 24 22   BUN 16 15   CREATININE 1.15 1.18   LABGLOM >60 >60   GLUCOSE 98 101*     BNP: No results for input(s): BNP in the last 72 hours. PT/INR: No results for input(s): PROTIME, INR in the last 72 hours. APTT:No results for input(s): APTT in the last 72 hours. CARDIAC ENZYMES:No results for input(s): CKTOTAL, CKMB, CKMBINDEX, TROPONINI in the last 72 hours. FASTING LIPID PANEL:  Lab Results   Component Value Date    HDL 40 12/07/2019    TRIG 113 12/07/2019     LIVER PROFILE:  Recent Labs     10/13/21  1346   AST 20   ALT 21   LABALBU 4.6       IMPRESSION:    Patient Active Problem List   Diagnosis    Recurrent depression ([address]    MDD (major depressive disorder), recurrent episode ([address]    Bipolar I disorder, most recent episode depressed ([address]    Opioid type dependence, continuous ([address]    Mild recurrent major depression ([address]    Bipolar 1 disorder ([address]    Bipolar disorder with severe depression ([address]    Opioid dependence on agonist therapy ([address]    Chest pain    Suicidal ideation    Substance abuse ([address]    Severe recurrent major depression without psychotic features ([address]    Major depression, single episode    Depression with suicidal ideation    Therapeutic opioid-induced constipation (OIC)    Cocaine abuse ([address]    Amphetamine abuse ([address]    Barbiturate abuse ([address]    Cannabis abuse     Assessment     1. Chest pain   2. H/o Cocaine use in past   3  Smoker       RECOMMENDATIONS:  1. EKG reviewed. Normal sinus rhythm without significant ST-T wave abnormality. Troponin x2 -. Had negative stress test last year. 2. No plan for further ischemia work-up. 3.          Obtain 2D echo as outpatient.     Discussed with patient and Nurse.    Electronically signed by Kal Reyes MD on 10/14/2021 at [address] [facility]

## 2021-10-14 NOTE — PROGRESS NOTES
[place] [facility]  Documentation Note                Admission Dx: Chest pain [R07.9]  Chest pain, unspecified type [R07.9]    Past Medical History:   has a past medical history of Anxiety, Depression, Hep C w/ coma, chronic, Polysubstance abuse ([facility] [address] and Substance abuse ([facility] [address] Previous Testing:     STRESS 9/9/2020: No ischemia/infarct. EF 40%. ECHO 9/9/2020: EF 47%, no regurg/stenosis. Previous office/hospital visit:   Dr. Brielle Parham 9/9/2020:   1. Atypical chest pain associated with cocaine use; currently pain-free with no evidence of acute coronary syndrome. 2. Bipolar disorder with suicidal ideation  3. Polysubstance abuse ( heroin, cocaine, cannabis)  4. Family h/o premature coronary disease  5. Tobacco abuse    Plan --   1. Complete 2D echo and Lexiscan stress test today  2.   Continue ASA and Lipitor    Robyn [facility]

## 2021-10-14 NOTE — PROGRESS NOTES
OBS/CDU   RESIDENT NOTE      Patients PCP is: No primary care provider on file. SUBJECTIVE      Been n.p.o. overnight. States his chest pain is no longer present. Shortness of breath improved. Patient endorses a headache at this time otherwise is requesting his Suboxone treatment. He is updated on labs and imaging findings in the ED and denies abdominal pain nausea vomiting diarrhea shortness of breath chest pain or new cough or fever or chills. PHYSICAL EXAM      General: NAD, AO X 3  Heent: EMOI, PERRL  Neck: SUPPLE, NO JVD  Cardiovascular: RRR, S1S2  Pulmonary: CTAB, NO SOB  Abdomen: SOFT, NTTP, ND, +BS  Extremities: +2/4 PULSES DISTAL, NO SWELLING  Neuro / Psych: NO NUMBNESS OR TINGLING, MENTATION AT BASELINE    PERTINENT TEST /EXAMS      I have reviewed all available laboratory results. MEDICATIONS CURRENT   gabapentin (NEURONTIN) tablet 600 mg, TID  buprenorphine-naloxone (SUBOXONE) 2-0.5 MG SL tablet 6 tablet, Daily  ARIPiprazole (ABILIFY) tablet 30 mg, Daily  buPROPion (WELLBUTRIN XL) extended release tablet 300 mg, Daily  melatonin tablet 1.5 mg, Nightly PRN  mirtazapine (REMERON) tablet 15 mg, Nightly  nicotine polacrilex (NICORETTE) gum 4 mg, PRN  sodium chloride flush 0.9 % injection 5-40 mL, 2 times per day  sodium chloride flush 0.9 % injection 5-40 mL, PRN  0.9 % sodium chloride infusion, PRN  enoxaparin (LOVENOX) injection 40 mg, Daily  acetaminophen (TYLENOL) tablet 650 mg, Q4H PRN  ondansetron (ZOFRAN) injection 4 mg, Q6H PRN  lactated ringers infusion, Continuous        All medication charted and reviewed. CONSULTS      IP CONSULT TO CARDIOLOGY    ASSESSMENT/PLAN       Skyler Demarco is a 55 y.o. male who presents with left-sided chest pain. Stress test September 2020 was negative. EKG without changes in the ED. Troponin negative. Mild anemia Hgb 11.8 today morning likely dilutional given CBC on admission normal.  Electrolytes normal.  VIANCA negative.   CT without evidence of aneurysm or PE, however there was evidence of a moderate hiatal hernia that is increased in size since prior study. Lipase normal, chest x-ray normal.    Chest pain  Cardiology on board  Symptomatic treatment    Opioid addiction  Suboxone daily 12-3    Headache  Fioricet as needed    Other  · Continue home medications and pain control  · Monitor vitals, labs, and imaging  · DISPO: pending consults and clinical improvement    --  Carlos Alberto Gill MD  Emergency Medicine Resident Physician     This dictation was generated by voice recognition computer software. Although all attempts are made to edit the dictation for accuracy, there may be errors in the transcription that are not intended.

## 2021-10-15 LAB
EKG ATRIAL RATE: 71 BPM
EKG ATRIAL RATE: 97 BPM
EKG P AXIS: 64 DEGREES
EKG P AXIS: 78 DEGREES
EKG P-R INTERVAL: 150 MS
EKG P-R INTERVAL: 156 MS
EKG Q-T INTERVAL: 358 MS
EKG Q-T INTERVAL: 416 MS
EKG QRS DURATION: 102 MS
EKG QRS DURATION: 106 MS
EKG QTC CALCULATION (BAZETT): 452 MS
EKG QTC CALCULATION (BAZETT): 454 MS
EKG R AXIS: 79 DEGREES
EKG R AXIS: 83 DEGREES
EKG T AXIS: 57 DEGREES
EKG T AXIS: 73 DEGREES
EKG VENTRICULAR RATE: 71 BPM
EKG VENTRICULAR RATE: 97 BPM

## 2021-10-15 PROCEDURE — 93010 ELECTROCARDIOGRAM REPORT: CPT | Performed by: INTERNAL MEDICINE

## 2021-10-26 ENCOUNTER — OFFICE VISIT (OUTPATIENT)
Dept: FAMILY MEDICINE CLINIC | Age: 46
End: 2021-10-26
Payer: COMMERCIAL

## 2021-10-26 ENCOUNTER — HOSPITAL ENCOUNTER (OUTPATIENT)
Age: 46
Setting detail: SPECIMEN
Discharge: HOME | End: 2021-10-26
Payer: COMMERCIAL

## 2021-10-26 VITALS
DIASTOLIC BLOOD PRESSURE: 80 MMHG | TEMPERATURE: 97.1 F | SYSTOLIC BLOOD PRESSURE: 120 MMHG | OXYGEN SATURATION: 98 % | HEART RATE: 86 BPM | BODY MASS INDEX: 29.78 KG/M2 | HEIGHT: 70 IN | WEIGHT: 208 LBS

## 2021-10-26 DIAGNOSIS — Z13.29 SCREENING FOR THYROID DISORDER: ICD-10-CM

## 2021-10-26 DIAGNOSIS — F19.11 HISTORY OF SUBSTANCE ABUSE (HCC): ICD-10-CM

## 2021-10-26 DIAGNOSIS — G89.29 CHRONIC MIDLINE LOW BACK PAIN WITH BILATERAL SCIATICA: ICD-10-CM

## 2021-10-26 DIAGNOSIS — Z12.5 ENCOUNTER FOR SCREENING FOR MALIGNANT NEOPLASM OF PROSTATE: ICD-10-CM

## 2021-10-26 DIAGNOSIS — F31.9 BIPOLAR 1 DISORDER (HCC): ICD-10-CM

## 2021-10-26 DIAGNOSIS — Z11.4 SCREENING FOR HIV (HUMAN IMMUNODEFICIENCY VIRUS): ICD-10-CM

## 2021-10-26 DIAGNOSIS — T40.2X5A THERAPEUTIC OPIOID-INDUCED CONSTIPATION (OIC): ICD-10-CM

## 2021-10-26 DIAGNOSIS — F90.0 ATTENTION DEFICIT HYPERACTIVITY DISORDER (ADHD), PREDOMINANTLY INATTENTIVE TYPE: Primary | ICD-10-CM

## 2021-10-26 DIAGNOSIS — Z12.11 SCREEN FOR COLON CANCER: ICD-10-CM

## 2021-10-26 DIAGNOSIS — R73.9 ELEVATED BLOOD SUGAR: ICD-10-CM

## 2021-10-26 DIAGNOSIS — D50.8 OTHER IRON DEFICIENCY ANEMIA: ICD-10-CM

## 2021-10-26 DIAGNOSIS — M54.41 CHRONIC MIDLINE LOW BACK PAIN WITH BILATERAL SCIATICA: ICD-10-CM

## 2021-10-26 DIAGNOSIS — F33.2 SEVERE RECURRENT MAJOR DEPRESSION WITHOUT PSYCHOTIC FEATURES (HCC): ICD-10-CM

## 2021-10-26 DIAGNOSIS — F33.2 SEVERE EPISODE OF RECURRENT MAJOR DEPRESSIVE DISORDER, WITHOUT PSYCHOTIC FEATURES (HCC): ICD-10-CM

## 2021-10-26 DIAGNOSIS — F11.20 OPIOID DEPENDENCE ON AGONIST THERAPY (HCC): ICD-10-CM

## 2021-10-26 DIAGNOSIS — B18.2 CHRONIC HEPATITIS C WITHOUT HEPATIC COMA (HCC): ICD-10-CM

## 2021-10-26 DIAGNOSIS — F31.4 BIPOLAR DISORDER WITH SEVERE DEPRESSION (HCC): ICD-10-CM

## 2021-10-26 DIAGNOSIS — M54.42 CHRONIC MIDLINE LOW BACK PAIN WITH BILATERAL SCIATICA: ICD-10-CM

## 2021-10-26 DIAGNOSIS — K59.03 THERAPEUTIC OPIOID-INDUCED CONSTIPATION (OIC): ICD-10-CM

## 2021-10-26 PROBLEM — F32.A DEPRESSION WITH SUICIDAL IDEATION: Status: RESOLVED | Noted: 2020-10-21 | Resolved: 2021-10-26

## 2021-10-26 PROBLEM — F32.9 MAJOR DEPRESSION, SINGLE EPISODE: Status: RESOLVED | Noted: 2020-10-21 | Resolved: 2021-10-26

## 2021-10-26 PROBLEM — R45.851 DEPRESSION WITH SUICIDAL IDEATION: Status: RESOLVED | Noted: 2020-10-21 | Resolved: 2021-10-26

## 2021-10-26 PROBLEM — F14.10 COCAINE ABUSE (HCC): Status: RESOLVED | Noted: 2021-04-11 | Resolved: 2021-10-26

## 2021-10-26 PROBLEM — F13.10 BARBITURATE ABUSE (HCC): Status: RESOLVED | Noted: 2021-04-11 | Resolved: 2021-10-26

## 2021-10-26 PROBLEM — F15.10 AMPHETAMINE ABUSE (HCC): Status: RESOLVED | Noted: 2021-04-11 | Resolved: 2021-10-26

## 2021-10-26 PROBLEM — D64.9 ABSOLUTE ANEMIA: Status: ACTIVE | Noted: 2021-10-26

## 2021-10-26 LAB
ABSOLUTE EOS #: 0.11 K/UL (ref 0–0.44)
ABSOLUTE IMMATURE GRANULOCYTE: <0.03 K/UL (ref 0–0.3)
ABSOLUTE LYMPH #: 2.28 K/UL (ref 1.1–3.7)
ABSOLUTE MONO #: 0.42 K/UL (ref 0.1–1.2)
ALBUMIN SERPL-MCNC: 4.4 G/DL (ref 3.5–5.2)
ALBUMIN/GLOBULIN RATIO: 1.4 (ref 1–2.5)
ALP BLD-CCNC: 88 U/L (ref 40–129)
ALT SERPL-CCNC: 24 U/L (ref 5–41)
ANION GAP SERPL CALCULATED.3IONS-SCNC: 13 MMOL/L (ref 9–17)
AST SERPL-CCNC: 20 U/L
BASOPHILS # BLD: 1 % (ref 0–2)
BASOPHILS ABSOLUTE: 0.06 K/UL (ref 0–0.2)
BILIRUB SERPL-MCNC: <0.1 MG/DL (ref 0.3–1.2)
BUN BLDV-MCNC: 18 MG/DL (ref 6–20)
BUN/CREAT BLD: ABNORMAL (ref 9–20)
CALCIUM SERPL-MCNC: 8.9 MG/DL (ref 8.6–10.4)
CHLORIDE BLD-SCNC: 105 MMOL/L (ref 98–107)
CO2: 22 MMOL/L (ref 20–31)
CREAT SERPL-MCNC: 1.09 MG/DL (ref 0.7–1.2)
DIFFERENTIAL TYPE: ABNORMAL
EOSINOPHILS RELATIVE PERCENT: 2 % (ref 1–4)
ESTIMATED AVERAGE GLUCOSE: 117 MG/DL
FERRITIN: 8 UG/L (ref 30–400)
FOLATE: 13.5 NG/ML
GFR AFRICAN AMERICAN: >60 ML/MIN
GFR NON-AFRICAN AMERICAN: >60 ML/MIN
GFR SERPL CREATININE-BSD FRML MDRD: ABNORMAL ML/MIN/{1.73_M2}
GFR SERPL CREATININE-BSD FRML MDRD: ABNORMAL ML/MIN/{1.73_M2}
GLUCOSE BLD-MCNC: 93 MG/DL (ref 70–99)
HBA1C MFR BLD: 5.7 % (ref 4–6)
HCT VFR BLD CALC: 39.7 % (ref 40.7–50.3)
HEMOGLOBIN: 11.9 G/DL (ref 13–17)
HIV AG/AB: NONREACTIVE
IMMATURE GRANULOCYTES: 0 %
IRON SATURATION: 16 % (ref 20–55)
IRON: 62 UG/DL (ref 59–158)
LYMPHOCYTES # BLD: 40 % (ref 24–43)
MCH RBC QN AUTO: 26.3 PG (ref 25.2–33.5)
MCHC RBC AUTO-ENTMCNC: 30 G/DL (ref 28.4–34.8)
MCV RBC AUTO: 87.6 FL (ref 82.6–102.9)
MONOCYTES # BLD: 7 % (ref 3–12)
NRBC AUTOMATED: 0 PER 100 WBC
PDW BLD-RTO: 16.1 % (ref 11.8–14.4)
PLATELET # BLD: 254 K/UL (ref 138–453)
PLATELET ESTIMATE: ABNORMAL
PMV BLD AUTO: 12 FL (ref 8.1–13.5)
POTASSIUM SERPL-SCNC: 4.1 MMOL/L (ref 3.7–5.3)
PROSTATE SPECIFIC ANTIGEN: 1.01 UG/L
RBC # BLD: 4.53 M/UL (ref 4.21–5.77)
RBC # BLD: ABNORMAL 10*6/UL
SEG NEUTROPHILS: 50 % (ref 36–65)
SEGMENTED NEUTROPHILS ABSOLUTE COUNT: 2.86 K/UL (ref 1.5–8.1)
SODIUM BLD-SCNC: 140 MMOL/L (ref 135–144)
TOTAL IRON BINDING CAPACITY: 381 UG/DL (ref 250–450)
TOTAL PROTEIN: 7.5 G/DL (ref 6.4–8.3)
TSH SERPL DL<=0.05 MIU/L-ACNC: 1.64 MIU/L (ref 0.3–5)
UNSATURATED IRON BINDING CAPACITY: 319 UG/DL (ref 112–347)
VITAMIN B-12: 1568 PG/ML (ref 232–1245)
WBC # BLD: 5.8 K/UL (ref 3.5–11.3)
WBC # BLD: ABNORMAL 10*3/UL

## 2021-10-26 PROCEDURE — G8484 FLU IMMUNIZE NO ADMIN: HCPCS | Performed by: INTERNAL MEDICINE

## 2021-10-26 PROCEDURE — 4004F PT TOBACCO SCREEN RCVD TLK: CPT | Performed by: INTERNAL MEDICINE

## 2021-10-26 PROCEDURE — G8427 DOCREV CUR MEDS BY ELIG CLIN: HCPCS | Performed by: INTERNAL MEDICINE

## 2021-10-26 PROCEDURE — 99203 OFFICE O/P NEW LOW 30 MIN: CPT | Performed by: INTERNAL MEDICINE

## 2021-10-26 PROCEDURE — G8419 CALC BMI OUT NRM PARAM NOF/U: HCPCS | Performed by: INTERNAL MEDICINE

## 2021-10-26 RX ORDER — ATOMOXETINE 18 MG/1
18 CAPSULE ORAL DAILY
Qty: 30 CAPSULE | Refills: 1 | Status: ON HOLD | OUTPATIENT
Start: 2021-10-26 | End: 2021-12-13 | Stop reason: HOSPADM

## 2021-10-26 RX ORDER — QUETIAPINE FUMARATE 100 MG/1
200 TABLET, FILM COATED ORAL NIGHTLY PRN
Status: ON HOLD | COMMUNITY
Start: 2021-10-25 | End: 2021-12-09

## 2021-10-26 RX ORDER — BUPROPION HYDROCHLORIDE 450 MG/1
1 TABLET, FILM COATED, EXTENDED RELEASE ORAL EVERY MORNING
Status: ON HOLD | COMMUNITY
Start: 2021-10-21 | End: 2021-12-13 | Stop reason: HOSPADM

## 2021-10-26 RX ORDER — GABAPENTIN 600 MG/1
600 TABLET ORAL 3 TIMES DAILY
Qty: 90 TABLET | Refills: 2 | Status: ON HOLD | OUTPATIENT
Start: 2021-10-26 | End: 2021-12-13 | Stop reason: HOSPADM

## 2021-10-26 RX ORDER — IBUPROFEN 800 MG/1
1 TABLET ORAL 3 TIMES DAILY
Status: ON HOLD | COMMUNITY
Start: 2021-09-30 | End: 2021-12-13 | Stop reason: HOSPADM

## 2021-10-26 RX ORDER — DOCUSATE SODIUM 100 MG/1
1 CAPSULE, LIQUID FILLED ORAL 2 TIMES DAILY PRN
Status: ON HOLD | COMMUNITY
Start: 2021-10-25 | End: 2021-12-09

## 2021-10-26 RX ORDER — MULTIVITAMIN
1 TABLET ORAL DAILY
COMMUNITY
Start: 2021-09-29

## 2021-10-26 RX ORDER — OMEPRAZOLE 20 MG/1
1 CAPSULE, DELAYED RELEASE ORAL DAILY
COMMUNITY
Start: 2021-10-25 | End: 2021-10-26

## 2021-10-26 SDOH — ECONOMIC STABILITY: FOOD INSECURITY: WITHIN THE PAST 12 MONTHS, THE FOOD YOU BOUGHT JUST DIDN'T LAST AND YOU DIDN'T HAVE MONEY TO GET MORE.: SOMETIMES TRUE

## 2021-10-26 SDOH — ECONOMIC STABILITY: FOOD INSECURITY: WITHIN THE PAST 12 MONTHS, YOU WORRIED THAT YOUR FOOD WOULD RUN OUT BEFORE YOU GOT MONEY TO BUY MORE.: SOMETIMES TRUE

## 2021-10-26 ASSESSMENT — SOCIAL DETERMINANTS OF HEALTH (SDOH): HOW HARD IS IT FOR YOU TO PAY FOR THE VERY BASICS LIKE FOOD, HOUSING, MEDICAL CARE, AND HEATING?: SOMEWHAT HARD

## 2021-10-26 NOTE — PROGRESS NOTES
Pt is here to establish  Wants to discuss Strattera for ADHD. He does not want Ritalin and Adderall due to being addictive.

## 2021-10-26 NOTE — PROGRESS NOTES
episode depressed ([facility]    Opioid type dependence, continuous ([facility]    Mild recurrent major depression ([facility]    Bipolar 1 disorder ([facility]    Bipolar disorder with severe depression ([facility]    Opioid dependence on agonist therapy ([facility]    Chest pain    Suicidal ideation    Substance abuse ([facility]    Severe recurrent major depression without psychotic features ([facility]    Major depression, single episode    Depression with suicidal ideation    Therapeutic opioid-induced constipation (OIC)    Cocaine abuse ([facility]    Amphetamine abuse ([facility]    Barbiturate abuse ([facility]    Cannabis abuse         Prior to Visit Medications    Medication Sig Taking? Authorizing Provider   buPROPion HCl ER, XL, 450 MG TB24 Take 1 tablet by mouth every morning Yes Ti Mcmullen PA-C   ibuprofen (ADVIL;MOTRIN) 800 MG tablet Take 1 tablet by mouth 3 times daily Yes Ti Mcmullen PA-C   QUEtiapine (SEROQUEL) 100 MG tablet Take 1 tablet by mouth daily Yes PRADEEP Vaca Ala, CNP   Multiple Vitamin (MULTIVITAMIN) TABS Take 1 tablet by mouth daily Yes Historical Provider, MD   docusate sodium (COLACE) 100 MG capsule Take 1 capsule by mouth 2 times daily as needed Yes PRADEEP Vaca Ala, CNP   omeprazole (PRILOSEC) 20 MG delayed release capsule Take 1 capsule by mouth daily Yes Ti Mcmullen PA-C   Melatonin 5 MG CAPS Take 2 capsules by mouth nightly Yes Historical Provider, MD   gabapentin (NEURONTIN) 600 MG tablet Take 600 mg by mouth 3 times daily. Yes Historical Provider, MD   buprenorphine-naloxone (SUBOXONE) 12-3 MG sublingual film Place 1 Film under the tongue daily. Yes Historical Provider, MD   topiramate (TOPAMAX) 100 MG tablet Take 100 mg by mouth 2 times daily Yes Historical Provider, MD   ARIPiprazole (ABILIFY) 30 MG tablet Take 30 mg by mouth daily Yes Historical Provider, MD     Review of Systems  Review of Systems   Constitutional: Negative for fatigue, fever and unexpected weight change.    Respiratory: Negative for cough, choking, chest tightness, shortness of breath and wheezing. Cardiovascular: Negative for chest pain, palpitations and leg swelling. Gastrointestinal: Negative for abdominal pain, anal bleeding, blood in stool, constipation, diarrhea, nausea and vomiting. Endocrine: Negative. Musculoskeletal: Positive for back pain. Negative for joint swelling and myalgias. Skin: Negative. Neurological: Negative for dizziness. Psychiatric/Behavioral: Positive for behavioral problems and decreased concentration. Negative for sleep disturbance. All other systems reviewed and are negative. Objective:       Physical Exam:  /80 (Site: Left Upper Arm, Position: Sitting, Cuff Size: Large Adult)   Pulse 86   Temp 97.1 °F (36.2 °C) (Temporal)   Ht 5' 10.16\" (1.782 m)   Wt 208 lb (94.3 kg)   SpO2 98%   BMI 29.71 kg/m²   Physical Exam  Vitals and nursing note reviewed. Constitutional:       General: He is not in acute distress. Appearance: He is well-developed. He is not ill-appearing. Eyes:      General: Lids are normal. Vision grossly intact. Cardiovascular:      Rate and Rhythm: Normal rate and regular rhythm. Heart sounds: Normal heart sounds, S1 normal and S2 normal. No murmur heard. No friction rub. No gallop. Pulmonary:      Effort: Pulmonary effort is normal. No respiratory distress. Breath sounds: Normal breath sounds. No wheezing. Abdominal:      General: Bowel sounds are normal.      Palpations: Abdomen is soft. There is no mass. Tenderness: There is no abdominal tenderness. There is no guarding. Musculoskeletal:         General: Normal range of motion. Skin:     General: Skin is warm and dry. Capillary Refill: Capillary refill takes less than 2 seconds. Neurological:      General: No focal deficit present. Mental Status: He is alert and oriented to person, place, and time.          Data Review  Labs and chart in care everywhere reviewed Assessment/Plan:      1. Attention deficit hyperactivity disorder (ADHD), predominantly inattentive type  - atomoxetine (STRATTERA) 18 MG capsule; Take 1 capsule by mouth daily  Dispense: 30 capsule; Refill: 1    2. Severe recurrent major depression without psychotic features [facility])  Follow-up psychiatry, continue current regimen per psychiatry    3. Opioid dependence on agonist therapy [facility])  Follow-up with Suboxone clinic    4. Bipolar 1 disorder (HCC)  Continue current regimen per psych    5. Chronic midline low back pain with bilateral sciatica  - gabapentin (NEURONTIN) 600 MG tablet; Take 1 tablet by mouth 3 times daily for 90 days. Dispense: 90 tablet; Refill: 2    6. Other iron deficiency anemia  - Iron And TIBC; Future  - Ferritin; Future  - Vitamin B12 & Folate; Future  - CBC With Auto Differential; Future  - Comprehensive Metabolic Panel; Future    7. Chronic hepatitis C without hepatic coma (HCC)  - Hepatitis C RNA, Quantitative, PCR; Future    8. Screening for thyroid disorder  - TSH With Reflex Ft4; Future    9. Encounter for screening for malignant neoplasm of prostate  - PSA Screening; Future    10. Screen for colon cancer  - Cologuard (For External Results Only); Future    11. Elevated blood sugar  - Hemoglobin A1C; Future    12. Screening for HIV (human immunodeficiency virus)  - HIV Screen; Future    13. Therapeutic opioid-induced constipation (OIC)  Continue fiber supplementation     14. History of substance abuse ([facility]  States clean now for over a year     15. Bipolar disorder with severe depression ([facility]  F/u psychiatry     16.  Severe episode of recurrent major depressive disorder, without psychotic features [facility])  F/u psychiatry            Health Maintenance Due   Topic Date Due    Colon cancer screen colonoscopy  Never done    A1C test (Diabetic or Prediabetic)  12/07/2020       Electronically signed by Marry Webb MD on 10/26/2021 at 11:50 AM

## 2021-10-27 LAB
DIRECT EXAM: ABNORMAL
Lab: ABNORMAL
SPECIMEN DESCRIPTION: ABNORMAL

## 2021-10-29 DIAGNOSIS — D50.8 OTHER IRON DEFICIENCY ANEMIA: Primary | ICD-10-CM

## 2021-10-29 DIAGNOSIS — B19.20 HEPATITIS C TEST POSITIVE: ICD-10-CM

## 2021-10-30 ASSESSMENT — VISUAL ACUITY: OU: 1

## 2021-10-30 ASSESSMENT — ENCOUNTER SYMPTOMS
ABDOMINAL PAIN: 0
VOMITING: 0
DIARRHEA: 0
ANAL BLEEDING: 0
NAUSEA: 0
CHEST TIGHTNESS: 0
CHOKING: 0
WHEEZING: 0
CONSTIPATION: 0
BACK PAIN: 1
SHORTNESS OF BREATH: 0
COUGH: 0
BLOOD IN STOOL: 0

## 2021-11-03 ENCOUNTER — TELEPHONE (OUTPATIENT)
Dept: FAMILY MEDICINE CLINIC | Age: 46
End: 2021-11-03

## 2021-11-03 NOTE — TELEPHONE ENCOUNTER
----- Message from Solitario Palma sent at 11/3/2021 12:14 PM EDT -----  Subject: Appointment Request    Reason for Call: Routine (Patient Request) No Script    QUESTIONS  Type of Appointment? Established Patient  Reason for appointment request? Available appointments did not meet   patient need  Additional Information for Provider? Patient called to rescheduled   appointment due to another Dr visit. First available shown was January 3,   2021 and he needed a 4 week follow up. Patient would like a visit around   10/20/2021 for follow up or if medication can be refilled until next   visit. Please reach to patient to schedule or advise.  ---------------------------------------------------------------------------  --------------  CALL BACK INFO  What is the best way for the office to contact you? OK to leave message on   voicemail  Preferred Call Back Phone Number? [phone]  ---------------------------------------------------------------------------  --------------  SCRIPT ANSWERS  Relationship to Patient? Self  Have your symptoms changed? No  (Is the patient requesting to see the provider for a procedure?)? No  (Is the patient requesting to see the provider urgently  today or   tomorrow. )? No  Have you been diagnosed with, awaiting test results for, or told that you   are suspected of having COVID-19 (Coronavirus)? (If patient has tested   negative or was tested as a requirement for work, school, or travel and   not based on symptoms, answer no)? No  Within the past two weeks have you developed any of the following symptoms   (answer no if symptoms have been present longer than 2 weeks or began   more than 2 weeks ago)? Fever or Chills, Cough, Shortness of breath or   difficulty breathing, Loss of taste or smell, Sore throat, Nasal   congestion, Sneezing or runny nose, Fatigue or generalized body aches   (answer no if pain is specific to a body part e.g. back pain), Diarrhea,   Headache?  No  Have you had close contact with someone with COVID-19 in the last 14 days? No  (Service Expert  click yes below to proceed with bulletn. As Usual   Scheduling)?  Yes

## 2021-11-08 ENCOUNTER — TELEPHONE (OUTPATIENT)
Dept: FAMILY MEDICINE CLINIC | Age: 46
End: 2021-11-08

## 2021-11-08 NOTE — TELEPHONE ENCOUNTER
Called patient and he stated he wanted to make sure he still had refills on his medications, strattera and gabapentin.

## 2021-11-08 NOTE — TELEPHONE ENCOUNTER
----- Message from Amy Leigh sent at 11/8/2021 12:47 PM EST -----  Subject: Message to Provider    QUESTIONS  Information for Provider? need a call from provider concerning medication   and the appointment on 11/18 12 pm, patient stated he can not make appt   already has another appointment on that date  ---------------------------------------------------------------------------  --------------  [address]  What is the best way for the office to contact you? OK to leave message on   voicemail  Preferred Call Back Phone Number? [phone]  ---------------------------------------------------------------------------  --------------  SCRIPT ANSWERS  Relationship to Patient?  Self

## 2021-11-24 ENCOUNTER — OFFICE VISIT (OUTPATIENT)
Dept: GASTROENTEROLOGY | Age: 46
End: 2021-11-24
Payer: COMMERCIAL

## 2021-11-24 VITALS
DIASTOLIC BLOOD PRESSURE: 86 MMHG | SYSTOLIC BLOOD PRESSURE: 139 MMHG | BODY MASS INDEX: 30.71 KG/M2 | WEIGHT: 215 LBS | HEART RATE: 91 BPM | TEMPERATURE: 98.3 F

## 2021-11-24 DIAGNOSIS — B18.2 CHRONIC HEPATITIS C WITHOUT HEPATIC COMA (HCC): Primary | ICD-10-CM

## 2021-11-24 PROCEDURE — G8484 FLU IMMUNIZE NO ADMIN: HCPCS | Performed by: INTERNAL MEDICINE

## 2021-11-24 PROCEDURE — 4004F PT TOBACCO SCREEN RCVD TLK: CPT | Performed by: INTERNAL MEDICINE

## 2021-11-24 PROCEDURE — 99204 OFFICE O/P NEW MOD 45 MIN: CPT | Performed by: INTERNAL MEDICINE

## 2021-11-24 PROCEDURE — G8417 CALC BMI ABV UP PARAM F/U: HCPCS | Performed by: INTERNAL MEDICINE

## 2021-11-24 PROCEDURE — G8427 DOCREV CUR MEDS BY ELIG CLIN: HCPCS | Performed by: INTERNAL MEDICINE

## 2021-11-24 NOTE — PROGRESS NOTES
Reason for Referral:   Lee Walton MD  [address],  [facility] [address]    Chief Complaint   Patient presents with    New Patient     Patient here to discus his anemia and Hep - C and est care     Anemia    Hepatitis C       1. Chronic hepatitis C without hepatic coma ([facility]            HISTORY OF PRESENT ILLNESS: Andrea Huntley is a 55 y.o. male with a past history remarkable for, referred for evaluation of   Chief Complaint   Patient presents with    New Patient     Patient here to discus his anemia and Hep - C and est care     Anemia    Hepatitis C   . Patient seen with a history of positive hepatitis C antibody and anemia. At present hemoglobin is about 11.9. Iron levels, K64 folic acid levels within normal limits. Patient denies symptoms of anemia including weakness, tiredness, fatigability. Has good appetite and no weight loss. Bowel movements normal without diarrhea. No melena hematochezia. Has good appetite and there is no dysphagia. No GERD symptoms. No dyspepsia. .  No history of anticoagulation therapy. Patient's mother had colon polyps. Recently patient had a Cologuard test done and the results are pending. Patient is known to have hepatitis C antibody positive in the last 2 years. He gives history of intravenous drug use. Used to share needles with his girlfriend who had hepatitis C. He did use IV drugs for about 5 years and he is sober in the last 2 years. At present patient is on Suboxone. Patient is living at [facility] drug rehab program.    He denies alcoholism. He had tattoo marks by 10 years ago. No other family members known to have liver disease. He had drug screen done in April 2021 and at that time he was positive for cocaine, barbiturates and amphetamines. Looks like patient was taking Motrin in the past and he states that he stopped this medication.     Past Medical,Family, and Social History reviewed and does contribute to the patient presentingcondition. Patient's PMH/PSH,SH,PSYCH Hx, MEDs, ALLERGIES, and ROS were all reviewed and updated in the appropriate sections. PAST MEDICAL HISTORY:  Past Medical History:   Diagnosis Date    ADHD (attention deficit hyperactivity disorder)     Anxiety     Depression     Hep C w/ coma, chronic     Polysubstance abuse ([facility]     pt currently on Suboxone 4mg therapy; drug abuse includes IV heroin, IV cocaine, cannabis, opiates, \"just about every drug in the world. \"    Substance abuse ([facility]        History reviewed. No pertinent surgical history. CURRENT MEDICATIONS:    Current Outpatient Medications:     buPROPion HCl ER, XL, 450 MG TB24, Take 1 tablet by mouth every morning, Disp: , Rfl:     ibuprofen (ADVIL;MOTRIN) 800 MG tablet, Take 1 tablet by mouth 3 times daily, Disp: , Rfl:     QUEtiapine (SEROQUEL) 100 MG tablet, Take 1 tablet by mouth daily, Disp: , Rfl:     Multiple Vitamin (MULTIVITAMIN) TABS, Take 1 tablet by mouth daily, Disp: , Rfl:     docusate sodium (COLACE) 100 MG capsule, Take 1 capsule by mouth 2 times daily as needed, Disp: , Rfl:     Melatonin 5 MG CAPS, Take 2 capsules by mouth nightly, Disp: , Rfl:     atomoxetine (STRATTERA) 18 MG capsule, Take 1 capsule by mouth daily, Disp: 30 capsule, Rfl: 1    gabapentin (NEURONTIN) 600 MG tablet, Take 1 tablet by mouth 3 times daily for 90 days. , Disp: 90 tablet, Rfl: 2    buprenorphine-naloxone (SUBOXONE) 12-3 MG sublingual film, Place 1 Film under the tongue daily. , Disp: , Rfl:     topiramate (TOPAMAX) 100 MG tablet, Take 100 mg by mouth 2 times daily, Disp: , Rfl:     ARIPiprazole (ABILIFY) 30 MG tablet, Take 30 mg by mouth daily, Disp: , Rfl:     ALLERGIES:   Allergies   Allergen Reactions    Duloxetine      Other reaction(s): Headache  Pt complains of migraine headache       FAMILY HISTORY:       Problem Relation Age of Onset    Diabetes Mother     Hypertension Mother     Depression Mother         & Attends Club or Organization Meetings: Not on file    Marital Status: Not on file   Intimate Partner Violence:     Fear of Current or Ex-Partner: Not on file    Emotionally Abused: Not on file    Physically Abused: Not on file    Sexually Abused: Not on file   Housing Stability:     Unable to Pay for Housing in the Last Year: Not on file    Number of Jacob in the Last Year: Not on file    Unstable Housing in the Last Year: Not on file       REVIEW OF SYSTEMS:       Review of Systems    PHYSICAL EXAMINATION: Vital signs reviewed per the nursing documentation. /86   Pulse 91   Temp 98.3 °F (36.8 °C)   Wt 215 lb (97.5 kg)   BMI 30.71 kg/m²   Body mass index is 30.71 kg/m². Physical Exam  Vitals and nursing note reviewed. Constitutional:       General: He is not in acute distress. Appearance: He is well-developed. HENT:      Head: Normocephalic and atraumatic. Mouth/Throat:      Pharynx: No oropharyngeal exudate. Eyes:      General: No scleral icterus. Conjunctiva/sclera: Conjunctivae normal.      Pupils: Pupils are equal, round, and reactive to light. Neck:      Thyroid: No thyromegaly. Trachea: No tracheal deviation. Cardiovascular:      Rate and Rhythm: Normal rate and regular rhythm. Heart sounds: Normal heart sounds. No murmur heard. Pulmonary:      Effort: Pulmonary effort is normal. No respiratory distress. Breath sounds: Normal breath sounds. No wheezing or rales. Abdominal:      General: Bowel sounds are normal. There is no distension. Palpations: Abdomen is soft. There is no hepatomegaly or mass. Tenderness: There is no abdominal tenderness. There is no guarding or rebound. Hernia: No hernia is present. Genitourinary:     Rectum: Normal.   Musculoskeletal:      Cervical back: Normal range of motion and neck supple. Lymphadenopathy:      Cervical: No cervical adenopathy. Skin:     General: Skin is warm and dry. Findings: No erythema or rash. Neurological:      Mental Status: He is alert and oriented to person, place, and time. Cranial Nerves: No cranial nerve deficit. Psychiatric:         Thought Content: Thought content normal.           LABORATORY DATA: Reviewed  Lab Results   Component Value Date    WBC 5.8 10/26/2021    HGB 11.9 (L) 10/26/2021    HCT 39.7 (L) 10/26/2021    MCV 87.6 10/26/2021     10/26/2021     10/26/2021    K 4.1 10/26/2021     10/26/2021    CO2 22 10/26/2021    BUN 18 10/26/2021    CREATININE 1.09 10/26/2021    LABALBU 4.4 10/26/2021    BILITOT <0.10 (L) 10/26/2021    ALKPHOS 88 10/26/2021    AST 20 10/26/2021    ALT 24 10/26/2021         Lab Results   Component Value Date    RBC 4.53 10/26/2021    HGB 11.9 (L) 10/26/2021    MCV 87.6 10/26/2021    MCH 26.3 10/26/2021    MCHC 30.0 10/26/2021    RDW 16.1 (H) 10/26/2021    MPV 12.0 10/26/2021    BASOPCT 1 10/26/2021    LYMPHSABS 2.28 10/26/2021    MONOSABS 0.42 10/26/2021    NEUTROABS 2.86 10/26/2021    EOSABS 0.11 10/26/2021    BASOSABS 0.06 10/26/2021         DIAGNOSTIC TESTING:     No results found. IMPRESSION: Mr. Jennifer Bruno is a 55 y.o. male with     Assessment:  1. Chronic hepatitis C without hepatic coma (HCC)        Plan:  Patient's liver battery normal.  Hepatitis C RNA positive. Advised hepatitis A antibody and hepatitis B surface antibody to see whether he needs vaccination for hepatitis A and B. Also advised hepatitis C genotyping and other labs. Also may need FibroScan. Advised to see me in the next 4 to 6 weeks. Strongly encouraged not to use IV drugs. Spent 30 minutes providing patient education and counseling. Thank you for allowing me to participate in the care of Mr. Jennifer Bruno. For any further questions please do not hesitate to contact me. Note is dictated utilizing voice recognition software. Unfortunately this leads to occasional typographical errors.  Please contact our office if you have any questions. I have reviewed and agree with the MA/LPN ROS.      Kiarra Harkins MD, [facility]  Board Certified in Gastroenterology and [facility]  Office #: [phone]

## 2021-12-03 ENCOUNTER — TELEPHONE (OUTPATIENT)
Dept: FAMILY MEDICINE CLINIC | Age: 46
End: 2021-12-03

## 2021-12-03 DIAGNOSIS — Z12.11 SCREEN FOR COLON CANCER: ICD-10-CM

## 2021-12-09 ENCOUNTER — HOSPITAL ENCOUNTER (INPATIENT)
Age: 46
LOS: 4 days | Discharge: HOME | DRG: 753 | End: 2021-12-13
Attending: EMERGENCY MEDICINE | Admitting: PSYCHIATRY & NEUROLOGY
Payer: COMMERCIAL

## 2021-12-09 DIAGNOSIS — F32.A DEPRESSION WITH SUICIDAL IDEATION: Primary | ICD-10-CM

## 2021-12-09 DIAGNOSIS — R45.851 DEPRESSION WITH SUICIDAL IDEATION: Primary | ICD-10-CM

## 2021-12-09 LAB
ABSOLUTE EOS #: 0 K/UL (ref 0–0.4)
ABSOLUTE IMMATURE GRANULOCYTE: ABNORMAL K/UL (ref 0–0.3)
ABSOLUTE LYMPH #: 1.4 K/UL (ref 1–4.8)
ABSOLUTE MONO #: 0.5 K/UL (ref 0.1–1.3)
ALBUMIN SERPL-MCNC: 4.8 G/DL (ref 3.5–5.2)
ALBUMIN/GLOBULIN RATIO: ABNORMAL (ref 1–2.5)
ALP BLD-CCNC: 102 U/L (ref 40–129)
ALT SERPL-CCNC: 23 U/L (ref 5–41)
AMPHETAMINE SCREEN URINE: POSITIVE
ANION GAP SERPL CALCULATED.3IONS-SCNC: 10 MMOL/L (ref 9–17)
AST SERPL-CCNC: 19 U/L
BARBITURATE SCREEN URINE: NEGATIVE
BASOPHILS # BLD: 1 % (ref 0–2)
BASOPHILS ABSOLUTE: 0 K/UL (ref 0–0.2)
BENZODIAZEPINE SCREEN, URINE: NEGATIVE
BILIRUB SERPL-MCNC: 0.42 MG/DL (ref 0.3–1.2)
BUN BLDV-MCNC: 11 MG/DL (ref 6–20)
BUN/CREAT BLD: ABNORMAL (ref 9–20)
BUPRENORPHINE URINE: ABNORMAL
CALCIUM SERPL-MCNC: 9.7 MG/DL (ref 8.6–10.4)
CANNABINOID SCREEN URINE: POSITIVE
CHLORIDE BLD-SCNC: 103 MMOL/L (ref 98–107)
CO2: 27 MMOL/L (ref 20–31)
COCAINE METABOLITE, URINE: POSITIVE
CREAT SERPL-MCNC: 0.99 MG/DL (ref 0.7–1.2)
DIFFERENTIAL TYPE: ABNORMAL
EOSINOPHILS RELATIVE PERCENT: 0 % (ref 0–4)
ETHANOL PERCENT: <0.01 %
ETHANOL: <10 MG/DL
GFR AFRICAN AMERICAN: >60 ML/MIN
GFR NON-AFRICAN AMERICAN: >60 ML/MIN
GFR SERPL CREATININE-BSD FRML MDRD: ABNORMAL ML/MIN/{1.73_M2}
GFR SERPL CREATININE-BSD FRML MDRD: ABNORMAL ML/MIN/{1.73_M2}
GLUCOSE BLD-MCNC: 119 MG/DL (ref 70–99)
HCT VFR BLD CALC: 38.1 % (ref 41–53)
HEMOGLOBIN: 12.3 G/DL (ref 13.5–17.5)
IMMATURE GRANULOCYTES: ABNORMAL %
LYMPHOCYTES # BLD: 19 % (ref 24–44)
MCH RBC QN AUTO: 27.2 PG (ref 26–34)
MCHC RBC AUTO-ENTMCNC: 32.3 G/DL (ref 31–37)
MCV RBC AUTO: 84 FL (ref 80–100)
MDMA URINE: ABNORMAL
METHADONE SCREEN, URINE: NEGATIVE
METHAMPHETAMINE, URINE: ABNORMAL
MONOCYTES # BLD: 6 % (ref 1–7)
NRBC AUTOMATED: ABNORMAL PER 100 WBC
OPIATES, URINE: POSITIVE
OXYCODONE SCREEN URINE: NEGATIVE
PDW BLD-RTO: 15.8 % (ref 11.5–14.9)
PHENCYCLIDINE, URINE: NEGATIVE
PLATELET # BLD: 295 K/UL (ref 150–450)
PLATELET ESTIMATE: ABNORMAL
PMV BLD AUTO: 7.7 FL (ref 6–12)
POTASSIUM SERPL-SCNC: 4.6 MMOL/L (ref 3.7–5.3)
PROPOXYPHENE, URINE: ABNORMAL
RBC # BLD: 4.53 M/UL (ref 4.5–5.9)
RBC # BLD: ABNORMAL 10*6/UL
SARS-COV-2, RAPID: NOT DETECTED
SEG NEUTROPHILS: 74 % (ref 36–66)
SEGMENTED NEUTROPHILS ABSOLUTE COUNT: 5.3 K/UL (ref 1.3–9.1)
SODIUM BLD-SCNC: 140 MMOL/L (ref 135–144)
SPECIMEN DESCRIPTION: NORMAL
TEST INFORMATION: ABNORMAL
TOTAL PROTEIN: 8.2 G/DL (ref 6.4–8.3)
TRICYCLIC ANTIDEPRESSANTS, UR: ABNORMAL
WBC # BLD: 7.2 K/UL (ref 3.5–11)
WBC # BLD: ABNORMAL 10*3/UL

## 2021-12-09 PROCEDURE — G0480 DRUG TEST DEF 1-7 CLASSES: HCPCS

## 2021-12-09 PROCEDURE — 36415 COLL VENOUS BLD VENIPUNCTURE: CPT

## 2021-12-09 PROCEDURE — 6370000000 HC RX 637 (ALT 250 FOR IP)

## 2021-12-09 PROCEDURE — 83036 HEMOGLOBIN GLYCOSYLATED A1C: CPT

## 2021-12-09 PROCEDURE — 99283 EMERGENCY DEPT VISIT LOW MDM: CPT

## 2021-12-09 PROCEDURE — 87635 SARS-COV-2 COVID-19 AMP PRB: CPT

## 2021-12-09 PROCEDURE — 85025 COMPLETE CBC W/AUTO DIFF WBC: CPT

## 2021-12-09 PROCEDURE — 1240000000 HC EMOTIONAL WELLNESS R&B

## 2021-12-09 PROCEDURE — 6370000000 HC RX 637 (ALT 250 FOR IP): Performed by: PSYCHIATRY & NEUROLOGY

## 2021-12-09 PROCEDURE — APPSS60 APP SPLIT SHARED TIME 46-60 MINUTES

## 2021-12-09 PROCEDURE — 80307 DRUG TEST PRSMV CHEM ANLYZR: CPT

## 2021-12-09 PROCEDURE — 80053 COMPREHEN METABOLIC PANEL: CPT

## 2021-12-09 RX ORDER — HYDROXYZINE 50 MG/1
50 TABLET, FILM COATED ORAL 3 TIMES DAILY PRN
Status: DISCONTINUED | OUTPATIENT
Start: 2021-12-09 | End: 2021-12-13 | Stop reason: HOSPADM

## 2021-12-09 RX ORDER — PANTOPRAZOLE SODIUM 40 MG/1
40 TABLET, DELAYED RELEASE ORAL
Status: DISCONTINUED | OUTPATIENT
Start: 2021-12-09 | End: 2021-12-13 | Stop reason: HOSPADM

## 2021-12-09 RX ORDER — ARIPIPRAZOLE 30 MG/1
30 TABLET ORAL DAILY
Status: DISCONTINUED | OUTPATIENT
Start: 2021-12-09 | End: 2021-12-10

## 2021-12-09 RX ORDER — MULTIVITAMIN
1 TABLET ORAL DAILY
Status: DISCONTINUED | OUTPATIENT
Start: 2021-12-09 | End: 2021-12-09 | Stop reason: CLARIF

## 2021-12-09 RX ORDER — BUPROPION HYDROCHLORIDE 450 MG/1
1 TABLET, FILM COATED, EXTENDED RELEASE ORAL EVERY MORNING
Status: DISCONTINUED | OUTPATIENT
Start: 2021-12-10 | End: 2021-12-09 | Stop reason: CLARIF

## 2021-12-09 RX ORDER — QUETIAPINE FUMARATE 200 MG/1
200 TABLET, FILM COATED ORAL NIGHTLY PRN
Status: DISCONTINUED | OUTPATIENT
Start: 2021-12-09 | End: 2021-12-10

## 2021-12-09 RX ORDER — CLONIDINE HYDROCHLORIDE 0.1 MG/1
0.1 TABLET ORAL 3 TIMES DAILY
Status: DISCONTINUED | OUTPATIENT
Start: 2021-12-09 | End: 2021-12-13 | Stop reason: HOSPADM

## 2021-12-09 RX ORDER — ACETAMINOPHEN 325 MG/1
650 TABLET ORAL EVERY 4 HOURS PRN
Status: DISCONTINUED | OUTPATIENT
Start: 2021-12-09 | End: 2021-12-13 | Stop reason: HOSPADM

## 2021-12-09 RX ORDER — CHLORPROMAZINE HYDROCHLORIDE 25 MG/1
50 TABLET, FILM COATED ORAL 3 TIMES DAILY PRN
Status: DISCONTINUED | OUTPATIENT
Start: 2021-12-09 | End: 2021-12-13 | Stop reason: HOSPADM

## 2021-12-09 RX ORDER — POLYETHYLENE GLYCOL 3350 17 G/17G
17 POWDER, FOR SOLUTION ORAL DAILY PRN
Status: DISCONTINUED | OUTPATIENT
Start: 2021-12-09 | End: 2021-12-13 | Stop reason: HOSPADM

## 2021-12-09 RX ORDER — TRAZODONE HYDROCHLORIDE 50 MG/1
50 TABLET ORAL NIGHTLY PRN
Status: DISCONTINUED | OUTPATIENT
Start: 2021-12-09 | End: 2021-12-13 | Stop reason: HOSPADM

## 2021-12-09 RX ORDER — M-VIT,TX,IRON,MINS/CALC/FOLIC 27MG-0.4MG
1 TABLET ORAL DAILY
Status: DISCONTINUED | OUTPATIENT
Start: 2021-12-09 | End: 2021-12-13 | Stop reason: HOSPADM

## 2021-12-09 RX ORDER — BUPROPION HYDROCHLORIDE 300 MG/1
300 TABLET ORAL DAILY
Status: DISCONTINUED | OUTPATIENT
Start: 2021-12-09 | End: 2021-12-10

## 2021-12-09 RX ORDER — QUETIAPINE FUMARATE 200 MG/1
200 TABLET, FILM COATED ORAL NIGHTLY PRN
COMMUNITY
End: 2022-04-19

## 2021-12-09 RX ORDER — CHLORPROMAZINE HYDROCHLORIDE 25 MG/ML
50 INJECTION INTRAMUSCULAR 3 TIMES DAILY PRN
Status: DISCONTINUED | OUTPATIENT
Start: 2021-12-09 | End: 2021-12-13 | Stop reason: HOSPADM

## 2021-12-09 RX ORDER — OMEPRAZOLE 20 MG/1
20 CAPSULE, DELAYED RELEASE ORAL DAILY
COMMUNITY
End: 2022-04-19 | Stop reason: SDUPTHER

## 2021-12-09 RX ORDER — GABAPENTIN 600 MG/1
600 TABLET ORAL 3 TIMES DAILY
Status: DISCONTINUED | OUTPATIENT
Start: 2021-12-09 | End: 2021-12-10

## 2021-12-09 RX ORDER — NICOTINE 21 MG/24HR
1 PATCH, TRANSDERMAL 24 HOURS TRANSDERMAL DAILY
Status: DISCONTINUED | OUTPATIENT
Start: 2021-12-09 | End: 2021-12-13

## 2021-12-09 RX ORDER — TIZANIDINE 4 MG/1
4 TABLET ORAL EVERY 6 HOURS PRN
Status: DISCONTINUED | OUTPATIENT
Start: 2021-12-09 | End: 2021-12-13 | Stop reason: HOSPADM

## 2021-12-09 RX ORDER — QUETIAPINE FUMARATE 100 MG/1
100 TABLET, FILM COATED ORAL DAILY
Status: DISCONTINUED | OUTPATIENT
Start: 2021-12-09 | End: 2021-12-09

## 2021-12-09 RX ORDER — MAGNESIUM HYDROXIDE/ALUMINUM HYDROXICE/SIMETHICONE 120; 1200; 1200 MG/30ML; MG/30ML; MG/30ML
30 SUSPENSION ORAL EVERY 6 HOURS PRN
Status: DISCONTINUED | OUTPATIENT
Start: 2021-12-09 | End: 2021-12-13 | Stop reason: HOSPADM

## 2021-12-09 RX ORDER — DICYCLOMINE HYDROCHLORIDE 10 MG/1
10 CAPSULE ORAL 3 TIMES DAILY PRN
Status: DISCONTINUED | OUTPATIENT
Start: 2021-12-09 | End: 2021-12-13 | Stop reason: HOSPADM

## 2021-12-09 RX ORDER — IBUPROFEN 400 MG/1
400 TABLET ORAL EVERY 6 HOURS PRN
Status: DISCONTINUED | OUTPATIENT
Start: 2021-12-09 | End: 2021-12-13 | Stop reason: HOSPADM

## 2021-12-09 RX ORDER — BUPROPION HYDROCHLORIDE 150 MG/1
150 TABLET ORAL DAILY
Status: DISCONTINUED | OUTPATIENT
Start: 2021-12-09 | End: 2021-12-10

## 2021-12-09 RX ORDER — LOPERAMIDE HYDROCHLORIDE 2 MG/1
2 CAPSULE ORAL 4 TIMES DAILY PRN
Status: DISCONTINUED | OUTPATIENT
Start: 2021-12-09 | End: 2021-12-13 | Stop reason: HOSPADM

## 2021-12-09 RX ADMIN — BUPROPION HYDROCHLORIDE 150 MG: 150 TABLET, EXTENDED RELEASE ORAL at 15:15

## 2021-12-09 RX ADMIN — IBUPROFEN 400 MG: 400 TABLET, FILM COATED ORAL at 20:54

## 2021-12-09 RX ADMIN — HYDROXYZINE HYDROCHLORIDE 50 MG: 50 TABLET, FILM COATED ORAL at 20:54

## 2021-12-09 RX ADMIN — GABAPENTIN 600 MG: 600 TABLET, FILM COATED ORAL at 15:15

## 2021-12-09 RX ADMIN — ARIPIPRAZOLE 30 MG: 30 TABLET ORAL at 15:15

## 2021-12-09 RX ADMIN — MULTIPLE VITAMINS W/ MINERALS TAB 1 TABLET: TAB at 15:15

## 2021-12-09 RX ADMIN — CLONIDINE HYDROCHLORIDE 0.1 MG: 0.1 TABLET ORAL at 15:15

## 2021-12-09 RX ADMIN — BUPROPION HYDROCHLORIDE 300 MG: 150 TABLET, EXTENDED RELEASE ORAL at 15:16

## 2021-12-09 RX ADMIN — CLONIDINE HYDROCHLORIDE 0.1 MG: 0.1 TABLET ORAL at 20:54

## 2021-12-09 RX ADMIN — GABAPENTIN 600 MG: 600 TABLET, FILM COATED ORAL at 20:54

## 2021-12-09 ASSESSMENT — ENCOUNTER SYMPTOMS
SHORTNESS OF BREATH: 0
EYE PAIN: 0
COLOR CHANGE: 0
BACK PAIN: 0
ABDOMINAL PAIN: 0

## 2021-12-09 ASSESSMENT — SLEEP AND FATIGUE QUESTIONNAIRES
DIFFICULTY STAYING ASLEEP: NO
AVERAGE NUMBER OF SLEEP HOURS: 6
DO YOU USE A SLEEP AID: NO
SLEEP PATTERN: DIFFICULTY FALLING ASLEEP
DIFFICULTY FALLING ASLEEP: NO
RESTFUL SLEEP: YES
DO YOU HAVE DIFFICULTY SLEEPING: YES
DIFFICULTY ARISING: NO

## 2021-12-09 ASSESSMENT — PATIENT HEALTH QUESTIONNAIRE - PHQ9: SUM OF ALL RESPONSES TO PHQ QUESTIONS 1-9: 9

## 2021-12-09 ASSESSMENT — LIFESTYLE VARIABLES: HISTORY_ALCOHOL_USE: NO

## 2021-12-09 ASSESSMENT — PAIN - FUNCTIONAL ASSESSMENT
PAIN_FUNCTIONAL_ASSESSMENT: 0-10
PAIN_FUNCTIONAL_ASSESSMENT: 0-10

## 2021-12-09 ASSESSMENT — PAIN SCALES - GENERAL
PAINLEVEL_OUTOF10: 0
PAINLEVEL_OUTOF10: 4

## 2021-12-09 NOTE — GROUP NOTE
Group Therapy Note    Date: 12/9/2021    Group Start Time: 1330  Group End Time: 6418  Group Topic: Psychoeducation    [facility]    Patient refused to attend progressive muscle relaxation group at 1330 after encouragement from staff. 1:1 talk time offered by staff as alternative to group session.

## 2021-12-09 NOTE — BH NOTE
Patient CSSR screening autopopulated patient suicide precautions. Provider contacted, patient order discontinued and no further orders at this time.

## 2021-12-09 NOTE — BH NOTE
[facility]  Admission Note     Admission Type:   Admission Type: Voluntary    Reason for admission:  Reason for Admission: +SI brought to ED by [facility]    PATIENT STRENGTHS:  Strengths: Communication, No significant Physical Illness    Patient Strengths and Limitations:  Limitations: Multiple barriers to leisure interests, External locus of control    Addictive Behavior:   Addictive Behavior  In the past 3 months, have you felt or has someone told you that you have a problem with:  : None  Do you have a history of Chemical Use?: No  Do you have a history of Alcohol Use?: No  Do you have a history of Street Drug Abuse?: Yes  Histroy of Prescripton Drug Abuse?: No    Medical Problems:   Past Medical History:   Diagnosis Date    ADHD (attention deficit hyperactivity disorder)     Anxiety     Depression     Hep C w/ coma, chronic     Polysubstance abuse ([facility]     pt currently on Suboxone 4mg therapy; drug abuse includes IV heroin, IV cocaine, cannabis, opiates, \"just about every drug in the world. \"    Substance abuse ([facility]        Status EXAM:  Status and Exam  Normal: No  Facial Expression: Avoids Gaze, Flat, Expressionless  Affect: Appropriate  Level of Consciousness: Alert  Mood:Normal: No  Mood: Depressed, Anxious  Motor Activity:Normal: No  Motor Activity: Decreased  Interview Behavior: Cooperative  Preception: [place] to Person, [place]randa [place] to Time, [place] to Place, [place] to Situation  Attention:Normal: Yes  Thought Content:Normal: Yes  Hallucinations: None  Delusions: No  Memory:Normal: No  Memory: Poor Recent  Insight and Judgment: No  Insight and Judgment: Poor Insight, Poor Judgment  Present Suicidal Ideation: Yes (no active plan)  Present Homicidal Ideation: No    Tobacco Screening:  Practical Counseling, on admission, lali X, if applicable and completed (first 3 are required if patient doesn't refuse):            ( )  Recognizing danger situations (included triggers and roadblocks) ( )  Coping skills (new ways to manage stress, exercise, relaxation techniques, changing routine, distraction)                                                           (x )  Basic information about quitting (benefits of quitting, techniques in how to quit, available resources  (x ) Referral for counseling faxed to [facility]                                           ( ) Patient refused counseling  ( ) Patient has not smoked in the last 30 days    Metabolic Screening:    Lab Results   Component Value Date    LABA1C 5.7 10/26/2021       Lab Results   Component Value Date    CHOL 126 12/07/2019    CHOL 135 03/11/2019    CHOL 109 11/07/2018    CHOL 119 03/09/2018    CHOL 153 04/22/2014     Lab Results   Component Value Date    TRIG 113 12/07/2019    TRIG 69 03/11/2019    TRIG 48 11/07/2018    TRIG 78 03/09/2018    TRIG 112 04/22/2014     Lab Results   Component Value Date    HDL 40 (L) 12/07/2019    HDL 46 03/11/2019    HDL 46 11/07/2018    HDL 35 (L) 03/09/2018    HDL 51 04/22/2014     No components found for: LDLCAL  No results found for: LABVLDL      Body mass index is 27.12 kg/m². BP Readings from Last 2 Encounters:   12/09/21 (!) 146/82   11/24/21 139/86           Pt admitted with followings belongings:  Dental Appliances: None  Vision - Corrective Lenses: Glasses  Hearing Aid: None  Jewelry: Necklace  Body Piercings Removed: N/A  Clothing: Footwear, Jacket / coat, Pants, Shirt, Socks, Undergarments (Comment), Other (Comment) (belt)  Were All Patient Medications Collected?: Not Applicable  Other Valuables: Cell phone, Kassandra Burks     Patient's home medications were  Being reviewed, message left with [employer]. Patient oriented to surroundings and program expectations and copy of patient rights given. Received admission packet  Consents reviewed, signed. patient verbalize understanding. Patient education on precautions.                    Jillian Humphrey RN

## 2021-12-09 NOTE — PROGRESS NOTES
Pharmacy Medication History Note      List of current medications patient is taking is complete. Source of information: [address] [facility] [address] made to medication list:  Medications removed (include reason, ex. therapy complete or physician discontinued, noncompliance):  none      Medications added/doses adjusted:  Seroquel 200 mg bid corrected to 200 mg nightly - ordered prn but takes routinely      Other notes (ex. Recent course of antibiotics, Coumadin dosing):      Denies use of other OTC or herbal medications. yes    Allergies clarified per RN      Please let me know if you have any questions about this encounter. Thank you!     Electronically signed by Gerardo Esparza [facility] on 12/9/2021 at 3:03 PM

## 2021-12-09 NOTE — ED NOTES
[facility] called and  was left with call back number to begin case transfer. @ 80 Tammy called back from [facility]  and Dr Samir Varma to admit under MDD with SI and bed request and voluntary faxed to [facility]. - per [facility] fax is down and information faxed to [facility], patient assigned to room 125-2.

## 2021-12-09 NOTE — ED PROVIDER NOTES
EMERGENCY DEPARTMENT ENCOUNTER    Pt Name: Denise Doll  MRN: [id number]  [place] 1975  Date of evaluation: 12/9/21  CHIEF COMPLAINT       Chief Complaint   Patient presents with    Suicidal     HISTORY OF PRESENT ILLNESS   51-year-old male presents for mental health evaluation. Patient reports for about the last month he has been feeling worsening depression. Patient reports that he is also been having worsening suicidal thoughts. Patient reports that he is to the point where he is concerned he would act on them. Patient denies any specific plan that he start of right now, does admit prior suicide attempt via trying to cut his wrists. Patient denies any homicidal ideation, denies any visual or auditory hallucinations, does admit to meth and cocaine use a couple days ago, denies any other ingestion, denies any other complaints at this time. The history is provided by the patient. REVIEW OF SYSTEMS     Review of Systems   Constitutional: Negative for chills and fever. HENT: Negative for congestion and ear pain. Eyes: Negative for pain. Respiratory: Negative for shortness of breath. Cardiovascular: Negative for chest pain, palpitations and leg swelling. Gastrointestinal: Negative for abdominal pain. Genitourinary: Negative for dysuria and flank pain. Musculoskeletal: Negative for back pain. Skin: Negative for color change. Neurological: Negative for numbness and headaches. Psychiatric/Behavioral: Positive for suicidal ideas. Negative for confusion. All other systems reviewed and are negative. PASTMEDICAL HISTORY     Past Medical History:   Diagnosis Date    ADHD (attention deficit hyperactivity disorder)     Anxiety     Depression     Hep C w/ coma, chronic     Polysubstance abuse ([facility] [address]     pt currently on Suboxone 4mg therapy; drug abuse includes IV heroin, IV cocaine, cannabis, opiates, \"just about every drug in the world. \"    Substance abuse ([facility] [address]      Past Problem List  Patient Active Problem List   Diagnosis Code    MDD (major depressive disorder), recurrent episode ([facility] [address] F33.9    Bipolar I disorder, most recent episode depressed ([facility] [address] F31.30    Opioid type dependence, continuous ([facility] [address] F11.20    Bipolar 1 disorder ([facility] [address] F31.9    Bipolar disorder with severe depression ([facility] [address] F31.4    Opioid dependence on agonist therapy ([facility] [address] F11.20    Chest pain R07.9    Suicidal ideation R45.851    History of substance abuse ([facility] [address] F19.11    Severe recurrent major depression without psychotic features ([facility] [address] F33.2    Therapeutic opioid-induced constipation (OIC) K59.03, T40.2X5A    Cannabis abuse F12.10    Chronic midline low back pain with bilateral sciatica M54.41, M54.42, G89.29    Chronic hepatitis C without hepatic coma (HCC) B18.2    Absolute anemia D64.9    Attention deficit hyperactivity disorder (ADHD), predominantly inattentive type F90.0    Depression with suicidal ideation F32. A, R45.851     SURGICAL HISTORY     History reviewed. No pertinent surgical history. CURRENT MEDICATIONS       Current Discharge Medication List      CONTINUE these medications which have NOT CHANGED    Details   omeprazole (PRILOSEC) 20 MG delayed release capsule Take 20 mg by mouth daily      QUEtiapine (SEROQUEL) 200 MG tablet Take 200 mg by mouth nightly as needed      buPROPion HCl ER, XL, 450 MG TB24 Take 1 tablet by mouth every morning      ibuprofen (ADVIL;MOTRIN) 800 MG tablet Take 1 tablet by mouth 3 times daily      Multiple Vitamin (MULTIVITAMIN) TABS Take 1 tablet by mouth daily      Melatonin 5 MG CAPS Take 2 capsules by mouth nightly      atomoxetine (STRATTERA) 18 MG capsule Take 1 capsule by mouth daily  Qty: 30 capsule, Refills: 1    Associated Diagnoses: Attention deficit hyperactivity disorder (ADHD), predominantly inattentive type      gabapentin (NEURONTIN) 600 MG tablet Take 1 tablet by mouth 3 times daily for 90 days.   Qty: 90 tablet, Refills: 2    Associated Diagnoses: Chronic midline low back pain with bilateral sciatica      buprenorphine-naloxone (SUBOXONE) 12-3 MG sublingual film Place 1 Film under the tongue daily. LF 12/6/21, fills weekly, takes daily      topiramate (TOPAMAX) 100 MG tablet Take 100 mg by mouth 2 times daily      ARIPiprazole (ABILIFY) 30 MG tablet Take 30 mg by mouth daily           ALLERGIES     is allergic to duloxetine. FAMILY HISTORY     He indicated that the status of his mother is unknown. He indicated that the status of his father is unknown. He indicated that the status of his other is unknown. SOCIAL HISTORY       Social History     Tobacco Use    Smoking status: Current Every Day Smoker     Packs/day: 0.25     Years: 31.00     Pack years: 7.75     Types: Cigarettes    Smokeless tobacco: Current User     Types: Chew    Tobacco comment: chews more than smokies   Vaping Use    Vaping Use: Never used   Substance Use Topics    Alcohol use: Not Currently     Comment: occasional ETOH use    Drug use: Not Currently     Frequency: 7.0 times per week     Types: Marijuana (Dylan Vogel), IV, Cocaine     Comment: pt currently taking Suboxone; drug abuse includes IV heroin, IV cocaine, cannabis, opiates, \"just about every drug in the world. \"     PHYSICAL EXAM     INITIAL VITALS: BP (!) 146/82   Pulse 108   Temp 98.2 °F (36.8 °C) (Temporal)   Resp 15   Ht 6' (1.829 m)   Wt 200 lb (90.7 kg)   SpO2 100%   BMI 27.12 kg/m²    Physical Exam  Vitals and nursing note reviewed. Constitutional:       Appearance: Normal appearance. HENT:      Head: Normocephalic and atraumatic. Right Ear: External ear normal.      Left Ear: External ear normal.      Nose: Nose normal.      Mouth/Throat:      Mouth: Mucous membranes are moist.   Eyes:      Pupils: Pupils are equal, round, and reactive to light. Cardiovascular:      Rate and Rhythm: Normal rate and regular rhythm. Pulses: Normal pulses. Heart sounds: Normal heart sounds. Pulmonary:      Effort: Pulmonary effort is normal.      Breath sounds: Normal breath sounds. Abdominal:      General: Abdomen is flat. Palpations: Abdomen is soft. Tenderness: There is no abdominal tenderness. Musculoskeletal:         General: No tenderness. Normal range of motion. Cervical back: Neck supple. Skin:     General: Skin is warm and dry. Capillary Refill: Capillary refill takes less than 2 seconds. Neurological:      General: No focal deficit present. Mental Status: He is alert and oriented to person, place, and time. Cranial Nerves: Cranial nerves are intact. Sensory: Sensation is intact. Motor: Motor function is intact. Coordination: Coordination is intact. Psychiatric:         Behavior: Behavior normal.         Thought Content: Thought content includes suicidal ideation. Thought content does not include suicidal plan. MEDICAL DECISION MAKIN-year-old male presents for mental health evaluation. On initial exam patient in no acute distress, vitals are stable, patient with active suicidal thoughts, no specific plan however concerned that he would act on these thoughts, will check labs for medical clearance, patient to be evaluated by social work as well    Labs reviewed and unremarkable, discussed with social work, given patient with active suicidal thoughts and concern that he will act on them, both myself and social work in agreement patient benefit from admission, patient medically clear, patient is agreeable to admission         CRITICAL CARE:       PROCEDURES:    Procedures    DIAGNOSTIC RESULTS   EKG:All EKG's are interpreted by the Emergency Department Physician who either signs or Co-signs this chart in the absence of a cardiologist.        RADIOLOGY:All plain film, CT, MRI, and formal ultrasound images (except ED bedside ultrasound) are read by the radiologist, see reports below, unless otherwisenoted in MDM or here.   No orders to display     LABS: All lab results were reviewed by myself, and all abnormals are listed below.   Labs Reviewed   CBC WITH AUTO DIFFERENTIAL - Abnormal; Notable for the following components:       Result Value    Hemoglobin 12.3 (*)     Hematocrit 38.1 (*)     RDW 15.8 (*)     Seg Neutrophils 74 (*)     Lymphocytes 19 (*)     All other components within normal limits   COMPREHENSIVE METABOLIC PANEL W/ REFLEX TO MG FOR LOW K - Abnormal; Notable for the following components:    Glucose 119 (*)     All other components within normal limits   URINE DRUG SCREEN - Abnormal; Notable for the following components:    Amphetamine Screen, Ur POSITIVE (*)     Cocaine Metabolite, Urine POSITIVE (*)     Opiates, Urine POSITIVE (*)     Cannabinoid Scrn, Ur POSITIVE (*)     All other components within normal limits   COVID-19, RAPID   ETHANOL       EMERGENCY DEPARTMENTCOURSE:         Vitals:    Vitals:    12/09/21 0802 12/09/21 1203   BP: (!) 149/87 (!) 146/82   Pulse: 103 108   Resp: 16 15   Temp: 98.1 °F (36.7 °C) 98.2 °F (36.8 °C)   TempSrc: Oral Temporal   SpO2: 99% 100%   Weight: 200 lb (90.7 kg) 200 lb (90.7 kg)   Height: 6' (1.829 m) 6' (1.829 m)       The patient was given the following medications while in the emergency department:  Orders Placed This Encounter   Medications    nicotine (NICODERM CQ) 14 MG/24HR 1 patch    acetaminophen (TYLENOL) tablet 650 mg    aluminum & magnesium hydroxide-simethicone (MAALOX) 200-200-20 MG/5ML suspension 30 mL    hydrOXYzine (ATARAX) tablet 50 mg    ibuprofen (ADVIL;MOTRIN) tablet 400 mg    polyethylene glycol (GLYCOLAX) packet 17 g    traZODone (DESYREL) tablet 50 mg    cloNIDine (CATAPRES) tablet 0.1 mg    tiZANidine (ZANAFLEX) tablet 4 mg    loperamide (IMODIUM) capsule 2 mg    dicyclomine (BENTYL) capsule 10 mg    chlorproMAZINE (THORAZINE) tablet 50 mg    ARIPiprazole (ABILIFY) tablet 30 mg    DISCONTD: buPROPion HCl ER (XL) TB24 1 tablet    gabapentin (NEURONTIN) tablet 600 mg    DISCONTD: Multivitamin TABS 1 tablet    DISCONTD: QUEtiapine (SEROQUEL) tablet 100 mg    AND Linked Order Group     buPROPion (WELLBUTRIN XL) extended release tablet 150 mg     buPROPion (WELLBUTRIN XL) extended release tablet 300 mg    therapeutic multivitamin-minerals 1 tablet    QUEtiapine (SEROQUEL) tablet 200 mg    pantoprazole (PROTONIX) tablet 40 mg    chlorproMAZINE (THORAZINE) injection 50 mg     CONSULTS:  None    FINAL IMPRESSION      1. Depression with suicidal ideation          DISPOSITION/PLAN   DISPOSITION Admitted 12/09/2021 11:12:10 AM      PATIENT REFERRED TO:  No follow-up provider specified. DISCHARGE MEDICATIONS:  Current Discharge Medication List        The care is provided during an unprecedented national emergency due to the novel coronavirus, COVID 19.   Lena Tucker DO  Attending Emergency Physician                  Lena Tucker DO  12/09/21 9811
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
## 2021-12-09 NOTE — CARE COORDINATION
PHI spoke with Joie Calderon this date regarding patient involvement with recovery programming.  Amanda hdz pt was discharged from [facility] 12/5/21 per his request, reported to [facility] staff that he had \"stable housing with a friend,\" and was discharged from program. Elkin hdz pt has upcoming appointment with Laney June his NP 12/16/2021 @1220pm. Breathing spontaneous and unlabored. Breath sounds clear and equal bilaterally with regular rhythm.

## 2021-12-09 NOTE — PLAN OF CARE
[facility]  Initial Interdisciplinary Treatment Plan NO      Original treatment plan Date & Time: 12/9/21 1501    Admission Type:  Admission Type: Voluntary    Reason for admission:   Reason for Admission: +SI brought to ED by [facility]    Estimated Length of Stay:  5-7days  Estimated Discharge Date: to be determined by physician    PATIENT STRENGTHS:  Patient Strengths:Strengths: Communication, No significant Physical Illness  Patient Strengths and Limitations:Limitations: Tendency to isolate self, Multiple barriers to leisure interests, Inappropriate/potentially harmful leisure interests  Addictive Behavior: Addictive Behavior  In the past 3 months, have you felt or has someone told you that you have a problem with:  : None  Do you have a history of Chemical Use?: No  Do you have a history of Alcohol Use?: No  Do you have a history of Street Drug Abuse?: Yes  Histroy of Prescripton Drug Abuse?: No  Medical Problems:  Past Medical History:   Diagnosis Date    ADHD (attention deficit hyperactivity disorder)     Anxiety     Depression     Hep C w/ coma, chronic     Polysubstance abuse ([facility] [address]     pt currently on Suboxone 4mg therapy; drug abuse includes IV heroin, IV cocaine, cannabis, opiates, \"just about every drug in the world. \"    Substance abuse ([facility] [address]      Status EXAM:Status and Exam  Normal: No  Facial Expression: Avoids Gaze, Flat, Expressionless  Affect: Appropriate  Level of Consciousness: Alert  Mood:Normal: No  Mood: Depressed, Anxious  Motor Activity:Normal: No  Motor Activity: Decreased  Interview Behavior: Cooperative  Preception: [place] to Person, [place] to Time, [place] to Place, [place] to Situation  Attention:Normal: Yes  Thought Content:Normal: Yes  Hallucinations: None  Delusions: No  Memory:Normal: No  Memory: Poor Recent  Insight and Judgment: No  Insight and Judgment: Poor Insight, Poor Judgment  Present Suicidal Ideation: Yes (no active plan)  Present Homicidal Ideation: No    EDUCATION:   Learner Progress Toward Treatment Goals: reviewed group plans and strategies for care    Method:group therapy, medication compliance, individualized assessments and care planning    Outcome: needs reinforcement    PATIENT GOALS: to be discussed with patient within 72 hours    PLAN/TREATMENT RECOMMENDATIONS:     continue group therapy , medications compliance, goal setting, individualized assessments and care, continue to monitor pt on unit      SHORT-TERM GOALS:   Time frame for Short-Term Goals: 5-7 days    LONG-TERM GOALS:  Time frame for Long-Term Goals: 6 months  Members Present in Team Meeting: See Signature Sheet    Federico Acuna

## 2021-12-10 PROCEDURE — 6370000000 HC RX 637 (ALT 250 FOR IP): Performed by: PSYCHIATRY & NEUROLOGY

## 2021-12-10 PROCEDURE — 1240000000 HC EMOTIONAL WELLNESS R&B

## 2021-12-10 PROCEDURE — 99254 IP/OBS CNSLTJ NEW/EST MOD 60: CPT | Performed by: INTERNAL MEDICINE

## 2021-12-10 PROCEDURE — 6370000000 HC RX 637 (ALT 250 FOR IP)

## 2021-12-10 PROCEDURE — 99222 1ST HOSP IP/OBS MODERATE 55: CPT | Performed by: PSYCHIATRY & NEUROLOGY

## 2021-12-10 RX ORDER — GABAPENTIN 300 MG/1
300 CAPSULE ORAL 3 TIMES DAILY
Status: DISCONTINUED | OUTPATIENT
Start: 2021-12-10 | End: 2021-12-13 | Stop reason: HOSPADM

## 2021-12-10 RX ORDER — QUETIAPINE FUMARATE 200 MG/1
200 TABLET, FILM COATED ORAL NIGHTLY
Status: DISCONTINUED | OUTPATIENT
Start: 2021-12-10 | End: 2021-12-13 | Stop reason: HOSPADM

## 2021-12-10 RX ADMIN — NICOTINE POLACRILEX 2 MG: 2 GUM, CHEWING BUCCAL at 14:49

## 2021-12-10 RX ADMIN — BUPROPION HYDROCHLORIDE 300 MG: 150 TABLET, EXTENDED RELEASE ORAL at 08:15

## 2021-12-10 RX ADMIN — PANTOPRAZOLE SODIUM 40 MG: 40 TABLET, DELAYED RELEASE ORAL at 08:16

## 2021-12-10 RX ADMIN — ARIPIPRAZOLE 30 MG: 30 TABLET ORAL at 08:15

## 2021-12-10 RX ADMIN — QUETIAPINE FUMARATE 200 MG: 200 TABLET ORAL at 21:59

## 2021-12-10 RX ADMIN — GABAPENTIN 300 MG: 300 CAPSULE ORAL at 21:59

## 2021-12-10 RX ADMIN — PANTOPRAZOLE SODIUM 40 MG: 40 TABLET, DELAYED RELEASE ORAL at 14:49

## 2021-12-10 RX ADMIN — CLONIDINE HYDROCHLORIDE 0.1 MG: 0.1 TABLET ORAL at 08:16

## 2021-12-10 RX ADMIN — MULTIPLE VITAMINS W/ MINERALS TAB 1 TABLET: TAB at 08:15

## 2021-12-10 RX ADMIN — GABAPENTIN 300 MG: 300 CAPSULE ORAL at 14:48

## 2021-12-10 RX ADMIN — CLONIDINE HYDROCHLORIDE 0.1 MG: 0.1 TABLET ORAL at 21:59

## 2021-12-10 RX ADMIN — IBUPROFEN 400 MG: 400 TABLET, FILM COATED ORAL at 21:59

## 2021-12-10 RX ADMIN — HYDROXYZINE HYDROCHLORIDE 50 MG: 50 TABLET, FILM COATED ORAL at 21:59

## 2021-12-10 RX ADMIN — NICOTINE POLACRILEX 2 MG: 2 GUM, CHEWING BUCCAL at 08:15

## 2021-12-10 RX ADMIN — BUPROPION HYDROCHLORIDE 150 MG: 150 TABLET, EXTENDED RELEASE ORAL at 08:16

## 2021-12-10 RX ADMIN — NICOTINE POLACRILEX 2 MG: 2 GUM, CHEWING BUCCAL at 17:40

## 2021-12-10 RX ADMIN — CLONIDINE HYDROCHLORIDE 0.1 MG: 0.1 TABLET ORAL at 14:48

## 2021-12-10 RX ADMIN — NICOTINE POLACRILEX 2 MG: 2 GUM, CHEWING BUCCAL at 11:19

## 2021-12-10 RX ADMIN — GABAPENTIN 600 MG: 600 TABLET, FILM COATED ORAL at 08:15

## 2021-12-10 ASSESSMENT — PAIN SCALES - GENERAL: PAINLEVEL_OUTOF10: 5

## 2021-12-10 NOTE — GROUP NOTE
Group Therapy Note    Date: 12/10/2021    Group Start Time: 1330  Group End Time: 0262  Group Topic: Psychoeducation    BARBER Stearns, ALEXXS    Patient refused to attend leisure skills group at 1330 after encouragement from staff. 1:1 talk time offered by staff as alternative to group session.

## 2021-12-10 NOTE — PLAN OF CARE
Problem: Suicide risk  Goal: Provide patient with safe environment  Description: Provide patient with safe environment  12/10/2021 1010 by Douglas Mcarthur LPN  Outcome: Ongoing     Problem: Depressive Behavior With or Without Suicide Precautions:  Goal: Ability to disclose and discuss suicidal ideas will improve  Description: Ability to disclose and discuss suicidal ideas will improve  12/10/2021 1010 by Douglas Mcarthur LPN  Outcome: Ongoing   Patient denies intent to harm self, safe environment maintained, support and encouragement provided.

## 2021-12-10 NOTE — GROUP NOTE
Group Therapy Note    Date: 12/10/2021    Group Start Time: 1000  Group End Time: 2044  Group Topic: Psychotherapy    [address], LSW    patient refused to attend psychotherapy group at [facility] after encouragement from staff.   1:1 talk time provided as alternative to group session

## 2021-12-10 NOTE — PLAN OF CARE
[address] [place] [address]  Day 3 Interdisciplinary Treatment Plan NOTE    Review Date & Time: 12/10/2021 1312    Admission Type:   Admission Type: Voluntary    Reason for admission:  Reason for Admission: +SI brought to ED by TPD  Estimated Length of Stay:  5-7 days  Estimated Discharge Date Update: to be determined by physician    PATIENT STRENGTHS:  Patient Strengths:Strengths: Communication, No significant Physical Illness  Patient Strengths and Limitations:Limitations: Tendency to isolate self, Multiple barriers to leisure interests, Inappropriate/potentially harmful leisure interests  Addictive Behavior:Addictive Behavior  In the past 3 months, have you felt or has someone told you that you have a problem with:  : None  Do you have a history of Chemical Use?: No  Do you have a history of Alcohol Use?: No  Do you have a history of Street Drug Abuse?: Yes  Histroy of Prescripton Drug Abuse?: No  Medical Problems:  Past Medical History:   Diagnosis Date    ADHD (attention deficit hyperactivity disorder)     Anxiety     Depression     Hep C w/ coma, chronic     Polysubstance abuse ([facility] [address]     pt currently on Suboxone 4mg therapy; drug abuse includes IV heroin, IV cocaine, cannabis, opiates, \"just about every drug in the world. \"    Substance abuse ([facility]        Risk:  Fall RiskTotal: 81  Jeff Scale Jeff Scale Score: 22  BVC    Change in scores:  No Changes to plan of Care:  No    Status EXAM:   Status and Exam  Normal: No  Facial Expression: Flat, Sad  Affect: Appropriate  Level of Consciousness: Alert  Mood:Normal: No  Mood: Depressed, Anxious  Motor Activity:Normal: Yes  Motor Activity: Decreased  Interview Behavior: Cooperative  Preception: Stevenson to Person, Jeremi Lavonia to Time, Stevenson to Place, Stevenson to Situation  Attention:Normal: Yes  Thought Processes: Circumstantial  Thought Content:Normal: No  Thought Content: Preoccupations  Hallucinations: None  Delusions: No  Memory:Normal: Yes  Memory: Poor Recent  Insight and Judgment: No  Insight and Judgment: Poor Judgment, Poor Insight, Unmotivated  Present Suicidal Ideation: Yes (contracts for safety)  Present Homicidal Ideation: No    Daily Assessment Last Entry:   Daily Sleep (WDL): Within Defined Limits         Patient Currently in Pain: Denies  Daily Nutrition (WDL): Within Defined Limits    Patient Monitoring:  Frequency of Checks: 4 times per hour, close    Psychiatric Symptoms:   Depression Symptoms  Depression Symptoms: Isolative, Feelings of helplessness  Anxiety Symptoms  Anxiety Symptoms: Generalized  Lianna Symptoms  Lianna Symptoms: No problems reported or observed. Psychosis Symptoms  Delusion Type: No problems reported or observed. Suicide Risk CSSR-S:  1) Within the past month, have you wished you were dead or wished you could go to sleep and not wake up? : Yes  2) Have you actually had any thoughts of killing yourself? : No  3) Have you been thinking about how you might kill yourself? : No  5) Have you started to work out or worked out the details of how to kill yourself?  Do you intend to carry out this plan? : No  6) Have you ever done anything, started to do anything, or prepared to do anything to end your life?: No  Change in Result: Change in Plan of care:       EDUCATION:   Learner Progress Toward Treatment Goals:  Reviewed results and recommendations of this team, Reviewed group plan and strategies, Reviewed signs, symptoms and risk of self harm and violent behavior, Reviewed goals and plan of care    Method:  small group, individual verbal education    Outcome: Verbalized by patient but needs reinforcement to obtain goals    PATIENT GOALS:  Short term:  Pt declined to participate  Long term:  Pt declined to participate    PLAN/TREATMENT RECOMMENDATIONS UPDATE:  continue with group therapies, increased socialization, continue planning for after discharge goals, continue with medication compliance    SHORT-TERM GOALS UPDATE:   Time frame for Short-Term Goals:  5-7 days    LONG-TERM GOALS UPDATE:   Time frame for Long-Term Goals:  6 months    Members Present in Team Meeting:   See signature sheet  Federico Acuna

## 2021-12-10 NOTE — PLAN OF CARE
Problem: Suicide risk  Goal: Provide patient with safe environment  Description: Provide patient with safe environment  12/9/2021 2250 by Antonia Martinez LPN  Outcome: Ongoing  Note: Patient provided safe environment within [facility]. Will continue to monitor and provide q15 min safety checks. Problem: Depressive Behavior With or Without Suicide Precautions:  Goal: Able to verbalize acceptance of life and situations over which he or she has no control  Description: Able to verbalize acceptance of life and situations over which he or she has no control  12/9/2021 2250 by Antonia Martinez LPN  Outcome: Ongoing  Note: Patient was unable to verbalize acceptance of life and situations in which he has no control at this time. Problem: Depressive Behavior With or Without Suicide Precautions:  Goal: Ability to disclose and discuss suicidal ideas will improve  Description: Ability to disclose and discuss suicidal ideas will improve  12/9/2021 2250 by Antonia Martinez LPN  Outcome: Ongoing  Note: Patient admits to fleeting suicidal ideations with no plan. Patient contracts for safety. Patient educated and agrees to come to staff if this occurs. Patient monitored every 15 minutes with environmental safety checks. Problem: Risk of Harm:  Goal: Ability to remain free from injury will improve  Description: Ability to remain free from injury will improve  12/9/2021 2250 by Antonia Martinez LPN  Outcome: Ongoing  Note: Patient is free from injury at this time. Patient admits to fleeting suicidal ideations with no plan. Patient contracts for safety. Patient monitored every 15 minutes with environmental safety checks. Problem: Substance Abuse:  Goal: Absence of drug withdrawal signs and symptoms  Description: Absence of drug withdrawal signs and symptoms  12/9/2021 2250 by Antonia Martinez LPN  Outcome: Ongoing  Note: Patient is absent of withdrawal symptoms at this time. Patient educated and agrees to come to staff if this occurs. Patient monitored every 15 minutes with environmental safety checks. Problem: Tobacco Use:  Goal: Inpatient tobacco use cessation counseling participation  Description: Inpatient tobacco use cessation counseling participation  12/9/2021 2250 by Toney Vargas LPN  Outcome: Ongoing  Note: Patient given tobacco quitline number 50316917844 at this time, refusing to call at this time, states \" I just dont want to quit now\"- patient given information as to the dangers of long term tobacco use. Continue to reinforce the importance of tobacco cessation.

## 2021-12-10 NOTE — PROGRESS NOTES
Behavioral Services  Medicare Certification Upon Admission    I certify that this patient's inpatient psychiatric hospital admission is medically necessary for:    [x] (1) Treatment which could reasonably be expected to improve this patient's condition,       [x] (2) Or for diagnostic study;     AND     [x](2) The inpatient psychiatric services are provided while the individual is under the care of a physician and are included in the individualized plan of care.     Estimated length of stay/service 4 to 7 days    Plan for post-hospital care inpatient rehab versus home with outpatient community mental health follow-up    Electronically signed by Devi Londono MD on 12/10/2021 at 9:35 AM

## 2021-12-10 NOTE — BH NOTE
Pt did not attend goals [email or web address] d/t resting in room despite staff invitation to attend. 1:1 talk time offered as alternative to group session, pt declined and remained isolative to self.

## 2021-12-10 NOTE — CONSULTS
[facility] Internal Medicine    CONSULTATION / HISTORY AND PHYSICAL EXAMINATION            Date:   12/10/2021  Patient name:  Remy Kaplan  Date of admission:  12/9/2021  7:59 AM  MRN:   [id number]  Account:  [de-identified]  YOB: 1975  PCP:    Debbie Ramos MD  Room:   [facility]0125-  Code Status:    Full Code    Physician Requesting Consult: Cheo Arce MD    Reason for Consult:  medical management    Chief Complaint:     Chief Complaint   Patient presents with    Suicidal       History Obtained From:     Patient medical record nursing staff    History of Present Illness:   Patient admitted to [facility] floor with major depression  Patient past medical history of hypertension, polysubstance abuse  Internal medicine, consulted for management of hypertension, readings of elevated blood sugars  Patient told me that he was diagnosed with hypertension years ago, he was taking clonidine, unfortunately his clonidine ran out few days ago, he does not check his blood pressure at home  No other Complains     Past Medical History:     Past Medical History:   Diagnosis Date    ADHD (attention deficit hyperactivity disorder)     Anxiety     Depression     Hep C w/ coma, chronic     Polysubstance abuse ([facility]     pt currently on Suboxone 4mg therapy; drug abuse includes IV heroin, IV cocaine, cannabis, opiates, \"just about every drug in the world. \"    Substance abuse ([facility] [address]         Past Surgical History:     History reviewed. No pertinent surgical history. Medications Prior to Admission:     Prior to Admission medications    Medication Sig Start Date End Date Taking?  Authorizing Provider   omeprazole (PRILOSEC) 20 MG delayed release capsule Take 20 mg by mouth daily   Yes Historical Provider, MD   QUEtiapine (SEROQUEL) 200 MG tablet Take 200 mg by mouth nightly as needed   Yes Historical Provider, MD   buPROPion HCl ER, XL, 450 MG TB24 Take 1 tablet by mouth every morning 10/21/21  Yes Glo ROSANNE Marie   ibuprofen (ADVIL;MOTRIN) 800 MG tablet Take 1 tablet by mouth 3 times daily 9/30/21  Yes Glo ROSANNE Marie   Multiple Vitamin (MULTIVITAMIN) TABS Take 1 tablet by mouth daily 9/29/21  Yes Historical Provider, MD   Melatonin 5 MG CAPS Take 2 capsules by mouth nightly 10/25/21  Yes Historical Provider, MD   atomoxetine (STRATTERA) 18 MG capsule Take 1 capsule by mouth daily 10/26/21  Yes Arti Verner Kil, MD   gabapentin (NEURONTIN) 600 MG tablet Take 1 tablet by mouth 3 times daily for 90 days. Patient taking differently: Take 600 mg by mouth 3 times daily. LF 11/18/21 for 30 days 10/26/21 1/24/22 Yes Arti Verner Kil, MD   buprenorphine-naloxone (SUBOXONE) 12-3 MG sublingual film Place 1 Film under the tongue daily. LF 12/6/21, fills weekly, takes daily   Yes Historical Provider, MD   topiramate (TOPAMAX) 100 MG tablet Take 100 mg by mouth 2 times daily   Yes Historical Provider, MD   ARIPiprazole (ABILIFY) 30 MG tablet Take 30 mg by mouth daily   Yes Historical Provider, MD        Allergies:     Duloxetine    Social History:     Tobacco:    reports that he has been smoking cigarettes. He has a 7.75 pack-year smoking history. His smokeless tobacco use includes chew. Alcohol:      reports previous alcohol use. Drug Use:  reports previous drug use. Frequency: 7.00 times per week. Drugs: Marijuana (Nathalie Sermon), IV, and Cocaine. Family History:     Family History   Problem Relation Age of Onset    Diabetes Mother     Hypertension Mother     Depression Mother         & Uncles    Coronary Art Dis Father     Cancer Other         G. Parent  ? Review of Systems:     Positive and Negative as described in HPI. CONSTITUTIONAL:  negative for fevers, chills, sweats, fatigue, weight loss  HEENT:  negative for vision, hearing changes, runny nose, throat pain  RESPIRATORY:  negative for shortness of breath, cough, congestion, wheezing.   CARDIOVASCULAR:  negative for chest pain, palpitations. GASTROINTESTINAL:  negative for nausea, vomiting, diarrhea, constipation, change in bowel habits, abdominal pain   GENITOURINARY:  negative for difficulty of urination, burning with urination, frequency   INTEGUMENT:  negative for rash, skin lesions, easy bruising   HEMATOLOGIC/LYMPHATIC:  negative for swelling/edema   ALLERGIC/IMMUNOLOGIC:  negative for urticaria , itching  ENDOCRINE:  negative increase in drinking, increase in urination, hot or cold intolerance  MUSCULOSKELETAL:  negative joint pains, muscle aches, swelling of joints  NEUROLOGICAL:  negative for headaches, dizziness, lightheadedness, numbness, pain, tingling extremities  BEHAVIOR/PSYCH:  Depressed     Physical Exam:     /75   Pulse 92   Temp 97.7 °F (36.5 °C) (Oral)   Resp 14   Ht 6' (1.829 m)   Wt 200 lb (90.7 kg)   SpO2 100%   BMI 27.12 kg/m²   Temp (24hrs), Av.2 °F (36.8 °C), Min:97.7 °F (36.5 °C), Max:98.6 °F (37 °C)    No results for input(s): POCGLU in the last 72 hours. No intake or output data in the 24 hours ending 12/10/21 1548    General Appearance:  alert, well appearing, and in no acute distress  Mental status: oriented to person, place, and time with normal affect  Head:  normocephalic, atraumatic. Eye: no icterus, redness, pupils equal and reactive, extraocular eye movements intact, conjunctiva clear  Ear: normal external ear, no discharge, hearing intact  Nose:  no drainage noted  Mouth: mucous membranes moist  Neck: supple, no carotid bruits, thyroid not palpable  Lungs: Bilateral equal air entry, clear to ausculation, no wheezing, rales or rhonchi, normal effort  Cardiovascular: normal rate, regular rhythm, no murmur, gallop, rub.   Abdomen: Soft, nontender, nondistended, normal bowel sounds, no hepatomegaly or splenomegaly  Neurologic: There are no new focal motor or sensory deficits, normal muscle tone and bulk, no abnormal sensation, normal speech, cranial nerves II through XII grossly intact  Skin: No gross lesions, rashes, bruising or bleeding on exposed skin area  Extremities:  peripheral pulses palpable, no pedal edema or calf pain with palpation  Psych: Investigations:      Laboratory Testing:  No results found for this or any previous visit (from the past 24 hour(s)). Consultations:   IP CONSULT TO INTERNAL MEDICINE  Assessment :      Primary Problem  Severe recurrent major depression without psychotic features [facility])    Active Hospital Problems    Diagnosis Date Noted    Depression with suicidal ideation [F32. A, R45.851] 12/09/2021    Chronic hepatitis C without hepatic coma ([facility] [address] [B18.2] 10/26/2021    Cannabis abuse [F12.10] 04/11/2021    Severe recurrent major [facility] without psychotic features [facility] [F33.2] 09/09/2020    History of substance abuse ([facility] [F19.11]        Plan:     1. HTN -blood pressure was uncontrolled, started On home dose of Clonidine   2. Readings of high blood sugars, likely due to sympathomimetic effect of drug abuse  3. Will recheck HbA1c  4. Polysubstance abuse, advise to stop using [facility]         Juanita Sims MD  12/10/2021  3:48 PM    Copy sent to Dr. Eddie Gutierrez MD    Please note that this chart was generated using voice recognition Dragon dictation software. Although every effort was made to ensure the accuracy of this automated transcription, some errors in transcription may have occurred.

## 2021-12-10 NOTE — GROUP NOTE
Group Therapy Note    Date: 12/10/2021    Group Start Time: 1100  Group End Time: 1130  Group Topic: Healthy Living/Wellness    [facility]    Patient refused to attend health and fitness/walking group at 1100 after encouragement from staff. 1:1 talk time offered by staff as alternative to group session.

## 2021-12-11 PROCEDURE — 6370000000 HC RX 637 (ALT 250 FOR IP): Performed by: PSYCHIATRY & NEUROLOGY

## 2021-12-11 PROCEDURE — 6370000000 HC RX 637 (ALT 250 FOR IP)

## 2021-12-11 PROCEDURE — 1240000000 HC EMOTIONAL WELLNESS R&B

## 2021-12-11 PROCEDURE — 99231 SBSQ HOSP IP/OBS SF/LOW 25: CPT | Performed by: NURSE PRACTITIONER

## 2021-12-11 RX ADMIN — GABAPENTIN 300 MG: 300 CAPSULE ORAL at 21:49

## 2021-12-11 RX ADMIN — GABAPENTIN 300 MG: 300 CAPSULE ORAL at 15:57

## 2021-12-11 RX ADMIN — CLONIDINE HYDROCHLORIDE 0.1 MG: 0.1 TABLET ORAL at 15:57

## 2021-12-11 RX ADMIN — CLONIDINE HYDROCHLORIDE 0.1 MG: 0.1 TABLET ORAL at 21:49

## 2021-12-11 RX ADMIN — TIZANIDINE 4 MG: 4 TABLET ORAL at 21:49

## 2021-12-11 RX ADMIN — NICOTINE POLACRILEX 2 MG: 2 GUM, CHEWING BUCCAL at 12:00

## 2021-12-11 RX ADMIN — NICOTINE POLACRILEX 2 MG: 2 GUM, CHEWING BUCCAL at 10:52

## 2021-12-11 RX ADMIN — GABAPENTIN 300 MG: 300 CAPSULE ORAL at 08:39

## 2021-12-11 RX ADMIN — PANTOPRAZOLE SODIUM 40 MG: 40 TABLET, DELAYED RELEASE ORAL at 08:39

## 2021-12-11 RX ADMIN — DICYCLOMINE HYDROCHLORIDE 10 MG: 10 CAPSULE ORAL at 08:39

## 2021-12-11 RX ADMIN — HYDROXYZINE HYDROCHLORIDE 50 MG: 50 TABLET, FILM COATED ORAL at 21:49

## 2021-12-11 RX ADMIN — QUETIAPINE FUMARATE 200 MG: 200 TABLET ORAL at 21:49

## 2021-12-11 RX ADMIN — NICOTINE POLACRILEX 2 MG: 2 GUM, CHEWING BUCCAL at 08:40

## 2021-12-11 RX ADMIN — NICOTINE POLACRILEX 2 MG: 2 GUM, CHEWING BUCCAL at 22:41

## 2021-12-11 RX ADMIN — MULTIPLE VITAMINS W/ MINERALS TAB 1 TABLET: TAB at 08:39

## 2021-12-11 RX ADMIN — NICOTINE POLACRILEX 2 MG: 2 GUM, CHEWING BUCCAL at 15:56

## 2021-12-11 RX ADMIN — CLONIDINE HYDROCHLORIDE 0.1 MG: 0.1 TABLET ORAL at 08:39

## 2021-12-11 RX ADMIN — PANTOPRAZOLE SODIUM 40 MG: 40 TABLET, DELAYED RELEASE ORAL at 15:57

## 2021-12-11 ASSESSMENT — PAIN SCALES - GENERAL: PAINLEVEL_OUTOF10: 5

## 2021-12-11 NOTE — PLAN OF CARE
Problem: Suicide risk  Goal: Provide patient with safe environment  Description: Provide patient with safe environment  12/10/2021 2243 by Toney Vargas LPN  Outcome: Ongoing  Note: Patient provided safe environment within [facility] milieu. Will continue to monitor and provide q15 min safety checks. Problem: Depressive Behavior With or Without Suicide Precautions:  Goal: Able to verbalize acceptance of life and situations over which he or she has no control  Description: Able to verbalize acceptance of life and situations over which he or she has no control  12/10/2021 2243 by Toney Vargas LPN  Outcome: Ongoing  Note: Patient was able to verbalize acceptance of life and situations in which he has no control this shift. Problem: Depressive Behavior With or Without Suicide Precautions:  Goal: Ability to disclose and discuss suicidal ideas will improve  Description: Ability to disclose and discuss suicidal ideas will improve  12/10/2021 2243 by Toney Vargas LPN  Outcome: Ongoing  Note:  Patient denies suicidal ideations at this time. Patient agreed to seek staff at anytime he felt like any urges to harm self would arise. Safety checks maintained [id number]. Problem: Risk of Harm:  Goal: Ability to remain free from injury will improve  Description: Ability to remain free from injury will improve  12/10/2021 2243 by Toney Vargas LPN  Outcome: Ongoing  Note: Patient denies suicidal/homicidal ideations and hallucinations this shift. Patient remains free from injury this shift. Patient is isolative to room this shift. Patient monitored every 15 minutes with environmental safety checks. Problem: Substance Abuse:  Goal: Absence of drug withdrawal signs and symptoms  Description: Absence of drug withdrawal signs and symptoms  12/10/2021 2243 by Toney Vargas LPN  Outcome: Ongoing  Note: Patient is absent of withdrawal symptoms at this time. Patient educated and agrees to come to staff if this occurs.  Patient monitored every 15 minutes with environmental safety checks. Problem: Tobacco Use:  Goal: Inpatient tobacco use cessation counseling participation  Description: Inpatient tobacco use cessation counseling participation  12/10/2021 2243 by Eliana Lomeli LPN  Outcome: Ongoing  Note: Patient given tobacco quitline number 38765686409 at this time, refusing to call at this time, states \" I just dont want to quit now\"- patient given information as to the dangers of long term tobacco use. Continue to reinforce the importance of tobacco cessation.

## 2021-12-11 NOTE — GROUP NOTE
Group Therapy Note    Date: 12/11/2021    Group Start Time: 1500  Group End Time: 6348  Group Topic: Cognitive Skills    BARBER Monzon, CTRS        Group Therapy Note    Attendees: 5/15         Pt did not participate in Cognitive Skills Group at 1500 when encouraged by RT due to resting in room. Pt was offered talk time as an alternative to group but declined.          Discipline Responsible: Psychoeducational Specialist        Signature:  Chong Thomas

## 2021-12-11 NOTE — PROGRESS NOTES
HISTORY:  Family History   Problem Relation Age of Onset    Diabetes Mother     Hypertension Mother     Depression Mother         & Uncles    Coronary Art Dis Father     Cancer Other         G. Parent  ? Social History     Socioeconomic History    Marital status: Legally      Spouse name: Janine Tamez Number of children: 2    Years of education: Not on file    Highest education level: Not on file   Occupational History    Not on file   Tobacco Use    Smoking status: Current Every Day Smoker     Packs/day: 0.25     Years: 31.00     Pack years: 7.75     Types: Cigarettes    Smokeless tobacco: Current User     Types: Chew    Tobacco comment: chews more than smokies   Vaping Use    Vaping Use: Never used   Substance and Sexual Activity    Alcohol use: Not Currently     Comment: occasional ETOH use    Drug use: Not Currently     Frequency: 7.0 times per week     Types: Marijuana ([place]), IV, Cocaine     Comment: pt currently taking Suboxone; drug abuse includes IV heroin, IV cocaine, cannabis, opiates, \"just about every drug in the world. \"    Sexual activity: Yes     Partners: Female   Other Topics Concern    Not on file   Social History Narrative    Not on file     Social Determinants of Health     Financial Resource Strain: Medium Risk    Difficulty of Paying Living Expenses: Somewhat hard   Food Insecurity: Food Insecurity Present    Worried About Running Out of Food in the Last Year: Sometimes true    Lizette of Food in the Last Year: Sometimes true   Transportation Needs:     Lack of Transportation (Medical): Not on file    Lack of Transportation (Non-Medical):  Not on file   Physical Activity:     Days of Exercise per Week: Not on file    Minutes of Exercise per Session: Not on file   Stress:     Feeling of Stress : Not on file   Social Connections:     Frequency of Communication with Friends and Family: Not on file    Frequency of Social Gatherings with Friends and Family: Not on file    Attends Spiritism Services: Not on file    Active Member of Clubs or Organizations: Not on file    Attends Club or Organization Meetings: Not on file    Marital Status: Not on file   Intimate Partner Violence:     Fear of Current or Ex-Partner: Not on file    Emotionally Abused: Not on file    Physically Abused: Not on file    Sexually Abused: Not on file   Housing Stability:     Unable to Pay for Housing in the Last Year: Not on file    Number of [place] in the Last Year: Not on file    Unstable Housing in the Last Year: Not on file           ROS:  [x] All negative/unchanged except if checked.  Explain positive(checked items) below:  [] Constitutional  [] Eyes  [] Ear/Nose/Mouth/Throat  [] Respiratory  [] CV  [] GI  []   [] Musculoskeletal  [] Skin/Breast  [] Neurological  [] Endocrine  [] Heme/Lymph  [] Allergic/Immunologic    Explanation:     MEDICATIONS:    Current Facility-Administered Medications:     gabapentin (NEURONTIN) capsule 300 mg, 300 mg, Oral, TID, Yaritza Sánchez MD, 300 mg at 12/11/21 0839    QUEtiapine (SEROQUEL) tablet 200 mg, 200 mg, Oral, Nightly, Yaritza Sánchez MD, 200 mg at 12/10/21 2159    nicotine (NICODERM CQ) 14 MG/24HR 1 patch, 1 patch, TransDERmal, Daily, Román Alejandro MD    acetaminophen (TYLENOL) tablet 650 mg, 650 mg, Oral, Q4H PRN, Román Alejandro MD    aluminum & magnesium hydroxide-simethicone (MAALOX) 200-200-20 MG/5ML suspension 30 mL, 30 mL, Oral, Q6H PRN, Román Alejandro MD    hydrOXYzine (ATARAX) tablet 50 mg, 50 mg, Oral, TID PRN, Román Alejandro MD, 50 mg at 12/10/21 2159    ibuprofen (ADVIL;MOTRIN) tablet 400 mg, 400 mg, Oral, Q6H PRN, Román Alejandro MD, 400 mg at 12/10/21 2159    polyethylene glycol (GLYCOLAX) packet 17 g, 17 g, Oral, Daily PRN, Román Alejandro MD    traZODone (DESYREL) tablet 50 mg, 50 mg, Oral, Nightly PRN, Román Alejandro MD    cloNIDine (CATAPRES) tablet 0.1 mg, 0.1 mg, Oral, TID, PRADEEP Monaco CNP, 0.1 mg at 12/11/21 0839    tiZANidine (ZANAFLEX) tablet 4 mg, 4 mg, Oral, Q6H PRN, PRADEEP Monaoc CNP    loperamide (IMODIUM) capsule 2 mg, 2 mg, Oral, 4x Daily PRN, PRADEEP Monaco CNP    dicyclomine (BENTYL) capsule 10 mg, 10 mg, Oral, TID PRN, PRADEEP Monaco CNP, 10 mg at 12/11/21 0839    chlorproMAZINE (THORAZINE) tablet 50 mg, 50 mg, Oral, TID PRN, PRADEEP Monaco CNP    therapeutic multivitamin-minerals 1 tablet, 1 tablet, Oral, Daily, PRADEEP Monaco CNP, 1 tablet at 12/11/21 0839    pantoprazole (PROTONIX) tablet 40 mg, 40 mg, Oral, BID AC, PRADEEP Monaco CNP, 40 mg at 12/11/21 0839    chlorproMAZINE (THORAZINE) injection 50 mg, 50 mg, IntraMUSCular, TID PRN, PRADEEP Monaco CNP    nicotine polacrilex (NICORETTE) gum 2 mg, 2 mg, Oral, PRN, Royer Sommers MD, 2 mg at 12/11/21 1200      Examination:  /71   Pulse 89   Temp 96.9 °F (36.1 °C) (Oral)   Resp 14   Ht 6' (1.829 m)   Wt 200 lb (90.7 kg)   SpO2 100%   BMI 27.12 kg/m²   Gait - steady  Medication side effects(SE): denies     Mental Status Examination:    Level of consciousness:  within normal limits   Appearance:  fair grooming and fair hygiene  Behavior/Motor:  no abnormalities noted  Attitude toward examiner:  cooperative, attentive and good eye contact  Speech:  spontaneous, normal rate and normal volume   Mood: \"ok\"  Affect:  mood incongruent, blunted  Thought processes:  linear and goal directed   Thought content:  Homicidal ideation - none  Suicidal Ideation:  Improving, able to contract for safety on unit  Delusions:  no evidence of delusions  Perceptual Disturbance:  denies any perceptual disturbance  Cognition:  oriented to person, place, and time   Concentration intact  Insight fair   Judgement fair     ASSESSMENT:   Suicidal ideation  Stimulant Use Disorder (Cocaine) (Methamphetamines)  Opioid recognition technology. **

## 2021-12-11 NOTE — PLAN OF CARE
Problem: Depressive Behavior With or Without Suicide Precautions:  Goal: Able to verbalize acceptance of life and situations over which he or she has no control  Description: Able to verbalize acceptance of life and situations over which he or she has no control  Outcome: Ongoing   Patient is able to verbalize acceptance of life and situations over which he has no control. Problem: Depressive Behavior With or Without Suicide Precautions:  Goal: Ability to disclose and discuss suicidal ideas will improve  Description: Ability to disclose and discuss suicidal ideas will improve  Outcome: Ongoing   Patient is working on the ability  to disclose and discuss suicidal ideas will improve.

## 2021-12-11 NOTE — GROUP NOTE
Group Therapy Note    Date: 12/11/2021    Group Start Time: 3648  Group End Time: 8920  Group Topic: Psychoeducation    [address],[address], MSW, LSW        Group Therapy Note    Attendees: 6/16         Patient's Goal:  Increase interpersonal relationship skills and identify positive coping skills. Status After Intervention:  Improved    Participation Level:  Active Listener and Interactive    Participation Quality: Appropriate, Attentive and Sharing      Speech:  normal      Thought Process/Content: Logical      Affective Functioning: Congruent      Mood: euthymic      Level of consciousness:  Alert and Attentive      Response to Learning: Able to verbalize current knowledge/experience and Able to verbalize/acknowledge new learning      Endings: None Reported    Modes of Intervention: Support and Socialization      Discipline Responsible: /Counselor      Signature:  Krystina Horn MSW, LSW

## 2021-12-12 LAB
ESTIMATED AVERAGE GLUCOSE: 120 MG/DL
HBA1C MFR BLD: 5.8 % (ref 4–6)

## 2021-12-12 PROCEDURE — 6370000000 HC RX 637 (ALT 250 FOR IP)

## 2021-12-12 PROCEDURE — 6370000000 HC RX 637 (ALT 250 FOR IP): Performed by: PSYCHIATRY & NEUROLOGY

## 2021-12-12 PROCEDURE — 99232 SBSQ HOSP IP/OBS MODERATE 35: CPT | Performed by: NURSE PRACTITIONER

## 2021-12-12 PROCEDURE — 1240000000 HC EMOTIONAL WELLNESS R&B

## 2021-12-12 PROCEDURE — 99232 SBSQ HOSP IP/OBS MODERATE 35: CPT | Performed by: INTERNAL MEDICINE

## 2021-12-12 RX ADMIN — MULTIPLE VITAMINS W/ MINERALS TAB 1 TABLET: TAB at 09:07

## 2021-12-12 RX ADMIN — NICOTINE POLACRILEX 2 MG: 2 GUM, CHEWING BUCCAL at 21:33

## 2021-12-12 RX ADMIN — HYDROXYZINE HYDROCHLORIDE 50 MG: 50 TABLET, FILM COATED ORAL at 21:30

## 2021-12-12 RX ADMIN — NICOTINE POLACRILEX 2 MG: 2 GUM, CHEWING BUCCAL at 09:09

## 2021-12-12 RX ADMIN — QUETIAPINE FUMARATE 200 MG: 200 TABLET ORAL at 21:30

## 2021-12-12 RX ADMIN — IBUPROFEN 400 MG: 400 TABLET, FILM COATED ORAL at 09:07

## 2021-12-12 RX ADMIN — GABAPENTIN 300 MG: 300 CAPSULE ORAL at 13:51

## 2021-12-12 RX ADMIN — GABAPENTIN 300 MG: 300 CAPSULE ORAL at 21:30

## 2021-12-12 RX ADMIN — PANTOPRAZOLE SODIUM 40 MG: 40 TABLET, DELAYED RELEASE ORAL at 16:50

## 2021-12-12 RX ADMIN — NICOTINE POLACRILEX 2 MG: 2 GUM, CHEWING BUCCAL at 11:43

## 2021-12-12 RX ADMIN — NICOTINE POLACRILEX 2 MG: 2 GUM, CHEWING BUCCAL at 13:52

## 2021-12-12 RX ADMIN — PANTOPRAZOLE SODIUM 40 MG: 40 TABLET, DELAYED RELEASE ORAL at 08:16

## 2021-12-12 RX ADMIN — DICYCLOMINE HYDROCHLORIDE 10 MG: 10 CAPSULE ORAL at 09:07

## 2021-12-12 RX ADMIN — CLONIDINE HYDROCHLORIDE 0.1 MG: 0.1 TABLET ORAL at 21:30

## 2021-12-12 RX ADMIN — GABAPENTIN 300 MG: 300 CAPSULE ORAL at 09:07

## 2021-12-12 RX ADMIN — CLONIDINE HYDROCHLORIDE 0.1 MG: 0.1 TABLET ORAL at 13:51

## 2021-12-12 RX ADMIN — NICOTINE POLACRILEX 2 MG: 2 GUM, CHEWING BUCCAL at 16:50

## 2021-12-12 RX ADMIN — CLONIDINE HYDROCHLORIDE 0.1 MG: 0.1 TABLET ORAL at 09:07

## 2021-12-12 RX ADMIN — TIZANIDINE 4 MG: 4 TABLET ORAL at 21:30

## 2021-12-12 RX ADMIN — NICOTINE POLACRILEX 2 MG: 2 GUM, CHEWING BUCCAL at 18:50

## 2021-12-12 RX ADMIN — IBUPROFEN 400 MG: 400 TABLET, FILM COATED ORAL at 18:51

## 2021-12-12 ASSESSMENT — PAIN DESCRIPTION - DESCRIPTORS: DESCRIPTORS: ACHING

## 2021-12-12 ASSESSMENT — PAIN DESCRIPTION - LOCATION: LOCATION: BACK

## 2021-12-12 ASSESSMENT — PAIN DESCRIPTION - PAIN TYPE: TYPE: CHRONIC PAIN

## 2021-12-12 ASSESSMENT — PAIN SCALES - GENERAL
PAINLEVEL_OUTOF10: 5
PAINLEVEL_OUTOF10: 4
PAINLEVEL_OUTOF10: 2

## 2021-12-12 ASSESSMENT — PAIN - FUNCTIONAL ASSESSMENT: PAIN_FUNCTIONAL_ASSESSMENT: ACTIVITIES ARE NOT PREVENTED

## 2021-12-12 ASSESSMENT — PAIN DESCRIPTION - ORIENTATION: ORIENTATION: UPPER;LOWER

## 2021-12-12 ASSESSMENT — PAIN DESCRIPTION - FREQUENCY: FREQUENCY: INTERMITTENT

## 2021-12-12 ASSESSMENT — PAIN DESCRIPTION - ONSET: ONSET: ON-GOING

## 2021-12-12 ASSESSMENT — PAIN DESCRIPTION - PROGRESSION: CLINICAL_PROGRESSION: NOT CHANGED

## 2021-12-12 NOTE — CONSULTS
[facility] Internal Medicine    CONSULTATION / HISTORY AND PHYSICAL EXAMINATION            Date:   12/12/2021  Patient name:  Ashia Pavon  Date of admission:  12/9/2021  7:59 AM  MRN:   [id number]  Account:  [de-identified]  YOB: 1975  PCP:    Mckayla Cee MD  Room:   [facility]0125-02  Code Status:    Full Code    Physician Requesting Consult: Dorcas Lerner MD    Reason for Consult:  medical management    Chief Complaint:     Chief Complaint   Patient presents with    Suicidal       History Obtained From:     Patient medical record nursing staff    History of Present Illness:   Patient admitted to [facility] floor with major depression  Patient past medical history of hypertension, polysubstance abuse  Internal medicine, consulted for management of hypertension, readings of elevated blood sugars  Patient told me that he was diagnosed with hypertension years ago, he was taking clonidine, unfortunately his clonidine ran out few days ago, he does not check his blood pressure at home  No other Complains     Past Medical History:     Past Medical History:   Diagnosis Date    ADHD (attention deficit hyperactivity disorder)     Anxiety     Depression     Hep C w/ coma, chronic     Polysubstance abuse ([facility] [address]     pt currently on Suboxone 4mg therapy; drug abuse includes IV heroin, IV cocaine, cannabis, opiates, \"just about every drug in the world. \"    Substance abuse ([facility] [address]         Past Surgical History:     History reviewed. No pertinent surgical history. Medications Prior to Admission:     Prior to Admission medications    Medication Sig Start Date End Date Taking?  Authorizing Provider   omeprazole (PRILOSEC) 20 MG delayed release capsule Take 20 mg by mouth daily   Yes Historical Provider, MD   QUEtiapine (SEROQUEL) 200 MG tablet Take 200 mg by mouth nightly as needed   Yes Historical Provider, MD   buPROPion HCl ER, XL, 450 MG TB24 Take 1 tablet by mouth every morning 10/21/21  Yes Bala Shaikh PA-C   ibuprofen (ADVIL;MOTRIN) 800 MG tablet Take 1 tablet by mouth 3 times daily 9/30/21  Yes Bala Sahikh PA-C   Multiple Vitamin (MULTIVITAMIN) TABS Take 1 tablet by mouth daily 9/29/21  Yes Historical Provider, MD   Melatonin 5 MG CAPS Take 2 capsules by mouth nightly 10/25/21  Yes Historical Provider, MD   atomoxetine (STRATTERA) 18 MG capsule Take 1 capsule by mouth daily 10/26/21  Yes Isabel Rasmussen MD   gabapentin (NEURONTIN) 600 MG tablet Take 1 tablet by mouth 3 times daily for 90 days. Patient taking differently: Take 600 mg by mouth 3 times daily. LF 11/18/21 for 30 days 10/26/21 1/24/22 Yes Isabel Rasmussen MD   buprenorphine-naloxone (SUBOXONE) 12-3 MG sublingual film Place 1 Film under the tongue daily. LF 12/6/21, fills weekly, takes daily   Yes Historical Provider, MD   topiramate (TOPAMAX) 100 MG tablet Take 100 mg by mouth 2 times daily   Yes Historical Provider, MD   ARIPiprazole (ABILIFY) 30 MG tablet Take 30 mg by mouth daily   Yes Historical Provider, MD        Allergies:     Duloxetine    Social History:     Tobacco:    reports that he has been smoking cigarettes. He has a 7.75 pack-year smoking history. His smokeless tobacco use includes chew. Alcohol:      reports previous alcohol use. Drug Use:  reports previous drug use. Frequency: 7.00 times per week. Drugs: Marijuana ([place]), IV, and Cocaine. Family History:     Family History   Problem Relation Age of Onset    Diabetes Mother     Hypertension Mother     Depression Mother         & Uncles    Coronary Art Dis Father     Cancer Other         G. Parent  ? Review of Systems:     Positive and Negative as described in HPI. CONSTITUTIONAL:  negative for fevers, chills, sweats, fatigue, weight loss  HEENT:  negative for vision, hearing changes, runny nose, throat pain  RESPIRATORY:  negative for shortness of breath, cough, congestion, wheezing.   CARDIOVASCULAR:  negative for chest pain, palpitations. GASTROINTESTINAL:  negative for nausea, vomiting, diarrhea, constipation, change in bowel habits, abdominal pain   GENITOURINARY:  negative for difficulty of urination, burning with urination, frequency   INTEGUMENT:  negative for rash, skin lesions, easy bruising   HEMATOLOGIC/LYMPHATIC:  negative for swelling/edema   ALLERGIC/IMMUNOLOGIC:  negative for urticaria , itching  ENDOCRINE:  negative increase in drinking, increase in urination, hot or cold intolerance  MUSCULOSKELETAL:  negative joint pains, muscle aches, swelling of joints  NEUROLOGICAL:  negative for headaches, dizziness, lightheadedness, numbness, pain, tingling extremities  BEHAVIOR/PSYCH:  Depressed     Physical Exam:     /63   Pulse 90   Temp 98.4 °F (36.9 °C) (Oral)   Resp 14   Ht 6' (1.829 m)   Wt 200 lb (90.7 kg)   SpO2 100%   BMI 27.12 kg/m²   Temp (24hrs), Av.8 °F (36.6 °C), Min:97.1 °F (36.2 °C), Max:98.4 °F (36.9 °C)    No results for input(s): POCGLU in the last 72 hours. No intake or output data in the 24 hours ending 21 1713    General Appearance:  alert, well appearing, and in no acute distress  Mental status: oriented to person, place, and time with normal affect  Head:  normocephalic, atraumatic. Eye: no icterus, redness, pupils equal and reactive, extraocular eye movements intact, conjunctiva clear  Ear: normal external ear, no discharge, hearing intact  Nose:  no drainage noted  Mouth: mucous membranes moist  Neck: supple, no carotid bruits, thyroid not palpable  Lungs: Bilateral equal air entry, clear to ausculation, no wheezing, rales or rhonchi, normal effort  Cardiovascular: normal rate, regular rhythm, no murmur, gallop, rub.   Abdomen: Soft, nontender, nondistended, normal bowel sounds, no hepatomegaly or splenomegaly  Neurologic: There are no new focal motor or sensory deficits, normal muscle tone and bulk, no abnormal sensation, normal speech, cranial nerves II through XII grossly intact  Skin: No gross lesions, rashes, bruising or bleeding on exposed skin area  Extremities:  peripheral pulses palpable, no pedal edema or calf pain with palpation  Psych: Investigations:      Laboratory Testing:  No results found for this or any previous visit (from the past 24 hour(s)). Consultations:   IP CONSULT TO INTERNAL MEDICINE  Assessment :      Primary Problem  Severe recurrent major depression without psychotic features [facility])    Active Hospital Problems    Diagnosis Date Noted    Depression with suicidal ideation [F32. A, R45.851] 12/09/2021    Chronic hepatitis C without hepatic coma ([facility] [address] [B18.2] 10/26/2021    Cannabis abuse [F12.10] 04/11/2021    Severe recurrent major depression without psychotic features ([facility] [address] [F33.2] 09/09/2020    History of substance abuse ([facility] [F19.11]        Plan:     1. HTN -blood pressure was uncontrolled, started On home dose of Clonidine   2. Readings of high blood sugars, likely due to sympathomimetic effect of drug abuse  3. A1c is 5.9 diagnosed with prediabetes advised for weight loss lifestyle changes and exercise patient is agreeable  4. Polysubstance abuse, advise to stop using [facility]   We will sign off please call as needed      Adeline Carranza MD  12/12/2021  5:13 PM    Copy sent to Dr. Nima Webb MD    Please note that this chart was generated using voice recognition Dragon dictation software. Although every effort was made to ensure the accuracy of this automated transcription, some errors in transcription may have occurred.

## 2021-12-12 NOTE — GROUP NOTE
Group Therapy Note    Date: 12/12/2021    Group Start Time: 1030  Group End Time: 7792  Group Topic: Psychoeducation    [address] [place] [address],[address], MSW, CHINYEREW        Group Therapy Note    Attendees: 3/16         Patient refused to attend psychoeducation group at 10:30 am after encouragement from staff.   1:1 talk time provided as alternative to group session    Discipline Responsible: /Counselor      Signature:  UZAIR Park, RED

## 2021-12-12 NOTE — PROGRESS NOTES
BEHAVIORAL HEALTH FOLLOW-UP NOTE     12/12/2021   Chief Complaint: Suicidal ideation     Reviewed patient's current plan of care and vital signs with nursing staff. Sleep:reports had \"good\" sleep last night  Attending groups: Not today  Patient was seen and examined in person, patient compliant with medication, reports no side effects. Subjective    Patient seen face-to-face for follow-up assessment. He is medication compliant. We discussed discontinuation of both his Abilify and Wellbutrin secondary to both working on dopamine and counteracting each other. Did review restarting Abilify for mood stability as patient verbalizes a history of bipolar disorder and impulse control. He states that he feels better off the medications at this time and does not wish to start either again. He does verbalize having a psychiatrist outpatient and feels comfortable talking about reinitiation of the medication if necessary. Patient remains focused on discharge. He reports modest opiate withdrawals and states that the most significant is body cramping which he rates as 5 out of 10, with 10 indicating the most severe. States he also has some anxiety but has not required any daytime hydroxyzine. He does endorse modest improvement in suicidal ideation but is unable to contract for safety off unit. Feels structure has been beneficial and is fearful of relapse. Patient denies desire to attend inpatient AOD and states that he would prefer IOP. We will continue inpatient hospitalization for stability at this time.     Appetite:   [x] Normal/Unchanged  [] Increased  [] Decreased      Sleep:       [] Normal/Unchanged  [x] Fair       [] Poor              Energy:    [] Normal/Unchanged  [] Increased  [x] Decreased        Aggression:  [] yes  [x] no    Patient is [x] able  [] unable to CONTRACT FOR SAFETY ON THE UNIT    PAST MEDICAL/PSYCHIATRIC HISTORY:   Past Medical History:   Diagnosis Date    ADHD (attention deficit hyperactivity disorder)     Anxiety     Depression     Hep C w/ coma, chronic     Polysubstance abuse ([facility]     pt currently on Suboxone 4mg therapy; drug abuse includes IV heroin, IV cocaine, cannabis, opiates, \"just about every drug in the world. \"    Substance abuse ([facility]        FAMILY/SOCIAL HISTORY:  Family History   Problem Relation Age of Onset    Diabetes Mother     Hypertension Mother     Depression Mother         & Uncles    Coronary Art Dis Father     Cancer Other         G. Parent  ? Social History     Socioeconomic History    Marital status: Legally      Spouse name: Miguel Almonte Number of children: 2    Years of education: Not on file    Highest education level: Not on file   Occupational History    Not on file   Tobacco Use    Smoking status: Current Every Day Smoker     Packs/day: 0.25     Years: 31.00     Pack years: 7.75     Types: Cigarettes    Smokeless tobacco: Current User     Types: Chew    Tobacco comment: chews more than smokies   Vaping Use    Vaping Use: Never used   Substance and Sexual Activity    Alcohol use: Not Currently     Comment: occasional ETOH use    Drug use: Not Currently     Frequency: 7.0 times per week     Types: Marijuana (Yvette Croft), IV, Cocaine     Comment: pt currently taking Suboxone; drug abuse includes IV heroin, IV cocaine, cannabis, opiates, \"just about every drug in the world. \"    Sexual activity: Yes     Partners: Female   Other Topics Concern    Not on file   Social History Narrative    Not on file     Social Determinants of Health     Financial Resource Strain: Medium Risk    Difficulty of Paying Living Expenses: Somewhat hard   Food Insecurity: Food Insecurity Present    Worried About Running Out of Food in the Last Year: Sometimes true    Lizette of Food in the Last Year: Sometimes true   Transportation Needs:     Lack of Transportation (Medical): Not on file    Lack of Transportation (Non-Medical):  Not on file   Physical Activity:     Days of Exercise per Week: Not on file    Minutes of Exercise per Session: Not on file   Stress:     Feeling of Stress : Not on file   Social Connections:     Frequency of Communication with Friends and Family: Not on file    Frequency of Social Gatherings with Friends and Family: Not on file    Attends Worship Services: Not on file    Active Member of [address] [place] [address] or Organizations: Not on file    Attends Club or Organization Meetings: Not on file    Marital Status: Not on file   Intimate Partner Violence:     Fear of Current or Ex-Partner: Not on file    Emotionally Abused: Not on file    Physically Abused: Not on file    Sexually Abused: Not on file   Housing Stability:     Unable to Pay for Housing in the Last Year: Not on file    Number of [place] in the Last Year: Not on file    Unstable Housing in the Last Year: Not on file           ROS:  [x] All negative/unchanged except if checked.  Explain positive(checked items) below:  [] Constitutional  [] Eyes  [] Ear/Nose/Mouth/Throat  [] Respiratory  [] CV  [] GI  []   [] Musculoskeletal  [] Skin/Breast  [] Neurological  [] Endocrine  [] Heme/Lymph  [] Allergic/Immunologic    Explanation:     MEDICATIONS:    Current Facility-Administered Medications:     gabapentin (NEURONTIN) capsule 300 mg, 300 mg, Oral, TID, Ya Henry MD, 300 mg at 12/12/21 1351    QUEtiapine (SEROQUEL) tablet 200 mg, 200 mg, Oral, Nightly, Ya Henry MD, 200 mg at 12/11/21 2149    nicotine (NICODERM CQ) 14 MG/24HR 1 patch, 1 patch, TransDERmal, Daily, Yarelis Rodrigues MD    acetaminophen (TYLENOL) tablet 650 mg, 650 mg, Oral, Q4H PRN, Yarelis Rodrigues MD    aluminum & magnesium hydroxide-simethicone (MAALOX) 200-200-20 MG/5ML suspension 30 mL, 30 mL, Oral, Q6H PRN, Yarelis Rodrigues MD    hydrOXYzine (ATARAX) tablet 50 mg, 50 mg, Oral, TID PRN, Yarelis Rodrigues MD, 50 mg at 12/11/21 2149    ibuprofen (ADVIL;MOTRIN) tablet 400 mg, 400 mg, Oral, Q6H PRN, Joseph Jacques MD, 400 mg at 12/12/21 0907    polyethylene glycol (GLYCOLAX) packet 17 g, 17 g, Oral, Daily PRN, Joseph Jacques MD    traZODone (DESYREL) tablet 50 mg, 50 mg, Oral, Nightly PRN, Joseph Jacques MD    cloNIDine (CATAPRES) tablet 0.1 mg, 0.1 mg, Oral, TID, [place] Hof, APRN - CNP, 0.1 mg at 12/12/21 1351    tiZANidine (ZANAFLEX) tablet 4 mg, 4 mg, Oral, Q6H PRN, [place] Hof, APRN - CNP, 4 mg at 12/11/21 2149    loperamide (IMODIUM) capsule 2 mg, 2 mg, Oral, 4x Daily PRN, [place] Hof, APRN - CNP    dicyclomine (BENTYL) capsule 10 mg, 10 mg, Oral, TID PRN, [place] Hof, APRN - CNP, 10 mg at 12/12/21 0907    chlorproMAZINE (THORAZINE) tablet 50 mg, 50 mg, Oral, TID PRN, [place] Hof, APRN - CNP    therapeutic multivitamin-minerals 1 tablet, 1 tablet, Oral, Daily, [place] Hof, APRN - CNP, 1 tablet at 12/12/21 0907    pantoprazole (PROTONIX) tablet 40 mg, 40 mg, Oral, BID AC, [place] Hof, APRN - CNP, 40 mg at 12/12/21 0816    chlorproMAZINE (THORAZINE) injection 50 mg, 50 mg, IntraMUSCular, TID PRN, [place] Hof, APRN - CNP    nicotine polacrilex (NICORETTE) gum 2 mg, 2 mg, Oral, PRN, Cate Urena MD, 2 mg at 12/12/21 1352      Examination:  /63   Pulse 90   Temp 98.4 °F (36.9 °C) (Oral)   Resp 14   Ht 6' (1.829 m)   Wt 200 lb (90.7 kg)   SpO2 100%   BMI 27.12 kg/m²   Gait - steady  Medication side effects(SE): denies     Mental Status Examination:    Level of consciousness:  within normal limits   Appearance: Poor grooming and fair hygiene  Behavior/Motor:  no abnormalities noted  Attitude toward examiner:  cooperative, attentive and good eye contact  Speech:  spontaneous, normal rate and normal volume   Mood:  \"Anxious\"  Affect: Blunted  Thought processes:  linear and goal directed   Thought content:  Homicidal ideation - none  Suicidal Ideation:  Improving, able to contract for safety on unit  Delusions:  no evidence of delusions  Perceptual Disturbance:  denies any perceptual disturbance  Cognition:  oriented to person, place, and time   Concentration intact  Insight fair   Judgement fair     ASSESSMENT:   Suicidal ideation  Stimulant Use Disorder (Cocaine) (Methamphetamines)  Opioid Use Disorder  Cannabis Use Disorder     Patient symptoms are:  [] Well controlled  [x] Improving  [] Worsening  [] No change      Diagnosis:   Principal Problem:    Severe recurrent major depression without psychotic features ([facility]  Active Problems:    History of substance abuse ([facility]    Cannabis abuse    Chronic hepatitis C without hepatic coma ([facility]    Depression with suicidal ideation  Resolved Problems:    * No resolved hospital problems. *      LABS:    No results for input(s): WBC, HGB, PLT in the last 72 hours. No results for input(s): NA, K, CL, CO2, BUN, CREATININE, GLUCOSE in the last 72 hours. No results for input(s): BILITOT, ALKPHOS, AST, ALT in the last 72 hours. Lab Results   Component Value Date    BARBSCNU NEGATIVE 12/09/2021    LABBENZ NEGATIVE 12/09/2021    LABMETH NEGATIVE 12/09/2021    PPXUR NOT REPORTED 12/09/2021     Lab Results   Component Value Date    TSH 1.64 10/26/2021     No results found for: LITHIUM  No results found for: VALPROATE, CBMZ          Reviewed current Medications with the patient. Medication changes: Continue regimen and observe. Reports feels with removal of both Wellbutrin and Abilify. Risks, benefits, side effects, drug-to-drug interactions and alternatives to treatment were discussed. Encourage patient to attend group and other milieu activities.   Discharge planning discussed with the patient   PSYCHOTHERAPY/COUNSELING:  [] Therapeutic interview  [x] Supportive  [] CBT  [x] Ongoing  [] Other    [x] Patient continues to need, on a daily basis, active treatment furnished directly by or requiring the supervision of inpatient psychiatric personnel Anticipated Length of stay per MD Alexy Martinez is a 55 y.o. male being evaluated face to face. --Tana Garcia, PRADEEP - CNP on 12/12/2021 at 4:53 PM    An electronic signature was used to authenticate this note. **This report has been created using voice recognition software. It may contain minor errors which are inherent in voice recognition technology. **

## 2021-12-12 NOTE — PLAN OF CARE
Problem: Suicide risk  Goal: Provide patient with safe environment  Description: Provide patient with safe environment  Outcome: Ongoing   Patient is in a safe environment due to Q15min checks. Problem: Depressive Behavior With or Without Suicide Precautions:  Goal: Able to verbalize acceptance of life and situations over which he or she has no control  Description: Able to verbalize acceptance of life and situations over which he or she has no control  Outcome: Ongoing   Patient is working on the ability to verbalize acceptance of life and situations over which he has no control.

## 2021-12-12 NOTE — BH NOTE
As needed medication follow-up note:    As needed medication of atarax 50 mg was effective as evidence by patient stating his anxiety is better and he is able to rest. Patient denies medication side effects. Will continue to monitor and provide support as needed.

## 2021-12-12 NOTE — PLAN OF CARE
Problem: Suicide risk  Goal: Provide patient with safe environment  Description: Provide patient with safe environment  12/11/2021 2159 by Ava Riggins RN  Outcome: Ongoing     Problem: Depressive Behavior With or Without Suicide Precautions:  Goal: Able to verbalize acceptance of life and situations over which he or she has no control  Description: Able to verbalize acceptance of life and situations over which he or she has no control  12/11/2021 2159 by Ava Riggins RN  Outcome: Ongoing     Problem: Depressive Behavior With or Without Suicide Precautions:  Goal: Ability to disclose and discuss suicidal ideas will improve  Description: Ability to disclose and discuss suicidal ideas will improve  12/11/2021 2159 by Ava Riggins RN  Outcome: Ongoing     Problem: Risk of Harm:  Goal: Ability to remain free from injury will improve  Description: Ability to remain free from injury will improve  12/11/2021 2159 by Ava Riggins RN  Outcome: Ongoing     Problem: Substance Abuse:  Goal: Absence of drug withdrawal signs and symptoms  Description: Absence of drug withdrawal signs and symptoms  12/11/2021 2159 by Ava Riggins RN  Outcome: Ongoing     Problem: Pain:  Goal: Pain level will decrease  Description: Pain level will decrease  12/11/2021 2159 by Ava Riggins RN  Outcome: Ongoing     Patient denies suicidal and homicidal ideation at this time. Patient was seclusive to his room most of the night and out for needs only. Patient is complaining of withdrawal symptoms and was willing to take comfort medication for withdrawal. Patient did not complain of pain this evening. Patient encouraged to participate in unit group programming/work with staff to identify any other life situations that are not within ability of control and to learn how to accept and deal with them. Patient is free of self harm at this time.   Patient agrees to seek out staff if thoughts to harm self arise. Staff will provide support and reassurance as needed. Safety checks maintained every 15 minutes.

## 2021-12-12 NOTE — GROUP NOTE
Group Therapy Note    Date: 12/12/2021    Group Start Time: 1036  Group End Time: 5999  Group Topic: Psychoeducation    BARBER Carlos, CTRS    Patient refused to attend creative expression group at 1435 after encouragement from staff. 1:1 talk time offered by staff as alternative to group session.

## 2021-12-13 VITALS
HEIGHT: 72 IN | DIASTOLIC BLOOD PRESSURE: 62 MMHG | WEIGHT: 200 LBS | BODY MASS INDEX: 27.09 KG/M2 | HEART RATE: 116 BPM | RESPIRATION RATE: 16 BRPM | SYSTOLIC BLOOD PRESSURE: 110 MMHG | TEMPERATURE: 97.5 F | OXYGEN SATURATION: 100 %

## 2021-12-13 PROCEDURE — 99239 HOSP IP/OBS DSCHRG MGMT >30: CPT | Performed by: PSYCHIATRY & NEUROLOGY

## 2021-12-13 PROCEDURE — 6370000000 HC RX 637 (ALT 250 FOR IP): Performed by: PSYCHIATRY & NEUROLOGY

## 2021-12-13 PROCEDURE — 6370000000 HC RX 637 (ALT 250 FOR IP)

## 2021-12-13 RX ORDER — CLONIDINE HYDROCHLORIDE 0.1 MG/1
0.1 TABLET ORAL 3 TIMES DAILY
Qty: 60 TABLET | Refills: 3 | Status: SHIPPED | OUTPATIENT
Start: 2021-12-13

## 2021-12-13 RX ORDER — IBUPROFEN 400 MG/1
400 TABLET ORAL EVERY 6 HOURS PRN
Qty: 120 TABLET | Refills: 3 | Status: SHIPPED | OUTPATIENT
Start: 2021-12-13

## 2021-12-13 RX ORDER — QUETIAPINE FUMARATE 200 MG/1
200 TABLET, FILM COATED ORAL NIGHTLY PRN
Qty: 30 TABLET | Refills: 0 | Status: CANCELLED | OUTPATIENT
Start: 2021-12-13

## 2021-12-13 RX ORDER — GABAPENTIN 300 MG/1
300 CAPSULE ORAL 3 TIMES DAILY
Qty: 42 CAPSULE | Refills: 0 | Status: SHIPPED | OUTPATIENT
Start: 2021-12-13 | End: 2022-04-19 | Stop reason: DRUGHIGH

## 2021-12-13 RX ORDER — OMEPRAZOLE 20 MG/1
20 CAPSULE, DELAYED RELEASE ORAL DAILY
Qty: 30 CAPSULE | Refills: 0 | Status: CANCELLED | OUTPATIENT
Start: 2021-12-13

## 2021-12-13 RX ORDER — M-VIT,TX,IRON,MINS/CALC/FOLIC 27MG-0.4MG
1 TABLET ORAL DAILY
Qty: 30 TABLET | Refills: 0 | Status: CANCELLED | OUTPATIENT
Start: 2021-12-13

## 2021-12-13 RX ORDER — HYDROXYZINE 50 MG/1
50 TABLET, FILM COATED ORAL 3 TIMES DAILY PRN
Qty: 30 TABLET | Refills: 0 | Status: SHIPPED | OUTPATIENT
Start: 2021-12-13 | End: 2021-12-23

## 2021-12-13 RX ADMIN — GABAPENTIN 300 MG: 300 CAPSULE ORAL at 09:04

## 2021-12-13 RX ADMIN — CLONIDINE HYDROCHLORIDE 0.1 MG: 0.1 TABLET ORAL at 09:04

## 2021-12-13 RX ADMIN — MULTIPLE VITAMINS W/ MINERALS TAB 1 TABLET: TAB at 09:04

## 2021-12-13 RX ADMIN — NICOTINE POLACRILEX 2 MG: 2 GUM, CHEWING BUCCAL at 11:15

## 2021-12-13 RX ADMIN — NICOTINE POLACRILEX 2 MG: 2 GUM, CHEWING BUCCAL at 09:04

## 2021-12-13 NOTE — CARE COORDINATION
Patient requested information faxed to [facility] this date, Sw faxed clinicals per his request, encouraged pt to contact [facility] to confirm bed availability as pt states he is attempting to admit to their program today.

## 2021-12-13 NOTE — DISCHARGE INSTR - DIET

## 2021-12-13 NOTE — CARE COORDINATION
PHI spoke with Clara from [facility] this date, Laura Joshi states patient is accepted to their program, should arrive at [address] [place] [address] today, preferably before 1:30p, SW informed Clara pt may not arrive prior to that time given allotment of time to contact taxi through pt insurance plan, Clara states pt should arrive as \"close to that time as possible. \" Laura Joshi states she has also informed patient due to this \"number of admissions here already this year,\" pt may not be in the program for the complete 30 days, but will be assisted with transition plan when he leaves their program. PHI updated staff.

## 2021-12-13 NOTE — BH NOTE
[facility]  Discharge Note    Patient discharged to [facility] via [facility] cab. Pt discharged with followings belongings:   Dental Appliances: None  Vision - Corrective Lenses: Glasses  Hearing Aid: None  Jewelry: Necklace  Body Piercings Removed: N/A  Clothing: Footwear, Jacket / coat, Pants, Shirt, Socks, Undergarments (Comment), Other (Comment) (belt)  Were All Patient Medications Collected?: Not Applicable  Other Valuables: Cell phone, Bj Lopez sent home with patient. ([phone]). Patient education on aftercare instructions: Yes, and verbalized understanding. Information faxed to [facility] by staff  at 1:50 PM .Patient verbalize understanding of AVS:  Yes, via read back method.     Status EXAM upon discharge:  Status and Exam  Normal: Yes  Facial Expression: Brightened  Affect: Appropriate  Level of Consciousness: Alert  Mood:Normal: No  Mood: Depressed, Anxious  Motor Activity:Normal: Yes  Motor Activity: Decreased  Interview Behavior: Cooperative  Preception: [place] to Person, Verneta Putt to Time, [place] to Place, [place] to Situation  Attention:Normal: Yes  Attention: Distractible  Thought Processes: Circumstantial  Thought Content:Normal: Yes  Thought Content: Preoccupations  Hallucinations: None  Delusions: No  Memory:Normal: Yes  Memory: Poor Remote  Insight and Judgment: No  Insight and Judgment: Poor Insight  Present Suicidal Ideation: No  Present Homicidal Ideation: No      Metabolic Screening:    Lab Results   Component Value Date    LABA1C 5.8 12/09/2021       Lab Results   Component Value Date    CHOL 126 12/07/2019    CHOL 135 03/11/2019    CHOL 109 11/07/2018    CHOL 119 03/09/2018    CHOL 153 04/22/2014     Lab Results   Component Value Date    TRIG 113 12/07/2019    TRIG 69 03/11/2019    TRIG 48 11/07/2018    TRIG 78 03/09/2018    TRIG 112 04/22/2014     Lab Results   Component Value Date    HDL 40 (L) 12/07/2019    HDL 46 03/11/2019    HDL 46 11/07/2018    HDL 35 (L) 03/09/2018    HDL 51 04/22/2014     No components found for: LDLCAL  No results found for: Nancy Muniz LPN
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
## 2021-12-13 NOTE — CARE COORDINATION
Name: Cj Walton    : 1975    Discharge Date: 21    Primary Auth/Cert #: [id number]    Destination: Patient discharged to [facility] Treatment     Discharge Medications:      Medication List      START taking these medications    cloNIDine 0.1 MG tablet  Commonly known as: CATAPRES  Take 1 tablet by mouth 3 times daily  Notes to patient: hyperactivity     gabapentin 300 MG capsule  Commonly known as: NEURONTIN  Take 1 capsule by mouth 3 times daily for 14 days.   Replaces: gabapentin 600 MG tablet  Notes to patient: Anxiety/neuropathic     hydrOXYzine 50 MG tablet  Commonly known as: ATARAX  Take 1 tablet by mouth 3 times daily as needed for Anxiety  Notes to patient: anxiety        CHANGE how you take these medications    ibuprofen 400 MG tablet  Commonly known as: ADVIL;MOTRIN  Take 1 tablet by mouth every 6 hours as needed (Pain moderate (4-7), Pain severe (8-10))  What changed:   · medication strength  · how much to take  · when to take this  · reasons to take this        CONTINUE taking these medications    Multivitamin Tabs     omeprazole 20 MG delayed release capsule  Commonly known as: PRILOSEC  Notes to patient: Acid reducer     QUEtiapine 200 MG tablet  Commonly known as: SEROQUEL  Notes to patient: sleep        STOP taking these medications    ARIPiprazole 30 MG tablet  Commonly known as: ABILIFY     atomoxetine 18 MG capsule  Commonly known as: Strattera     buPROPion HCl ER (XL) 450 MG Tb24     gabapentin 600 MG tablet  Commonly known as: NEURONTIN  Replaced by: gabapentin 300 MG capsule     Melatonin 5 MG Caps     Suboxone 12-3 MG sublingual film  Generic drug: buprenorphine-naloxone     topiramate 100 MG tablet  Commonly known as: TOPAMAX           Where to Get Your Medications      These medications were sent to [facility] [address] [place] [address]    Phone: [phone] · cloNIDine 0.1 MG tablet  · gabapentin 300 MG capsule  · hydrOXYzine 50 MG tablet  · ibuprofen 400 MG tablet         Follow Up Appointment: [facility] [address] [phone]    On 12/16/2021  @1220pm with Stephane Mccormack NP     [facility] [place] [facility]  [address] [place], [place]   On 12/13/2021  you will admit to their program today per Clara in admissions

## 2021-12-13 NOTE — GROUP NOTE
Group Therapy Note    Date: 12/13/2021    Group Start Time: 1030  Group End Time: 5448  Group Topic: Psychoeducation    BARBER Ellison Other, CTRS    Patient refused to attend socialization skills group at 1030 after encouragement from staff. 1:1 talk time offered by staff as alternative to group session.

## 2022-01-07 ENCOUNTER — TELEPHONE (OUTPATIENT)
Dept: GASTROENTEROLOGY | Age: 47
End: 2022-01-07

## 2022-01-07 NOTE — TELEPHONE ENCOUNTER
Writer tried calling pt to rs appt that he missed on 1/5/22.  Unable to leave message,  stated \"this caller is not accepting calls at this time, please try again later\"

## 2022-04-03 ENCOUNTER — APPOINTMENT (OUTPATIENT)
Dept: GENERAL RADIOLOGY | Age: 47
End: 2022-04-03
Payer: COMMERCIAL

## 2022-04-03 ENCOUNTER — HOSPITAL ENCOUNTER (EMERGENCY)
Age: 47
Discharge: HOME | End: 2022-04-03
Attending: STUDENT IN AN ORGANIZED HEALTH CARE EDUCATION/TRAINING PROGRAM
Payer: COMMERCIAL

## 2022-04-03 VITALS
TEMPERATURE: 97.4 F | RESPIRATION RATE: 16 BRPM | BODY MASS INDEX: 24.41 KG/M2 | HEART RATE: 108 BPM | DIASTOLIC BLOOD PRESSURE: 81 MMHG | SYSTOLIC BLOOD PRESSURE: 162 MMHG | WEIGHT: 180 LBS | OXYGEN SATURATION: 97 %

## 2022-04-03 DIAGNOSIS — L02.619 FOOT ABSCESS: Primary | ICD-10-CM

## 2022-04-03 PROCEDURE — 10061 I&D ABSCESS COMP/MULTIPLE: CPT

## 2022-04-03 PROCEDURE — 99285 EMERGENCY DEPT VISIT HI MDM: CPT

## 2022-04-03 PROCEDURE — 6370000000 HC RX 637 (ALT 250 FOR IP): Performed by: STUDENT IN AN ORGANIZED HEALTH CARE EDUCATION/TRAINING PROGRAM

## 2022-04-03 PROCEDURE — 2500000003 HC RX 250 WO HCPCS: Performed by: STUDENT IN AN ORGANIZED HEALTH CARE EDUCATION/TRAINING PROGRAM

## 2022-04-03 PROCEDURE — 73630 X-RAY EXAM OF FOOT: CPT

## 2022-04-03 RX ORDER — SULFAMETHOXAZOLE AND TRIMETHOPRIM 800; 160 MG/1; MG/1
1 TABLET ORAL 2 TIMES DAILY
Qty: 20 TABLET | Refills: 0 | Status: SHIPPED | OUTPATIENT
Start: 2022-04-03 | End: 2022-04-13

## 2022-04-03 RX ORDER — SULFAMETHOXAZOLE AND TRIMETHOPRIM 800; 160 MG/1; MG/1
1 TABLET ORAL ONCE
Status: COMPLETED | OUTPATIENT
Start: 2022-04-03 | End: 2022-04-03

## 2022-04-03 RX ORDER — LIDOCAINE HYDROCHLORIDE 10 MG/ML
10 INJECTION, SOLUTION INFILTRATION; PERINEURAL ONCE
Status: COMPLETED | OUTPATIENT
Start: 2022-04-03 | End: 2022-04-03

## 2022-04-03 RX ADMIN — LIDOCAINE HYDROCHLORIDE 10 ML: 10 INJECTION, SOLUTION INFILTRATION; PERINEURAL at 16:00

## 2022-04-03 RX ADMIN — SULFAMETHOXAZOLE AND TRIMETHOPRIM 1 TABLET: 800; 160 TABLET ORAL at 17:12

## 2022-04-03 ASSESSMENT — ENCOUNTER SYMPTOMS
RHINORRHEA: 0
COUGH: 0
ABDOMINAL PAIN: 0
SORE THROAT: 0
BACK PAIN: 0
EYE PAIN: 0
COLOR CHANGE: 0
FACIAL SWELLING: 0
NAUSEA: 0
EYE REDNESS: 0
DIARRHEA: 0
SHORTNESS OF BREATH: 0
VOMITING: 0

## 2022-04-03 ASSESSMENT — PAIN - FUNCTIONAL ASSESSMENT: PAIN_FUNCTIONAL_ASSESSMENT: 0-10

## 2022-04-03 ASSESSMENT — PAIN SCALES - GENERAL
PAINLEVEL_OUTOF10: 5
PAINLEVEL_OUTOF10: 5

## 2022-04-03 NOTE — ED TRIAGE NOTES
Mode of arrival (squad #, walk in, police, etc) : walk in        Chief complaint(s): blister        Arrival Note (brief scenario, treatment PTA, etc). : pt reports he stepped on a rock and now he has a blister on the bottom of his foot. Pt reports he has pain and pressure when walking on it. C= \"Have you ever felt that you should Cut down on your drinking? \"  No  A= \"Have people Annoyed you by criticizing your drinking? \"  No  G= \"Have you ever felt bad or Guilty about your drinking? \"  No  E= \"Have you ever had a drink as an Eye-opener first thing in the morning to steady your nerves or to help a hangover? \"  No      Deferred []      Reason for deferring: N/A    *If yes to two or more: probable alcohol abuse. *

## 2022-04-03 NOTE — ED PROVIDER NOTES
EMERGENCY DEPARTMENT ENCOUNTER    Pt Name: Maria E Camacho  MRN: [id number]  [place] 1975  Date of evaluation: 4/3/22  CHIEF COMPLAINT       Chief Complaint   Patient presents with    Blister     right foot     HISTORY OF PRESENT ILLNESS   HPI  77-year-old male history of bipolar presents for evaluation of right foot pain. Patient says he was wearing socks and stepped on a rock several weeks ago. There was a break in the skin. Since then he has had some progressive swelling and pain over the right lateral forefoot. No fever chills. No drainage. No treatment prior to arrival.  No other recent illness. Symptoms are moderate and progressive. REVIEW OF SYSTEMS     Review of Systems   Constitutional: Negative for chills and fatigue. HENT: Negative for facial swelling, nosebleeds, rhinorrhea and sore throat. Eyes: Negative for pain and redness. Respiratory: Negative for cough and shortness of breath. Cardiovascular: Negative for chest pain and leg swelling. Gastrointestinal: Negative for abdominal pain, diarrhea, nausea and vomiting. Genitourinary: Negative for flank pain and hematuria. Musculoskeletal: Negative for arthralgias and back pain. Skin: Positive for wound. Negative for color change and rash. Neurological: Negative for dizziness, tremors, facial asymmetry, speech difficulty, weakness and numbness. PASTMEDICAL HISTORY     Past Medical History:   Diagnosis Date    ADHD (attention deficit hyperactivity disorder)     Anxiety     Depression     Hep C w/ coma, chronic     Polysubstance abuse ([facility]     pt currently on Suboxone 4mg therapy; drug abuse includes IV heroin, IV cocaine, cannabis, opiates, \"just about every drug in the world. \"    Substance abuse [facility])      Past Problem List  Patient Active Problem List   Diagnosis Code    MDD (major depressive disorder), recurrent episode ([facility] F33.9    Bipolar I disorder, most recent episode depressed ([facility] F31.30    Opioid type dependence, continuous (HCC) F11.20    Bipolar 1 disorder (HCC) F31.9    Bipolar disorder with severe depression ([facility] F31.4    Opioid dependence on agonist therapy ([facility] F11.20    Chest pain R07.9    Suicidal ideation R45.851    History of substance abuse ([facility] F19.11    Severe recurrent major depression without psychotic features ([facility] F33.2    Therapeutic opioid-induced constipation (OIC) K59.03, T40.2X5A    Cannabis abuse F12.10    Chronic midline low back pain with bilateral sciatica M54.41, M54.42, G89.29    Chronic hepatitis C without hepatic coma (HCC) B18.2    Absolute anemia D64.9    Attention deficit hyperactivity disorder (ADHD), predominantly inattentive type F90.0    Depression with suicidal ideation F32. A, R45.851     SURGICAL HISTORY     History reviewed. No pertinent surgical history. CURRENT MEDICATIONS       Discharge Medication List as of 4/3/2022  5:11 PM      CONTINUE these medications which have NOT CHANGED    Details   cloNIDine (CATAPRES) 0.1 MG tablet Take 1 tablet by mouth 3 times daily, Disp-60 tablet, R-3Normal      gabapentin (NEURONTIN) 300 MG capsule Take 1 capsule by mouth 3 times daily for 14 days. , Disp-42 capsule, R-0Normal      ibuprofen (ADVIL;MOTRIN) 400 MG tablet Take 1 tablet by mouth every 6 hours as needed (Pain moderate (4-7), Pain severe (8-10)), Disp-120 tablet, R-3Normal      omeprazole (PRILOSEC) 20 MG delayed release capsule Take 20 mg by mouth dailyHistorical Med      QUEtiapine (SEROQUEL) 200 MG tablet Take 200 mg by mouth nightly as neededHistorical Med      Multiple Vitamin (MULTIVITAMIN) TABS Take 1 tablet by mouth dailyHistorical Med           ALLERGIES     is allergic to duloxetine. FAMILY HISTORY     He indicated that the status of his mother is unknown. He indicated that the status of his father is unknown. He indicated that the status of his other is unknown.      SOCIAL HISTORY       Social History     Tobacco Use    Smoking status: Current Every Day Smoker     Packs/day: 0.25     Years: 31.00     Pack years: 7.75     Types: Cigarettes    Smokeless tobacco: Current User     Types: Chew    Tobacco comment: chews more than smokies   Vaping Use    Vaping Use: Never used   Substance Use Topics    Alcohol use: Not Currently     Comment: occasional ETOH use    Drug use: Not Currently     Frequency: 7.0 times per week     Types: Marijuana ([place] So), IV, Cocaine     Comment: pt currently taking Suboxone; drug abuse includes IV heroin, IV cocaine, cannabis, opiates, \"just about every drug in the world. \"     PHYSICAL EXAM     INITIAL VITALS: BP (!) 162/81   Pulse 108   Temp 97.4 °F (36.3 °C) (Oral)   Resp 16   Wt 180 lb (81.6 kg)   SpO2 97%   BMI 24.41 kg/m²    Physical Exam  Vitals and nursing note reviewed. Constitutional:       Appearance: Normal appearance. HENT:      Head: Normocephalic and atraumatic. Nose: Nose normal.   Eyes:      Extraocular Movements: Extraocular movements intact. Pupils: Pupils are equal, round, and reactive to light. Cardiovascular:      Rate and Rhythm: Normal rate and regular rhythm. Pulmonary:      Effort: Pulmonary effort is normal. No respiratory distress. Breath sounds: Normal breath sounds. Abdominal:      General: Abdomen is flat. There is no distension. Palpations: Abdomen is soft. There is no mass. Musculoskeletal:         General: No swelling. Normal range of motion. Cervical back: Normal range of motion. No rigidity. Skin:     General: Skin is warm and dry. Comments: Focal area of erythema, fluctuance, swelling over right lateral forefoot    Neurological:      General: No focal deficit present. Mental Status: He is alert. Mental status is at baseline. Cranial Nerves: No cranial nerve deficit. MEDICAL DECISION MAKIN-year-old male presents for evaluation of what looks like a small abscess in the right lateral forefoot.   We will get an x-ray to evaluate for foreign body, plan for incision and drainage antibiotics    X-ray shows soft tissue swelling. Incision and drainage performed with some purulent drainage, will discharge on p.o. antibiotics, podiatry referral.         CRITICAL CARE:       PROCEDURES:    Incision/Drainage    Date/Time: 4/3/2022 8:25 PM  Performed by: Emir Morgan MD  Authorized by: Emir Morgan MD     Consent:     Consent obtained:  Verbal    Consent given by:  Patient    Risks discussed:  Bleeding and incomplete drainage  Location:     Type:  Abscess    Location:  Lower extremity    Lower extremity location:  Foot    Foot location:  R foot  Pre-procedure details:     Skin preparation:  Antiseptic wash  Anesthesia (see MAR for exact dosages): Anesthesia method:  Local infiltration    Local anesthetic:  Lidocaine 1% w/o epi  Procedure type:     Complexity:  Simple  Procedure details:     Incision types:  Stab incision and single straight    Incision depth:  Dermal    Scalpel blade:  10    Wound management:  Probed and deloculated    Drainage:  Purulent    Drainage amount:  Scant    Wound treatment:  Wound left open    Packing materials:  None  Post-procedure details:     Patient tolerance of procedure: Tolerated well, no immediate complications        DIAGNOSTIC RESULTS   EKG:All EKG's are interpreted by the Emergency Department Physician who either signs or Co-signs this chart in the absence of a cardiologist.        RADIOLOGY:All plain film, CT, MRI, and formal ultrasound images (except ED bedside ultrasound) are read by the radiologist, see reports below, unless otherwisenoted in MDM or here. XR FOOT RIGHT (MIN 3 VIEWS)   Final Result   No acute osseous abnormality. Mild soft tissue swelling along the lateral   aspect of the foot near the 5th metatarsal head. LABS: All lab results were reviewed by myself, and all abnormals are listed below.   Labs Reviewed - No data to display    EMERGENCY DEPARTMENTCOURSE:         Vitals:    Vitals:    04/03/22 1507   BP: (!) 162/81   Pulse: 108   Resp: 16   Temp: 97.4 °F (36.3 °C)   TempSrc: Oral   SpO2: 97%   Weight: 180 lb (81.6 kg)       The patient was given the following medications while in the emergency department:  Orders Placed This Encounter   Medications    lidocaine 1 % injection 10 mL    sulfamethoxazole-trimethoprim (BACTRIM DS;SEPTRA DS) 800-160 MG per tablet 1 tablet     Order Specific Question:   Antimicrobial Indications     Answer:   Skin and Soft Tissue Infection    sulfamethoxazole-trimethoprim (BACTRIM DS) 800-160 MG per tablet     Sig: Take 1 tablet by mouth 2 times daily for 10 days     Dispense:  20 tablet     Refill:  0    sulfamethoxazole-trimethoprim (BACTRIM DS) 800-160 MG per tablet     Sig: Take 1 tablet by mouth 2 times daily for 10 days     Dispense:  20 tablet     Refill:  0     CONSULTS:  None    FINAL IMPRESSION      1. Foot abscess          DISPOSITION/PLAN   DISPOSITION Decision To Discharge 04/03/2022 05:14:18 PM      PATIENT REFERRED TO:  Grant Selby DPM  [address] [place] [phone]    Call in 1 day      DISCHARGE MEDICATIONS:  Discharge Medication List as of 4/3/2022  5:11 PM      START taking these medications    Details   !! sulfamethoxazole-trimethoprim (BACTRIM DS) 800-160 MG per tablet Take 1 tablet by mouth 2 times daily for 10 days, Disp-20 tablet, R-0Print      !! sulfamethoxazole-trimethoprim (BACTRIM DS) 800-160 MG per tablet Take 1 tablet by mouth 2 times daily for 10 days, Disp-20 tablet, R-0Normal       !! - Potential duplicate medications found. Please discuss with provider. The care is provided during an unprecedented national emergency due to the novel coronavirus, COVID 19.   MD Bryan Perez MD  04/03/22 2027

## 2022-04-19 ENCOUNTER — OFFICE VISIT (OUTPATIENT)
Dept: FAMILY MEDICINE CLINIC | Age: 47
End: 2022-04-19
Payer: COMMERCIAL

## 2022-04-19 VITALS
TEMPERATURE: 98.2 F | WEIGHT: 193 LBS | HEART RATE: 91 BPM | DIASTOLIC BLOOD PRESSURE: 74 MMHG | SYSTOLIC BLOOD PRESSURE: 118 MMHG | OXYGEN SATURATION: 98 % | BODY MASS INDEX: 26.18 KG/M2

## 2022-04-19 DIAGNOSIS — K21.9 GASTROESOPHAGEAL REFLUX DISEASE, UNSPECIFIED WHETHER ESOPHAGITIS PRESENT: ICD-10-CM

## 2022-04-19 DIAGNOSIS — F90.2 ATTENTION DEFICIT HYPERACTIVITY DISORDER (ADHD), COMBINED TYPE: ICD-10-CM

## 2022-04-19 DIAGNOSIS — F11.20 OPIOID DEPENDENCE ON AGONIST THERAPY (HCC): ICD-10-CM

## 2022-04-19 DIAGNOSIS — F19.11 HISTORY OF SUBSTANCE ABUSE (HCC): ICD-10-CM

## 2022-04-19 DIAGNOSIS — M54.42 CHRONIC MIDLINE LOW BACK PAIN WITH BILATERAL SCIATICA: Primary | ICD-10-CM

## 2022-04-19 DIAGNOSIS — Z72.0 TOBACCO ABUSE: ICD-10-CM

## 2022-04-19 DIAGNOSIS — B18.2 CHRONIC HEPATITIS C WITHOUT HEPATIC COMA (HCC): ICD-10-CM

## 2022-04-19 DIAGNOSIS — M54.41 CHRONIC MIDLINE LOW BACK PAIN WITH BILATERAL SCIATICA: Primary | ICD-10-CM

## 2022-04-19 DIAGNOSIS — G89.29 CHRONIC MIDLINE LOW BACK PAIN WITH BILATERAL SCIATICA: Primary | ICD-10-CM

## 2022-04-19 PROBLEM — R45.851 SUICIDAL IDEATION: Status: RESOLVED | Noted: 2020-09-09 | Resolved: 2022-04-19

## 2022-04-19 PROBLEM — R45.851 DEPRESSION WITH SUICIDAL IDEATION: Status: RESOLVED | Noted: 2021-12-09 | Resolved: 2022-04-19

## 2022-04-19 PROBLEM — F31.4 BIPOLAR DISORDER WITH SEVERE DEPRESSION (HCC): Status: RESOLVED | Noted: 2019-12-04 | Resolved: 2022-04-19

## 2022-04-19 PROBLEM — F32.A DEPRESSION WITH SUICIDAL IDEATION: Status: RESOLVED | Noted: 2021-12-09 | Resolved: 2022-04-19

## 2022-04-19 PROBLEM — R07.9 CHEST PAIN: Status: RESOLVED | Noted: 2020-09-09 | Resolved: 2022-04-19

## 2022-04-19 PROCEDURE — G8427 DOCREV CUR MEDS BY ELIG CLIN: HCPCS | Performed by: INTERNAL MEDICINE

## 2022-04-19 PROCEDURE — 99214 OFFICE O/P EST MOD 30 MIN: CPT | Performed by: INTERNAL MEDICINE

## 2022-04-19 PROCEDURE — 4004F PT TOBACCO SCREEN RCVD TLK: CPT | Performed by: INTERNAL MEDICINE

## 2022-04-19 PROCEDURE — G8417 CALC BMI ABV UP PARAM F/U: HCPCS | Performed by: INTERNAL MEDICINE

## 2022-04-19 RX ORDER — ATOMOXETINE 18 MG/1
CAPSULE ORAL
COMMUNITY
Start: 2022-01-14 | End: 2022-04-19

## 2022-04-19 RX ORDER — CHOLECALCIFEROL (VITAMIN D3) 125 MCG
CAPSULE ORAL
COMMUNITY
Start: 2022-01-27

## 2022-04-19 RX ORDER — OMEPRAZOLE 20 MG/1
20 CAPSULE, DELAYED RELEASE ORAL DAILY
Qty: 90 CAPSULE | Refills: 1 | Status: SHIPPED | OUTPATIENT
Start: 2022-04-19

## 2022-04-19 RX ORDER — NALOXONE HYDROCHLORIDE 4 MG/.1ML
SPRAY NASAL
COMMUNITY
Start: 2022-04-12

## 2022-04-19 RX ORDER — GABAPENTIN 600 MG/1
600 TABLET ORAL 3 TIMES DAILY
Qty: 90 TABLET | Refills: 2 | Status: SHIPPED | OUTPATIENT
Start: 2022-04-19 | End: 2022-07-18

## 2022-04-19 RX ORDER — GABAPENTIN 400 MG/1
1 CAPSULE ORAL 3 TIMES DAILY
COMMUNITY
Start: 2022-04-06 | End: 2022-04-19

## 2022-04-19 RX ORDER — ATOMOXETINE 40 MG/1
40 CAPSULE ORAL DAILY
Qty: 30 CAPSULE | Refills: 3 | Status: SHIPPED | OUTPATIENT
Start: 2022-04-19

## 2022-04-19 ASSESSMENT — PATIENT HEALTH QUESTIONNAIRE - PHQ9
SUM OF ALL RESPONSES TO PHQ9 QUESTIONS 1 & 2: 0
8. MOVING OR SPEAKING SO SLOWLY THAT OTHER PEOPLE COULD HAVE NOTICED. OR THE OPPOSITE, BEING SO FIGETY OR RESTLESS THAT YOU HAVE BEEN MOVING AROUND A LOT MORE THAN USUAL: 0
3. TROUBLE FALLING OR STAYING ASLEEP: 0
5. POOR APPETITE OR OVEREATING: 1
SUM OF ALL RESPONSES TO PHQ QUESTIONS 1-9: 5
4. FEELING TIRED OR HAVING LITTLE ENERGY: 1
SUM OF ALL RESPONSES TO PHQ QUESTIONS 1-9: 5
10. IF YOU CHECKED OFF ANY PROBLEMS, HOW DIFFICULT HAVE THESE PROBLEMS MADE IT FOR YOU TO DO YOUR WORK, TAKE CARE OF THINGS AT HOME, OR GET ALONG WITH OTHER PEOPLE: 3
6. FEELING BAD ABOUT YOURSELF - OR THAT YOU ARE A FAILURE OR HAVE LET YOURSELF OR YOUR FAMILY DOWN: 0
2. FEELING DOWN, DEPRESSED OR HOPELESS: 0
SUM OF ALL RESPONSES TO PHQ QUESTIONS 1-9: 5
9. THOUGHTS THAT YOU WOULD BE BETTER OFF DEAD, OR OF HURTING YOURSELF: 0
1. LITTLE INTEREST OR PLEASURE IN DOING THINGS: 0
SUM OF ALL RESPONSES TO PHQ QUESTIONS 1-9: 5
7. TROUBLE CONCENTRATING ON THINGS, SUCH AS READING THE NEWSPAPER OR WATCHING TELEVISION: 3

## 2022-04-19 ASSESSMENT — ENCOUNTER SYMPTOMS
DIARRHEA: 0
BLOOD IN STOOL: 0
CHEST TIGHTNESS: 0
WHEEZING: 0
CONSTIPATION: 0
COUGH: 0
SHORTNESS OF BREATH: 0
CHOKING: 0
NAUSEA: 0
ABDOMINAL PAIN: 0
VOMITING: 0
ANAL BLEEDING: 0

## 2022-04-19 ASSESSMENT — VISUAL ACUITY: OU: 1

## 2022-04-19 NOTE — PROGRESS NOTES
Subjective:       Patient ID:     Robin Shrestha is a 52 y.o. male who presents for   Chief Complaint   Patient presents with    ADHD    Medication Refill       HPI:  Nursing note reviewed and discussed with patient. Was in a treatment center for six months for opiate dependence. Still on suboxone - [facility]. States he needs to get on something for his ADHD. Ritalin made him antsy as a child so he doesn't want to take that, tried strattera and didn't work for him. He wants to get back on his gabapentin as well  Using clonidine for anxiety - mainly at night to help him sleep. He declines referral to see psychiatrist at all, states doesn't want to be loaded on pills. moved back from [employer] 3 weeks ago, currently working in construction       Patient's medications, allergies, past medical, surgical, social and family histories were reviewed and updated as appropriate. Past Medical History:   Diagnosis Date    ADHD (attention deficit hyperactivity disorder)     Anxiety     Depression     Hep C w/ coma, chronic     Polysubstance abuse ([address]     pt currently on Suboxone 4mg therapy; drug abuse includes IV heroin, IV cocaine, cannabis, opiates, \"just about every drug in the world. \"    Substance abuse ([address]      No past surgical history on file.     Social History     Tobacco Use    Smoking status: Current Every Day Smoker     Packs/day: 0.25     Years: 31.00     Pack years: 7.75     Types: Cigarettes    Smokeless tobacco: Current User     Types: Chew    Tobacco comment: chews more than smokies   Substance Use Topics    Alcohol use: Not Currently     Comment: occasional ETOH use      Patient Active Problem List   Diagnosis    MDD (major depressive disorder), recurrent episode ([address]    Bipolar I disorder, most recent episode depressed ([address]    Opioid type dependence, continuous ([address]    Bipolar 1 disorder ([address]    Bipolar disorder with severe depression ([address]    Opio[facility]    Chest pain    Suicidal ideation    History of substance abuse ([facility]    Severe recurrent major depression without psychotic features ([facility]    Therapeutic opioid-induced constipation ([facility])    Cannabis abuse    Chronic midline low back pain with bilateral sciatica    Chronic hepatitis C without hepatic coma (HCC)    Absolute anemia    Attention deficit hyperactivity disorder (ADHD), predominantly inattentive type    Depression with suicidal ideation         Prior to Visit Medications    Medication Sig Taking? Authorizing Provider   naloxone 4 MG/0.1ML LIQD nasal spray  Yes Mauro Lopez MD   gabapentin (NEURONTIN) 400 MG capsule Take 1 capsule by mouth 3 times daily. Yes PRADEEP Ramirez CNP   melatonin 5 MG TABS tablet  Yes Milla Soto RN   nicotine polacrilex (NICORETTE) 4 MG gum  Yes PRADEEP Ramirez CNP   atomoxetine (STRATTERA) 18 MG capsule  Yes Milla Soto RN   cloNIDine (CATAPRES) 0.1 MG tablet Take 1 tablet by mouth 3 times daily Yes Kayley Horn MD   ibuprofen (ADVIL;MOTRIN) 400 MG tablet Take 1 tablet by mouth every 6 hours as needed (Pain moderate (4-7), Pain severe (8-10)) Yes Kayley Horn MD   omeprazole (PRILOSEC) 20 MG delayed release capsule Take 20 mg by mouth daily Yes Historical Provider, MD   Multiple Vitamin (MULTIVITAMIN) TABS Take 1 tablet by mouth daily Yes Historical Provider, MD     Review of Systems  Review of Systems   Constitutional: Negative for fatigue, fever and unexpected weight change. Respiratory: Negative for cough, choking, chest tightness, shortness of breath and wheezing. Cardiovascular: Negative for chest pain, palpitations and leg swelling. Gastrointestinal: Negative for abdominal pain, anal bleeding, blood in stool, constipation, diarrhea, nausea and vomiting. Endocrine: Negative. Musculoskeletal: Negative for joint swelling and myalgias. Skin: Negative. Neurological: Negative for dizziness. Psychiatric/Behavioral: Positive for decreased concentration. Negative for agitation, behavioral problems, confusion, dysphoric mood, hallucinations, self-injury, sleep disturbance and suicidal ideas. The patient is hyperactive. The patient is not nervous/anxious. All other systems reviewed and are negative. Objective:       Physical Exam:  /74   Pulse 91   Temp 98.2 °F (36.8 °C) (Temporal)   Wt 193 lb (87.5 kg)   SpO2 98%   BMI 26.18 kg/m²   Wt Readings from Last 3 Encounters:   04/19/22 193 lb (87.5 kg)   04/03/22 180 lb (81.6 kg)   11/24/21 215 lb (97.5 kg)         Physical Exam  Vitals and nursing note reviewed. Constitutional:       General: He is not in acute distress. Appearance: He is well-developed. He is not ill-appearing. Eyes:      General: Lids are normal. Vision grossly intact. Cardiovascular:      Rate and Rhythm: Normal rate and regular rhythm. Heart sounds: Normal heart sounds, S1 normal and S2 normal. No murmur heard. No friction rub. No gallop. Pulmonary:      Effort: Pulmonary effort is normal. No respiratory distress. Breath sounds: Normal breath sounds. No wheezing. Abdominal:      General: Bowel sounds are normal.      Palpations: Abdomen is soft. There is no mass. Tenderness: There is no abdominal tenderness. There is no guarding. Musculoskeletal:         General: Normal range of motion. Skin:     General: Skin is warm and dry. Capillary Refill: Capillary refill takes less than 2 seconds. Neurological:      General: No focal deficit present. Mental Status: He is alert and oriented to person, place, and time. Psychiatric:         Attention and Perception: Perception normal. He is inattentive. Mood and Affect: Mood normal.         Speech: Speech is rapid and pressured. Data Review  No visits with results within 2 Month(s) from this visit.    Latest known visit with results is:   Hospital Outpatient Visit on 10/26/2021   Component Date Value    HIV Ag/Ab 10/26/2021 NONREACTIVE     Specimen Description 10/26/2021 . PLASMA     Special Requests 10/26/2021 NOT REPORTED     Direct Exam 10/26/2021 HCV RNA DETECTED 3,010,000 IU/ML (6.48 LOG IU/ML) This test is a sensitive method for quantitating HCV RNA viral loads in plasma. It utilizes RT-PCR in the FDA approved Roche Ampliprep/Taqman 48 System. This test is intended for detecting and quantifying HCV RNA viral loads in the range of 15 IU/mL to 20,000,000 IU/mL (1.18 log IU/mL to 7.30 log IU/mL). Patients should have confirmed HCV infection prior to RNA quantification. This test has been developed to monitor disease progression and efficacy of anti-HCV drug therapy. This test has been optimized for HCV genotypes 1-6.  Results reported to the appropriate Health Department*    PSA 10/26/2021 1.01     Glucose 10/26/2021 93     BUN 10/26/2021 18     CREATININE 10/26/2021 1.09     Bun/Cre Ratio 10/26/2021 NOT REPORTED     Calcium 10/26/2021 8.9     Sodium 10/26/2021 140     Potassium 10/26/2021 4.1     Chloride 10/26/2021 105     CO2 10/26/2021 22     Anion Gap 10/26/2021 13     Alkaline Phosphatase 10/26/2021 88     ALT 10/26/2021 24     AST 10/26/2021 20     Total Bilirubin 10/26/2021 <0.10*    Total Protein 10/26/2021 7.5     Albumin 10/26/2021 4.4     Albumin/Globulin Ratio 10/26/2021 1.4     GFR Non- 10/26/2021 >60     GFR  10/26/2021 >60     GFR Comment 10/26/2021          GFR Staging 10/26/2021 NOT REPORTED     TSH 10/26/2021 1.64     Hemoglobin A1C 10/26/2021 5.7     Estimated Avg Glucose 10/26/2021 117     WBC 10/26/2021 5.8     RBC 10/26/2021 4.53     Hemoglobin 10/26/2021 11.9*    Hematocrit 10/26/2021 39.7*    MCV 10/26/2021 87.6     MCH 10/26/2021 26.3     MCHC 10/26/2021 30.0     RDW 10/26/2021 16.1*    Platelets 20/13/3082 254     MPV 10/26/2021 12.0     NRBC Automated 10/26/2021 0.0     Differential Type 10/26/2021 NOT REPORTED     Seg Neutrophils 10/26/2021 50     Lymphocytes 10/26/2021 40     Monocytes 10/26/2021 7     Eosinophils % 10/26/2021 2     Basophils 10/26/2021 1     Immature Granulocytes 10/26/2021 0     Segs Absolute 10/26/2021 2.86     Absolute Lymph # 10/26/2021 2.28     Absolute Mono # 10/26/2021 0.42     Absolute Eos # 10/26/2021 0.11     Basophils Absolute 10/26/2021 0.06     Absolute Immature Granul* 10/26/2021 <0.03     WBC Morphology 10/26/2021 NOT REPORTED     RBC Morphology 10/26/2021 ANISOCYTOSIS PRESENT     Platelet Estimate 98/30/4826 NOT REPORTED     Vitamin B-12 10/26/2021 1568*    Folate 10/26/2021 13.5     Ferritin 10/26/2021 8*    Iron 10/26/2021 62     TIBC 10/26/2021 381     Iron Saturation 10/26/2021 16*    UIBC 10/26/2021 319      Lab Results   Component Value Date    CHOL 126 12/07/2019    TRIG 113 12/07/2019    HDL 40 12/07/2019          Assessment/Plan:      1. Chronic midline low back pain with bilateral sciatica  - gabapentin (NEURONTIN) 600 MG tablet; Take 1 tablet by mouth 3 times daily for 90 days. Dispense: 90 tablet; Refill: 2    2. Opioid dependence on agonist therapy ([facility]  F/u suboxone clinic     3. Chronic hepatitis C without hepatic coma (HCC)  F/u GI - information to call Dr Ko Snow office provided in AVS    4. Attention deficit hyperactivity disorder (ADHD), combined type  - atomoxetine (STRATTERA) 40 MG capsule; Take 1 capsule by mouth daily  Dispense: 30 capsule; Refill: 3    5. Gastroesophageal reflux disease, unspecified whether esophagitis present  - omeprazole (PRILOSEC) 20 MG delayed release capsule; Take 1 capsule by mouth daily  Dispense: 90 capsule; Refill: 1    6. Tobacco abuse  - nicotine polacrilex (NICORETTE) 4 MG gum; Take 1 each by mouth every hour as needed for Smoking cessation  Dispense: 110 each; Refill: 3    7.  History of substance abuse ([facility]  Clean for over two years now            Health Maintenance Due   Topic Date Due    Hepatitis A vaccine (1 of 2 - Risk 2-dose series) Never done    Depression Monitoring  Never done    Hepatitis B vaccine (1 of 3 - Risk 3-dose series) Never done       Electronically signed by Kala Spicer MD on 4/19/2022 at 9:00 AM

## 2022-04-19 NOTE — PATIENT INSTRUCTIONS
Patient Education   Call Dr Lotus Garg office at [phone] to figure out follow-up and treatment for the hepatitis C. Stopping Smoking: Care Instructions  Your Care Instructions     Cigarette smokers crave the nicotine in cigarettes. Giving it up is much harder than simply changing a habit. Your body has to stop craving the nicotine. It is hard to quit, but you can do it. There are many tools that people use to quitsmoking. You may find that combining tools works best for you. There are several steps to quitting. First you get ready to quit. Then you get support to help you. After that, you learn new skills and behaviors to become anonsmoker. For many people, a necessary step is getting and using medicine. Your doctor will help you set up the plan that best meets your needs. You may want to attend a smoking cessation program to help you quit smoking. When you choose a program, look for one that has proven success. Ask your doctor for ideas. You will greatly increase your chances of success if you take medicineas well as get counseling or join a cessation program.  Some of the changes you feel when you first quit tobacco are uncomfortable. Your body will miss the nicotine at first, and you may feel short-tempered and grumpy. You may have trouble sleeping or concentrating. Medicine can help you deal with these symptoms. You may struggle with changing your smoking habits and rituals. The last step is the tricky one: Be prepared for the smoking urge to continue for a time. This is a lot to deal with, but keep at it. You willfeel better. Follow-up care is a key part of your treatment and safety. Be sure to make and go to all appointments, and call your doctor if you are having problems. It's also a good idea to know your test results and keep alist of the medicines you take. How can you care for yourself at home?  Ask your family, friends, and coworkers for support.  You have a better chance of quitting if you have help and support.  Join a support group, such as Nicotine Anonymous, for people who are trying to quit smoking.  Consider signing up for a smoking cessation program, such as the American Lung Association's Freedom from Smoking program.   Get text messaging support. Go to the website at www.smokefree. gov to sign up for the [facility] program.   Set a quit date. Pick your date carefully so that it is not right in the middle of a big deadline or stressful time. Once you quit, do not even take a puff. Get rid of all ashtrays and lighters after your last cigarette. Clean your house and your clothes so that they do not smell of smoke.  Learn how to be a nonsmoker. Think about ways you can avoid those things that make you reach for a cigarette. ? Avoid situations that put you at greatest risk for smoking. For some people, it is hard to have a drink with friends without smoking. For others, they might skip a coffee break with coworkers who smoke. ? Change your daily routine. Take a different route to work or eat a meal in a different place.  Cut down on stress. Calm yourself or release tension by doing an activity you enjoy, such as reading a book, taking a hot bath, or gardening.  Talk to your doctor or pharmacist about nicotine replacement therapy, which replaces the nicotine in your body. You still get nicotine but you do not use tobacco. Nicotine replacement products help you slowly reduce the amount of nicotine you need. These products come in several forms, many of them available over-the-counter:  ? Nicotine patches  ? Nicotine gum and lozenges  ? Nicotine inhaler   Ask your doctor about bupropion (Wellbutrin) or varenicline (Chantix), which are prescription medicines. They do not contain nicotine. They help you by reducing withdrawal symptoms, such as stress and anxiety.  Some people find hypnosis, acupuncture, and massage helpful for ending the smoking habit.    Eat a healthy diet and get regular exercise. Having healthy habits will help your body move past its craving for nicotine.  Be prepared to keep trying. Most people are not successful the first few times they try to quit. Do not get mad at yourself if you smoke again. Make a list of things you learned and think about when you want to try again, such as next week, next month, or next year. Where can you learn more? Go to https://DoNanzamichi.Skadoit. org and sign in to your Watchwith account. Enter R104 in the [employer] box to learn more about \"Stopping Smoking: Care Instructions. \"     If you do not have an account, please click on the \"Sign Up Now\" link. Current as of: October 28, 2021               Content Version: 13.2  © 8940-5699 Healthwise, Incorporated. Care instructions adapted under license by [facility] ([facility]). If you have questions about a medical condition or this instruction, always ask your healthcare professional. [place] [address] any warranty or liability for your use of this information.

## 2022-05-02 ENCOUNTER — HOSPITAL ENCOUNTER (EMERGENCY)
Age: 47
Discharge: HOME | End: 2022-05-02
Attending: EMERGENCY MEDICINE
Payer: COMMERCIAL

## 2022-05-02 VITALS
RESPIRATION RATE: 20 BRPM | BODY MASS INDEX: 25.73 KG/M2 | SYSTOLIC BLOOD PRESSURE: 133 MMHG | OXYGEN SATURATION: 100 % | WEIGHT: 190 LBS | HEIGHT: 72 IN | DIASTOLIC BLOOD PRESSURE: 93 MMHG | TEMPERATURE: 97.7 F | HEART RATE: 99 BPM

## 2022-05-02 DIAGNOSIS — F15.10 METHAMPHETAMINE ABUSE (HCC): Primary | ICD-10-CM

## 2022-05-02 DIAGNOSIS — L03.90 CELLULITIS, UNSPECIFIED CELLULITIS SITE: ICD-10-CM

## 2022-05-02 PROCEDURE — 6370000000 HC RX 637 (ALT 250 FOR IP): Performed by: EMERGENCY MEDICINE

## 2022-05-02 PROCEDURE — 99283 EMERGENCY DEPT VISIT LOW MDM: CPT

## 2022-05-02 RX ORDER — ONDANSETRON 4 MG/1
4 TABLET, ORALLY DISINTEGRATING ORAL ONCE
Status: COMPLETED | OUTPATIENT
Start: 2022-05-02 | End: 2022-05-02

## 2022-05-02 RX ORDER — DOXYCYCLINE 100 MG/1
100 CAPSULE ORAL ONCE
Status: COMPLETED | OUTPATIENT
Start: 2022-05-02 | End: 2022-05-02

## 2022-05-02 RX ORDER — DOXYCYCLINE HYCLATE 100 MG
100 TABLET ORAL 2 TIMES DAILY
Qty: 10 TABLET | Refills: 0 | Status: SHIPPED | OUTPATIENT
Start: 2022-05-02 | End: 2022-05-07

## 2022-05-02 RX ORDER — LORAZEPAM 1 MG/1
1 TABLET ORAL ONCE
Status: COMPLETED | OUTPATIENT
Start: 2022-05-02 | End: 2022-05-02

## 2022-05-02 RX ADMIN — LORAZEPAM 1 MG: 1 TABLET ORAL at 06:10

## 2022-05-02 RX ADMIN — ONDANSETRON 4 MG: 4 TABLET, ORALLY DISINTEGRATING ORAL at 06:04

## 2022-05-02 RX ADMIN — DOXYCYCLINE 100 MG: 100 CAPSULE ORAL at 06:10

## 2022-05-02 ASSESSMENT — ENCOUNTER SYMPTOMS
COUGH: 0
BACK PAIN: 0
ABDOMINAL PAIN: 0
SHORTNESS OF BREATH: 0
DIARRHEA: 0
VOMITING: 0

## 2022-05-02 ASSESSMENT — PAIN - FUNCTIONAL ASSESSMENT: PAIN_FUNCTIONAL_ASSESSMENT: NONE - DENIES PAIN

## 2022-05-02 NOTE — ED NOTES
upon d/c pt is alert, oriented, ambulatory, and free of distress. Writer RN educated pt on follow-up care with PCP and [facility]. RM encouraged pt to complete full regimen of antibiotics. Opportunities for questions offered, no further needs at this time.         Sweetie Mays RN  05/02/22 0080

## 2022-05-02 NOTE — ED PROVIDER NOTES
EMERGENCY DEPARTMENT ENCOUNTER    Pt Name: Magaly Foy  MRN: [id number]  [place] 1975  Date of evaluation: 5/2/22  CHIEF COMPLAINT       Chief Complaint   Patient presents with    Drug / Alcohol Assessment     HISTORY OF PRESENT ILLNESS   HPI     This is a 35-year-old male comes in today. The patient called the ambulance because he snorted methamphetamine an hour prior to arrival and cannot calm down. He denies any chest pain no shortness of breath no abdominal pain but has had some vomiting. Patient also wants to be seen because he had an abscess in his right lower leg he cleaned the knife then cut it open by himself and noticed that it is red and swollen painful. He has a history of IV drug use but states that he does not use IV drugs anymore. REVIEW OF SYSTEMS     Review of Systems   Constitutional: Negative for fever. HENT: Negative for congestion. Respiratory: Negative for cough and shortness of breath. Cardiovascular: Negative for chest pain. Gastrointestinal: Negative for abdominal pain, diarrhea and vomiting. Genitourinary: Negative for dysuria. Musculoskeletal: Negative for back pain. Right leg pain   Skin: Negative for rash. Neurological: Negative for headaches. Psychiatric/Behavioral: The patient is nervous/anxious. All other systems reviewed and are negative. PASTMEDICAL HISTORY     Past Medical History:   Diagnosis Date    ADHD (attention deficit hyperactivity disorder)     Anxiety     Bipolar disorder with severe depression ([address] 12/4/2019    Depression     Depression with suicidal ideation 12/9/2021    Hep C w/ coma, chronic     Polysubstance abuse ([address]     pt currently on Suboxone 4mg therapy; drug abuse includes IV heroin, IV cocaine, cannabis, opiates, \"just about every drug in the world. \"    Substance abuse ([address]     Suicidal ideation 9/9/2020     SURGICAL HISTORY     History reviewed. No pertinent surgical history.   CURRENT MEDICATIONS Previous Medications    ATOMOXETINE (STRATTERA) 40 MG CAPSULE    Take 1 capsule by mouth daily    CLONIDINE (CATAPRES) 0.1 MG TABLET    Take 1 tablet by mouth 3 times daily    GABAPENTIN (NEURONTIN) 600 MG TABLET    Take 1 tablet by mouth 3 times daily for 90 days. IBUPROFEN (ADVIL;MOTRIN) 400 MG TABLET    Take 1 tablet by mouth every 6 hours as needed (Pain moderate (4-7), Pain severe (8-10))    MELATONIN 5 MG TABS TABLET        MULTIPLE VITAMIN (MULTIVITAMIN) TABS    Take 1 tablet by mouth daily    NALOXONE 4 MG/0.1ML LIQD NASAL SPRAY        NICOTINE POLACRILEX (NICORETTE) 4 MG GUM    Take 1 each by mouth every hour as needed for Smoking cessation    OMEPRAZOLE (PRILOSEC) 20 MG DELAYED RELEASE CAPSULE    Take 1 capsule by mouth daily     ALLERGIES     is allergic to duloxetine. FAMILY HISTORY     He indicated that the status of his mother is unknown. He indicated that the status of his father is unknown. He indicated that the status of his other is unknown. SOCIAL HISTORY       Social History     Tobacco Use    Smoking status: Current Every Day Smoker     Packs/day: 0.25     Years: 31.00     Pack years: 7.75     Types: Cigarettes    Smokeless tobacco: Current User     Types: Chew    Tobacco comment: chews more than smokies   Vaping Use    Vaping Use: Never used   Substance Use Topics    Alcohol use: Not Currently     Comment: occasional ETOH use    Drug use: Not Currently     Frequency: 7.0 times per week     Types: Marijuana (Janyth Rily), IV, Cocaine, Methamphetamines (Crystal Meth)     Comment: pt currently taking Suboxone; drug abuse includes IV heroin, IV cocaine, cannabis, opiates, \"just about every drug in the world. \"     PHYSICAL EXAM     INITIAL VITALS: BP (!) 156/109   Pulse 100   Temp 97.7 °F (36.5 °C) (Oral)   Resp 24   Ht 6' (1.829 m)   Wt 190 lb (86.2 kg)   SpO2 100%   BMI 25.77 kg/m²    Physical Exam  Vitals and nursing note reviewed.    Constitutional:       General: He is not in acute distress. Appearance: He is well-developed. HENT:      Head: Normocephalic and atraumatic. Eyes:      Conjunctiva/sclera: Conjunctivae normal.   Cardiovascular:      Rate and Rhythm: Normal rate and regular rhythm. Heart sounds: No murmur heard. No friction rub. Pulmonary:      Effort: Pulmonary effort is normal. No respiratory distress. Breath sounds: Normal breath sounds. No wheezing or rhonchi. Abdominal:      General: There is no distension. Palpations: Abdomen is soft. Tenderness: There is no abdominal tenderness. There is no guarding or rebound. Musculoskeletal:      Cervical back: Neck supple. Comments: 3 x 2 area of erythema with central punctate necrotic area on the right lower distal leg with mild tenderness to palpation no fluctuance no induration   Skin:     General: Skin is warm and dry. Capillary Refill: Capillary refill takes less than 2 seconds. Neurological:      Mental Status: He is alert. Psychiatric:      Comments: Mild psychomotor agitation but very cooperative         EMERGENCY DEPARTMENTCOURSE:   Patient presents after snorting methamphetamine. Clinically [place] well has some mild psychomotor agitation we will give him Ativan. He is also vomiting we will give him some Zofran. He has no chest pain doubt ACS. Terms of the leg does not appear to be an abscess no fluctuance however there does appear to be the beginning of cellulitis he is not septic at this time will treat with outpatient doxycycline.        Vitals:    Vitals:    05/02/22 0551   BP: (!) 156/109   Pulse: 100   Resp: 24   Temp: 97.7 °F (36.5 °C)   TempSrc: Oral   SpO2: 100%   Weight: 190 lb (86.2 kg)   Height: 6' (1.829 m)       The patient was given the following medications while in the emergency department:  Orders Placed This Encounter   Medications    ondansetron (ZOFRAN-ODT) disintegrating tablet 4 mg    LORazepam (ATIVAN) tablet 1 mg    doxycycline monohydrate (MONODOX) capsule 100 mg     Order Specific Question:   Antimicrobial Indications     Answer:   Skin and Soft Tissue Infection    doxycycline hyclate (VIBRA-TABS) 100 MG tablet     Sig: Take 1 tablet by mouth 2 times daily for 5 days     Dispense:  10 tablet     Refill:  0         FINAL IMPRESSION      1. Methamphetamine abuse ([address]    2. Cellulitis, unspecified cellulitis site         DISPOSITION/PLAN   DISPOSITION Decision To Discharge 05/02/2022 05:55:38 AM      PATIENT REFERRED TO:  Isabel Preciado, [address] [facility] [address] [phone]      As needed    [facility].   [address] [phone]    Schedule an appointment as soon as possible for a visit   for drug use    DISCHARGE MEDICATIONS:  New Prescriptions    DOXYCYCLINE HYCLATE (VIBRA-TABS) 100 MG TABLET    Take 1 tablet by mouth 2 times daily for 5 days     Jae Lockhart MD  Attending Emergency Physician    This charting supersedes any ED resident or staff charting and was written using speech recognition Julia Leach MD  05/02/22 0600

## 2022-05-02 NOTE — ED TRIAGE NOTES
Mode of arrival (squad #, walk in, police, etc) : Ambulance    Chief complaint(s): Drug Assessment    Arrival Note (brief scenario, treatment PTA, etc). : Pt presents to ED via EMS s/p snorting meth about an hour ago. Pt reports that this is his first time using meth. Pt reports feeling jittery and nauseous, pt appears restless upon triage assessment. C= \"Have you ever felt that you should Cut down on your drinking? \"  No  A= \"Have people Annoyed you by criticizing your drinking? \" No  G= \"Have you ever felt bad or Guilty about your drinking? \"  No  E= \"Have you ever had a drink as an Eye-opener first thing in the morning to steady your nerves or to help a hangover? \"  No    Deferred []      *If yes to two or more: probable alcohol abuse. *

## 2022-08-10 NOTE — ED NOTES
Bed: 11  Expected date: 9/9/20  Expected time: 2:58 AM  Means of arrival: [facility]  Comments:  LS 2 OD cocaine suicidal ideation c/o chest pain A/O      Kirsten Rodrigez RN  09/09/20 2125 Referring Provider (Optional): Dr. Matamoros

## 2023-08-31 NOTE — BH NOTE
RN has reviewed LPN documentation. Detail Level: Detailed Add 31169 Cpt? (Important Note: In 2017 The Use Of 91645 Is Being Tracked By Cms To Determine Future Global Period Reimbursement For Global Periods): no Wound Assessment Override (Optional): slightly crusted but healing well within normal limits Additional Comments: Continue applying vaseline until healed.

## 2023-10-16 ENCOUNTER — HOSPITAL ENCOUNTER (INPATIENT)
Age: 48
LOS: 3 days | Discharge: HOME | DRG: 751 | End: 2023-10-19
Attending: STUDENT IN AN ORGANIZED HEALTH CARE EDUCATION/TRAINING PROGRAM | Admitting: PSYCHIATRY & NEUROLOGY
Payer: COMMERCIAL

## 2023-10-16 DIAGNOSIS — R45.851 SUICIDAL IDEATION: Primary | ICD-10-CM

## 2023-10-16 PROBLEM — F32.A DEPRESSION WITH SUICIDAL IDEATION: Status: ACTIVE | Noted: 2023-10-16

## 2023-10-16 LAB
ALBUMIN SERPL-MCNC: 4.5 G/DL (ref 3.5–5.2)
ALP SERPL-CCNC: 68 U/L (ref 40–129)
ALT SERPL-CCNC: 22 U/L (ref 5–41)
AMPHET UR QL SCN: NEGATIVE
ANION GAP SERPL CALCULATED.3IONS-SCNC: 14 MMOL/L (ref 9–17)
AST SERPL-CCNC: 23 U/L
BACTERIA URNS QL MICRO: ABNORMAL
BARBITURATES UR QL SCN: NEGATIVE
BASOPHILS # BLD: 0.1 K/UL (ref 0–0.2)
BASOPHILS NFR BLD: 1 % (ref 0–2)
BENZODIAZ UR QL: NEGATIVE
BILIRUB SERPL-MCNC: 0.7 MG/DL (ref 0.3–1.2)
BILIRUB UR QL STRIP: NEGATIVE
BUN SERPL-MCNC: 19 MG/DL (ref 6–20)
CALCIUM SERPL-MCNC: 9.6 MG/DL (ref 8.6–10.4)
CANNABINOIDS UR QL SCN: POSITIVE
CASTS #/AREA URNS LPF: ABNORMAL /LPF
CHLORIDE SERPL-SCNC: 101 MMOL/L (ref 98–107)
CLARITY UR: CLEAR
CO2 SERPL-SCNC: 22 MMOL/L (ref 20–31)
COCAINE UR QL SCN: POSITIVE
COLOR UR: YELLOW
CREAT SERPL-MCNC: 0.9 MG/DL (ref 0.7–1.2)
EOSINOPHIL # BLD: 0 K/UL (ref 0–0.4)
EOSINOPHILS RELATIVE PERCENT: 0 % (ref 0–4)
EPI CELLS #/AREA URNS HPF: ABNORMAL /HPF
ERYTHROCYTE [DISTWIDTH] IN BLOOD BY AUTOMATED COUNT: 12.8 % (ref 11.5–14.9)
ETHANOL PERCENT: <0.01 %
ETHANOLAMINE SERPL-MCNC: <10 MG/DL
FENTANYL UR QL: POSITIVE
GFR SERPL CREATININE-BSD FRML MDRD: >60 ML/MIN/1.73M2
GLUCOSE SERPL-MCNC: 110 MG/DL (ref 70–99)
GLUCOSE UR STRIP-MCNC: NEGATIVE MG/DL
HCT VFR BLD AUTO: 42.1 % (ref 41–53)
HGB BLD-MCNC: 14.1 G/DL (ref 13.5–17.5)
HGB UR QL STRIP.AUTO: NEGATIVE
KETONES UR STRIP-MCNC: ABNORMAL MG/DL
LEUKOCYTE ESTERASE UR QL STRIP: NEGATIVE
LYMPHOCYTES NFR BLD: 1.9 K/UL (ref 1–4.8)
LYMPHOCYTES RELATIVE PERCENT: 26 % (ref 24–44)
MCH RBC QN AUTO: 31 PG (ref 26–34)
MCHC RBC AUTO-ENTMCNC: 33.5 G/DL (ref 31–37)
MCV RBC AUTO: 92.5 FL (ref 80–100)
METHADONE UR QL: NEGATIVE
MONOCYTES NFR BLD: 0.6 K/UL (ref 0.1–1.3)
MONOCYTES NFR BLD: 9 % (ref 1–7)
NEUTROPHILS NFR BLD: 64 % (ref 36–66)
NEUTS SEG NFR BLD: 4.7 K/UL (ref 1.3–9.1)
NITRITE UR QL STRIP: NEGATIVE
OPIATES UR QL SCN: NEGATIVE
OXYCODONE UR QL SCN: NEGATIVE
PCP UR QL SCN: NEGATIVE
PH UR STRIP: 5 [PH] (ref 5–8)
PLATELET # BLD AUTO: 229 K/UL (ref 150–450)
PMV BLD AUTO: 8.4 FL (ref 6–12)
POTASSIUM SERPL-SCNC: 3.8 MMOL/L (ref 3.7–5.3)
PROT SERPL-MCNC: 7.6 G/DL (ref 6.4–8.3)
PROT UR STRIP-MCNC: ABNORMAL MG/DL
RBC # BLD AUTO: 4.55 M/UL (ref 4.5–5.9)
RBC #/AREA URNS HPF: ABNORMAL /HPF
SODIUM SERPL-SCNC: 137 MMOL/L (ref 135–144)
SP GR UR STRIP: 1.03 (ref 1–1.03)
TEST INFORMATION: ABNORMAL
TSH SERPL DL<=0.05 MIU/L-ACNC: 0.63 UIU/ML (ref 0.3–5)
UROBILINOGEN UR STRIP-ACNC: NORMAL EU/DL (ref 0–1)
WBC #/AREA URNS HPF: ABNORMAL /HPF
WBC OTHER # BLD: 7.4 K/UL (ref 3.5–11)

## 2023-10-16 PROCEDURE — 84443 ASSAY THYROID STIM HORMONE: CPT

## 2023-10-16 PROCEDURE — 80307 DRUG TEST PRSMV CHEM ANLYZR: CPT

## 2023-10-16 PROCEDURE — G0480 DRUG TEST DEF 1-7 CLASSES: HCPCS

## 2023-10-16 PROCEDURE — 81001 URINALYSIS AUTO W/SCOPE: CPT

## 2023-10-16 PROCEDURE — 99285 EMERGENCY DEPT VISIT HI MDM: CPT

## 2023-10-16 PROCEDURE — 36415 COLL VENOUS BLD VENIPUNCTURE: CPT

## 2023-10-16 PROCEDURE — 85025 COMPLETE CBC W/AUTO DIFF WBC: CPT

## 2023-10-16 PROCEDURE — 1240000000 HC EMOTIONAL WELLNESS R&B

## 2023-10-16 PROCEDURE — 6370000000 HC RX 637 (ALT 250 FOR IP): Performed by: NURSE PRACTITIONER

## 2023-10-16 PROCEDURE — 80053 COMPREHEN METABOLIC PANEL: CPT

## 2023-10-16 PROCEDURE — 90792 PSYCH DIAG EVAL W/MED SRVCS: CPT | Performed by: PSYCHIATRY & NEUROLOGY

## 2023-10-16 PROCEDURE — APPSS60 APP SPLIT SHARED TIME 46-60 MINUTES: Performed by: NURSE PRACTITIONER

## 2023-10-16 PROCEDURE — 99222 1ST HOSP IP/OBS MODERATE 55: CPT | Performed by: INTERNAL MEDICINE

## 2023-10-16 PROCEDURE — 6370000000 HC RX 637 (ALT 250 FOR IP): Performed by: PSYCHIATRY & NEUROLOGY

## 2023-10-16 RX ORDER — CLONIDINE HYDROCHLORIDE 0.1 MG/1
0.1 TABLET ORAL EVERY 4 HOURS PRN
Status: DISCONTINUED | OUTPATIENT
Start: 2023-10-16 | End: 2023-10-19 | Stop reason: HOSPADM

## 2023-10-16 RX ORDER — NICOTINE 21 MG/24HR
1 PATCH, TRANSDERMAL 24 HOURS TRANSDERMAL DAILY
Status: DISCONTINUED | OUTPATIENT
Start: 2023-10-16 | End: 2023-10-16

## 2023-10-16 RX ORDER — ONDANSETRON 4 MG/1
4 TABLET, FILM COATED ORAL EVERY 8 HOURS PRN
Status: DISCONTINUED | OUTPATIENT
Start: 2023-10-16 | End: 2023-10-16

## 2023-10-16 RX ORDER — ACETAMINOPHEN 325 MG/1
650 TABLET ORAL EVERY 4 HOURS PRN
Status: DISCONTINUED | OUTPATIENT
Start: 2023-10-16 | End: 2023-10-19 | Stop reason: HOSPADM

## 2023-10-16 RX ORDER — QUETIAPINE FUMARATE 100 MG/1
100 TABLET, FILM COATED ORAL NIGHTLY
Status: DISCONTINUED | OUTPATIENT
Start: 2023-10-16 | End: 2023-10-19 | Stop reason: HOSPADM

## 2023-10-16 RX ORDER — CYCLOBENZAPRINE HCL 10 MG
10 TABLET ORAL 3 TIMES DAILY PRN
Status: DISCONTINUED | OUTPATIENT
Start: 2023-10-16 | End: 2023-10-19 | Stop reason: HOSPADM

## 2023-10-16 RX ORDER — MAGNESIUM HYDROXIDE/ALUMINUM HYDROXICE/SIMETHICONE 120; 1200; 1200 MG/30ML; MG/30ML; MG/30ML
30 SUSPENSION ORAL EVERY 6 HOURS PRN
Status: DISCONTINUED | OUTPATIENT
Start: 2023-10-16 | End: 2023-10-19 | Stop reason: HOSPADM

## 2023-10-16 RX ORDER — POLYETHYLENE GLYCOL 3350 17 G
2 POWDER IN PACKET (EA) ORAL
Status: DISCONTINUED | OUTPATIENT
Start: 2023-10-16 | End: 2023-10-19 | Stop reason: HOSPADM

## 2023-10-16 RX ORDER — HYDROXYZINE 50 MG/1
50 TABLET, FILM COATED ORAL 3 TIMES DAILY PRN
Status: DISCONTINUED | OUTPATIENT
Start: 2023-10-16 | End: 2023-10-19 | Stop reason: HOSPADM

## 2023-10-16 RX ORDER — DICYCLOMINE HYDROCHLORIDE 10 MG/1
20 CAPSULE ORAL 4 TIMES DAILY PRN
Status: DISCONTINUED | OUTPATIENT
Start: 2023-10-16 | End: 2023-10-19 | Stop reason: HOSPADM

## 2023-10-16 RX ORDER — CLONIDINE HYDROCHLORIDE 0.1 MG/1
0.1 TABLET ORAL EVERY 6 HOURS PRN
Status: DISCONTINUED | OUTPATIENT
Start: 2023-10-16 | End: 2023-10-16 | Stop reason: SDUPTHER

## 2023-10-16 RX ORDER — LOPERAMIDE HYDROCHLORIDE 2 MG/1
2 CAPSULE ORAL 4 TIMES DAILY PRN
Status: DISCONTINUED | OUTPATIENT
Start: 2023-10-16 | End: 2023-10-19 | Stop reason: HOSPADM

## 2023-10-16 RX ORDER — ONDANSETRON 4 MG/1
4 TABLET, FILM COATED ORAL EVERY 8 HOURS PRN
Status: DISCONTINUED | OUTPATIENT
Start: 2023-10-16 | End: 2023-10-19 | Stop reason: HOSPADM

## 2023-10-16 RX ORDER — IBUPROFEN 400 MG/1
400 TABLET ORAL EVERY 6 HOURS PRN
Status: DISCONTINUED | OUTPATIENT
Start: 2023-10-16 | End: 2023-10-19 | Stop reason: HOSPADM

## 2023-10-16 RX ORDER — TRAZODONE HYDROCHLORIDE 50 MG/1
50 TABLET ORAL NIGHTLY PRN
Status: DISCONTINUED | OUTPATIENT
Start: 2023-10-16 | End: 2023-10-19 | Stop reason: HOSPADM

## 2023-10-16 RX ORDER — BUPRENORPHINE AND NALOXONE 8; 2 MG/1; MG/1
3 FILM, SOLUBLE BUCCAL; SUBLINGUAL DAILY
Status: ON HOLD | COMMUNITY
End: 2023-10-19 | Stop reason: HOSPADM

## 2023-10-16 RX ORDER — POLYETHYLENE GLYCOL 3350 17 G/17G
17 POWDER, FOR SOLUTION ORAL DAILY PRN
Status: DISCONTINUED | OUTPATIENT
Start: 2023-10-16 | End: 2023-10-19 | Stop reason: HOSPADM

## 2023-10-16 RX ORDER — DICYCLOMINE HCL 20 MG
20 TABLET ORAL EVERY 6 HOURS PRN
Status: DISCONTINUED | OUTPATIENT
Start: 2023-10-16 | End: 2023-10-16 | Stop reason: SDUPTHER

## 2023-10-16 RX ADMIN — CYCLOBENZAPRINE 10 MG: 10 TABLET, FILM COATED ORAL at 15:58

## 2023-10-16 RX ADMIN — NICOTINE POLACRILEX 2 MG: 2 LOZENGE ORAL at 11:10

## 2023-10-16 RX ADMIN — DICYCLOMINE HYDROCHLORIDE 20 MG: 10 CAPSULE ORAL at 18:05

## 2023-10-16 RX ADMIN — DICYCLOMINE HYDROCHLORIDE 20 MG: 20 TABLET ORAL at 11:12

## 2023-10-16 RX ADMIN — NICOTINE POLACRILEX 2 MG: 2 LOZENGE ORAL at 15:59

## 2023-10-16 RX ADMIN — ONDANSETRON HYDROCHLORIDE 4 MG: 4 TABLET, FILM COATED ORAL at 18:06

## 2023-10-16 RX ADMIN — HYDROXYZINE HYDROCHLORIDE 50 MG: 50 TABLET, FILM COATED ORAL at 21:31

## 2023-10-16 RX ADMIN — ONDANSETRON HYDROCHLORIDE 4 MG: 4 TABLET, FILM COATED ORAL at 11:12

## 2023-10-16 RX ADMIN — HYDROXYZINE HYDROCHLORIDE 50 MG: 50 TABLET, FILM COATED ORAL at 11:12

## 2023-10-16 RX ADMIN — QUETIAPINE FUMARATE 100 MG: 100 TABLET ORAL at 21:31

## 2023-10-16 RX ADMIN — CLONIDINE HYDROCHLORIDE 0.1 MG: 0.1 TABLET ORAL at 15:58

## 2023-10-16 ASSESSMENT — ENCOUNTER SYMPTOMS
CHEST TIGHTNESS: 0
NAUSEA: 0
DIARRHEA: 0
VOMITING: 0
SORE THROAT: 0
EYE REDNESS: 0
SHORTNESS OF BREATH: 0
RHINORRHEA: 0
ABDOMINAL PAIN: 0
EYE DISCHARGE: 0

## 2023-10-16 ASSESSMENT — PATIENT HEALTH QUESTIONNAIRE - PHQ9
SUM OF ALL RESPONSES TO PHQ QUESTIONS 1-9: 2
SUM OF ALL RESPONSES TO PHQ9 QUESTIONS 1 & 2: 2
2. FEELING DOWN, DEPRESSED OR HOPELESS: 1
1. LITTLE INTEREST OR PLEASURE IN DOING THINGS: 1
SUM OF ALL RESPONSES TO PHQ QUESTIONS 1-9: 2

## 2023-10-16 ASSESSMENT — SLEEP AND FATIGUE QUESTIONNAIRES
DO YOU HAVE DIFFICULTY SLEEPING: YES
AVERAGE NUMBER OF SLEEP HOURS: 6
SLEEP PATTERN: INSOMNIA;DISTURBED/INTERRUPTED SLEEP;DIFFICULTY FALLING ASLEEP
DO YOU USE A SLEEP AID: YES

## 2023-10-16 ASSESSMENT — PAIN SCALES - GENERAL: PAINLEVEL_OUTOF10: 4

## 2023-10-16 ASSESSMENT — LIFESTYLE VARIABLES
HOW MANY STANDARD DRINKS CONTAINING ALCOHOL DO YOU HAVE ON A TYPICAL DAY: PATIENT DOES NOT DRINK
HOW OFTEN DO YOU HAVE A DRINK CONTAINING ALCOHOL: NEVER

## 2023-10-16 ASSESSMENT — PAIN DESCRIPTION - LOCATION: LOCATION: ABDOMEN

## 2023-10-16 ASSESSMENT — PAIN - FUNCTIONAL ASSESSMENT: PAIN_FUNCTIONAL_ASSESSMENT: NONE - DENIES PAIN

## 2023-10-16 NOTE — GROUP NOTE
Psych-Ed/Relapse Prevention Group Note        Date: October 16, 2023 Start Time: 1:30pm  End Time:  2:15pm      Number of Participants in Group & Unit Census:  5/18    Topic: Creative Expression    Goal of Group:Patient will identify benefits of creative expression for coping and stress management      Comments:     Patient did not participate in Psych-Ed/Relapse Prevention group, despite staff encouragement and explanation of benefits. Patient remain seclusive to self. Q15 minute safety checks maintained for patient safety and will continue to encourage patient to attend unit programming.          Signature:  Edy Crooks

## 2023-10-16 NOTE — ED NOTES
Provisional Diagnosis:   Depression with suicidal ideation     Psychosocial and Contextual Factors: Pt has substance abuse issues. C-SSRS Summary:    Patient: X    Family:     Agency: X (EPIC)    Present Suicidal Behavior:     Verbal: X    Attempt:       Past Suicidal Behavior:     Verbal: X    Attempt: X    Self- Injurious/ Self-Mutilation: Pt denies    Trauma History: Pt denies    Protective Factors: Pt has housing, support, and insurance. Risk Factors: Pt has poor judgement and coping skills. Substance Abuse: Pt abuses cocaine, fentanyl and marijuana. Clinical Summary:  Sarika Marcum is a 50year old male who presents to the ED via self. Pt is suicidal. Pt has cut self on the wrist in the past as an attempt to commit suicide. Pt reports pt's suicidal thoughts are not to the point that pt has a plan yet however, pt does not feel safe leaving the hospital. Pt denies HI/AH/VH. Pt reports pt relapsed on cocaine 2 days ago after 2 years of sobriety. Pt has feelings of worthlessness. Pt identifies pt's relapse as the trigger to pt's SI. Pt denies the of other illegal drugs, however pt's UDS is positive for fentanyl and marijuana. Pt denies the use of alcohol. Pt has a previous diagnosis of bipolar disorder and major depressive disorder. Pt has been off pt's medications for the past 2 weeks. Pt is linked with [facility] for suboxone. Pt was last admitted to the [facility] 12/9/21. Pt reports poor sleep and a good appetite. Level of Care Disposition:.PHI consulted with Nemesio Kline NP from psychiatry. Pt accepted for an inpatient admission to the [facility] for safety and stabilization.

## 2023-10-16 NOTE — ED NOTES
Belongings and patient checked by security. Belongings locked up. Pt in blue gown. Mode of arrival (squad #, walk in, police, etc) : Walk In        Chief complaint(s): Mental Health Evaluation        Arrival Note (brief scenario, treatment PTA, etc). : Pt came to the ED for having thoughts of suicide pt denies having a plan he responded \"I don't know I haven't gotten that far\". Pt has had past suicidal thoughts in the past with an attempt by cutting wrist. Pt states he's been feeling guilty due to his relapse on cocaine he's disappointed in himself he's been sober for several years and relapsed a few days ago. Pt denies HI pt also denies visual and audio hallucinations at this time. Pt also denies the use of alcohol at this time. C= \"Have you ever felt that you should Cut down on your drinking? \"  No  A= \"Have people Annoyed you by criticizing your drinking? \"  No  G= \"Have you ever felt bad or Guilty about your drinking? \"  No  E= \"Have you ever had a drink as an Eye-opener first thing in the morning to steady your nerves or to help a hangover? \"  No      Deferred []      Reason for deferring: N/A    *If yes to two or more: probable alcohol abuse. Elias Sorto LPN  06/18/24 0096
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
## 2023-10-16 NOTE — ED PROVIDER NOTES
Suspect: Medical derangement    Pertinent Comorbid Conditions: See history    2)  Data Reviewed and Analyzed  (Lab and radiology tests/orders below in next section)    External Documents Reviewed: Previous visits for substance abuse and previous admission for suicidal ideation        3)  Treatment and Disposition      ED Course as of 10/16/23 0218   Mon Oct 16, 2023   0123 CBC with Auto Differential(!):    WBC 7.4   RBC 4.55   Hemoglobin Quant 14.1   Hematocrit 42.1   MCV 92.5   MCH 31.0   MCHC 33.5   RDW 12.8   Platelet Count 347   MPV 8.4   Neutrophils % 64   Lymphocyte % 26   Monocytes % 9(!)   Eosinophils % 0   Basophils % 1   Neutrophils Absolute 4.70   Lymphocytes Absolute 1.90   Monocytes Absolute 0.60   Eosinophils Absolute 0.00   Basophils Absolute 0.10  unremarkable [JOSE LUIS]   0155 CMP(!):    Sodium 137   Potassium 3.8   Chloride 101   CO2 22   Anion Gap 14   Glucose, Random 110(!)   BUN,BUNPL 19   Creatinine 0.9   Est, Glom Filt Rate >60   CALCIUM, SERUM, 532430 9.6   Total Protein 7.6   Albumin 4.5   BILIRUBIN TOTAL 0.7   Alk Phos 68   ALT 22   AST 23  unremarkable [JOSE LUIS]   0155 ETOH:    ETHANOL,ETHA <10   Ethanol percent <0.010  negative [JOSE LUIS]      ED Course User Index  [JOSE LUIS] Laith Bee MD           Disposition discussion with patient/family, Shared Decision Making: Informed of psych admission    Case discussed with consulting clinician: Discussed with  Marylenallison Michell    55-year-old suicidal with plan medically cleared admitted to psych        CRITICAL CARE:       PROCEDURES:    Procedures      DATA FOR LAB AND RADIOLOGY TESTS ORDERED BELOW ARE REVIEWED BY THE ED CLINICIAN:    RADIOLOGY: All x-rays, CT, MRI, and formal ultrasound images (except ED bedside ultrasound) are read by the radiologist, see reports below, unless otherwise noted in MDM or here. Reports below are reviewed by myself.   No orders to display       LABS: Lab orders shown below, the results are reviewed by myself, and all

## 2023-10-16 NOTE — GROUP NOTE
Psych-Ed/Relapse Prevention Group Note        Date: October 16, 2023 Start Time:  10:30am   End Time:  11:15am      Number of Participants in Group & Unit Census:  5/19    Topic: Socialization    Goal of Group:Patient will demonstrate improved interpersonal skills and offer peer support      Comments:     Patient did not participate in Psych-Ed/Relapse Prevention group, despite staff encouragement and explanation of benefits. Patient remain seclusive to self. Q15 minute safety checks maintained for patient safety and will continue to encourage patient to attend unit programming.          Signature:  Edy Botello

## 2023-10-17 PROCEDURE — 6370000000 HC RX 637 (ALT 250 FOR IP): Performed by: NURSE PRACTITIONER

## 2023-10-17 PROCEDURE — 1240000000 HC EMOTIONAL WELLNESS R&B

## 2023-10-17 PROCEDURE — 99232 SBSQ HOSP IP/OBS MODERATE 35: CPT | Performed by: PSYCHIATRY & NEUROLOGY

## 2023-10-17 PROCEDURE — APPSS30 APP SPLIT SHARED TIME 16-30 MINUTES: Performed by: NURSE PRACTITIONER

## 2023-10-17 PROCEDURE — 6370000000 HC RX 637 (ALT 250 FOR IP): Performed by: PSYCHIATRY & NEUROLOGY

## 2023-10-17 RX ADMIN — DICYCLOMINE HYDROCHLORIDE 20 MG: 10 CAPSULE ORAL at 09:28

## 2023-10-17 RX ADMIN — CLONIDINE HYDROCHLORIDE 0.1 MG: 0.1 TABLET ORAL at 09:28

## 2023-10-17 RX ADMIN — CLONIDINE HYDROCHLORIDE 0.1 MG: 0.1 TABLET ORAL at 17:43

## 2023-10-17 RX ADMIN — HYDROXYZINE HYDROCHLORIDE 50 MG: 50 TABLET, FILM COATED ORAL at 22:22

## 2023-10-17 RX ADMIN — ACETAMINOPHEN 650 MG: 325 TABLET ORAL at 17:42

## 2023-10-17 RX ADMIN — NICOTINE POLACRILEX 2 MG: 2 LOZENGE ORAL at 17:42

## 2023-10-17 RX ADMIN — QUETIAPINE FUMARATE 100 MG: 100 TABLET ORAL at 22:22

## 2023-10-17 RX ADMIN — DICYCLOMINE HYDROCHLORIDE 20 MG: 10 CAPSULE ORAL at 17:43

## 2023-10-17 RX ADMIN — CYCLOBENZAPRINE 10 MG: 10 TABLET, FILM COATED ORAL at 17:43

## 2023-10-17 RX ADMIN — CYCLOBENZAPRINE 10 MG: 10 TABLET, FILM COATED ORAL at 09:28

## 2023-10-17 RX ADMIN — NICOTINE POLACRILEX 2 MG: 2 LOZENGE ORAL at 09:51

## 2023-10-17 RX ADMIN — NICOTINE POLACRILEX 2 MG: 2 LOZENGE ORAL at 12:21

## 2023-10-17 ASSESSMENT — PAIN - FUNCTIONAL ASSESSMENT: PAIN_FUNCTIONAL_ASSESSMENT: ACTIVITIES ARE NOT PREVENTED

## 2023-10-17 ASSESSMENT — PAIN SCALES - GENERAL
PAINLEVEL_OUTOF10: 3
PAINLEVEL_OUTOF10: 4

## 2023-10-17 ASSESSMENT — PAIN DESCRIPTION - LOCATION
LOCATION: GENERALIZED
LOCATION: GENERALIZED

## 2023-10-17 ASSESSMENT — LIFESTYLE VARIABLES
HOW OFTEN DO YOU HAVE A DRINK CONTAINING ALCOHOL: NEVER
HOW MANY STANDARD DRINKS CONTAINING ALCOHOL DO YOU HAVE ON A TYPICAL DAY: PATIENT DOES NOT DRINK
HOW MANY STANDARD DRINKS CONTAINING ALCOHOL DO YOU HAVE ON A TYPICAL DAY: PATIENT DOES NOT DRINK
HOW OFTEN DO YOU HAVE A DRINK CONTAINING ALCOHOL: NEVER

## 2023-10-17 ASSESSMENT — PAIN DESCRIPTION - DESCRIPTORS: DESCRIPTORS: ACHING

## 2023-10-17 NOTE — GROUP NOTE
Group Therapy Note    Date: 10/17/2023    Group Start Time: 1000  Group End Time: 8836  Group Topic: Psychotherapy    [facility]    UZAIR Salazar LSW        Group Therapy Note    Attendees: 6/18       Patient refused to attend psychotherapy group at 10:00 am after encouragement from staff. 1:1 talk time provided as alternative to group session.       Discipline Responsible: /Counselor      Signature:  UZAIR Salazar LSW

## 2023-10-17 NOTE — GROUP NOTE
Psych-Ed/Relapse Prevention Group Note        Date: October 17, 2023 Start Time: 1:30pm  End Time:  2:15pm      Number of Participants in Group & Unit Census:  8/16    Topic: Socialization and Peer Support    Goal of Group:Patient will demonstrate improved interpersonal skills and offer peer support      Comments:     Patient did not participate in Psych-Ed/Relapse Prevention group, despite staff encouragement and explanation of benefits. Patient remain seclusive to self. Q15 minute safety checks maintained for patient safety and will continue to encourage patient to attend unit programming.          Signature:  Edy Becerra

## 2023-10-17 NOTE — GROUP NOTE
Psych-Ed/Relapse Prevention Group Note        Date: October 17, 2023 Start Time: 11am  End Time: 11:45am      Number of Participants in Group & Unit Census:  6/16    Topic: Leisure skills    Goal of Group:Patient will identify benefits of leisure for coping and stress management      Comments:     Patient did not participate in Psych-Ed/Relapse Prevention group, despite staff encouragement and explanation of benefits. Patient remain seclusive to self. Q15 minute safety checks maintained for patient safety and will continue to encourage patient to attend unit programming.          Signature:  Edy Wilde

## 2023-10-18 PROCEDURE — 6370000000 HC RX 637 (ALT 250 FOR IP): Performed by: PSYCHIATRY & NEUROLOGY

## 2023-10-18 PROCEDURE — APPSS30 APP SPLIT SHARED TIME 16-30 MINUTES

## 2023-10-18 PROCEDURE — 99232 SBSQ HOSP IP/OBS MODERATE 35: CPT | Performed by: PSYCHIATRY & NEUROLOGY

## 2023-10-18 PROCEDURE — 1240000000 HC EMOTIONAL WELLNESS R&B

## 2023-10-18 RX ADMIN — CYCLOBENZAPRINE 10 MG: 10 TABLET, FILM COATED ORAL at 08:54

## 2023-10-18 RX ADMIN — CLONIDINE HYDROCHLORIDE 0.1 MG: 0.1 TABLET ORAL at 08:54

## 2023-10-18 RX ADMIN — CYCLOBENZAPRINE 10 MG: 10 TABLET, FILM COATED ORAL at 17:09

## 2023-10-18 RX ADMIN — DICYCLOMINE HYDROCHLORIDE 20 MG: 10 CAPSULE ORAL at 17:09

## 2023-10-18 RX ADMIN — ACETAMINOPHEN 650 MG: 325 TABLET ORAL at 08:53

## 2023-10-18 RX ADMIN — HYDROXYZINE HYDROCHLORIDE 50 MG: 50 TABLET, FILM COATED ORAL at 17:09

## 2023-10-18 RX ADMIN — QUETIAPINE FUMARATE 100 MG: 100 TABLET ORAL at 20:55

## 2023-10-18 ASSESSMENT — PAIN DESCRIPTION - LOCATION
LOCATION: GENERALIZED
LOCATION: GENERALIZED

## 2023-10-18 ASSESSMENT — PAIN SCALES - GENERAL
PAINLEVEL_OUTOF10: 6
PAINLEVEL_OUTOF10: 4

## 2023-10-18 ASSESSMENT — PAIN DESCRIPTION - DESCRIPTORS: DESCRIPTORS: ACHING

## 2023-10-18 NOTE — PROGRESS NOTES
Behavioral Services  Medicare Certification Upon Admission    I certify that this patient's inpatient psychiatric hospital admission is medically necessary for:    [x] (1) Treatment which could reasonably be expected to improve this patient's condition,       [x] (2) Or for diagnostic study;     AND     [x](2) The inpatient psychiatric services are provided while the individual is under the care of a physician and are included in the individualized plan of care.     Estimated length of stay/service 4 to 7 days    Plan for post-hospital care Home with outpatient community mental health follow-up versus inpatient substance use rehab    Electronically signed by Violetta Dakin, MD on 10/16/2023 at 2:02 PM
Daily Progress Note  10/17/2023    Patient Name: Lilli Delgado    CHIEF COMPLAINT: Suicidal ideation         SUBJECTIVE:      Patient seen face-to-face for follow-up assessment. He is resting in bed, but is awake and alert for discussion. Patient is currently on COWS protocol for opiate withdrawal.  Noted that patient's urine toxicology was positive for fentanyl, cocaine and cannabinoid prior to admission. Patient expresses desire to follow-up with [facility] on an inpatient basis following discharge. Has a history of being prescribed Suboxone. Patient states his other psychotropic medications include Wellbutrin, Seroquel and gabapentin. He does not currently have an active prescriber for these medications, and all are high risk for abuse. Did discuss transitioning to duloxetine as it would help with both mood and neuropathic pain. Patient is not interested in any modifications to his previous regimen. Explained that we will not start these medications here secondary for abuse potential.  Patient reports feeling depressed and hopeless. Not able to contract for safety in the community. Requires continued inpatient hospitalization for safety.     Appetite:  [x] Adequate/Unchanged  [] Increased  [] Decreased      Sleep:       [] Adequate/Unchanged  [x] Fair  [] Poor      Group Attendance on Unit:   [] Yes   [] Selectively    [x] No    Compliant with scheduled medications: [x] Yes  [] No    Received emergency medications in past 24 hrs: [] Yes   [x] No    Medication Side Effects: Denies         Mental Status Exam  Level of consciousness: Alert and awake   Appearance: Appropriate attire for setting, resting in bed, with poor grooming and hygiene   Behavior/Motor: Approachable, engages with interviewer, no psychomotor abnormalities   Attitude toward examiner: Semicooperative, demanding, fair eye contact  Speech: Spontaneous, normal volume, irritable tone  Mood: Patient reports \"not good\"  Affect:
Pharmacy Medication History Note      List of current medications patient is taking is unable to assess. Source of information: Kip Alert, Kiera, [place], [address], patient     Changes made to medication list:  Medications removed (include reason, ex. therapy complete or physician discontinued, noncompliance):  None     Medications flagged for provider review:  None     Medications added/doses adjusted:  None     Other notes (ex. Recent course of antibiotics, Coumadin dosing): Unable to confirm patients home medications at this time. Ho Ashley RN at [facility] did provide writer with doses and medication names, but was not able to determine frequencies or where medications were sent at discharge. Patient is unable to tell staff where he filled these medications. Unable to safely and accurately put together patients medication list.   Awaiting call back from Delma Sanon from [address] [place] [address] to determine the patients Suboxone dosing in the event that is it resumed. Current Home Medication List at Time of Admission:  Prior to Admission medications    Medication Sig Start Date End Date Taking? Authorizing Provider   naloxone 4 MG/0.1ML LIQD nasal spray  4/12/22   Gema Luis MD   melatonin 5 MG TABS tablet  1/27/22   PRADEEP Cross - NP   atomoxetine (STRATTERA) 40 MG capsule Take 1 capsule by mouth daily 4/19/22   Isabel Blackwell MD   gabapentin (NEURONTIN) 600 MG tablet Take 1 tablet by mouth 3 times daily for 90 days.  4/19/22 7/18/22  Jud Hall MD   omeprazole (PRILOSEC) 20 MG delayed release capsule Take 1 capsule by mouth daily 4/19/22   Isabel Blackwell MD   nicotine polacrilex (NICORETTE) 4 MG gum Take 1 each by mouth every hour as needed for Smoking cessation 4/19/22 10/16/23  Isabel Blackwell MD   cloNIDine (CATAPRES) 0.1 MG tablet Take 1 tablet by mouth 3 times daily 12/13/21 10/16/23  Joya Love MD   ibuprofen (ADVIL;MOTRIN) 400 MG tablet
RT ASSESSMENT TREATMENT GOALS    [x]Pt Goal:  Pt will identify 1-2 positive coping skills by time of discharge. []Pt Goal:  Pt will identify 1-2 positive aspects of self by time of discharge. []Pt Goal:  Pt will remain on task/topic for 15-30 minutes during group by time of discharge. [x]Pt Goal:  Pt will identify 1-2 aspects of relapse prevention plan by time of discharge. []Pt Goal:  Pt will join in conversation with peers 1-2 times per group by time of discharge. []Pt Goal:  Pt will identify 1-2 new leisure interests by time of discharge. []Pt Goal: Pt will maintain behavorial control until the time of discharge.
Writer called [facility] and spoke with Ama Thomas RN. Patient was discharged from their facility on 09/29/2023. While admitted, [address] [place] [address] provided medications to the patient, but she is not sure where the medications went on discharge. Jeimy Boyer states that she has a list of the medications and strengths that the patient was discharged on, but frequencies are not available. The list provided to writer is as follows: Seroquel 100 mg, Abilify 2 mg, Wellbutrin  mg, and Gabapentin 300 mg. Writer called [facility], [facility], [address] [place] [address], and [address] [place] [address] to confirm medications after discharge from [facility] but was not able to confirm any of the medications provided.      Fantasma Pike, PharmADRIANA, BCPS  10/16/2023 10:19 AM
mL/min/1.73m2 Final    Comment:       These results are not intended for use in patients <25years of age. eGFR results are calculated without a race factor using the 2021 CKD-EPI equation. Careful clinical correlation is recommended, particularly when comparing to results   calculated using previous equations. The CKD-EPI equation is less accurate in patients with extremes of muscle mass, extra-renal   metabolism of creatine, excessive creatine ingestion, or following therapy that affects   renal tubular secretion.       Calcium 10/16/2023 9.6  8.6 - 10.4 mg/dL Final    Total Protein 10/16/2023 7.6  6.4 - 8.3 g/dL Final    Albumin 10/16/2023 4.5  3.5 - 5.2 g/dL Final    Total Bilirubin 10/16/2023 0.7  0.3 - 1.2 mg/dL Final    Alkaline Phosphatase 10/16/2023 68  40 - 129 U/L Final    ALT 10/16/2023 22  5 - 41 U/L Final    AST 10/16/2023 23  <40 U/L Final    Ethanol 10/16/2023 <10  <10 mg/dL Final    Ethanol percent 10/16/2023 <0.010  % Final    Color, UA 10/16/2023 Yellow  Yellow Final    Turbidity UA 10/16/2023 Clear  Clear Final    Glucose, Ur 10/16/2023 NEGATIVE  NEGATIVE mg/dL Final    Bilirubin Urine 10/16/2023 NEGATIVE  NEGATIVE Final    Ketones, Urine 10/16/2023 TRACE (A)  NEGATIVE mg/dL Final    Specific Gravity, UA 10/16/2023 1.031 (H)  1.000 - 1.030 Final    Urine Hgb 10/16/2023 NEGATIVE  NEGATIVE Final    pH, UA 10/16/2023 5.0  5.0 - 8.0 Final    Protein, UA 10/16/2023 TRACE (A)  NEGATIVE mg/dL Final    Urobilinogen, Urine 10/16/2023 Normal  0.0 - 1.0 EU/dL Final    Nitrite, Urine 10/16/2023 NEGATIVE  NEGATIVE Final    Leukocyte Esterase, Urine 10/16/2023 NEGATIVE  NEGATIVE Final    Amphetamine Screen, Ur 10/16/2023 NEGATIVE  NEGATIVE Final    Comment:       (Positive cutoff 1000 ng/mL)                  Barbiturate Screen, Ur 10/16/2023 NEGATIVE  NEGATIVE Final    Comment:       (Positive cutoff 200 ng/mL)                  Benzodiazepine Screen, Urine 10/16/2023 NEGATIVE  NEGATIVE Final

## 2023-10-18 NOTE — GROUP NOTE
Group Therapy Note    Date: 10/18/2023    Group Start Time: 1000  Group End Time: 9659  Group Topic: Psychotherapy    [facility]    UZAIR Michael, RED        Group Therapy Note    Attendees: 7/15       Patient refused to attend psychotherapy group at 10:00 am after encouragement from staff. 1:1 talk time provided as alternative to group session.       Discipline Responsible: /Counselor      Signature:  UZAIR Michael, RED

## 2023-10-18 NOTE — GROUP NOTE
Psych-Ed/Relapse Prevention Group Note        Date: October 18, 2023 Start Time: 11am  End Time: 11:45am      Number of Participants in Group & Unit Census:  9/14    Topic: Relaxation    Goal of Group:Patient will identify benefits of leisure for relaxation and coping      Comments:     Patient did not participate in Psych-Ed/Relapse Prevention group, despite staff encouragement and explanation of benefits. Patient remain seclusive to self. Q15 minute safety checks maintained for patient safety and will continue to encourage patient to attend unit programming.          Signature:  Earnie Oppenheim, [place]

## 2023-10-18 NOTE — GROUP NOTE
Psych-Ed/Relapse Prevention Group Note        Date: October 18, 2023 Start Time: 2:30pm  End Time:  3:15pm      Number of Participants in Group & Unit Census:  7/14    Topic: Socialization and Peer Support    Goal of Group:Patient will demonstrate improved interpersonal skills and offer peer support      Comments:     Patient did not participate in Psych-Ed/Relapse Prevention group, despite staff encouragement and explanation of benefits. Patient remain seclusive to self. Q15 minute safety checks maintained for patient safety and will continue to encourage patient to attend unit programming.          Signature:  Portia Botello

## 2023-10-19 VITALS
TEMPERATURE: 97.5 F | HEIGHT: 72 IN | WEIGHT: 168 LBS | DIASTOLIC BLOOD PRESSURE: 90 MMHG | HEART RATE: 102 BPM | BODY MASS INDEX: 22.75 KG/M2 | OXYGEN SATURATION: 99 % | RESPIRATION RATE: 14 BRPM | SYSTOLIC BLOOD PRESSURE: 123 MMHG

## 2023-10-19 PROCEDURE — 6370000000 HC RX 637 (ALT 250 FOR IP): Performed by: NURSE PRACTITIONER

## 2023-10-19 PROCEDURE — 6370000000 HC RX 637 (ALT 250 FOR IP): Performed by: PSYCHIATRY & NEUROLOGY

## 2023-10-19 PROCEDURE — 99239 HOSP IP/OBS DSCHRG MGMT >30: CPT | Performed by: PSYCHIATRY & NEUROLOGY

## 2023-10-19 RX ORDER — QUETIAPINE FUMARATE 100 MG/1
100 TABLET, FILM COATED ORAL NIGHTLY
Qty: 60 TABLET | Refills: 3
Start: 2023-10-19

## 2023-10-19 RX ADMIN — ACETAMINOPHEN 650 MG: 325 TABLET ORAL at 12:46

## 2023-10-19 RX ADMIN — NICOTINE POLACRILEX 2 MG: 2 LOZENGE ORAL at 10:10

## 2023-10-19 RX ADMIN — NICOTINE POLACRILEX 2 MG: 2 LOZENGE ORAL at 11:57

## 2023-10-19 ASSESSMENT — PAIN - FUNCTIONAL ASSESSMENT: PAIN_FUNCTIONAL_ASSESSMENT: ACTIVITIES ARE NOT PREVENTED

## 2023-10-19 ASSESSMENT — PAIN SCALES - WONG BAKER: WONGBAKER_NUMERICALRESPONSE: 0

## 2023-10-19 ASSESSMENT — PAIN DESCRIPTION - ORIENTATION: ORIENTATION: MID

## 2023-10-19 ASSESSMENT — PAIN DESCRIPTION - DESCRIPTORS: DESCRIPTORS: ACHING

## 2023-10-19 ASSESSMENT — PAIN DESCRIPTION - LOCATION: LOCATION: GENERALIZED

## 2023-10-19 ASSESSMENT — PAIN SCALES - GENERAL: PAINLEVEL_OUTOF10: 3

## 2023-10-19 NOTE — GROUP NOTE
Group Therapy Note    Date: 10/19/2023    Group Start Time: 1000  Group End Time: 8280  Group Topic: Psychotherapy    [facility]    UZAIR Solares LSW        Group Therapy Note    Attendees: 6/15       Patient refused to attend psychotherapy group at 10:00 am after encouragement from staff. 1:1 talk time provided as alternative to group session.     Discipline Responsible: /Counselor      Signature:  UZAIR Solares LSW

## 2023-10-19 NOTE — CARE COORDINATION
At the request pt social work contacted [facility] for possible placement. Social work faxed clinicals to [phone] for review.
BHI Biopsychosocial Assessment    Current Level of Psychosocial Functioning     Independent X  Dependent    Minimal Assist     Comments:    Psychosocial High Risk Factors (check all that apply)    Unable to obtain meds   Chronic illness/pain    Substance abuse X  Lack of Family Support   Financial stress   Isolation   Inadequate Community Resources  Suicide attempt(s)  Not taking medications X  Victim of crime   Developmental Delay  Unable to manage personal needs    Age 72 or older   Homeless  No transportation   Readmission within 30 days  Unemployment  Traumatic Event    Comments:   Psychiatric Advanced Directives: n/a    Family to Involve in Treatment: none identified at time of assessment    Sexual Orientation:  n/a    Patient Strengths: housing, income, insurance    Patient Barriers: substance use, poor coping skills, non-adherence to outpatient services, suicidal ideations      Opiate Education Provided:  offered but patient was not interested at this time      CMHC/mental health history: history with [facility] and [employer], denies current involvement in outpatient services    Plan of Care   medication management, group/individual therapies, family meetings, psycho -education, treatment team meetings to assist with stabilization    Initial Discharge Plan:  TBD with patient and provider      Clinical Summary:    Andrae Stevenson is a 51 y/o male admitted to [address] [place] [address] for suicidal ideations with no plan at the time of admission. He is withdrawing from recent relapse of cocaine and fentanyl. Patient states his suicidal thoughts remain the same and that he continues to have no plan at this time. He is unable to offer much information during assessment but does indicate he may be open to attending a treatment program when ready for discharge. Patient is unsure if he will return home. He is currently not connected to outpatient mental health services.   Social work offered support and will continue to engage patient
Name: Lul Herbert    : 1975    Discharge Date: 10/19/2023    Primary Auth/Cert #: [id number]     Destination: home    *If you have any specific discharge questions, please contact the assigned /discharge planner: Nelda Siemens ([phone]) or Brant Patino ([phone])      Discharge Medications:      Medication List        START taking these medications      QUEtiapine 100 MG tablet  Commonly known as: SEROQUEL  Take 1 tablet by mouth nightly Patient reports home supply  Notes to patient: Help clear thoughts - Help promote sleep             CONTINUE taking these medications      Multivitamin Tabs  Notes to patient: Vitamin Replacement      naloxone 4 MG/0.1ML Liqd nasal spray     omeprazole 20 MG delayed release capsule  Commonly known as: PRILOSEC  Take 1 capsule by mouth daily  Notes to patient: Help with indigestion             STOP taking these medications      atomoxetine 40 MG capsule  Commonly known as: Strattera     cloNIDine 0.1 MG tablet  Commonly known as: CATAPRES     gabapentin 600 MG tablet  Commonly known as: Neurontin     melatonin 5 MG Tabs tablet     Suboxone 8-2 MG Film SL film  Generic drug: buprenorphine-naloxone               Where to Get Your Medications        Information about where to get these medications is not yet available    Ask your nurse or doctor about these medications  QUEtiapine 100 MG tablet         Follow Up Appointment: [facility]  Follow up on 10/20/2023  Please use the walk in clinic betweenthe hours of 8:30am and 3:00 pm
Social work attempted to schedule an appointment for pt with [facility]. Staff could not schedule due to pt not being present on the phone and she did not wish to wait for social work to bring the pt into the office.  Social work was informed the pt could come to walk in hours tomorrow 10/20/23 between 8:30 am- 3:00 pm.
Social work called [employer] [employer] to follow up on referral. [employer] work left message asking for a return call.
Yes

## 2023-10-19 NOTE — PLAN OF CARE
[facility]  Initial Interdisciplinary Treatment Plan NO      Original treatment plan Date & Time: 10/16/2023   1302    Admission Type:  Admission Type: Voluntary    Reason for admission:   Reason for Admission: +Suicidal ideation after relapsing on cocaine, off meds for 2 days    Estimated Length of Stay:  5-7days  Estimated Discharge Date: to be determined by physician    PATIENT STRENGTHS:  Patient Strengths:   Patient Strengths and Limitations:   Addictive Behavior: Addictive Behavior  In the Past 3 Months, Have You Felt or Has Someone Told You That You Have a Problem With  : None  Medical Problems:  Past Medical History:   Diagnosis Date    ADHD (attention deficit hyperactivity disorder)     Anxiety     Bipolar disorder with severe depression ([address]) 12/4/2019    Depression     Depression with suicidal ideation 12/9/2021    Hep C w/ coma, chronic     Polysubstance abuse ([address])     pt currently on Suboxone 4mg therapy; drug abuse includes IV heroin, IV cocaine, cannabis, opiates, \"just about every drug in the world. \"    Substance abuse ([address])     Suicidal ideation 9/9/2020     Status EXAM:Mental Status and Behavioral Exam  Normal: No  Level of Assistance: Independent/Self  Facial Expression: Flat, Sad  Affect: Blunt  Level of Consciousness: Alert  Frequency of Checks: 4 times per hour, close  Mood:Normal: No  Mood: Depressed, Anxious, Guilty  Motor Activity:Normal: Yes  Eye Contact: Fair  Observed Behavior: Cooperative  Sexual Misconduct History: Current - no  Preception: [place] to person, [place] to time, [place] to place, [place] to situation  Attention:Normal: Yes  Thought Processes: Unremarkable  Thought Content:Normal: No  Thought Content: Preoccupations  Depression Symptoms: Feelings of helplessness, Feelings of hopelessess, Isolative  Anxiety Symptoms: Generalized  Lianna Symptoms: No problems reported or observed.   Hallucinations: None  Delusions: No  Memory:Normal: Yes  Insight and Judgment:
Patient denies thoughts of suicide and denies thoughts of self harm. Reports feelings of depression and anxiety, but feels he is in the right place. Patient is encouraged to attend groups and participate to help develop positive coping skills. Staff will work with patient on developing coping skills. Problem: Self Harm/Suicidality  Goal: Will have no self-injury during hospital stay  Description: INTERVENTIONS:  1. Ensure constant observer at bedside with Q15M safety checks  2. Maintain a safe environment  3. Secure patient belongings  4. Ensure family/visitors adhere to safety recommendations  5. Ensure safety tray has been added to patient's diet order  6. Every shift and PRN: Re-assess suicidal risk via Frequent Screener    Outcome: Progressing     Problem: Depression  Goal: Will be euthymic at discharge  Description: INTERVENTIONS:  1. Administer medication as ordered  2. Provide emotional support via 1:1 interaction with staff  3. Encourage involvement in milieu/groups/activities  4.  Monitor for social isolation  Outcome: Progressing
Problem: Self Harm/Suicidality  Goal: Will have no self-injury during hospital stay  Description: INTERVENTIONS:  1. Ensure constant observer at bedside with Q15M safety checks  2. Maintain a safe environment  3. Secure patient belongings  4. Ensure family/visitors adhere to safety recommendations  5. Ensure safety tray has been added to patient's diet order  6. Every shift and PRN: Re-assess suicidal risk via Frequent Screener    10/18/2023 1631 by Maria C Jones LPN  Outcome: Progressing       Problem: Depression  Goal: Will be euthymic at discharge  Description: INTERVENTIONS:  1. Administer medication as ordered  2. Provide emotional support via 1:1 interaction with staff  3. Encourage involvement in milieu/groups/activities  4. Monitor for social isolation  10/18/2023 1631 by Maria C Jones LPN  Outcome: Progressing, Patient aloof of peers, sad affect also isolative to his room. Patient voiced depression dur to recent mishap and life stressors. Patient encouraged to continue is medication regimen.
Problem: Self Harm/Suicidality  Goal: Will have no self-injury during hospital stay  Description: INTERVENTIONS:  1. Ensure constant observer at bedside with Q15M safety checks  2. Maintain a safe environment  3. Secure patient belongings  4. Ensure family/visitors adhere to safety recommendations  5. Ensure safety tray has been added to patient's diet order  6. Every shift and PRN: Re-assess suicidal risk via Frequent Screener    10/18/2023 2132 by Elliott Cordon  Outcome: Progressing  Flowsheets (Taken 10/18/2023 2132)  Will have no self-injury during hospital stay: Maintain a safe environment  Note: Pt remains free from self harm this shift. Pt denies wanting to cause harm to self or others at this time. Pt encouraged to seek nursing staff at anytime if he/she felt at danger to themselves or others. Pt states understanding. Safety checks maintained [id number]      Problem: Depression  Goal: Will be euthymic at discharge  Description: INTERVENTIONS:  1. Administer medication as ordered  2. Provide emotional support via 1:1 interaction with staff  3. Encourage involvement in milieu/groups/activities  4. Monitor for social isolation  10/18/2023 2132 by Elliott Cordon  Outcome: Progressing  Note: Patient has been isolative to room throughout this shift. Patient withdrawn and guarded upon assessment.
Problem: Self Harm/Suicidality  Goal: Will have no self-injury during hospital stay  Description: INTERVENTIONS:  1. Ensure constant observer at bedside with Q15M safety checks  2. Maintain a safe environment  3. Secure patient belongings  4. Ensure family/visitors adhere to safety recommendations  5. Ensure safety tray has been added to patient's diet order  6. Every shift and PRN: Re-assess suicidal risk via Frequent Screener    Outcome: Progressing     Problem: Depression  Goal: Will be euthymic at discharge  Description: INTERVENTIONS:  1. Administer medication as ordered  2. Provide emotional support via 1:1 interaction with staff  3. Encourage involvement in milieu/groups/activities  4.  Monitor for social isolation  Outcome: Progressing
Problem: Self Harm/Suicidality  Goal: Will have no self-injury during hospital stay  Description: INTERVENTIONS:  1. Ensure constant observer at bedside with Q15M safety checks  2. Maintain a safe environment  3. Secure patient belongings  4. Ensure family/visitors adhere to safety recommendations  5. Ensure safety tray has been added to patient's diet order  6. Every shift and PRN: Re-assess suicidal risk via Frequent Screener    Outcome: Progressing     Problem: Depression  Goal: Will be euthymic at discharge  Description: INTERVENTIONS:  1. Administer medication as ordered  2. Provide emotional support via 1:1 interaction with staff  3. Encourage involvement in milieu/groups/activities  4. Monitor for social isolation  Outcome: Progressing   Currently denies any suicidal or homicidal ideation denies any hallucinations isolative aloof mostly .
Problem: Self Harm/Suicidality  Goal: Will have no self-injury during hospital stay  Description: INTERVENTIONS:  1. Ensure constant observer at bedside with Q15M safety checks  2. Maintain a safe environment  3. Secure patient belongings  4. Ensure family/visitors adhere to safety recommendations  5. Ensure safety tray has been added to patient's diet order  6. Every shift and PRN: Re-assess suicidal risk via Frequent Screener  10/19/2023 0739 by Fede Mckeon RN  Outcome: Progressing  Patient has remained free from self-harm during admission. Patient has had thoughts of harming self before, but denied self-harm ideations today. If these types of thoughts arise, patient will notify a staff member. Patient currently feels safe while on the unit. Patient is able to contract for safety Q15 Minute checks in place for patient safety. Problem: Depression  Goal: Will be euthymic at discharge  Description: INTERVENTIONS:  1. Administer medication as ordered  2. Provide emotional support via 1:1 interaction with staff  3. Encourage involvement in milieu/groups/activities  4. Monitor for social isolation  10/19/2023 0739 by Fede Mckeon RN  Outcome: Progressing  Patient reports feeling depressed, but feels that their symptoms are improving at this time. Patients mood has changed and states it is improving compared to admission. Patient has been out in the dayroom at times and attempted groups. Writer offered any extra time needed for patient to discuss their depression. Patient declines at this time. Q15 minute checks in place for patients' safety.
TSH normal  Thank you for the consult. Medicine will sign off. Please do not hesitate to contact us for any issues or concerns.    Donna Corona MD
Content:Normal: Yes  Thought Content: Preoccupations  Depression Symptoms: No problems reported or observed. Anxiety Symptoms: Generalized  Lianna Symptoms: No problems reported or observed. Hallucinations: None  Delusions: No  Memory:Normal: Yes  Insight and Judgment: No  Insight and Judgment: Poor judgment, Poor insight, Unmotivated    Daily Assessment Last Entry:   Daily Sleep (WDL): Within Defined Limits            Daily Nutrition (WDL): Within Defined Limits  Level of Assistance: Independent/Self    Patient Monitoring:  Frequency of Checks: 4 times per hour, close    Psychiatric Symptoms:   Depression Symptoms  Depression Symptoms: No problems reported or observed. Anxiety Symptoms  Anxiety Symptoms: Generalized  Lianna Symptoms  Lianna Symptoms: No problems reported or observed. Suicide Risk CSSR-S:  1) Within the past month, have you wished you were dead or wished you could go to sleep and not wake up? : Yes  2) Have you actually had any thoughts of killing yourself? : Yes  3) Have you been thinking about how you might kill yourself? : No  5) Have you started to work out or worked out the details of how to kill yourself? Do you intend to carry out this plan? : No  6) Have you ever done anything, started to do anything, or prepared to do anything to end your life?: No  Change in Result:  no Change in Plan of care:  no      EDUCATION:   Learner Progress Toward Treatment Goals:   Reviewed results and recommendations of this team, Reviewed group plan and strategies, Reviewed signs, symptoms and risk of self harm and violent behavior, Reviewed goals and plan of care    Method:  small group, individual verbal education    Outcome:   Verbalized by patient but needs reinforcement to obtain goals    PATIENT GOALS:  Short term:  Patient declined to attend treatment team. No goals discussed at this time.    Long term:  See short term    PLAN/TREATMENT RECOMMENDATIONS UPDATE:  continue with group therapies,

## 2023-10-19 NOTE — DISCHARGE INSTRUCTIONS
Information:  Medications:   Medication summary provided   I understand that I should take only the medications on my list.     -why and when I need to take each medicine.     -which side effects to watch for.     -that I should carry my medication information at all times in case of     Emergency situations. I will take all of my medicines to follow up appointments.     -check with my physician or pharmacist before taking any new    Medication, over the counter product or drink alcohol.    -Ask about food, drug or dietary supplement interactions.    -discard old lists and update records with medication providers. Notify Physician:  Notify physician if you notice:   Always call 911 if you feel your life is in danger  In case of an emergency call 911 immediately! If 911 is not available call your local emergency medical system for help    Behavioral Health Follow Up:  Original Referral Source: [facility]  Discharge Diagnosis: Suicidal ideation [R45.851]  Depression with suicidal ideation [F32. A, R45.851]  Recommendations for Level of Care: Follow up with outpatient provider and take medications as prescribed. Patient status at discharge: Stable   [facility] hospital  was: [facility]   Aftercare plan faxed: Yes   -faxed by: Nurse   -date: 10/19/2023   -time: 1500  Prescriptions: No scripts at this time     Smoking: Quit Smoking. Call the [employer]'s smoking quitline at 9-034-87K-QUIT  Know the signs of a heart attack   If you have any of the following symptoms call 911 immediately, do not wait more    Than five minutes. 1. Pressure, fullness and/ or squeezing in the center of the chest spreading to    The jaw, neck or shoulder. 2. Chest discomfort with light headedness, fainting, sweating, nausea or    Shortness of breath. 3. Upper abdominal pressure or discomfort. 4. Lower chest pain, back pain, unusual fatigue, shortness of breath, nausea   Or dizziness.      General Information:   Questions

## 2023-10-19 NOTE — GROUP NOTE
Psych-Ed/Relapse Prevention Group Note        Date: October 19, 2023 Start Time: 11am  End Time: 11:45am      Number of Participants in Group & Unit Census:  10/13    Topic: Communication skills    Goal of Group:Patient will demonstrate improved interpersonal communication and identify ways to improve communication      Comments:     Patient did not participate in Psych-Ed/Relapse Prevention group, despite staff encouragement and explanation of benefits. Patient remain seclusive to self. Q15 minute safety checks maintained for patient safety and will continue to encourage patient to attend unit programming.          Signature:  Edy Graff

## 2023-10-19 NOTE — GROUP NOTE
Psych-Ed/Relapse Prevention Group Note        Date: October 19, 2023 Start Time: 1:30pm  End Time:  2:00pm      Number of Participants in Group & Unit Census:  8/14    Topic: Community Resources    Goal of Group:Patient will identify resources in the community with visitor from [address] [place] [address] [place]      Comments:     Patient did not participate in Psych-Ed/Relapse Prevention group, despite staff encouragement and explanation of benefits. Patient remain seclusive to self. Q15 minute safety checks maintained for patient safety and will continue to encourage patient to attend unit programming.          Signature:  Edy Wallace

## 2023-10-19 NOTE — GROUP NOTE
Group Therapy Note    Date: 10/18/2023    Group Start Time: 2000  Group End Time: 2030  Group Topic: Relaxation    Naomi Cedeno        Group Therapy Note    Relaxation Group Note        Date: October 18, 2023 Start Time:  2000   End Time:  2030      Number of Participants in Group & Unit Census:  9/13    Topic: relaxation    Goal of Group:promote relaxation and encourage group participation        Patient did not participate in Relaxation group, despite staff encouragement and explanation of benefits. Patient remain seclusive to self. Q15 minute safety checks maintained for patient safety and will continue to encourage patient to attend unit programming.           Signature:  Sourav Oconnor

## 2023-10-20 NOTE — DISCHARGE SUMMARY
Provider Discharge Summary     Patient ID:  Sumit Olson  [id number]  87 y.o.  1975    Admit date: 10/16/2023    Discharge date and time: 10/20/2023  3:15 PM     Admitting Physician: Grant Norris MD     Discharge Physician: Delmi Dorantes MD    Admission Diagnoses: Suicidal ideation [R45.851]  Depression with suicidal ideation [F32. A, R45.851]    Discharge Diagnoses:      Severe recurrent major depression without psychotic features ([address])   Polysubstance use disorder (THC, cocaine, opiates)    Patient Active Problem List   Diagnosis Code    MDD (major depressive disorder), recurrent episode ([address]) F33.9    Bipolar I disorder, most recent episode depressed ([address]) F31.30    Opioid type dependence, continuous ([address]) F11.20    Opioid dependence on agonist therapy ([address]) F11.20    Suicidal ideation R45.851    History of substance abuse ([address]) F19.11    Severe recurrent major depression without psychotic features ([address]) F33.2    Therapeutic opioid-induced constipation (OIC) K59.03, T40.2X5A    Cannabis abuse F12.10    Chronic midline low back pain with bilateral sciatica M54.41, M54.42, G89.29    Chronic hepatitis C without hepatic coma ([address]) B18.2    Absolute anemia D64.9    Attention deficit hyperactivity disorder (ADHD), predominantly inattentive type F90.0    Depression with suicidal ideation F32. A, R45.851        Admission Condition: poor    Discharged Condition: stable    Indication for Admission: threat to self    History of Present Illnes (present tense wording is of findings from admission exam and are not necessarily indicative of current findings):   Sumit Olson is a 50 y.o. male who has a past medical history of polysubstance abuse, major depressive disorder, Absolute Anemia, and Hepatitis C. Patient presented to the ED with suicidal ideations. He is admitted to [address] voluntarily. Patient was initially agreeable to assessment at bedside with door open. He became nausea and asked to postpone conversation.

## 2023-11-09 ENCOUNTER — HOSPITAL ENCOUNTER (OUTPATIENT)
Age: 48
Discharge: HOME | End: 2023-11-09
Payer: COMMERCIAL

## 2023-11-09 LAB
ALBUMIN SERPL-MCNC: 4.4 G/DL (ref 3.5–5.2)
ALP SERPL-CCNC: 62 U/L (ref 40–129)
ALT SERPL-CCNC: 61 U/L (ref 5–41)
ANION GAP SERPL CALCULATED.3IONS-SCNC: 12 MMOL/L (ref 9–17)
AST SERPL-CCNC: 39 U/L
BASOPHILS # BLD: 0 K/UL (ref 0–0.2)
BASOPHILS NFR BLD: 0 % (ref 0–2)
BILIRUB SERPL-MCNC: 0.4 MG/DL (ref 0.3–1.2)
BUN SERPL-MCNC: 14 MG/DL (ref 6–20)
CALCIUM SERPL-MCNC: 9 MG/DL (ref 8.6–10.4)
CHLORIDE SERPL-SCNC: 102 MMOL/L (ref 98–107)
CO2 SERPL-SCNC: 25 MMOL/L (ref 20–31)
CREAT SERPL-MCNC: 0.8 MG/DL (ref 0.7–1.2)
EOSINOPHIL # BLD: 0 K/UL (ref 0–0.4)
EOSINOPHILS RELATIVE PERCENT: 0 % (ref 0–4)
ERYTHROCYTE [DISTWIDTH] IN BLOOD BY AUTOMATED COUNT: 13 % (ref 11.5–14.9)
GFR SERPL CREATININE-BSD FRML MDRD: >60 ML/MIN/1.73M2
GLUCOSE SERPL-MCNC: 105 MG/DL (ref 70–99)
HBV SURFACE AB SERPL IA-ACNC: <3.5 MIU/ML
HCT VFR BLD AUTO: 39.9 % (ref 41–53)
HCV AB SERPL QL IA: REACTIVE
HGB BLD-MCNC: 13.3 G/DL (ref 13.5–17.5)
LYMPHOCYTES NFR BLD: 1.92 K/UL (ref 1–4.8)
LYMPHOCYTES RELATIVE PERCENT: 26 % (ref 24–44)
MCH RBC QN AUTO: 31 PG (ref 26–34)
MCHC RBC AUTO-ENTMCNC: 33.3 G/DL (ref 31–37)
MCV RBC AUTO: 93 FL (ref 80–100)
MONOCYTES NFR BLD: 0.3 K/UL (ref 0.1–1.3)
MONOCYTES NFR BLD: 4 % (ref 1–7)
MORPHOLOGY: NORMAL
NEUTROPHILS NFR BLD: 70 % (ref 36–66)
NEUTS SEG NFR BLD: 5.18 K/UL (ref 1.3–9.1)
PLATELET # BLD AUTO: 224 K/UL (ref 150–450)
PMV BLD AUTO: 9 FL (ref 6–12)
POTASSIUM SERPL-SCNC: 3.6 MMOL/L (ref 3.7–5.3)
PROT SERPL-MCNC: 7.5 G/DL (ref 6.4–8.3)
RBC # BLD AUTO: 4.29 M/UL (ref 4.5–5.9)
SODIUM SERPL-SCNC: 139 MMOL/L (ref 135–144)
WBC OTHER # BLD: 7.4 K/UL (ref 3.5–11)

## 2023-11-09 PROCEDURE — 87340 HEPATITIS B SURFACE AG IA: CPT

## 2023-11-09 PROCEDURE — 85025 COMPLETE CBC W/AUTO DIFF WBC: CPT

## 2023-11-09 PROCEDURE — 36415 COLL VENOUS BLD VENIPUNCTURE: CPT

## 2023-11-09 PROCEDURE — 86480 TB TEST CELL IMMUN MEASURE: CPT

## 2023-11-09 PROCEDURE — 86780 TREPONEMA PALLIDUM: CPT

## 2023-11-09 PROCEDURE — 87522 HEPATITIS C REVRS TRNSCRPJ: CPT

## 2023-11-09 PROCEDURE — 86704 HEP B CORE ANTIBODY TOTAL: CPT

## 2023-11-09 PROCEDURE — 86317 IMMUNOASSAY INFECTIOUS AGENT: CPT

## 2023-11-09 PROCEDURE — 87389 HIV-1 AG W/HIV-1&-2 AB AG IA: CPT

## 2023-11-09 PROCEDURE — 80053 COMPREHEN METABOLIC PANEL: CPT

## 2023-11-09 PROCEDURE — 86803 HEPATITIS C AB TEST: CPT

## 2023-11-10 LAB
HBV CORE AB SER QL: REACTIVE
HBV SURFACE AG SERPL QL IA: NONREACTIVE
HIV 1+2 AB+HIV1 P24 AG SERPL QL IA: NONREACTIVE
T PALLIDUM AB SER QL IA: NONREACTIVE

## 2023-11-11 LAB
QUANTI TB GOLD PLUS: NEGATIVE
QUANTI TB1 MINUS NIL: 0 IU/ML (ref 0–0.34)
QUANTI TB2 MINUS NIL: 0 IU/ML (ref 0–0.34)
QUANTIFERON MITOGEN: >10 IU/ML
QUANTIFERON NIL: 0.03 IU/ML

## 2023-11-13 LAB
HCV RNA # SERPL NAA+PROBE: DETECTED {COPIES}/ML
HCV RNA SERPL NAA+PROBE-ACNC: ABNORMAL IU/ML
HCV RNA SERPL NAA+PROBE-LOG IU: 7.05 LOG IU/ML
SPECIMEN SOURCE: ABNORMAL

## 2024-01-23 NOTE — CARE COORDINATION
BHI Biopsychosocial Assessment    Current Level of Psychosocial Functioning     Independent xx  Dependent    Minimal Assist     Comments:    Psychosocial High Risk Factors (check all that apply)    Unable to obtain meds   Chronic illness/pain  xx  Substance abuse xx  Lack of Family Support   Financial stress   Isolation xx  Inadequate Community Resources  Suicide attempt(s)  Not taking medications   Victim of crime   Developmental Delay  Unable to manage personal needs    Age 72 or older   Homeless  No transportation   Readmission within 30 days  Unemployment  Traumatic Event    Comments:   Psychiatric Advanced Directives: pt denies     Family to Involve in Treatment: pt reports his mother is supportive     Sexual Orientation:  N/A    Patient Strengths: Pt is linked with [facility] IOP program and outpatient mental health services, has stable housing, receives SS benefits     Patient Barriers: pt left recovery housing at [facility] 12/5/2021 and had polysubstance relapse       Opiate Education Provided:  Pt reports history of opiate abuse for which he history of MAT treatment, pt reports polysubstance abuse prior to admission       CMHC/mental health history: Pt is linked with [facility] for dual services     Plan of Care   medication management, group/individual therapies, family meetings, psycho -education, treatment team meetings to assist with stabilization    Initial Discharge Plan:  Pt states he plans to return to his apartment at discharge. Clinical Summary:  Malik Maldonado is a 55year old single male who has been admitted to [place] [address] with report of increasing depression over the last month, reported suicidal thoughts during evaluation in emergency department, denies during this session. Pt has history of opiate and polysubstance abuse in addition to depression; pt states he was in recovery and living in supportive housing until prior to admission, states relapse days prior to admission.  Pt reports he feels angry this date related to discussion with MD about medication regimine, and MAT treatment. SW confirmed pt has upcoming appointment with his provider at [facility] 12/16/2021 1220pm. Pt states his mother is supportive. Pt states he has been living in an apartment with a friend and will return there at discharge, SW offered support/assist in AOD programming as pt identifies. Pt states he plans to return to IOP program at [facility] to promote sobriety and recovery. Pt denies current legal issues. SW offered ongoing support. 25-Jan-2024

## 2024-05-07 ENCOUNTER — OFFICE VISIT (OUTPATIENT)
Dept: FAMILY MEDICINE CLINIC | Age: 49
End: 2024-05-07
Payer: COMMERCIAL

## 2024-05-07 VITALS
HEART RATE: 93 BPM | SYSTOLIC BLOOD PRESSURE: 124 MMHG | BODY MASS INDEX: 20.8 KG/M2 | DIASTOLIC BLOOD PRESSURE: 62 MMHG | WEIGHT: 153.6 LBS | HEIGHT: 72 IN | OXYGEN SATURATION: 96 %

## 2024-05-07 DIAGNOSIS — Z13.1 SCREENING FOR DIABETES MELLITUS: ICD-10-CM

## 2024-05-07 DIAGNOSIS — B18.2 CHRONIC HEPATITIS C WITHOUT HEPATIC COMA (HCC): ICD-10-CM

## 2024-05-07 DIAGNOSIS — R53.83 OTHER FATIGUE: ICD-10-CM

## 2024-05-07 DIAGNOSIS — Z12.11 SCREENING FOR COLON CANCER: ICD-10-CM

## 2024-05-07 DIAGNOSIS — Z12.5 SCREENING PSA (PROSTATE SPECIFIC ANTIGEN): ICD-10-CM

## 2024-05-07 DIAGNOSIS — R63.4 WEIGHT LOSS: ICD-10-CM

## 2024-05-07 DIAGNOSIS — Z13.220 SCREENING FOR HYPERLIPIDEMIA: ICD-10-CM

## 2024-05-07 DIAGNOSIS — F33.2 SEVERE EPISODE OF RECURRENT MAJOR DEPRESSIVE DISORDER, WITHOUT PSYCHOTIC FEATURES (HCC): Primary | ICD-10-CM

## 2024-05-07 DIAGNOSIS — F11.20 OPIOID DEPENDENCE ON AGONIST THERAPY (HCC): ICD-10-CM

## 2024-05-07 DIAGNOSIS — Z76.89 ENCOUNTER TO ESTABLISH CARE: ICD-10-CM

## 2024-05-07 DIAGNOSIS — K21.9 GASTROESOPHAGEAL REFLUX DISEASE, UNSPECIFIED WHETHER ESOPHAGITIS PRESENT: ICD-10-CM

## 2024-05-07 DIAGNOSIS — Z51.81 THERAPEUTIC DRUG MONITORING: ICD-10-CM

## 2024-05-07 DIAGNOSIS — Z11.59 SCREENING FOR VIRAL DISEASE: ICD-10-CM

## 2024-05-07 DIAGNOSIS — R56.9 SEIZURE (HCC): ICD-10-CM

## 2024-05-07 PROBLEM — E87.20 LACTIC ACIDOSIS: Status: ACTIVE | Noted: 2024-05-07

## 2024-05-07 PROBLEM — F17.210 DEPENDENCE ON NICOTINE FROM CIGARETTES: Status: ACTIVE | Noted: 2021-02-23

## 2024-05-07 PROCEDURE — 4004F PT TOBACCO SCREEN RCVD TLK: CPT | Performed by: NURSE PRACTITIONER

## 2024-05-07 PROCEDURE — G8420 CALC BMI NORM PARAMETERS: HCPCS | Performed by: NURSE PRACTITIONER

## 2024-05-07 PROCEDURE — G8427 DOCREV CUR MEDS BY ELIG CLIN: HCPCS | Performed by: NURSE PRACTITIONER

## 2024-05-07 PROCEDURE — 99205 OFFICE O/P NEW HI 60 MIN: CPT | Performed by: NURSE PRACTITIONER

## 2024-05-07 RX ORDER — FAMOTIDINE 20 MG/1
20 TABLET, FILM COATED ORAL 2 TIMES DAILY
Qty: 60 TABLET | Refills: 3 | Status: SHIPPED | OUTPATIENT
Start: 2024-05-07

## 2024-05-07 RX ORDER — BUPROPION HYDROCHLORIDE 300 MG/1
TABLET ORAL
COMMUNITY
Start: 2024-02-14 | End: 2024-05-07 | Stop reason: SDUPTHER

## 2024-05-07 RX ORDER — LACTOSE-REDUCED FOOD
237 LIQUID (ML) ORAL DAILY
Qty: 237 ML | Refills: 5 | Status: SHIPPED | OUTPATIENT
Start: 2024-05-07 | End: 2024-05-07

## 2024-05-07 RX ORDER — BUPROPION HYDROCHLORIDE 300 MG/1
TABLET ORAL
Qty: 30 TABLET | Refills: 3 | Status: SHIPPED | OUTPATIENT
Start: 2024-05-07

## 2024-05-07 RX ORDER — OMEPRAZOLE 20 MG/1
20 CAPSULE, DELAYED RELEASE ORAL DAILY
Qty: 90 CAPSULE | Refills: 1 | Status: CANCELLED | OUTPATIENT
Start: 2024-05-07

## 2024-05-07 RX ORDER — MULTIVITAMIN
1 TABLET ORAL DAILY
Qty: 30 TABLET | Refills: 2 | Status: SHIPPED | OUTPATIENT
Start: 2024-05-07

## 2024-05-07 RX ORDER — BUPROPION HYDROCHLORIDE 150 MG/1
TABLET ORAL
COMMUNITY
Start: 2024-03-28 | End: 2024-05-07

## 2024-05-07 RX ORDER — LACTOSE-REDUCED FOOD
237 LIQUID (ML) ORAL DAILY
Qty: 237 ML | Refills: 5 | Status: SHIPPED | OUTPATIENT
Start: 2024-05-07

## 2024-05-07 SDOH — ECONOMIC STABILITY: HOUSING INSECURITY
IN THE LAST 12 MONTHS, WAS THERE A TIME WHEN YOU DID NOT HAVE A STEADY PLACE TO SLEEP OR SLEPT IN A SHELTER (INCLUDING NOW)?: NO

## 2024-05-07 SDOH — ECONOMIC STABILITY: INCOME INSECURITY: HOW HARD IS IT FOR YOU TO PAY FOR THE VERY BASICS LIKE FOOD, HOUSING, MEDICAL CARE, AND HEATING?: HARD

## 2024-05-07 SDOH — ECONOMIC STABILITY: FOOD INSECURITY: WITHIN THE PAST 12 MONTHS, THE FOOD YOU BOUGHT JUST DIDN'T LAST AND YOU DIDN'T HAVE MONEY TO GET MORE.: OFTEN TRUE

## 2024-05-07 SDOH — ECONOMIC STABILITY: FOOD INSECURITY: WITHIN THE PAST 12 MONTHS, YOU WORRIED THAT YOUR FOOD WOULD RUN OUT BEFORE YOU GOT MONEY TO BUY MORE.: OFTEN TRUE

## 2024-05-07 ASSESSMENT — PATIENT HEALTH QUESTIONNAIRE - PHQ9
SUM OF ALL RESPONSES TO PHQ9 QUESTIONS 1 & 2: 0
4. FEELING TIRED OR HAVING LITTLE ENERGY: SEVERAL DAYS
10. IF YOU CHECKED OFF ANY PROBLEMS, HOW DIFFICULT HAVE THESE PROBLEMS MADE IT FOR YOU TO DO YOUR WORK, TAKE CARE OF THINGS AT HOME, OR GET ALONG WITH OTHER PEOPLE: SOMEWHAT DIFFICULT
2. FEELING DOWN, DEPRESSED OR HOPELESS: NOT AT ALL
8. MOVING OR SPEAKING SO SLOWLY THAT OTHER PEOPLE COULD HAVE NOTICED. OR THE OPPOSITE, BEING SO FIGETY OR RESTLESS THAT YOU HAVE BEEN MOVING AROUND A LOT MORE THAN USUAL: SEVERAL DAYS
5. POOR APPETITE OR OVEREATING: NOT AT ALL
1. LITTLE INTEREST OR PLEASURE IN DOING THINGS: NOT AT ALL
9. THOUGHTS THAT YOU WOULD BE BETTER OFF DEAD, OR OF HURTING YOURSELF: NOT AT ALL
SUM OF ALL RESPONSES TO PHQ QUESTIONS 1-9: 9
7. TROUBLE CONCENTRATING ON THINGS, SUCH AS READING THE NEWSPAPER OR WATCHING TELEVISION: NEARLY EVERY DAY
SUM OF ALL RESPONSES TO PHQ QUESTIONS 1-9: 9
SUM OF ALL RESPONSES TO PHQ QUESTIONS 1-9: 9
3. TROUBLE FALLING OR STAYING ASLEEP: NEARLY EVERY DAY
SUM OF ALL RESPONSES TO PHQ QUESTIONS 1-9: 9
6. FEELING BAD ABOUT YOURSELF - OR THAT YOU ARE A FAILURE OR HAVE LET YOURSELF OR YOUR FAMILY DOWN: SEVERAL DAYS

## 2024-05-07 ASSESSMENT — ENCOUNTER SYMPTOMS
SHORTNESS OF BREATH: 0
NAUSEA: 1
WHEEZING: 0
CONSTIPATION: 0
ABDOMINAL DISTENTION: 0
BACK PAIN: 0
DIARRHEA: 0
CHEST TIGHTNESS: 0
COUGH: 0
VOMITING: 1
ABDOMINAL PAIN: 0

## 2024-05-07 NOTE — PROGRESS NOTES
Flora Ruvalcaba (:  1975) is a 49 y.o. male,New patient, here for evaluation of the following chief complaint(s): Establish Care, Immunizations (DECLINES VACCINES), Medication Refill, Discuss Medications, Hepatitis C (DIAGNOSED 15 YRS AGO), and Weight Loss (UNINTENTIONAL WEIGHT LOSS)      ASSESSMENT/PLAN:    Flora received counseling on the following healthy behaviors: nutrition, exercise, and medication adherence  Reviewed prior labs and health maintenance  Discussed use, benefit, and side effects of prescribed medications.   Barriers to medication compliance addressed.   Patient given educational materials - see patient instructions  All patient questions answered.  Patient voiced understanding.  The patient's past medical,surgical, social, and family history as well as his current medications and allergies were reviewed as documented in today's encounter.    Medications, labs, diagnostic studies, consultations and follow-up as documented in this encounter.    Flora was seen today for establish care, immunizations, medication refill, discuss medications, hepatitis c and weight loss.    Diagnoses and all orders for this visit:    Severe episode of recurrent major depressive disorder, without psychotic features (HCC)  -Controlled  -Continue current regimen  -Continue following with [facility]  -History of suicidal ideation, none currently, no self-harm thoughts  -     buPROPion (WELLBUTRIN XL) 300 MG extended release tablet; TAKE 1 TABLET BY MOUTH DAILY AS DIRECTED    Gastroesophageal reflux disease, unspecified whether esophagitis present  -Not well-controlled  -Plan to switch from omeprazole to Pepcid  -GI referral  -     famotidine (PEPCID) 20 MG tablet; Take 1 tablet by mouth 2 times daily    Chronic hepatitis C without hepatic coma (HCC)  -Chronic condition  -Plan for ultrasound, lab work, referral to GI  -     Maria Teresa Slater MD, Gastroenterology, [place]  -      LIVER; Future    Other

## 2024-05-07 NOTE — PROGRESS NOTES
Visit Information    Have you changed or started any medications since your last visit including any over-the-counter medicines, vitamins, or herbal medicines? no   Have you stopped taking any of your medications? Is so, why? -  no  Are you having any side effects from any of your medications? - no    Have you seen any other physician or provider since your last visit?  no   Have you had any other diagnostic tests since your last visit?  no   Have you been seen in the emergency room and/or had an admission in a hospital since we last saw you?  no   Have you had your routine dental cleaning in the past 6 months?  no     Do you have an active MyChart account? If no, what is the barrier?  Yes    Patient Care Team:  Isabel Castano MD as PCP - General (Internal Medicine)  Isabel Castano MD as PCP - Empaneled Provider    Medical History Review  Past Medical, Family, and Social History reviewed and does contribute to the patient presenting condition    Health Maintenance   Topic Date Due    Hepatitis B vaccine (1 of 3 - 3-dose series) Never done    Hepatitis A vaccine (1 of 2 - Risk 2-dose series) Never done    A1C test (Diabetic or Prediabetic)  12/09/2022    Pneumococcal 0-64 years Vaccine (1 of 2 - PCV) 10/26/2024 (Originally 2/15/1981)    COVID-19 Vaccine (1) 10/26/2024 (Originally 1975)    Flu vaccine (Season Ended) 08/01/2024    Depression Monitoring  10/16/2024    Colorectal Cancer Screen  11/23/2024    Lipids  12/07/2024    DTaP/Tdap/Td vaccine (2 - Td or Tdap) 10/26/2029    HIV screen  Completed    Hib vaccine  Aged Out    Polio vaccine  Aged Out    Meningococcal (ACWY) vaccine  Aged Out    Hepatitis C screen  Discontinued

## 2024-05-07 NOTE — PATIENT INSTRUCTIONS
Number: [phone]    [facility] Healthy Connections Programs  What they offer: [employer] [facility] (FOC: [phone]), Starting Fresh Chronic Disease Management Program ([phone]), Mother and Child Dependency Program ([phone]), Help Me Grow and Early Head Start Program (515-751-4947), Breast and Cervical Cancer Project (BCCP:426.231.5940), and Shots for Tots ([phone])    Healthy Smiles Program  What they offer: Free dental cleanings in partnership with [facility]; located in [facility].  Phone Number: [phone].    Good Rx:  What they offer: Good Rx tracks prescription drug prices and provides free drug coupons for discounts on medications.  Website: https://www.[employer]    NeedyMeds:  What they offer: [employer] offers free information on medications and healthcare cost savings programs including prescription assistance programs, coupons, and discount programs.  Helpline: 100.993.7316  Website: https://www.PinPay.org    RX Assist:  What they offer: Information about free and low-cost medicine programs.  Website: https://www.rxassist.org    [facility] $4 Prescription Program:  What they offer: Prescription Program includes up to a 30-day supply for $4 and a 90-day supply for $10 of some covered generic drugs at commonly prescribed dosages  Website: https://www.[employer]/cp/4-prescriptions/6426782

## 2024-05-08 ENCOUNTER — TELEPHONE (OUTPATIENT)
Dept: GASTROENTEROLOGY | Age: 49
End: 2024-05-08

## 2024-05-08 NOTE — TELEPHONE ENCOUNTER
Last seen 11/2021/Leif/Chronic hepatitis C without hepatic coma/Sarika  1st attempt- Called pt and LVM to call the office to minerva an appt.  2nd attempt- Sent NP letter.

## 2025-01-14 NOTE — PROGRESS NOTES
